# Patient Record
Sex: FEMALE | Race: WHITE | NOT HISPANIC OR LATINO | Employment: OTHER | URBAN - METROPOLITAN AREA
[De-identification: names, ages, dates, MRNs, and addresses within clinical notes are randomized per-mention and may not be internally consistent; named-entity substitution may affect disease eponyms.]

---

## 2017-01-10 ENCOUNTER — GENERIC CONVERSION - ENCOUNTER (OUTPATIENT)
Dept: OTHER | Facility: OTHER | Age: 67
End: 2017-01-10

## 2017-01-23 ENCOUNTER — GENERIC CONVERSION - ENCOUNTER (OUTPATIENT)
Dept: OTHER | Facility: OTHER | Age: 67
End: 2017-01-23

## 2017-02-10 ENCOUNTER — APPOINTMENT (OUTPATIENT)
Dept: LAB | Facility: HOSPITAL | Age: 67
End: 2017-02-10
Payer: MEDICARE

## 2017-02-10 ENCOUNTER — TRANSCRIBE ORDERS (OUTPATIENT)
Dept: LAB | Facility: HOSPITAL | Age: 67
End: 2017-02-10

## 2017-02-10 DIAGNOSIS — E87.5 HYPERPOTASSEMIA: Primary | ICD-10-CM

## 2017-02-10 DIAGNOSIS — E87.5 HYPERPOTASSEMIA: ICD-10-CM

## 2017-02-10 LAB — POTASSIUM SERPL-SCNC: 4.3 MMOL/L (ref 3.5–5.3)

## 2017-02-10 PROCEDURE — 84132 ASSAY OF SERUM POTASSIUM: CPT

## 2017-02-10 PROCEDURE — 36415 COLL VENOUS BLD VENIPUNCTURE: CPT

## 2017-02-28 ENCOUNTER — TRANSCRIBE ORDERS (OUTPATIENT)
Dept: LAB | Facility: HOSPITAL | Age: 67
End: 2017-02-28

## 2017-03-03 ENCOUNTER — APPOINTMENT (OUTPATIENT)
Dept: LAB | Facility: HOSPITAL | Age: 67
End: 2017-03-03
Payer: MEDICARE

## 2017-03-03 DIAGNOSIS — E11.9 TYPE 2 DIABETES MELLITUS WITHOUT COMPLICATIONS (HCC): ICD-10-CM

## 2017-03-03 LAB
ALBUMIN SERPL BCP-MCNC: 3.4 G/DL (ref 3.5–5)
ALP SERPL-CCNC: 86 U/L (ref 46–116)
ALT SERPL W P-5'-P-CCNC: 18 U/L (ref 12–78)
ANION GAP SERPL CALCULATED.3IONS-SCNC: 8 MMOL/L (ref 4–13)
AST SERPL W P-5'-P-CCNC: 10 U/L (ref 5–45)
BILIRUB SERPL-MCNC: 1.26 MG/DL (ref 0.2–1)
BUN SERPL-MCNC: 14 MG/DL (ref 5–25)
CALCIUM SERPL-MCNC: 9.2 MG/DL (ref 8.3–10.1)
CHLORIDE SERPL-SCNC: 108 MMOL/L (ref 100–108)
CHOLEST SERPL-MCNC: 162 MG/DL (ref 50–200)
CO2 SERPL-SCNC: 28 MMOL/L (ref 21–32)
CREAT SERPL-MCNC: 0.78 MG/DL (ref 0.6–1.3)
CREAT UR-MCNC: 327 MG/DL
EST. AVERAGE GLUCOSE BLD GHB EST-MCNC: 151 MG/DL
GFR SERPL CREATININE-BSD FRML MDRD: >60 ML/MIN/1.73SQ M
GLUCOSE SERPL-MCNC: 129 MG/DL (ref 65–140)
HBA1C MFR BLD: 6.9 % (ref 4.2–6.3)
HDLC SERPL-MCNC: 59 MG/DL (ref 40–60)
LDLC SERPL CALC-MCNC: 91 MG/DL (ref 0–100)
MICROALBUMIN UR-MCNC: 139 MG/L (ref 0–20)
MICROALBUMIN/CREAT 24H UR: 43 MG/G CREATININE (ref 0–30)
POTASSIUM SERPL-SCNC: 4.5 MMOL/L (ref 3.5–5.3)
PROT SERPL-MCNC: 7.2 G/DL (ref 6.4–8.2)
SODIUM SERPL-SCNC: 144 MMOL/L (ref 136–145)
TRIGL SERPL-MCNC: 61 MG/DL

## 2017-03-03 PROCEDURE — 80053 COMPREHEN METABOLIC PANEL: CPT

## 2017-03-03 PROCEDURE — 80061 LIPID PANEL: CPT

## 2017-03-03 PROCEDURE — 83036 HEMOGLOBIN GLYCOSYLATED A1C: CPT

## 2017-03-03 PROCEDURE — 82043 UR ALBUMIN QUANTITATIVE: CPT

## 2017-03-03 PROCEDURE — 36415 COLL VENOUS BLD VENIPUNCTURE: CPT

## 2017-03-03 PROCEDURE — 82570 ASSAY OF URINE CREATININE: CPT

## 2017-03-06 ENCOUNTER — ALLSCRIPTS OFFICE VISIT (OUTPATIENT)
Dept: OTHER | Facility: OTHER | Age: 67
End: 2017-03-06

## 2017-03-15 ENCOUNTER — APPOINTMENT (OUTPATIENT)
Dept: LAB | Facility: HOSPITAL | Age: 67
End: 2017-03-15
Payer: MEDICARE

## 2017-03-15 ENCOUNTER — GENERIC CONVERSION - ENCOUNTER (OUTPATIENT)
Dept: OTHER | Facility: OTHER | Age: 67
End: 2017-03-15

## 2017-03-15 DIAGNOSIS — E11.21 TYPE 2 DIABETES MELLITUS WITH DIABETIC NEPHROPATHY (HCC): ICD-10-CM

## 2017-03-15 LAB
ANION GAP SERPL CALCULATED.3IONS-SCNC: 7 MMOL/L (ref 4–13)
BUN SERPL-MCNC: 15 MG/DL (ref 5–25)
CALCIUM SERPL-MCNC: 9 MG/DL (ref 8.3–10.1)
CHLORIDE SERPL-SCNC: 106 MMOL/L (ref 100–108)
CO2 SERPL-SCNC: 31 MMOL/L (ref 21–32)
CREAT SERPL-MCNC: 0.79 MG/DL (ref 0.6–1.3)
GFR SERPL CREATININE-BSD FRML MDRD: >60 ML/MIN/1.73SQ M
GLUCOSE P FAST SERPL-MCNC: 105 MG/DL (ref 65–99)
POTASSIUM SERPL-SCNC: 4.4 MMOL/L (ref 3.5–5.3)
SODIUM SERPL-SCNC: 144 MMOL/L (ref 136–145)

## 2017-03-15 PROCEDURE — 36415 COLL VENOUS BLD VENIPUNCTURE: CPT

## 2017-03-15 PROCEDURE — 80048 BASIC METABOLIC PNL TOTAL CA: CPT

## 2017-04-29 ENCOUNTER — TRANSCRIBE ORDERS (OUTPATIENT)
Dept: LAB | Facility: HOSPITAL | Age: 67
End: 2017-04-29

## 2017-04-29 ENCOUNTER — APPOINTMENT (OUTPATIENT)
Dept: LAB | Facility: HOSPITAL | Age: 67
End: 2017-04-29
Payer: MEDICARE

## 2017-04-29 DIAGNOSIS — L40.50 PSORIATIC ARTHROPATHY (HCC): ICD-10-CM

## 2017-04-29 DIAGNOSIS — L40.50 PSORIATIC ARTHROPATHY (HCC): Primary | ICD-10-CM

## 2017-04-29 LAB
ALBUMIN SERPL BCP-MCNC: 3.4 G/DL (ref 3.5–5)
ALP SERPL-CCNC: 87 U/L (ref 46–116)
ALT SERPL W P-5'-P-CCNC: 20 U/L (ref 12–78)
ANION GAP SERPL CALCULATED.3IONS-SCNC: 6 MMOL/L (ref 4–13)
AST SERPL W P-5'-P-CCNC: 8 U/L (ref 5–45)
BASOPHILS # BLD AUTO: 0.03 THOUSANDS/ΜL (ref 0–0.1)
BASOPHILS NFR BLD AUTO: 1 % (ref 0–1)
BILIRUB SERPL-MCNC: 1.11 MG/DL (ref 0.2–1)
BUN SERPL-MCNC: 15 MG/DL (ref 5–25)
CALCIUM SERPL-MCNC: 9.4 MG/DL (ref 8.3–10.1)
CHLORIDE SERPL-SCNC: 107 MMOL/L (ref 100–108)
CO2 SERPL-SCNC: 31 MMOL/L (ref 21–32)
CREAT SERPL-MCNC: 0.77 MG/DL (ref 0.6–1.3)
CRP SERPL QL: 4.6 MG/L
EOSINOPHIL # BLD AUTO: 0.08 THOUSAND/ΜL (ref 0–0.61)
EOSINOPHIL NFR BLD AUTO: 1 % (ref 0–6)
ERYTHROCYTE [DISTWIDTH] IN BLOOD BY AUTOMATED COUNT: 14.6 % (ref 11.6–15.1)
ERYTHROCYTE [SEDIMENTATION RATE] IN BLOOD: 19 MM/HOUR (ref 0–20)
GFR SERPL CREATININE-BSD FRML MDRD: >60 ML/MIN/1.73SQ M
GLUCOSE P FAST SERPL-MCNC: 130 MG/DL (ref 65–99)
HCT VFR BLD AUTO: 38.9 % (ref 34.8–46.1)
HGB BLD-MCNC: 12.6 G/DL (ref 11.5–15.4)
LYMPHOCYTES # BLD AUTO: 0.95 THOUSANDS/ΜL (ref 0.6–4.47)
LYMPHOCYTES NFR BLD AUTO: 16 % (ref 14–44)
MCH RBC QN AUTO: 30.3 PG (ref 26.8–34.3)
MCHC RBC AUTO-ENTMCNC: 32.4 G/DL (ref 31.4–37.4)
MCV RBC AUTO: 94 FL (ref 82–98)
MONOCYTES # BLD AUTO: 0.36 THOUSAND/ΜL (ref 0.17–1.22)
MONOCYTES NFR BLD AUTO: 6 % (ref 4–12)
NEUTROPHILS # BLD AUTO: 4.49 THOUSANDS/ΜL (ref 1.85–7.62)
NEUTS SEG NFR BLD AUTO: 76 % (ref 43–75)
NRBC BLD AUTO-RTO: 0 /100 WBCS
PLATELET # BLD AUTO: 260 THOUSANDS/UL (ref 149–390)
PMV BLD AUTO: 9.9 FL (ref 8.9–12.7)
POTASSIUM SERPL-SCNC: 4.6 MMOL/L (ref 3.5–5.3)
PROT SERPL-MCNC: 7.1 G/DL (ref 6.4–8.2)
RBC # BLD AUTO: 4.16 MILLION/UL (ref 3.81–5.12)
SODIUM SERPL-SCNC: 144 MMOL/L (ref 136–145)
WBC # BLD AUTO: 5.92 THOUSAND/UL (ref 4.31–10.16)

## 2017-04-29 PROCEDURE — 85652 RBC SED RATE AUTOMATED: CPT

## 2017-04-29 PROCEDURE — 80053 COMPREHEN METABOLIC PANEL: CPT

## 2017-04-29 PROCEDURE — 85025 COMPLETE CBC W/AUTO DIFF WBC: CPT

## 2017-04-29 PROCEDURE — 86140 C-REACTIVE PROTEIN: CPT

## 2017-04-29 PROCEDURE — 36415 COLL VENOUS BLD VENIPUNCTURE: CPT

## 2017-09-12 ENCOUNTER — TRANSCRIBE ORDERS (OUTPATIENT)
Dept: LAB | Facility: HOSPITAL | Age: 67
End: 2017-09-12

## 2017-09-12 ENCOUNTER — APPOINTMENT (OUTPATIENT)
Dept: LAB | Facility: HOSPITAL | Age: 67
End: 2017-09-12
Payer: MEDICARE

## 2017-09-12 DIAGNOSIS — E03.9 UNSPECIFIED HYPOTHYROIDISM: ICD-10-CM

## 2017-09-12 DIAGNOSIS — E03.9 UNSPECIFIED HYPOTHYROIDISM: Primary | ICD-10-CM

## 2017-09-12 DIAGNOSIS — E11.9 TYPE 2 DIABETES MELLITUS WITHOUT COMPLICATIONS (HCC): ICD-10-CM

## 2017-09-12 DIAGNOSIS — E55.9 UNSPECIFIED VITAMIN D DEFICIENCY: ICD-10-CM

## 2017-09-12 LAB
25(OH)D3 SERPL-MCNC: 36.7 NG/ML (ref 30–100)
ALBUMIN SERPL BCP-MCNC: 3.3 G/DL (ref 3.5–5)
ALP SERPL-CCNC: 87 U/L (ref 46–116)
ALT SERPL W P-5'-P-CCNC: 18 U/L (ref 12–78)
ANION GAP SERPL CALCULATED.3IONS-SCNC: 7 MMOL/L (ref 4–13)
AST SERPL W P-5'-P-CCNC: 14 U/L (ref 5–45)
BILIRUB SERPL-MCNC: 0.97 MG/DL (ref 0.2–1)
BUN SERPL-MCNC: 16 MG/DL (ref 5–25)
CALCIUM SERPL-MCNC: 9.2 MG/DL (ref 8.3–10.1)
CHLORIDE SERPL-SCNC: 107 MMOL/L (ref 100–108)
CHOLEST SERPL-MCNC: 141 MG/DL (ref 50–200)
CO2 SERPL-SCNC: 28 MMOL/L (ref 21–32)
CREAT SERPL-MCNC: 0.85 MG/DL (ref 0.6–1.3)
CREAT UR-MCNC: 190 MG/DL
EST. AVERAGE GLUCOSE BLD GHB EST-MCNC: 137 MG/DL
GFR SERPL CREATININE-BSD FRML MDRD: 72 ML/MIN/1.73SQ M
GLUCOSE P FAST SERPL-MCNC: 119 MG/DL (ref 65–99)
HBA1C MFR BLD: 6.4 % (ref 4.2–6.3)
HDLC SERPL-MCNC: 58 MG/DL (ref 40–60)
LDLC SERPL CALC-MCNC: 71 MG/DL (ref 0–100)
MICROALBUMIN UR-MCNC: 104 MG/L (ref 0–20)
MICROALBUMIN/CREAT 24H UR: 55 MG/G CREATININE (ref 0–30)
POTASSIUM SERPL-SCNC: 4.4 MMOL/L (ref 3.5–5.3)
PROT SERPL-MCNC: 7.2 G/DL (ref 6.4–8.2)
SODIUM SERPL-SCNC: 142 MMOL/L (ref 136–145)
TRIGL SERPL-MCNC: 58 MG/DL
TSH SERPL DL<=0.05 MIU/L-ACNC: 1.98 UIU/ML (ref 0.36–3.74)

## 2017-09-12 PROCEDURE — 84443 ASSAY THYROID STIM HORMONE: CPT

## 2017-09-12 PROCEDURE — 36415 COLL VENOUS BLD VENIPUNCTURE: CPT

## 2017-09-12 PROCEDURE — 82306 VITAMIN D 25 HYDROXY: CPT

## 2017-09-12 PROCEDURE — 80053 COMPREHEN METABOLIC PANEL: CPT

## 2017-09-12 PROCEDURE — 82043 UR ALBUMIN QUANTITATIVE: CPT

## 2017-09-12 PROCEDURE — 82570 ASSAY OF URINE CREATININE: CPT

## 2017-09-12 PROCEDURE — 80061 LIPID PANEL: CPT

## 2017-09-12 PROCEDURE — 83036 HEMOGLOBIN GLYCOSYLATED A1C: CPT

## 2017-09-13 DIAGNOSIS — E11.9 TYPE 2 DIABETES MELLITUS WITHOUT COMPLICATIONS (HCC): ICD-10-CM

## 2017-09-13 DIAGNOSIS — Z13.820 ENCOUNTER FOR SCREENING FOR OSTEOPOROSIS: ICD-10-CM

## 2017-09-18 ENCOUNTER — ALLSCRIPTS OFFICE VISIT (OUTPATIENT)
Dept: OTHER | Facility: OTHER | Age: 67
End: 2017-09-18

## 2017-09-19 ENCOUNTER — TRANSCRIBE ORDERS (OUTPATIENT)
Dept: ADMINISTRATIVE | Facility: HOSPITAL | Age: 67
End: 2017-09-19

## 2017-09-19 DIAGNOSIS — Z12.31 ENCOUNTER FOR SCREENING MAMMOGRAM FOR MALIGNANT NEOPLASM OF BREAST: Primary | ICD-10-CM

## 2017-10-17 DIAGNOSIS — Z12.31 ENCOUNTER FOR SCREENING MAMMOGRAM FOR MALIGNANT NEOPLASM OF BREAST: ICD-10-CM

## 2017-10-24 ENCOUNTER — HOSPITAL ENCOUNTER (OUTPATIENT)
Dept: RADIOLOGY | Facility: HOSPITAL | Age: 67
Discharge: HOME | End: 2017-10-24
Attending: OBSTETRICS & GYNECOLOGY
Payer: MEDICARE

## 2017-10-24 DIAGNOSIS — Z12.31 ENCOUNTER FOR SCREENING MAMMOGRAM FOR MALIGNANT NEOPLASM OF BREAST: ICD-10-CM

## 2017-10-24 PROCEDURE — G0202 SCR MAMMO BI INCL CAD: HCPCS

## 2017-10-26 ENCOUNTER — LAB REQUISITION (OUTPATIENT)
Dept: LAB | Facility: HOSPITAL | Age: 67
End: 2017-10-26
Payer: MEDICARE

## 2017-10-26 ENCOUNTER — ALLSCRIPTS OFFICE VISIT (OUTPATIENT)
Dept: OTHER | Facility: OTHER | Age: 67
End: 2017-10-26

## 2017-10-26 DIAGNOSIS — Z01.419 ENCOUNTER FOR GYNECOLOGICAL EXAMINATION WITHOUT ABNORMAL FINDING: ICD-10-CM

## 2017-10-26 PROCEDURE — 87624 HPV HI-RISK TYP POOLED RSLT: CPT | Performed by: OBSTETRICS & GYNECOLOGY

## 2017-10-26 PROCEDURE — G0145 SCR C/V CYTO,THINLAYER,RESCR: HCPCS | Performed by: OBSTETRICS & GYNECOLOGY

## 2017-10-26 NOTE — PROGRESS NOTES
Assessment  1  Diabetic nephropathy (250 40,583 81) (E11 21)  2  Diabetes mellitus, type 2 (250 00) (E11 9)  3  Vitamin D deficiency (268 9) (E55 9)  4  Hypothyroidism (244 9) (E03 9)  5  Caregiver stress (V61 49) (Z63 6)1      Assessment and plan #1 diabetic nephropathy continue with the ACE inhibitor at the current dose unfortunately her blood pressure is already on the low side we cannot increase the ACE inhibitor we will recheck a urine microalbumin in 6 months #2 type 2 diabetes recommended healthy and balanced diet reducing carbohydrates and sweets recommend further weight loss and walking check a comprehensive metabolic panel, hemoglobin A1c, lipid panel number for vitamin D deficiency check vitamin D level #5 hypothyroidism currently euthyroid continue with the current dose of levothyroxine check third generation TSH #6 caregiver stress at this point on the patient does not want medical treatment or counseling no suicidal ideation, I have counseled the patient today she will notify me if her symptoms were to worsen I asked her to make time for herself every day  RTO in 6 months call if any problems  Patient received the Prevnar 13 today, she'll come back in 1 month for her flu shot  1 Amended By: Juan Winter; Sep 18 2017 11:37 AM EST    Plan  Advance directive discussed with patient    · We recommend that you create an advance directive ; Status:Complete - Retrospective  Authorization;   Done: [id number]  Diabetes mellitus, type 2    · (1) COMPREHENSIVE METABOLIC PANEL; Status:Active; Requested for:18Mar2018;    · (1) HEMOGLOBIN A1C; Status:Active; Requested for:18Mar2018;    · (1) LIPID PANEL, FASTING; Status:Active; Requested for:18Mar2018;    · (1) MICROALBUMIN CREATININE RATIO, RANDOM URINE; Status:Active; Requested for:18Mar2018;   Hypothyroidism, Vitamin D deficiency    · (Q) TSH, 3RD GENERATION; Status:Active;  Requested for:18Mar2018;   Vitamin D deficiency    · *(Q) VITAMIN D, 25-HYDROXY, LC/MS/MS; Status:Active; Requested for:18Mar2018;     Chief Complaint  Chief Complaint Chronic Condition [facility]: Patient is here today for follow up of chronic conditions described in HPI  History of Present Illness  HPI: 71-year-old female coming in for a follow-up examination type 2 diabetes, hyperlipidemia, hypothyroidism, vitamin D deficiency, psoriatic arthritis and diabetic neuropathy; the patient reports to me that  with pancreatic ca - some good day and some bad day   The patient does report to me that her  can be very demanding, she reports made that he makes her get things and do things for her all day long  She reports made that at times he will not take his medications, she is becoming frustrated with him because of this  Patient does report to me because his situation she has good days and bad days on her diet  She has been going up and down the steps more frequently taking care of her   She does report caregiver stress symptoms no suicidal ideation      Review of Systems  Complete-Female:   Constitutional: No fever, no chills, feels well, no tiredness, no recent weight gain or weight loss  Cardiovascular: lower extremity edema-- and-- chronic, but-- the heart rate was not slow,-- no chest pain,-- no intermittent leg claudication,-- the heart rate was not fast-- and-- no palpitations  Respiratory: no shortness of breath,-- no cough,-- no wheezing-- and-- no shortness of breath during exertion  Gastrointestinal: No complaints of abdominal pain, no constipation, no nausea or vomiting, no diarrhea, no bloody stools  Psychiatric: anxiety, but-- not suicidal-- and-- no depression  ROS Reviewed:   ROS reviewed  Active Problems  1  Acute deep vein thrombosis of both lower extremities, unspecified vein (453 40) (I82 403)  2  Advance directive discussed with patient (V65 49) (Z67 89)  3  Arthritis (716 90) (M19 90)  4   Bereavement, uncomplicated (S60 51) (Z63 4)  5  Carcinoma in situ of breast, unspecified laterality  6  Chronic venous insufficiency (459 81) (I87 2)  7  Diabetes mellitus, type 2 (250 00) (E11 9)  8  Diabetic nephropathy (250 40,583 81) (E11 21)  9  Encounter for routine gynecological examination (V72 31) (Z01 419)  10  Encounter for screening mammogram for malignant neoplasm of breast (V76 12) (Z12 31)  11  Factor V Leiden mutation (289 81) (D68 51)  12  Hyperlipidemia (272 4) (E78 5)  13  Hypothyroidism (244 9) (E03 9)  14  Influenza vaccine needed (V04 81) (Z23)  15  Malignant neoplasm of breast (174 9) (C50 919)  16  Nontoxic single thyroid nodule (241 0) (E04 1)  17  Pain in right knee (719 46) (M25 561)  18  Pap smear, as part of routine gynecological examination (V76 2) (Z01 419)  19  Prediabetes (790 29) (R73 09)  20  Psoriasis (696 1) (L40 9)  21  Routine Gynecological Exam With Cervical Pap Smear (V72 31)  22  Screening for genitourinary condition (V81 6) (Z13 89)  23  Screening for osteoporosis (V82 81) (Z13 820)  24  Superficial thrombophlebitis of leg, unspecified laterality  25  Urine test positive for microalbuminuria (791 0) (R80 9)  26  Visit for screening mammogram (V76 12) (Z12 31)  27  Vitamin D deficiency (268 9) (E55 9)    Past Medical History  1  History of Encounter for routine gynecological examination (V72 31) (Z01 419)  2  History of Encounter for routine gynecological examination (V72 31) (Z01 419)  3  History of Encounter for routine gynecological examination (V72 31) (Z01 419)  4  History of Pap smear, as part of routine gynecological examination (V76 2) (Z01 419)  5  History of Routine Gynecological Exam With Cervical Pap Smear (V72 31)  Active Problems And Past Medical History Reviewed: The active problems and past medical history were reviewed and updated today  Surgical History  1  History of Endometrial Biopsy By Suction  2  History of Left Breast Lumpectomy  3  History of Oral Surgery Tooth Extraction  4  History of Tubal Ligation  Surgical History Reviewed: The surgical history was reviewed and updated today  Family History  Mother   1  Family history of Breast Cancer (V16 3)  Paternal Grandmother   2  Family history of Breast Cancer (V16 3)  Maternal Aunt   3  Family history of Breast Cancer (V16 3)  Family History   4  Family history of Cancer  5  Family history of Coronary Artery Disease (V17 49)  6  Family history of Diabetes Mellitus (V18 0)  7  Family history of Hypotension  8  Family history of Stroke Syndrome (V17 1)  9  Family history of Thyroid Disorder (V18 19)  Family History Reviewed: The family history was reviewed and updated today  Social History   · Denied: History of Alcohol Use (History)   · Bereavement, uncomplicated (E18 46) (D07 5)   · Denied: History of Drug Use   · Never A Smoker  Social History Reviewed: The social history was reviewed and updated today  The social history was reviewed and is unchanged  Current Meds  1  Aspirin 325 MG Oral Tablet; TAKE 1 TABLET DAILY; Therapy: (Recorded:24Nov2015) to Recorded  2  Calcium + D TABS; Therapy: (Recorded:24Nov2015) to Recorded  3  Folic Acid 1 MG Oral Tablet; TAKE 1 TABLET DAILY; Therapy: (Recorded:24Nov2015) to Recorded  4  Levothyroxine Sodium 75 MCG Oral Tablet; TAKE 1 TABLET DAILY ON AN EMPTY STOMACH; Therapy: 14FVG8491 to (Evaluate:06Apr2017)  Requested for: 98SJM7716; Last Rx:07Mar2017   Ordered  5  Lisinopril 5 MG Oral Tablet; TAKE 1 TABLET DAILY; Therapy: 18UXM8950 to (Evaluate:02Oct2017)  Requested for: 20NJV7571; Last Rx:06Mar2017   Ordered  6  Meloxicam 15 MG Oral Tablet; take 1 tablet daily as needed; Therapy: (Recorded:24Nov2015) to Recorded  7  MetFORMIN HCl - 500 MG Oral Tablet; TAKE 1 TABLET TWICE DAILY WITH MEALS; Therapy: 67YEX8221 to (Evaluate:02Oct2017)  Requested for: 97DTW7372; Last Rx:06Mar2017   Ordered  8  Methotrexate 2 5 MG Oral Tablet; TAKE 8 TABLETS WEEKLY;    Therapy: (Recorded:24Nov2015) to Recorded  9  Simvastatin 20 MG Oral Tablet; Take 1 tablet daily; Therapy: 97PFH5759 to (Evaluate:01Mar2018)  Requested for: 90VNI7635; Last Rx:06Mar2017   Ordered  10  Vitamin D 2000 UNIT Oral Capsule; Therapy: (Recorded:24Nov2015) to Recorded  Medication List Reviewed: The medication list was reviewed and updated today  Allergies  1  No Known Drug Allergies  2  No Known Environmental Allergies  3  No Known Food Allergies    Vitals  Vital Signs    Recorded: 18Sep2017 10:23AM   Heart Rate 72   Respiration 18   Systolic 411, LUE, Sitting   Diastolic 62, LUE, Sitting   BP CUFF SIZE Large   Height 5 ft 5 6 in   Weight 270 lb 0 2 oz   BMI Calculated 44 12   BSA Calculated 2 26     Physical Exam    Constitutional   General appearance: No acute distress, well appearing and well nourished  Eyes   Conjunctiva and lids: No swelling, erythema or discharge  Pupils and irises: Equal, round and reactive to light  Ears, Nose, Mouth, and Throat   External inspection of ears and nose: Normal     Otoscopic examination: Tympanic membranes translucent with normal light reflex  Canals patent without erythema  Nasal mucosa, septum, and turbinates: Normal without edema or erythema  Oropharynx: Normal with no erythema, edema, exudate or lesions  Pulmonary   Respiratory effort: No increased work of breathing or signs of respiratory distress  Auscultation of lungs: Clear to auscultation  Cardiovascular   Auscultation of heart: Normal rate and rhythm, normal S1 and S2, without murmurs  Examination of extremities for edema and/or varicosities: Normal     Abdomen   Abdomen: Non-tender, no masses  Liver and spleen: No hepatomegaly or splenomegaly  Lymphatic   Palpation of lymph nodes in neck: No lymphadenopathy  Psychiatric   Mood and affect: Abnormal   Mood and Affect: anxious-- and-- not depressed          Results/Data  (1) COMPREHENSIVE METABOLIC PANEL [id number] 81:04MW Michelle Arauz Order Number: [id number]     Test Name Result Flag Reference   SODIUM 142 mmol/L  136-145   POTASSIUM 4 4 mmol/L  3 5-5 3   CHLORIDE 107 mmol/L  100-108   CARBON DIOXIDE 28 mmol/L  21-32   ANION GAP (CALC) 7 mmol/L  4-13   BLOOD UREA NITROGEN 16 mg/dL  5-25   CREATININE 0 85 mg/dL  0 60-1 30   Standardized to IDMS reference method   CALCIUM 9 2 mg/dL  8 3-10 1   BILI, TOTAL 0 97 mg/dL  0 20-1 00   ALK PHOSPHATAS 87 U/L     ALT (SGPT) 18 U/L  12-78   Specimen collection should occur prior to Sulfasalazine and/or Sulfapyridine administration due to the potential for falsely depressed results  AST(SGOT) 14 U/L  5-45   Specimen collection should occur prior to Sulfasalazine administration due to the potential for falsely depressed results  ALBUMIN 3 3 g/dL L 3 5-5 0   TOTAL PROTEIN 7 2 g/dL  6 4-8 2   eGFR 72 ml/min/1 73sq [facility] Disease Education Program recommendations are as follows:  GFR calculation is accurate only with a steady state creatinine  Chronic Kidney disease less than 60 ml/min/1 73 sq  meters  Kidney failure less than 15 ml/min/1 73 sq  meters  GLUCOSE FASTING 119 mg/dL H 65-99   Specimen collection should occur prior to Sulfasalazine administration due to the potential for falsely depressed results  Specimen collection should occur prior to Sulfapyridine administration due to the potential for falsely elevated results  (1) HEMOGLOBIN A1C 61Evy1704 08:36AM Michelle Arauz Order Number: [id number]     Test Name Result Flag Reference   HEMOGLOBIN A1C 6 4 % H 4 2-6 3   EST  AVG  GLUCOSE 137 mg/dl       (1) LIPID PANEL, FASTING 29Ern7554 08:36AM [facility] Order Number: [id number]     Test Name Result Flag Reference   CHOLESTEROL 141 mg/dL     HDL,DIRECT 58 mg/dL  40-60   Specimen collection should occur prior to Metamizole administration due to the potential for falsley depressed results  LDL CHOLESTEROL CALCULATED 71 mg/dL  0-100   - Patient Instructions: This is a fasting blood test  Water,black tea or black  coffee only after 9:00pm the night before test   Drink 2 glasses of water the morning of test       Triglyceride:        Normal ??? ??? ??? ??? ??? ??? ??? <150 mg/dl   ??? ??? ???Borderline High ??? ??? 150-199 mg/dl   ??? ??? ? ?? High ??? ??? ??? ??? ??? ??? ??? 200-499 mg/dl   ??? ??? ? ??Very High ??? ??? ??? ??? ??? >499 mg/dl      Cholesterol:       Desirable ??? ??? ??? ??? <200 mg/dl   ??? ??? Borderline High ??? 200-239 mg/dl   ??? ??? High ??? ??? ??? ??? ??? ??? >239 mg/dl      HDL Cholesterol:       High ??? ???>59 mg/dL   ??? ??? Low ??? ??? <41 mg/dL      This screening LDL is a calculated result  It does not have the accuracy of the Direct Measured LDL in the monitoring of patients with hyperlipidemia and/or statin therapy  Direct Measure LDL (QUE833) must be ordered separately in these patients  TRIGLYCERIDES 58 mg/dL  <=150   Specimen collection should occur prior to N-Acetylcysteine or Metamizole administration due to the potential for falsely depressed results  (1) MICROALBUMIN CREATININE RATIO, RANDOM URINE 21Vmb7186 08:36AM [facility]   [facility] Order Number: [id number]     Test Name Result Flag Reference   MICROALBUMIN/ CREAT R 55 mg/g creatinine H 0-30   MICROALBUMIN,URINE 104 0 mg/L H 0 0-20 0   CREATININE URINE 190 0 mg/dL       (1) VITAMIN D 25-HYDROXY 25Eri8238 08:36AM [facility]     Test Name Result Flag Reference   VIT D 25-HYDROX 36 7 ng/mL  30 0-100 0   This assay is a certified procedure of the CDC Vitamin D Standardization Certification Program (VDSCP)     Deficiency <20ng/ml   Insufficiency 20-30ng/ml   Sufficient  ng/ml     *Patients undergoing fluorescein dye angiography may retain small amounts of fluorescein in the body for 48-72 hours post procedure   Samples containing fluorescein can produce falsely elevated Vitamin D values  If the patient had this procedure, a specimen should be resubmitted post fluorescein clearance  (1) TSH [id number] 08:36AM Roxanne Pal     Test Name Result Flag Reference   TSH 1 980 uIU/mL  0 358-3 740   This is a patient instruction: This test is non-fasting  Please drink two glasses of water morning of bloodwork  Patients undergoing fluorescein dye angiography may retain small amounts of fluorescein in the body for 48-72 hours post procedure  Samples containing fluorescein can produce falsely depressed TSH values  If the patient had this procedure,a specimen should be resubmitted post fluorescein clearance  The recommended reference ranges for TSH during pregnancy are as follows:  First trimester 0 1 to 2 5 uIU/mL  Second trimester  0 2 to 3 0 uIU/mL  Third trimester 0 3 to 3 0 uIU/m     (1) CBC/PLT/DIFF 29Apr2017 09:04AM EP[facility], Provider   Test ordered by: Dawn Moya     Test Name Result Flag Reference   WBC COUNT 5 92 Thousand/uL  4 31-10 16   RBC COUNT 4 16 Million/uL  3 81-5 12   HEMOGLOBIN 12 6 g/dL  11 5-15 4   HEMATOCRIT 38 9 %  34 8-46  1   MCV 94 fL  82-98   MCH 30 3 pg  26 8-34 3   MCHC 32 4 g/dL  31 4-37 4   RDW 14 6 %  11 6-15 1   MPV 9 9 fL  8 9-12 7   PLATELET COUNT 008 Thousands/uL  149-390   nRBC AUTOMATED 0 /100 WBCs     NEUTROPHILS RELATIVE PERCENT 76 % H 43-75   LYMPHOCYTES RELATIVE PERCENT 16 %  14-44   MONOCYTES RELATIVE PERCENT 6 %  4-12   EOSINOPHILS RELATIVE PERCENT 1 %  0-6   BASOPHILS RELATIVE PERCENT 1 %  0-1   NEUTROPHILS ABSOLUTE COUNT 4 49 Thousands/? ??L  1 85-7 62   LYMPHOCYTES ABSOLUTE COUNT 0 95 Thousands/? ??L  0 60-4 47   MONOCYTES ABSOLUTE COUNT 0 36 Thousand/? ??L  0 17-1 22   EOSINOPHILS ABSOLUTE COUNT 0 08 Thousand/? ??L  0 00-0 61   BASOPHILS ABSOLUTE COUNT 0 03 Thousands/? ??L  0 00-0 10   This bloodwork is non-fasting  Please drink two glasses of water morning of  bloodwork       Signatures   Electronically signed by : Grant Cisneros ; Sep 18 2017 11:37AM EST                       (Author)    Electronically signed by : Rajani Hess DO; Sep 18 2017 11:37AM EST                       (Author)    Electronically signed by : Rajani Hess DO; Sep 18 2017  8:06PM EST                       (Author)

## 2017-10-27 NOTE — PROGRESS NOTES
Assessment  1  History of Encounter for routine gynecological examination (V72 31) (Z01 419)   2  History of Pap smear, as part of routine gynecological examination (V76 2) (Z01 419)   3  History of screening mammography (V15 89) (Z92 89)   4  Encounter for routine gynecological examination (V72 31) (Z01 419)   5  Pap smear, as part of routine gynecological examination (V76 2) (Z01 419)   6  Visit for screening mammogram (V76 12) (Z12 31)    Plan  Encounter for routine gynecological examination    · Follow-up visit in 1 year Evaluation and Treatment  Follow-up  Status: Hold For -  Scheduling  Requested for: 26Oct2017   Ordered; For: Encounter for routine gynecological examination; Ordered By: Vasyl Okeefe Performed:  Due: 61FRE8304  Pap smear, as part of routine gynecological examination    · (1) THIN PREP PAP WITH IMAGING; Status: In Progress - Specimen/Data  Collected,Retrospective By Protocol Authorization;   Done: [id number]   Perform:[facility] In Office Collection; Order Comments:Cervical/Endocervical; BAB:[id number]; Last Updated Graham Boucher; 10/26/2017 8:25:03 AM;Ordered; For:Pap smear, as part of routine gynecological examination; Ordered By:Rachana Ash; Maturation index required? : No  HPV? : Regardless of Interpretation  Visit for screening mammogram    · * MAMMO SCREENING BILATERAL W CAD; Status:Hold For - Exact  Date,Scheduling,Retrospective By Protocol Authorization; Requested for:Oct 2018; Perform:[facility]; HRE:[id number]; Last Updated Graham Boucher; 10/26/2017 8:25:43 AM;Ordered; For:Visit for screening mammogram; Ordered By:Rachana Ash; Discussion/Summary  healthy adult female Currently, she eats a healthy diet  cervical cancer screening is current Pap test was done today Breast cancer screening: monthly self breast exam was advised, mammogram is current and mammogram has been ordered  Colorectal cancer screening: colorectal cancer screening is current   Advice and education were given regarding nutrition, aerobic exercise, calcium supplements, vitamin D supplements and reproductive health  Normal GYN Exam  Stressed  ordered  SMear DOne  GYN Exam in 1 year  if any problems develop during the interim  The patient has the current Goals: [address]  The patent has the current Barriers: None  Patient is able to Self-Care  PATIENT EDUCATION RECORD She was given the following educational materials:  Ideas for a Healthy Life Style  The treatment plan was reviewed with the patient/guardian  The patient/guardian understands and agrees with the treatment plan     Self Referrals: No      Chief Complaint  Yearly Exam  doing well  No concerns or questions for today  History of Present Illness  HPI: Followed for KIDNEYS & DM    Normal Bowel & Bladder habits  pelvic or abdominal pain   GYN HM, Adult Female [facility] Sero: The patient is being seen for a gynecology evaluation  The last health maintenance visit was 1 year(s) ago  General Health: The patient's health since the last visit is described as good  She has regular dental visits  -- She denies vision problems  -- She denies hearing loss  Lifestyle:  She consumes a diverse and healthy diet  -- She does not have any weight concerns  -- She exercises regularly  -- She does not use tobacco -- She denies drug use  Reproductive health: the patient is postmenopausal    Screening: cancer screening reviewed and current  metabolic screening reviewed and current  risk screening reviewed and current  Review of Systems    Constitutional: No fever, no chills, feels well, no tiredness, no recent weight gain or loss  ENT: no ear ache, no loss of hearing, no nosebleeds or nasal discharge, no sore throat or hoarseness  Cardiovascular: DM, but-- no complaints of slow or fast heart rate, no chest pain, no palpitations, no leg claudication or lower extremity edema     Respiratory: no complaints of shortness of breath, no wheezing, no dyspnea on exertion, no orthopnea or PND  Breasts: no complaints of breast pain, breast lump or nipple discharge  Gastrointestinal: no complaints of abdominal pain, no constipation, no nausea or diarrhea, no vomiting, no bloody stools  Genitourinary: no complaints of dysuria, no incontinence, no pelvic pain, no dysmenorrhea, no vaginal discharge or abnormal vaginal bleeding  Musculoskeletal: no complaints of arthralgia, no myalgia, no joint swelling or stiffness, no limb pain or swelling  Integumentary: no complaints of skin rash or lesion, no itching or dry skin, no skin wounds  Neurological: no complaints of headache, no confusion, no numbness or tingling, no dizziness or fainting  Active Problems  1  Acute deep vein thrombosis of both lower extremities, unspecified vein (453 40)   (I82 403)   2  Advance directive discussed with patient (V65 49) (Z71 89)   3  Arthritis (716 90) (M19 90)   4  Bereavement, uncomplicated (Q47 93) (H20 4)   5  Carcinoma in situ of breast, unspecified laterality   6  Caregiver stress (V61 49) (Z63 6)   7  Chronic venous insufficiency (459 81) (I87 2)   8  Diabetes mellitus, type 2 (250 00) (E11 9)   9  Diabetic nephropathy (250 40,583 81) (E11 21)   10  Encounter for screening mammogram for malignant neoplasm of breast (V76 12)    (Z12 31)   11  Factor V Leiden mutation (289 81) (D68 51)   12  Hyperlipidemia (272 4) (E78 5)   13  Hypothyroidism (244 9) (E03 9)   14  Influenza vaccine needed (V04 81) (Z23)   15  Malignant neoplasm of breast (174 9) (C50 919)   16  Need for pneumococcal vaccination (V03 82) (Z23)   17  Nontoxic single thyroid nodule (241 0) (E04 1)   18  Pain in right knee (719 46) (M25 561)   19  Prediabetes (790 29) (R73 09)   20  Psoriasis (696 1) (L40 9)   21  Routine Gynecological Exam With Cervical Pap Smear (V72 31)   22  Screening for genitourinary condition (V81 6) (Z13 89)   23  Screening for osteoporosis (V82 81) (Z13 820)   24  Superficial thrombophlebitis of leg, unspecified laterality   25  Urine test positive for microalbuminuria (791 0) (R80 9)   26  Vitamin D deficiency (268 9) (E55 9)    Past Medical History   · History of Encounter for routine gynecological examination (V72 31) (Z01 419)   · History of Encounter for routine gynecological examination (V72 31) (Z01 419)   · History of Encounter for routine gynecological examination (V72 31) (Z01 419)   · History of Pap smear, as part of routine gynecological examination (V76 2) (Z01 419)   · History of Routine Gynecological Exam With Cervical Pap Smear (V72 31)    Surgical History   · History of Endometrial Biopsy By Suction   · History of Left Breast Lumpectomy   · History of Oral Surgery Tooth Extraction   · History of Tubal Ligation    Family History  Mother    · Family history of Breast Cancer (V16 3)  Paternal Grandmother    · Family history of Breast Cancer (V16 3)  Maternal Aunt    · Family history of Breast Cancer (V16 3)  Family History    · Family history of Cancer   · Family history of Coronary Artery Disease (V17 49)   · Family history of Diabetes Mellitus (V18 0)   · Family history of Hypotension   · Family history of Stroke Syndrome (V17 1)   · Family history of Thyroid Disorder (V18 19)    Social History   · Denied: History of Alcohol Use (History)   · Bereavement, uncomplicated (D09 19) (P38 8)   · Denied: History of Drug Use   · Never A Smoker    Current Meds   1  Aspirin 325 MG Oral Tablet; TAKE 1 TABLET DAILY; Therapy: (Recorded:24Nov2015) to Recorded   2  Calcium + D TABS; Therapy: (Recorded:24Nov2015) to Recorded   3  Folic Acid 1 MG Oral Tablet; TAKE 1 TABLET DAILY; Therapy: (Recorded:24Nov2015) to Recorded   4  Levothyroxine Sodium 75 MCG Oral Tablet; TAKE 1 TABLET DAILY ON AN EMPTY   STOMACH; Therapy: 22ZHI1700 to (Evaluate:06Apr2017)  Requested for: 58JAK7095; Last   Rx:07Mar2017 Ordered   5  Lisinopril 5 MG Oral Tablet; Take 1 tablet daily;    Therapy: 23OIR5164 to (Evaluate:51Jgn2631)  Requested for: 56KII3632; Last   Rx:17Oct2017 Ordered   6  Meloxicam 15 MG Oral Tablet; take 1 tablet daily as needed; Therapy: (Recorded:24Nov2015) to Recorded   7  MetFORMIN HCl - 500 MG Oral Tablet; TAKE 1 TABLET twice a day with meals; Therapy: 84FRK8715 to (Evaluate:15May2018)  Requested for: 28TJH2979; Last   Rx:17Oct2017 Ordered   8  Methotrexate 2 5 MG Oral Tablet; TAKE 8 TABLETS WEEKLY; Therapy: (Recorded:24Nov2015) to Recorded   9  Simvastatin 20 MG Oral Tablet; Take 1 tablet daily; Therapy: 67NVX1293 to (Evaluate:01Mar2018)  Requested for: 66OYY6078; Last   Rx:06Mar2017 Ordered   10  Vitamin D 2000 UNIT Oral Capsule; Therapy: (Recorded:24Nov2015) to Recorded    Allergies  1  No Known Drug Allergies  2  No Known Environmental Allergies   3  No Known Food Allergies    Vitals   Recorded: 10SWL8692 64:57PT   Systolic 488   Diastolic 70   Height 5 ft 5 6 in   Weight 269 lb 6 oz   BMI Calculated 44 01   BSA Calculated 2 26     Physical Exam    Constitutional   General appearance: No acute distress, well appearing and well nourished  Neck   Neck: Normal, supple, trachea midline, no masses  Thyroid: Normal, no thyromegaly  Pulmonary   Respiratory effort: No increased work of breathing or signs of respiratory distress  Auscultation of lungs: Clear to auscultation  Cardiovascular   Auscultation of heart: Normal rate and rhythm, normal S1 and S2, no murmurs  Peripheral vascular exam: Normal pulses Throughout  Genitourinary   External genitalia: Normal and no lesions appreciated  Vagina: Normal, no lesions or dryness appreciated  Urethra: Normal     Urethral meatus: Normal     Bladder: Normal, soft, non-tender and no prolapse or masses appreciated  Cervix: Normal, no palpable masses  Uterus: Normal, non-tender, not enlarged, and no palpable masses  Adnexa/parametria: Normal, non-tender and no fullness or masses appreciated      Anus, perineum, and rectum: Normal sphincter tone, no masses, and no prolapse  Chest   Breasts: Normal and no dimpling or skin changes noted  Abdomen   Abdomen: Normal, non-tender, and no organomegaly noted  Liver and spleen: No hepatomegaly or splenomegaly  Examination for hernias: No hernias appreciated  Stool sample for occult blood: Negative  Lymphatic   Palpation of lymph nodes in neck, axillae, groin and/or other locations: No lymphadenopathy or masses noted  Skin   Skin and subcutaneous tissue: Normal skin turgor and no rashes      Palpation of skin and subcutaneous tissue: Normal     Psychiatric   Orientation to person, place, and time: Normal     Mood and affect: Normal        Provider Comments  Provider Comments:    treated for Pancreatic CA (Dr Beatriz Brannon)    2 children and 3 grandchildren          Future Appointments    Date/Time Provider Specialty Site   03/20/2018 10:00 AM Yossi Garcia DO [facility]     Signatures   Electronically signed by : Overton Klinefelter, M D ; Oct 26 2017  8:36AM EST                       (Author)

## 2017-10-30 LAB — HPV RRNA GENITAL QL NAA+PROBE: NORMAL

## 2017-11-01 LAB
LAB AP GYN PRIMARY INTERPRETATION: NORMAL
Lab: NORMAL

## 2018-01-12 VITALS
DIASTOLIC BLOOD PRESSURE: 70 MMHG | HEIGHT: 66 IN | SYSTOLIC BLOOD PRESSURE: 116 MMHG | WEIGHT: 269.38 LBS | BODY MASS INDEX: 43.29 KG/M2

## 2018-01-13 VITALS
BODY MASS INDEX: 44.2 KG/M2 | SYSTOLIC BLOOD PRESSURE: 136 MMHG | DIASTOLIC BLOOD PRESSURE: 74 MMHG | HEART RATE: 78 BPM | RESPIRATION RATE: 18 BRPM | WEIGHT: 275.01 LBS | HEIGHT: 66 IN

## 2018-01-13 NOTE — RESULT NOTES
Message   notify the pt normal BMP except mild elevated blood Please have the patient reduce carbohydrates and sweets in the diet and follow up as scheduled        Verified Results  (1) BASIC METABOLIC PROFILE 19BWZ2806 07:25AM Maximino Books   TW Order Number: [id number]  TW Order Number: [id number]     Test Name Result Flag Reference   GLUCOSE,RANDM 114 mg/dL     If the patient is fasting, the ADA then defines impaired fasting glucose as > 100 mg/dL and diabetes as > or equal to 123 mg/dL  SODIUM 142 mmol/L  136-145   POTASSIUM 4 6 mmol/L  3 5-5 3   CHLORIDE 108 mmol/L  100-108   CARBON DIOXIDE 29 mmol/L  21-32   ANION GAP (CALC) 5 mmol/L  4-13   BLOOD UREA NITROGEN 18 mg/dL  5-25   CREATININE 0 77 mg/dL  0 60-1 30   Standardized to IDMS reference method   CALCIUM 9 1 mg/dL  8 3-10 1   eGFR Non-African American      >60 0 ml/min/1 73sq [facility] Disease Education Program recommendations are as follows:  GFR calculation is accurate only with a steady state creatinine  Chronic Kidney disease less than 60 ml/min/1 73 sq  meters  Kidney failure less than 15 ml/min/1 73 sq  meters         Signatures   Electronically signed by : Dianna Borja DO; Jun 8 2016 10:37PM EST                       (Author)

## 2018-01-13 NOTE — RESULT NOTES
Message   Notify the patient the urine microalbumin level was normal follow-up as scheduled        Verified Results  (1) MICROALBUMIN CREATININE RATIO, RANDOM URINE 32Jcl1090 02:53PM Shelly Phalen Order Number: [id number]     Test Name Result Flag Reference   MICROALBUMIN/ CREAT R 22 mg/g creatinine  0-30   MICROALBUMIN,URINE 7 4 mg/L  0 0-20 0   CREATININE URINE 32 9 mg/dL         Signatures   Electronically signed by : Kevin Paulson DO; Sep 13 2016  9:13PM EST                       (Author)

## 2018-01-14 VITALS
WEIGHT: 270.01 LBS | BODY MASS INDEX: 43.39 KG/M2 | HEART RATE: 72 BPM | DIASTOLIC BLOOD PRESSURE: 62 MMHG | RESPIRATION RATE: 18 BRPM | HEIGHT: 66 IN | SYSTOLIC BLOOD PRESSURE: 124 MMHG

## 2018-01-16 NOTE — RESULT NOTES
Message   notify the pt normal basic metabolic profile - reduce sweets and carbohydrates and f/u to discuss as scheduled        Verified Results  (1) BASIC METABOLIC PROFILE 87QDS2946 01:36PM Unique Albert    Order Number: [id number]     Test Name Result Flag Reference   SODIUM 144 mmol/L  136-145   POTASSIUM 4 4 mmol/L  3 5-5 3   CHLORIDE 106 mmol/L  100-108   CARBON DIOXIDE 31 mmol/L  21-32   ANION GAP (CALC) 7 mmol/L  4-13   BLOOD UREA NITROGEN 15 mg/dL  5-25   CREATININE 0 79 mg/dL  0 60-1 30   Standardized to IDMS reference method   CALCIUM 9 0 mg/dL  8 3-10 1   eGFR Non-African American      >60 0 ml/min/1 73sq [facility] Disease Education Program recommendations are as follows:  GFR calculation is accurate only with a steady state creatinine  Chronic Kidney disease less than 60 ml/min/1 73 sq  meters  Kidney failure less than 15 ml/min/1 73 sq  meters     GLUCOSE FASTING 105 mg/dL H 65-99       Signatures   Electronically signed by : Baron Landry DO; Mar 15 2017  8:26PM EST                       (Author)

## 2018-02-26 DIAGNOSIS — E03.9 HYPOTHYROIDISM, UNSPECIFIED TYPE: ICD-10-CM

## 2018-02-26 DIAGNOSIS — E78.5 HYPERLIPIDEMIA, UNSPECIFIED HYPERLIPIDEMIA TYPE: Primary | ICD-10-CM

## 2018-02-26 RX ORDER — SIMVASTATIN 20 MG
TABLET ORAL
Qty: 90 TABLET | Refills: 3 | Status: SHIPPED | OUTPATIENT
Start: 2018-02-26 | End: 2019-02-18 | Stop reason: SDUPTHER

## 2018-02-26 RX ORDER — LEVOTHYROXINE SODIUM 0.07 MG/1
TABLET ORAL
Qty: 90 TABLET | Refills: 3 | Status: SHIPPED | OUTPATIENT
Start: 2018-02-26 | End: 2019-02-18 | Stop reason: SDUPTHER

## 2018-03-12 ENCOUNTER — APPOINTMENT (OUTPATIENT)
Dept: LAB | Facility: HOSPITAL | Age: 68
End: 2018-03-12
Payer: MEDICARE

## 2018-03-12 DIAGNOSIS — E11.9 TYPE 2 DIABETES MELLITUS WITHOUT COMPLICATIONS (HCC): ICD-10-CM

## 2018-03-12 LAB
25(OH)D3 SERPL-MCNC: 35.9 NG/ML (ref 30–100)
ALBUMIN SERPL BCP-MCNC: 3.4 G/DL (ref 3.5–5)
ALP SERPL-CCNC: 84 U/L (ref 46–116)
ALT SERPL W P-5'-P-CCNC: 15 U/L (ref 12–78)
ANION GAP SERPL CALCULATED.3IONS-SCNC: 7 MMOL/L (ref 4–13)
AST SERPL W P-5'-P-CCNC: 9 U/L (ref 5–45)
BILIRUB SERPL-MCNC: 0.81 MG/DL (ref 0.2–1)
BUN SERPL-MCNC: 20 MG/DL (ref 5–25)
CALCIUM SERPL-MCNC: 9.2 MG/DL (ref 8.3–10.1)
CHLORIDE SERPL-SCNC: 107 MMOL/L (ref 100–108)
CHOLEST SERPL-MCNC: 167 MG/DL (ref 50–200)
CO2 SERPL-SCNC: 29 MMOL/L (ref 21–32)
CREAT SERPL-MCNC: 0.79 MG/DL (ref 0.6–1.3)
CREAT UR-MCNC: 138 MG/DL
EST. AVERAGE GLUCOSE BLD GHB EST-MCNC: 143 MG/DL
GFR SERPL CREATININE-BSD FRML MDRD: 78 ML/MIN/1.73SQ M
GLUCOSE P FAST SERPL-MCNC: 114 MG/DL (ref 65–99)
HBA1C MFR BLD: 6.6 % (ref 4.2–6.3)
HDLC SERPL-MCNC: 56 MG/DL (ref 40–60)
LDLC SERPL CALC-MCNC: 100 MG/DL (ref 0–100)
MICROALBUMIN UR-MCNC: 150 MG/L (ref 0–20)
MICROALBUMIN/CREAT 24H UR: 109 MG/G CREATININE (ref 0–30)
POTASSIUM SERPL-SCNC: 5 MMOL/L (ref 3.5–5.3)
PROT SERPL-MCNC: 7.2 G/DL (ref 6.4–8.2)
SODIUM SERPL-SCNC: 143 MMOL/L (ref 136–145)
TRIGL SERPL-MCNC: 56 MG/DL
TSH SERPL DL<=0.05 MIU/L-ACNC: 5.62 UIU/ML (ref 0.36–3.74)

## 2018-03-12 PROCEDURE — 83036 HEMOGLOBIN GLYCOSYLATED A1C: CPT

## 2018-03-12 PROCEDURE — 84443 ASSAY THYROID STIM HORMONE: CPT

## 2018-03-12 PROCEDURE — 36415 COLL VENOUS BLD VENIPUNCTURE: CPT

## 2018-03-12 PROCEDURE — 80053 COMPREHEN METABOLIC PANEL: CPT

## 2018-03-12 PROCEDURE — 80061 LIPID PANEL: CPT

## 2018-03-12 PROCEDURE — 82570 ASSAY OF URINE CREATININE: CPT

## 2018-03-12 PROCEDURE — 82306 VITAMIN D 25 HYDROXY: CPT

## 2018-03-12 PROCEDURE — 82043 UR ALBUMIN QUANTITATIVE: CPT

## 2018-03-12 RX ORDER — MULTIVIT-MIN/IRON/FOLIC ACID/K 18-600-40
1 CAPSULE ORAL DAILY
COMMUNITY

## 2018-03-12 RX ORDER — MELOXICAM 15 MG/1
15 TABLET ORAL DAILY
Refills: 2 | COMMUNITY
Start: 2018-01-24 | End: 2022-02-07 | Stop reason: ALTCHOICE

## 2018-03-12 RX ORDER — LISINOPRIL 5 MG/1
5 TABLET ORAL DAILY
Refills: 6 | COMMUNITY
Start: 2018-02-16 | End: 2018-05-20 | Stop reason: SDUPTHER

## 2018-03-12 RX ORDER — LANOLIN ALCOHOL/MO/W.PET/CERES
CREAM (GRAM) TOPICAL
COMMUNITY

## 2018-03-12 RX ORDER — ASPIRIN 325 MG
1 TABLET ORAL DAILY
COMMUNITY
End: 2020-12-04

## 2018-03-12 RX ORDER — FOLIC ACID 1 MG/1
1 TABLET ORAL DAILY
COMMUNITY

## 2018-03-18 DIAGNOSIS — E11.9 TYPE 2 DIABETES MELLITUS WITHOUT COMPLICATIONS (HCC): ICD-10-CM

## 2018-03-20 ENCOUNTER — OFFICE VISIT (OUTPATIENT)
Dept: INTERNAL MEDICINE CLINIC | Facility: CLINIC | Age: 68
End: 2018-03-20
Payer: MEDICARE

## 2018-03-20 VITALS
WEIGHT: 262.2 LBS | HEIGHT: 67 IN | RESPIRATION RATE: 16 BRPM | OXYGEN SATURATION: 99 % | SYSTOLIC BLOOD PRESSURE: 114 MMHG | BODY MASS INDEX: 41.15 KG/M2 | DIASTOLIC BLOOD PRESSURE: 70 MMHG | HEART RATE: 70 BPM

## 2018-03-20 DIAGNOSIS — E03.9 HYPOTHYROIDISM, UNSPECIFIED TYPE: ICD-10-CM

## 2018-03-20 DIAGNOSIS — E78.5 HYPERLIPIDEMIA, UNSPECIFIED HYPERLIPIDEMIA TYPE: ICD-10-CM

## 2018-03-20 DIAGNOSIS — Z23 NEED FOR VACCINATION FOR ZOSTER: ICD-10-CM

## 2018-03-20 DIAGNOSIS — E13.9 DIABETES 1.5, MANAGED AS TYPE 2 (HCC): ICD-10-CM

## 2018-03-20 DIAGNOSIS — B35.3 TINEA PEDIS OF BOTH FEET: ICD-10-CM

## 2018-03-20 DIAGNOSIS — IMO0001 CLASS 3 OBESITY DUE TO EXCESS CALORIES WITHOUT SERIOUS COMORBIDITY WITH BODY MASS INDEX (BMI) OF 40.0 TO 44.9 IN ADULT: Primary | ICD-10-CM

## 2018-03-20 PROBLEM — E11.21 DIABETIC NEPHROPATHY (HCC): Status: ACTIVE | Noted: 2017-03-06

## 2018-03-20 PROCEDURE — 90471 IMMUNIZATION ADMIN: CPT

## 2018-03-20 PROCEDURE — 99214 OFFICE O/P EST MOD 30 MIN: CPT | Performed by: INTERNAL MEDICINE

## 2018-03-20 PROCEDURE — 90736 HZV VACCINE LIVE SUBQ: CPT

## 2018-03-20 NOTE — PROGRESS NOTES
Assessment/Plan:         Diagnoses and all orders for this visit:    Class 3 obesity due to excess calories without serious comorbidity with body mass index (BMI) of 40 0 to 44 9 in adult [facility])  Comments:  Obesity -I have counseled patient following healthy and balanced diet, I would like the patient to lose weight, I would like the patient exercise routinely; Hyperlipidemia, unspecified hyperlipidemia type  Comments:  Hyperlipidemia controlled continue with current medical regiment recommend a low-cholesterol diet and recommend routine exercise we will continue to monitor     Hypothyroidism, unspecified type  Comments:  Hypothyroidism controlled the patient is currently euthyroid I will be ordering a TSH prior to the next office visit and the patient will continue levothyroxine    Tinea pedis of both feet  Comments:  Diabetic foot examination completed for the patient today, will treat with econazole nitrate cream he would try, diabetic foot care  Orders:  -     econazole nitrate 1 % cream; Apply topically daily    Diabetes 1 5, managed as type 2 ([facility] [address] )  Comments:  I have counselled the pt to follow a healthy and balanced diet ,and recommend routine exercise  Will check diabetic labs  Orders:  -     Comprehensive metabolic panel; Future  -     HEMOGLOBIN A1C W/ EAG ESTIMATION; Future  -     Lipid Panel with Direct LDL reflex; Future  -     Microalbumin / creatinine urine ratio; Future    Need for vaccination for zoster  Comments:  Patient did request the shingles vaccine which she did receive today  Orders: -     Varicella-Zoster Vaccine SQ        Return to office 6 months  call if any problems  Subjective:      Patient ID: Tanya Farley is a 78 y o  female  HPI 71-year old female coming in for a follow up office visit regarding obesity, hyperlipidemia, hypothyroidism, type 2 diabetes, need for shingles vaccine;  The patient reports me compliant taking medications without untoward side effects the  The patient is here to review his medical condition, update me on the medical condition and the patient reports me no hospitalizations and no ER visits  She does report me that her  has recently passed pancreatic cancer she is doing well emotionally she reports me that she is already treated for him  She is starting to move on with her life she does reports me she was having a hard time taking the metformin 2/day (only 50%) she is active ,     The following portions of the patient's history were reviewed and updated as appropriate: current medications, past family history, past medical history, past social history, past surgical history and problem list     Review of Systems   Constitutional: Negative for activity change, appetite change, chills, fever and unexpected weight change  HENT: Negative for hearing loss and postnasal drip  Eyes: Negative for pain  Respiratory: Negative for cough, shortness of breath and wheezing  Cardiovascular: Negative for chest pain  Gastrointestinal: Negative for abdominal distention, abdominal pain, diarrhea, nausea and vomiting  Neurological: Negative for dizziness, light-headedness and headaches  Psychiatric/Behavioral: Negative for suicidal ideas  The patient is not nervous/anxious  Objective:      /70 (BP Location: Right arm, Patient Position: Sitting, Cuff Size: Standard)   Pulse 70   Resp 16   Ht 5' 6 5" (1 689 m)   Wt 119 kg (262 lb 3 2 oz)   SpO2 99%   BMI 41 69 kg/m²          Physical Exam   Constitutional: She appears well-developed and well-nourished  HENT:   Head: Normocephalic  Mouth/Throat: Oropharynx is clear and moist    Eyes: Conjunctivae are normal  Pupils are equal, round, and reactive to light  Right eye exhibits no discharge  Left eye exhibits no discharge  No scleral icterus  Neck: Neck supple  Cardiovascular: Normal rate, regular rhythm, normal heart sounds and intact distal pulses    Exam reveals no gallop and no friction rub  No murmur heard  Pulses:       Dorsalis pedis pulses are 2+ on the right side, and 2+ on the left side  Posterior tibial pulses are 2+ on the right side, and 2+ on the left side  Pulmonary/Chest: Breath sounds normal  No respiratory distress  She has no wheezes  She has no rales  Abdominal: Soft  Bowel sounds are normal  She exhibits no distension and no mass  There is no tenderness  There is no rebound and no guarding  Musculoskeletal: She exhibits no edema or deformity  Feet:   Right Foot:   Skin Integrity: Positive for skin breakdown and dry skin  Negative for ulcer, warmth or callus  Left Foot:   Skin Integrity: Positive for skin breakdown and dry skin  Negative for ulcer, warmth or callus  Lymphadenopathy:     She has no cervical adenopathy  Neurological: She is alert  Coordination normal      Right Foot/Ankle   Right Foot Inspection  Skin Exam: dry skin and maceration skin not intact, no warmth, no callus, no ulcer and no callus                          Toe Exam: no swelling and erythema  Sensory       Monofilament testing: intact  Vascular    The right DP pulse is 2+  The right PT pulse is 2+  Left Foot/Ankle  Left Foot Inspection  Skin Exam: dry skin and macerationskin not intact, no warmth, no ulcer and no callus                         Toe Exam: no swelling and no erythema                   Sensory       Monofilament: intact  Vascular    The left DP pulse is 2+  The left PT pulse is 2+  Assign Risk Category:  No deformity present;  No loss of protective sensation;        Risk: 0

## 2018-04-30 ENCOUNTER — APPOINTMENT (OUTPATIENT)
Dept: LAB | Facility: HOSPITAL | Age: 68
End: 2018-04-30
Payer: MEDICARE

## 2018-04-30 ENCOUNTER — TRANSCRIBE ORDERS (OUTPATIENT)
Dept: LAB | Facility: HOSPITAL | Age: 68
End: 2018-04-30

## 2018-04-30 DIAGNOSIS — L40.9 PSORIASIS: Primary | ICD-10-CM

## 2018-04-30 DIAGNOSIS — L40.9 PSORIASIS: ICD-10-CM

## 2018-04-30 LAB
ALBUMIN SERPL BCP-MCNC: 3.3 G/DL (ref 3.5–5)
ALP SERPL-CCNC: 83 U/L (ref 46–116)
ALT SERPL W P-5'-P-CCNC: 17 U/L (ref 12–78)
ANION GAP SERPL CALCULATED.3IONS-SCNC: 7 MMOL/L (ref 4–13)
AST SERPL W P-5'-P-CCNC: 12 U/L (ref 5–45)
BASOPHILS # BLD AUTO: 0.03 THOUSANDS/ΜL (ref 0–0.1)
BASOPHILS NFR BLD AUTO: 1 % (ref 0–1)
BILIRUB SERPL-MCNC: 0.89 MG/DL (ref 0.2–1)
BUN SERPL-MCNC: 12 MG/DL (ref 5–25)
CALCIUM SERPL-MCNC: 9 MG/DL (ref 8.3–10.1)
CHLORIDE SERPL-SCNC: 108 MMOL/L (ref 100–108)
CO2 SERPL-SCNC: 28 MMOL/L (ref 21–32)
CREAT SERPL-MCNC: 0.77 MG/DL (ref 0.6–1.3)
CRP SERPL QL: 4.5 MG/L
EOSINOPHIL # BLD AUTO: 0.11 THOUSAND/ΜL (ref 0–0.61)
EOSINOPHIL NFR BLD AUTO: 2 % (ref 0–6)
ERYTHROCYTE [DISTWIDTH] IN BLOOD BY AUTOMATED COUNT: 14.7 % (ref 11.6–15.1)
ERYTHROCYTE [SEDIMENTATION RATE] IN BLOOD: 25 MM/HOUR (ref 0–20)
GFR SERPL CREATININE-BSD FRML MDRD: 81 ML/MIN/1.73SQ M
GLUCOSE P FAST SERPL-MCNC: 114 MG/DL (ref 65–99)
HCT VFR BLD AUTO: 37.8 % (ref 34.8–46.1)
HGB BLD-MCNC: 11.8 G/DL (ref 11.5–15.4)
LYMPHOCYTES # BLD AUTO: 0.89 THOUSANDS/ΜL (ref 0.6–4.47)
LYMPHOCYTES NFR BLD AUTO: 15 % (ref 14–44)
MCH RBC QN AUTO: 29.6 PG (ref 26.8–34.3)
MCHC RBC AUTO-ENTMCNC: 31.2 G/DL (ref 31.4–37.4)
MCV RBC AUTO: 95 FL (ref 82–98)
MONOCYTES # BLD AUTO: 0.48 THOUSAND/ΜL (ref 0.17–1.22)
MONOCYTES NFR BLD AUTO: 8 % (ref 4–12)
NEUTROPHILS # BLD AUTO: 4.41 THOUSANDS/ΜL (ref 1.85–7.62)
NEUTS SEG NFR BLD AUTO: 74 % (ref 43–75)
NRBC BLD AUTO-RTO: 0 /100 WBCS
PLATELET # BLD AUTO: 296 THOUSANDS/UL (ref 149–390)
PMV BLD AUTO: 9.9 FL (ref 8.9–12.7)
POTASSIUM SERPL-SCNC: 4.3 MMOL/L (ref 3.5–5.3)
PROT SERPL-MCNC: 7.1 G/DL (ref 6.4–8.2)
RBC # BLD AUTO: 3.99 MILLION/UL (ref 3.81–5.12)
SODIUM SERPL-SCNC: 143 MMOL/L (ref 136–145)
WBC # BLD AUTO: 5.93 THOUSAND/UL (ref 4.31–10.16)

## 2018-04-30 PROCEDURE — 86140 C-REACTIVE PROTEIN: CPT

## 2018-04-30 PROCEDURE — 36415 COLL VENOUS BLD VENIPUNCTURE: CPT

## 2018-04-30 PROCEDURE — 80053 COMPREHEN METABOLIC PANEL: CPT

## 2018-04-30 PROCEDURE — 85025 COMPLETE CBC W/AUTO DIFF WBC: CPT

## 2018-04-30 PROCEDURE — 85652 RBC SED RATE AUTOMATED: CPT

## 2018-05-20 DIAGNOSIS — I10 ESSENTIAL HYPERTENSION: Primary | ICD-10-CM

## 2018-05-20 RX ORDER — LISINOPRIL 5 MG/1
TABLET ORAL
Qty: 30 TABLET | Refills: 6 | Status: SHIPPED | OUTPATIENT
Start: 2018-05-20 | End: 2018-12-20 | Stop reason: SDUPTHER

## 2018-05-31 ENCOUNTER — APPOINTMENT (OUTPATIENT)
Dept: LAB | Facility: HOSPITAL | Age: 68
End: 2018-05-31

## 2018-05-31 ENCOUNTER — TRANSCRIBE ORDERS (OUTPATIENT)
Dept: LAB | Facility: HOSPITAL | Age: 68
End: 2018-05-31

## 2018-05-31 DIAGNOSIS — Z01.84 IMMUNITY STATUS TESTING: Primary | ICD-10-CM

## 2018-05-31 DIAGNOSIS — Z01.84 IMMUNITY STATUS TESTING: ICD-10-CM

## 2018-05-31 LAB — RUBV IGG SERPL IA-ACNC: >175 IU/ML

## 2018-05-31 PROCEDURE — 86735 MUMPS ANTIBODY: CPT

## 2018-05-31 PROCEDURE — 86762 RUBELLA ANTIBODY: CPT

## 2018-05-31 PROCEDURE — 86765 RUBEOLA ANTIBODY: CPT

## 2018-05-31 PROCEDURE — 36415 COLL VENOUS BLD VENIPUNCTURE: CPT

## 2018-05-31 PROCEDURE — 86480 TB TEST CELL IMMUN MEASURE: CPT

## 2018-05-31 PROCEDURE — 86787 VARICELLA-ZOSTER ANTIBODY: CPT

## 2018-06-02 LAB
ANNOTATION COMMENT IMP: NORMAL
GAMMA INTERFERON BACKGROUND BLD IA-ACNC: 0.03 IU/ML
M TB IFN-G BLD-IMP: NEGATIVE
M TB IFN-G CD4+ BCKGRND COR BLD-ACNC: 0.01 IU/ML
M TB IFN-G CD4+ T-CELLS BLD-ACNC: 0.04 IU/ML
MITOGEN IGNF BLD-ACNC: >10 IU/ML
QUANTIFERON-TB GOLD IN TUBE: NORMAL
SERVICE CMNT-IMP: NORMAL

## 2018-06-05 LAB
MEV IGG SER QL: NORMAL
MUV IGG SER QL: NORMAL
VZV IGG SER IA-ACNC: NORMAL

## 2018-07-02 ENCOUNTER — TELEPHONE (OUTPATIENT)
Dept: INTERNAL MEDICINE CLINIC | Facility: CLINIC | Age: 68
End: 2018-07-02

## 2018-07-05 ENCOUNTER — CLINICAL SUPPORT (OUTPATIENT)
Dept: INTERNAL MEDICINE CLINIC | Facility: CLINIC | Age: 68
End: 2018-07-05
Payer: MEDICARE

## 2018-07-05 DIAGNOSIS — Z23 NEED FOR TD VACCINE: Primary | ICD-10-CM

## 2018-07-05 PROCEDURE — 90714 TD VACC NO PRESV 7 YRS+ IM: CPT | Performed by: INTERNAL MEDICINE

## 2018-07-05 PROCEDURE — 90471 IMMUNIZATION ADMIN: CPT | Performed by: INTERNAL MEDICINE

## 2018-08-03 DIAGNOSIS — E13.9 DIABETES 1.5, MANAGED AS TYPE 2 (HCC): Primary | ICD-10-CM

## 2018-08-29 ENCOUNTER — TRANSCRIBE ORDERS (OUTPATIENT)
Dept: OBGYN CLINIC | Facility: CLINIC | Age: 68
End: 2018-08-29

## 2018-08-29 DIAGNOSIS — Z12.31 ENCOUNTER FOR SCREENING MAMMOGRAM FOR MALIGNANT NEOPLASM OF BREAST: Primary | ICD-10-CM

## 2018-08-30 ENCOUNTER — APPOINTMENT (OUTPATIENT)
Dept: LAB | Facility: HOSPITAL | Age: 68
End: 2018-08-30
Payer: MEDICARE

## 2018-08-30 ENCOUNTER — TRANSCRIBE ORDERS (OUTPATIENT)
Dept: LAB | Facility: HOSPITAL | Age: 68
End: 2018-08-30

## 2018-08-30 DIAGNOSIS — L40.59 POLYARTICULAR PSORIATIC ARTHRITIS (HCC): Primary | ICD-10-CM

## 2018-08-30 DIAGNOSIS — E13.9 DIABETES 1.5, MANAGED AS TYPE 2 (HCC): ICD-10-CM

## 2018-08-30 LAB
ALBUMIN SERPL BCP-MCNC: 3.1 G/DL (ref 3.5–5)
ALP SERPL-CCNC: 78 U/L (ref 46–116)
ALT SERPL W P-5'-P-CCNC: 17 U/L (ref 12–78)
ANION GAP SERPL CALCULATED.3IONS-SCNC: 6 MMOL/L (ref 4–13)
AST SERPL W P-5'-P-CCNC: 13 U/L (ref 5–45)
BASOPHILS # BLD AUTO: 0.04 THOUSANDS/ΜL (ref 0–0.1)
BASOPHILS NFR BLD AUTO: 1 % (ref 0–1)
BILIRUB DIRECT SERPL-MCNC: 0.18 MG/DL (ref 0–0.2)
BILIRUB SERPL-MCNC: 0.89 MG/DL (ref 0.2–1)
BUN SERPL-MCNC: 15 MG/DL (ref 5–25)
CALCIUM SERPL-MCNC: 8.8 MG/DL (ref 8.3–10.1)
CHLORIDE SERPL-SCNC: 107 MMOL/L (ref 100–108)
CHOLEST SERPL-MCNC: 143 MG/DL (ref 50–200)
CO2 SERPL-SCNC: 26 MMOL/L (ref 21–32)
CREAT SERPL-MCNC: 0.77 MG/DL (ref 0.6–1.3)
CREAT UR-MCNC: 168 MG/DL
CRP SERPL QL: 6.3 MG/L
EOSINOPHIL # BLD AUTO: 0.09 THOUSAND/ΜL (ref 0–0.61)
EOSINOPHIL NFR BLD AUTO: 2 % (ref 0–6)
ERYTHROCYTE [DISTWIDTH] IN BLOOD BY AUTOMATED COUNT: 14.3 % (ref 11.6–15.1)
ERYTHROCYTE [SEDIMENTATION RATE] IN BLOOD: 32 MM/HOUR (ref 0–20)
EST. AVERAGE GLUCOSE BLD GHB EST-MCNC: 143 MG/DL
GFR SERPL CREATININE-BSD FRML MDRD: 81 ML/MIN/1.73SQ M
GLUCOSE P FAST SERPL-MCNC: 122 MG/DL (ref 65–99)
HBA1C MFR BLD: 6.6 % (ref 4.2–6.3)
HCT VFR BLD AUTO: 37.2 % (ref 34.8–46.1)
HDLC SERPL-MCNC: 53 MG/DL (ref 40–60)
HGB BLD-MCNC: 12.3 G/DL (ref 11.5–15.4)
IMM GRANULOCYTES # BLD AUTO: 0.02 THOUSAND/UL (ref 0–0.2)
IMM GRANULOCYTES NFR BLD AUTO: 0 % (ref 0–2)
LDLC SERPL CALC-MCNC: 78 MG/DL (ref 0–100)
LYMPHOCYTES # BLD AUTO: 0.81 THOUSANDS/ΜL (ref 0.6–4.47)
LYMPHOCYTES NFR BLD AUTO: 14 % (ref 14–44)
MCH RBC QN AUTO: 30.8 PG (ref 26.8–34.3)
MCHC RBC AUTO-ENTMCNC: 33.1 G/DL (ref 31.4–37.4)
MCV RBC AUTO: 93 FL (ref 82–98)
MICROALBUMIN UR-MCNC: 175 MG/L (ref 0–20)
MICROALBUMIN/CREAT 24H UR: 104 MG/G CREATININE (ref 0–30)
MONOCYTES # BLD AUTO: 0.38 THOUSAND/ΜL (ref 0.17–1.22)
MONOCYTES NFR BLD AUTO: 7 % (ref 4–12)
NEUTROPHILS # BLD AUTO: 4.35 THOUSANDS/ΜL (ref 1.85–7.62)
NEUTS SEG NFR BLD AUTO: 76 % (ref 43–75)
NRBC BLD AUTO-RTO: 0 /100 WBCS
PLATELET # BLD AUTO: 299 THOUSANDS/UL (ref 149–390)
PMV BLD AUTO: 10.2 FL (ref 8.9–12.7)
POTASSIUM SERPL-SCNC: 4.1 MMOL/L (ref 3.5–5.3)
PROT SERPL-MCNC: 6.9 G/DL (ref 6.4–8.2)
RBC # BLD AUTO: 3.99 MILLION/UL (ref 3.81–5.12)
SODIUM SERPL-SCNC: 139 MMOL/L (ref 136–145)
TRIGL SERPL-MCNC: 61 MG/DL
WBC # BLD AUTO: 5.69 THOUSAND/UL (ref 4.31–10.16)

## 2018-08-30 PROCEDURE — 83036 HEMOGLOBIN GLYCOSYLATED A1C: CPT

## 2018-08-30 PROCEDURE — 36415 COLL VENOUS BLD VENIPUNCTURE: CPT

## 2018-08-30 PROCEDURE — 86140 C-REACTIVE PROTEIN: CPT

## 2018-08-30 PROCEDURE — 85025 COMPLETE CBC W/AUTO DIFF WBC: CPT

## 2018-08-30 PROCEDURE — 85652 RBC SED RATE AUTOMATED: CPT

## 2018-08-30 PROCEDURE — 80061 LIPID PANEL: CPT

## 2018-08-30 PROCEDURE — 82570 ASSAY OF URINE CREATININE: CPT

## 2018-08-30 PROCEDURE — 82248 BILIRUBIN DIRECT: CPT

## 2018-08-30 PROCEDURE — 80053 COMPREHEN METABOLIC PANEL: CPT

## 2018-08-30 PROCEDURE — 82043 UR ALBUMIN QUANTITATIVE: CPT

## 2018-09-18 ENCOUNTER — TELEPHONE (OUTPATIENT)
Dept: INTERNAL MEDICINE CLINIC | Facility: CLINIC | Age: 68
End: 2018-09-18

## 2018-09-18 ENCOUNTER — OFFICE VISIT (OUTPATIENT)
Dept: INTERNAL MEDICINE CLINIC | Facility: CLINIC | Age: 68
End: 2018-09-18
Payer: MEDICARE

## 2018-09-18 VITALS
DIASTOLIC BLOOD PRESSURE: 70 MMHG | OXYGEN SATURATION: 95 % | HEIGHT: 67 IN | HEART RATE: 72 BPM | WEIGHT: 262 LBS | SYSTOLIC BLOOD PRESSURE: 120 MMHG | BODY MASS INDEX: 41.12 KG/M2

## 2018-09-18 DIAGNOSIS — Z00.00 HEALTHCARE MAINTENANCE: Primary | ICD-10-CM

## 2018-09-18 DIAGNOSIS — Z23 NEED FOR PNEUMOCOCCAL VACCINATION: ICD-10-CM

## 2018-09-18 DIAGNOSIS — E78.5 HYPERLIPIDEMIA, UNSPECIFIED HYPERLIPIDEMIA TYPE: ICD-10-CM

## 2018-09-18 DIAGNOSIS — E03.9 HYPOTHYROIDISM, UNSPECIFIED TYPE: ICD-10-CM

## 2018-09-18 DIAGNOSIS — E11.21 TYPE 2 DIABETES MELLITUS WITH DIABETIC NEPHROPATHY, WITHOUT LONG-TERM CURRENT USE OF INSULIN (HCC): ICD-10-CM

## 2018-09-18 DIAGNOSIS — J02.9 SORE THROAT: ICD-10-CM

## 2018-09-18 DIAGNOSIS — E11.9 TYPE 2 DIABETES MELLITUS WITHOUT COMPLICATION, WITHOUT LONG-TERM CURRENT USE OF INSULIN (HCC): Primary | ICD-10-CM

## 2018-09-18 PROCEDURE — G0009 ADMIN PNEUMOCOCCAL VACCINE: HCPCS

## 2018-09-18 PROCEDURE — 99213 OFFICE O/P EST LOW 20 MIN: CPT | Performed by: NURSE PRACTITIONER

## 2018-09-18 PROCEDURE — G0439 PPPS, SUBSEQ VISIT: HCPCS | Performed by: NURSE PRACTITIONER

## 2018-09-18 PROCEDURE — 90732 PPSV23 VACC 2 YRS+ SUBQ/IM: CPT

## 2018-09-18 NOTE — PROGRESS NOTES
Assessment and Plan:    Problem List Items Addressed This Visit     Diabetes mellitus, type 2 ([facility] )    Hypothyroidism      Other Visit Diagnoses     Healthcare maintenance    -  Primary    Need for pneumococcal vaccination        Relevant Orders    PNEUMOCOCCAL POLYSACCHARIDE VACCINE 23-VALENT =>1YO SQ IM (Completed)    Sore throat            Health Maintenance Due   Topic Date Due    DM Eye Exam  01/10/2018     · Patient Counseling:   ? Nutrition: Stressed importance of a well balanced diet, moderation of sodium/saturated fat, caloric balance and sufficient intake of fiber  ? Exercise: Stressed the importance of regular exercise with a goal of 150 minutes per week  ? Dental Health: Discussed daily flossing and brushing and regular dental visits      · Immunizations reviewed yes, up to date        HPI:  Almaz Peña is a 78 y o  female here for her Subsequent Wellness Visit  Patient Active Problem List   Diagnosis    Diabetes mellitus, type 2 ([place] [address] )    Diabetic nephropathy ([place] [address] )    Hyperlipidemia    Hypothyroidism    Malignant neoplasm of breast ([place] [address] )    Nontoxic single thyroid nodule    Prediabetes    Vitamin D deficiency     History reviewed  No pertinent past medical history  History reviewed  No pertinent surgical history  History reviewed  No pertinent family history    History   Smoking Status    Never Smoker   Smokeless Tobacco    Never Used     History   Alcohol use Not on file      History   Drug use: Unknown       Current Outpatient Prescriptions   Medication Sig Dispense Refill    aspirin 325 mg tablet Take 1 tablet by mouth daily      calcium citrate-vitamin D (CITRACAL+D) 315-200 MG-UNIT per tablet Take by mouth      Cholecalciferol (VITAMIN D) 2000 units CAPS Take 1 capsule by mouth daily      econazole nitrate 1 % cream Apply topically daily 15 g 0    folic acid (FOLVITE) 1 mg tablet Take 1 tablet by mouth daily      levothyroxine 75 mcg tablet TAKE 1 TABLET BY MOUTH EVERY DAY ON EMPTY STOMACH 90 tablet 3    lisinopril (ZESTRIL) 5 mg tablet TAKE 1 TABLET DAILY 30 tablet 6    meloxicam (MOBIC) 15 mg tablet Take 15 mg by mouth daily  2    metFORMIN (GLUCOPHAGE) 500 mg tablet TAKE 1 TABLET TWICE A DAY WITH MEALS 60 tablet 6    methotrexate 2 5 mg tablet TAKE 8 TABS BY MOUTH ONCE WEEKLY  1    simvastatin (ZOCOR) 20 mg tablet TAKE 1 TABLET DAILY 90 tablet 3     No current facility-administered medications for this visit  No Known Allergies  Immunization History   Administered Date(s) Administered    H1N1, All Formulations 10/27/2009    Influenza Quadrivalent Preservative Free 3 years and older IM 10/09/2014, 09/12/2016    Influenza TIV (IM) 11/16/2012, 10/13/2013, 11/20/2015, 10/28/2017    Pneumococcal Conjugate 13-Valent 09/18/2017    Pneumococcal Polysaccharide PPV23 09/18/2018    TD (adult) Preservative Free 07/05/2018    Zoster 03/20/2018     Vitals:    09/18/18 1027   BP: 120/70   Pulse: 72   SpO2: 95%     Patient Care Team:  Kevin Paulson DO as PCP - General    Medicare Screening Tests and Risk Assessments:  Last Medicare Wellness visit information reviewed, patient interviewed, no change since last AWV  Health Risk Assessment:  Patient rates overall health as very good  Patient feels that their physical health rating is Same  Eyesight was rated as Same  Hearing was rated as Same  Patient feels that their emotional and mental health rating is Same  Pain experienced by patient in the last 7 days has been Some  Patient states that she has experienced no weight loss or gain in last 6 months  Emotional/Mental Health:  Patient has been feeling nervous/anxious  PHQ-9 Depression Screening:    Frequency of the following problems over the past two weeks:      1  Little interest or pleasure in doing things: 0 - not at all      2  Feeling down, depressed, or hopeless: 0 - not at all  PHQ-2 Score: 0          Broken Bones/Falls:     Fall Risk Assessment:    In the past year, patient has experienced: No history of falling in past year          Bladder/Bowel:  Patient has not leaked urine accidently in the last six months  Patient reports no loss of bowel control  Immunizations:  Patient has not had a flu vaccination within the last year  Patient has received a pneumonia shot  Patient has received a shingles shot  Patient has received tetanus/diphtheria shot  Home Safety:  Patient does not have trouble with stairs inside or outside of their home  Patient currently reports that there are no safety hazards present in home, working smoke alarms, working carbon monoxide detectors  Preventative Screenings:   Breast cancer screening performed, colon cancer screen completed, cholesterol screen completed, glaucoma eye exam completed,     Nutrition:  Current diet: Diabetic with servings of the following:    Medications:  Patient is currently taking over-the-counter supplements  Patient is able to manage medications  Lifestyle Choices:  Patient reports no tobacco use  Patient has not smoked or used tobacco in the past   Patient reports no alcohol use  Patient drives a vehicle  Patient wears seat belt  Activities of Daily Living:  Can get out of bed by his or her self, able to dress self, able to make own meals, able to do own shopping, able to bathe self, can do own laundry/housekeeping, can manage own money, pay bills and track expenses    Previous Hospitalizations:  No hospitalization or ED visit in past 12 months        Advanced Directives:  Patient has decided on a power of   Patient has completed advanced directive          Preventative Screening/Counseling:      Cardiovascular:      General: Screening Current          Diabetes:      General: Screening Current      Counseling: Healthy Weight, Healthy Diet and Improve Physical Activity          Colorectal Cancer:      General: Screening Current          Breast Cancer:      General: Screening Current          Cervical Cancer:      General: Screening Current          Osteoporosis:      General: Screening Not Indicated          AAA:      General: Screening Not Indicated          Glaucoma:      General: Screening Current          HIV:      General: Screening Not Indicated          Hepatitis C:      General: Screening Not Indicated        Advanced Directives:   Patient has living will for healthcare, patient has an advanced directive       Immunizations:      Pneumococcal: Pneumococcal Due Today

## 2018-09-18 NOTE — PROGRESS NOTES
Assessment/Plan:        Sore Throat   Drink fluids and rest   Tylenol prn pain    Subjective:      Patient ID: Kia Huang is a 78 y o  female  Sore Throat    This is a new problem  The current episode started today  The problem has been unchanged  There has been no fever  The pain is mild  The following portions of the patient's history were reviewed and updated as appropriate: allergies, current medications, past family history, past medical history, past social history, past surgical history and problem list     Review of Systems   Constitutional: Negative  HENT: Positive for sore throat  Eyes: Negative  Respiratory: Negative  Cardiovascular: Negative  Gastrointestinal: Negative  Musculoskeletal: Negative  Neurological: Negative  History reviewed  No pertinent family history  History reviewed  No pertinent past medical history  Social History     Social History    Marital status: /Civil Union     Spouse name: N/A    Number of children: N/A    Years of education: N/A     Occupational History    Not on file       Social History Main Topics    Smoking status: Never Smoker    Smokeless tobacco: Never Used    Alcohol use Not on file    Drug use: Unknown    Sexual activity: Not on file     Other Topics Concern    Not on file     Social History Narrative    No narrative on file       Current Outpatient Prescriptions:     aspirin 325 mg tablet, Take 1 tablet by mouth daily, Disp: , Rfl:     calcium citrate-vitamin D (CITRACAL+D) 315-200 MG-UNIT per tablet, Take by mouth, Disp: , Rfl:     Cholecalciferol (VITAMIN D) 2000 units CAPS, Take 1 capsule by mouth daily, Disp: , Rfl:     econazole nitrate 1 % cream, Apply topically daily, Disp: 15 g, Rfl: 0    folic acid (FOLVITE) 1 mg tablet, Take 1 tablet by mouth daily, Disp: , Rfl:     levothyroxine 75 mcg tablet, TAKE 1 TABLET BY MOUTH EVERY DAY ON EMPTY STOMACH, Disp: 90 tablet, Rfl: 3    lisinopril (ZESTRIL) 5 mg tablet, TAKE 1 TABLET DAILY, Disp: 30 tablet, Rfl: 6    meloxicam (MOBIC) 15 mg tablet, Take 15 mg by mouth daily, Disp: , Rfl: 2    metFORMIN (GLUCOPHAGE) 500 mg tablet, TAKE 1 TABLET TWICE A DAY WITH MEALS, Disp: 60 tablet, Rfl: 6    methotrexate 2 5 mg tablet, TAKE 8 TABS BY MOUTH ONCE WEEKLY, Disp: , Rfl: 1    simvastatin (ZOCOR) 20 mg tablet, TAKE 1 TABLET DAILY, Disp: 90 tablet, Rfl: 3  No Known Allergies      Objective:      /70 (BP Location: Left arm, Patient Position: Sitting, Cuff Size: Large)   Pulse 72   Ht 5' 6 5" (1 689 m)   Wt 119 kg (262 lb)   SpO2 95%   BMI 41 65 kg/m²          Physical Exam   Constitutional: She is oriented to person, place, and time  She appears well-developed and well-nourished  HENT:   Head: Normocephalic and atraumatic  Eyes: Conjunctivae are normal  Pupils are equal, round, and reactive to light  Neck: Normal range of motion  Neck supple  Cardiovascular: Normal rate and regular rhythm  Pulmonary/Chest: Effort normal and breath sounds normal    Abdominal: Soft  Bowel sounds are normal    Musculoskeletal: Normal range of motion  Neurological: She is alert and oriented to person, place, and time  Skin: Skin is warm and dry

## 2018-10-01 DIAGNOSIS — Z12.31 ENCOUNTER FOR SCREENING MAMMOGRAM FOR MALIGNANT NEOPLASM OF BREAST: ICD-10-CM

## 2018-10-25 ENCOUNTER — HOSPITAL ENCOUNTER (OUTPATIENT)
Dept: RADIOLOGY | Facility: HOSPITAL | Age: 68
Discharge: HOME | End: 2018-10-25
Attending: OBSTETRICS & GYNECOLOGY
Payer: MEDICARE

## 2018-10-25 DIAGNOSIS — Z12.31 ENCOUNTER FOR SCREENING MAMMOGRAM FOR MALIGNANT NEOPLASM OF BREAST: ICD-10-CM

## 2018-10-25 PROCEDURE — 77067 SCR MAMMO BI INCL CAD: CPT

## 2018-10-31 ENCOUNTER — ANNUAL EXAM (OUTPATIENT)
Dept: OBGYN CLINIC | Facility: CLINIC | Age: 68
End: 2018-10-31
Payer: MEDICARE

## 2018-10-31 VITALS
WEIGHT: 266 LBS | HEIGHT: 67 IN | DIASTOLIC BLOOD PRESSURE: 76 MMHG | SYSTOLIC BLOOD PRESSURE: 124 MMHG | BODY MASS INDEX: 41.75 KG/M2

## 2018-10-31 DIAGNOSIS — Z12.39 BREAST CANCER SCREENING: Primary | ICD-10-CM

## 2018-10-31 DIAGNOSIS — Z01.419 ENCOUNTER FOR ANNUAL ROUTINE GYNECOLOGICAL EXAMINATION: ICD-10-CM

## 2018-10-31 PROCEDURE — G0101 CA SCREEN;PELVIC/BREAST EXAM: HCPCS | Performed by: OBSTETRICS & GYNECOLOGY

## 2018-10-31 NOTE — PROGRESS NOTES
Assessment/Plan:    No problem-specific Assessment & Plan notes found for this encounter  Normal gynecological physical examination  Self-breast examination stressed  Mammogram ordered  Discussed regular exercise, healthy diet, importance of vitamin D and calcium supplements  Discussed importance of sun block use during periods of prolonged sun exposure  Patient will be seen in 1 year for routine gynecologic and medical examination  Patient will call office for any problems, concerns, or issues which may arise during the interim  Diagnoses and all orders for this visit:    Breast cancer screening  -     Mammo screening bilateral w cad; Future    Encounter for annual routine gynecological examination        Subjective:      Patient ID: Radha Rico is a 78 y o  female  Patient is a 79-year-old female who presents to the office for her annual gynecologic and medical examination  She lost her  to pancreatic cancer this past February  She states that she doing well and in good spirits  She has been busy helping to babysit her grandchildren and is volunteering at the hospital weekly  She has no complaints today  She denies any vaginal bleeding or discharge, pelvic pain, abdominal pain/symptoms, vasomotor symptoms, insomnia, or weight change  She reports normal bowel and bladder habits  Patient has a history of breast cancer that was resolved over a decade ago  She states that she no longer has to go for additional screenings or follow-ups  She denies any breast changes, masses, pain or discharge and does regular self-breast examinations  Her last mammogram was last week and results were benign  She is up-to-date with colonoscopy screening  She has not yet undergone screening for osteoporosis  She is currently being followed and treated for a diabetes mellitus type 2, hyperlipidemia, hypothyroidism, and psoriatic arthritis          The following portions of the patient's history were reviewed and updated as appropriate: allergies, current medications, past family history, past medical history, past social history, past surgical history and problem list     Review of Systems   Constitutional: Negative  Negative for unexpected weight change  HENT: Negative  Eyes: Negative  Respiratory: Negative  Cardiovascular: Negative  Followed for cholesterol   Gastrointestinal: Negative  Negative for abdominal pain, blood in stool, nausea and vomiting  Endocrine: Negative  Followed for thyroid and diabetes   Genitourinary: Negative  Negative for dysuria, frequency, hematuria, urgency, vaginal bleeding and vaginal discharge  Musculoskeletal: Negative  Followed for psoriatic arthritis   Skin: Negative  Neurological: Negative  Psychiatric/Behavioral: Negative  Objective:      /76 (BP Location: Right arm, Patient Position: Sitting, Cuff Size: Standard)   Ht 5' 7" (1 702 m)   Wt 121 kg (266 lb)   BMI 41 66 kg/m²          Physical Exam   Constitutional: She appears well-developed and well-nourished  HENT:   Head: Normocephalic  Eyes: Pupils are equal, round, and reactive to light  Neck: Normal range of motion  Neck supple  Cardiovascular: Normal rate and regular rhythm  Pulmonary/Chest: Effort normal and breath sounds normal  Right breast exhibits no inverted nipple, no mass, no nipple discharge, no skin change and no tenderness  Left breast exhibits no inverted nipple, no mass, no nipple discharge, no skin change and no tenderness  Breasts are symmetrical        Abdominal: Soft  Normal appearance and bowel sounds are normal    Genitourinary: Rectum normal, vagina normal and uterus normal  Rectal exam shows guaiac negative stool  Pelvic exam was performed with patient supine  Right adnexum displays no mass, no tenderness and no fullness  Left adnexum displays no mass, no tenderness and no fullness   No vaginal discharge found  Lymphadenopathy:     She has no cervical adenopathy  She has no axillary adenopathy  Right: No inguinal and no supraclavicular adenopathy present  Left: No inguinal and no supraclavicular adenopathy present  Neurological: She is alert  Skin: Skin is intact  No rash noted  Psychiatric: She has a normal mood and affect   Her speech is normal and behavior is normal  Judgment and thought content normal  Cognition and memory are normal

## 2018-11-29 ENCOUNTER — APPOINTMENT (OUTPATIENT)
Dept: LAB | Facility: HOSPITAL | Age: 68
End: 2018-11-29
Attending: INTERNAL MEDICINE
Payer: MEDICARE

## 2018-11-29 ENCOUNTER — TRANSCRIBE ORDERS (OUTPATIENT)
Dept: LAB | Facility: HOSPITAL | Age: 68
End: 2018-11-29

## 2018-11-29 DIAGNOSIS — L40.59 POLYARTICULAR PSORIATIC ARTHRITIS (HCC): Primary | ICD-10-CM

## 2018-11-29 DIAGNOSIS — L40.59 POLYARTICULAR PSORIATIC ARTHRITIS (HCC): ICD-10-CM

## 2018-11-29 LAB
ALBUMIN SERPL BCP-MCNC: 3.4 G/DL (ref 3.5–5)
ALP SERPL-CCNC: 80 U/L (ref 46–116)
ALT SERPL W P-5'-P-CCNC: 20 U/L (ref 12–78)
AST SERPL W P-5'-P-CCNC: 14 U/L (ref 5–45)
BASOPHILS # BLD AUTO: 0.04 THOUSANDS/ΜL (ref 0–0.1)
BASOPHILS NFR BLD AUTO: 1 % (ref 0–1)
BILIRUB DIRECT SERPL-MCNC: 0.3 MG/DL (ref 0–0.2)
BILIRUB SERPL-MCNC: 1.15 MG/DL (ref 0.2–1)
CREAT SERPL-MCNC: 0.79 MG/DL (ref 0.6–1.3)
CRP SERPL QL: <3 MG/L
EOSINOPHIL # BLD AUTO: 0.06 THOUSAND/ΜL (ref 0–0.61)
EOSINOPHIL NFR BLD AUTO: 1 % (ref 0–6)
ERYTHROCYTE [DISTWIDTH] IN BLOOD BY AUTOMATED COUNT: 14.1 % (ref 11.6–15.1)
ERYTHROCYTE [SEDIMENTATION RATE] IN BLOOD: 23 MM/HOUR (ref 0–20)
GFR SERPL CREATININE-BSD FRML MDRD: 78 ML/MIN/1.73SQ M
HBV CORE AB SER QL: NORMAL
HBV CORE IGM SER QL: NORMAL
HBV SURFACE AG SER QL: NORMAL
HCT VFR BLD AUTO: 37.4 % (ref 34.8–46.1)
HCV AB SER QL: NORMAL
HGB BLD-MCNC: 12 G/DL (ref 11.5–15.4)
IMM GRANULOCYTES # BLD AUTO: 0.01 THOUSAND/UL (ref 0–0.2)
IMM GRANULOCYTES NFR BLD AUTO: 0 % (ref 0–2)
LYMPHOCYTES # BLD AUTO: 0.71 THOUSANDS/ΜL (ref 0.6–4.47)
LYMPHOCYTES NFR BLD AUTO: 14 % (ref 14–44)
MCH RBC QN AUTO: 30.1 PG (ref 26.8–34.3)
MCHC RBC AUTO-ENTMCNC: 32.1 G/DL (ref 31.4–37.4)
MCV RBC AUTO: 94 FL (ref 82–98)
MONOCYTES # BLD AUTO: 0.36 THOUSAND/ΜL (ref 0.17–1.22)
MONOCYTES NFR BLD AUTO: 7 % (ref 4–12)
NEUTROPHILS # BLD AUTO: 4.02 THOUSANDS/ΜL (ref 1.85–7.62)
NEUTS SEG NFR BLD AUTO: 77 % (ref 43–75)
NRBC BLD AUTO-RTO: 0 /100 WBCS
PLATELET # BLD AUTO: 286 THOUSANDS/UL (ref 149–390)
PMV BLD AUTO: 10.2 FL (ref 8.9–12.7)
PROT SERPL-MCNC: 6.9 G/DL (ref 6.4–8.2)
RBC # BLD AUTO: 3.99 MILLION/UL (ref 3.81–5.12)
WBC # BLD AUTO: 5.2 THOUSAND/UL (ref 4.31–10.16)

## 2018-11-29 PROCEDURE — 80076 HEPATIC FUNCTION PANEL: CPT

## 2018-11-29 PROCEDURE — 87340 HEPATITIS B SURFACE AG IA: CPT

## 2018-11-29 PROCEDURE — 86803 HEPATITIS C AB TEST: CPT

## 2018-11-29 PROCEDURE — 36415 COLL VENOUS BLD VENIPUNCTURE: CPT

## 2018-11-29 PROCEDURE — 86704 HEP B CORE ANTIBODY TOTAL: CPT

## 2018-11-29 PROCEDURE — 85652 RBC SED RATE AUTOMATED: CPT

## 2018-11-29 PROCEDURE — 85025 COMPLETE CBC W/AUTO DIFF WBC: CPT

## 2018-11-29 PROCEDURE — 86140 C-REACTIVE PROTEIN: CPT

## 2018-11-29 PROCEDURE — 82565 ASSAY OF CREATININE: CPT

## 2018-11-29 PROCEDURE — 86705 HEP B CORE ANTIBODY IGM: CPT

## 2018-12-20 DIAGNOSIS — I10 ESSENTIAL HYPERTENSION: ICD-10-CM

## 2018-12-20 RX ORDER — LISINOPRIL 5 MG/1
TABLET ORAL
Qty: 30 TABLET | Refills: 6 | Status: SHIPPED | OUTPATIENT
Start: 2018-12-20 | End: 2019-08-13 | Stop reason: SDUPTHER

## 2019-02-18 DIAGNOSIS — E03.9 HYPOTHYROIDISM, UNSPECIFIED TYPE: ICD-10-CM

## 2019-02-18 DIAGNOSIS — E78.5 HYPERLIPIDEMIA, UNSPECIFIED HYPERLIPIDEMIA TYPE: ICD-10-CM

## 2019-02-18 RX ORDER — LEVOTHYROXINE SODIUM 0.07 MG/1
TABLET ORAL
Qty: 90 TABLET | Refills: 3 | Status: SHIPPED | OUTPATIENT
Start: 2019-02-18 | End: 2020-02-15

## 2019-02-18 RX ORDER — SIMVASTATIN 20 MG
TABLET ORAL
Qty: 90 TABLET | Refills: 3 | Status: SHIPPED | OUTPATIENT
Start: 2019-02-18 | End: 2020-02-15

## 2019-03-05 ENCOUNTER — APPOINTMENT (OUTPATIENT)
Dept: LAB | Facility: HOSPITAL | Age: 69
End: 2019-03-05
Payer: MEDICARE

## 2019-03-05 DIAGNOSIS — L40.59 POLYARTICULAR PSORIATIC ARTHRITIS (HCC): Primary | ICD-10-CM

## 2019-03-05 LAB
ALBUMIN SERPL BCP-MCNC: 3.3 G/DL (ref 3.5–5)
ALP SERPL-CCNC: 87 U/L (ref 46–116)
ALT SERPL W P-5'-P-CCNC: 18 U/L (ref 12–78)
ANION GAP SERPL CALCULATED.3IONS-SCNC: 3 MMOL/L (ref 4–13)
AST SERPL W P-5'-P-CCNC: 11 U/L (ref 5–45)
BASOPHILS # BLD AUTO: 0.05 THOUSANDS/ΜL (ref 0–0.1)
BASOPHILS NFR BLD AUTO: 1 % (ref 0–1)
BILIRUB DIRECT SERPL-MCNC: 0.25 MG/DL (ref 0–0.2)
BILIRUB SERPL-MCNC: 1.13 MG/DL (ref 0.2–1)
BUN SERPL-MCNC: 17 MG/DL (ref 5–25)
CALCIUM SERPL-MCNC: 8.9 MG/DL (ref 8.3–10.1)
CHLORIDE SERPL-SCNC: 108 MMOL/L (ref 100–108)
CHOLEST SERPL-MCNC: 156 MG/DL (ref 50–200)
CO2 SERPL-SCNC: 26 MMOL/L (ref 21–32)
CREAT SERPL-MCNC: 0.77 MG/DL (ref 0.6–1.3)
CREAT UR-MCNC: 94.6 MG/DL
CRP SERPL QL: 4.9 MG/L
EOSINOPHIL # BLD AUTO: 0.08 THOUSAND/ΜL (ref 0–0.61)
EOSINOPHIL NFR BLD AUTO: 2 % (ref 0–6)
ERYTHROCYTE [DISTWIDTH] IN BLOOD BY AUTOMATED COUNT: 15.1 % (ref 11.6–15.1)
ERYTHROCYTE [SEDIMENTATION RATE] IN BLOOD: 30 MM/HOUR (ref 0–20)
EST. AVERAGE GLUCOSE BLD GHB EST-MCNC: 154 MG/DL
GFR SERPL CREATININE-BSD FRML MDRD: 80 ML/MIN/1.73SQ M
GLUCOSE P FAST SERPL-MCNC: 119 MG/DL (ref 65–99)
HBA1C MFR BLD: 7 % (ref 4.2–6.3)
HCT VFR BLD AUTO: 35.9 % (ref 34.8–46.1)
HDLC SERPL-MCNC: 58 MG/DL (ref 40–60)
HGB BLD-MCNC: 11.4 G/DL (ref 11.5–15.4)
IMM GRANULOCYTES # BLD AUTO: 0.01 THOUSAND/UL (ref 0–0.2)
IMM GRANULOCYTES NFR BLD AUTO: 0 % (ref 0–2)
LDLC SERPL CALC-MCNC: 84 MG/DL (ref 0–100)
LEFT EYE DIABETIC RETINOPATHY: NORMAL
LYMPHOCYTES # BLD AUTO: 0.75 THOUSANDS/ΜL (ref 0.6–4.47)
LYMPHOCYTES NFR BLD AUTO: 16 % (ref 14–44)
MCH RBC QN AUTO: 29.5 PG (ref 26.8–34.3)
MCHC RBC AUTO-ENTMCNC: 31.8 G/DL (ref 31.4–37.4)
MCV RBC AUTO: 93 FL (ref 82–98)
MICROALBUMIN UR-MCNC: 68.3 MG/L (ref 0–20)
MICROALBUMIN/CREAT 24H UR: 72 MG/G CREATININE (ref 0–30)
MONOCYTES # BLD AUTO: 0.45 THOUSAND/ΜL (ref 0.17–1.22)
MONOCYTES NFR BLD AUTO: 10 % (ref 4–12)
NEUTROPHILS # BLD AUTO: 3.33 THOUSANDS/ΜL (ref 1.85–7.62)
NEUTS SEG NFR BLD AUTO: 71 % (ref 43–75)
NONHDLC SERPL-MCNC: 98 MG/DL
NRBC BLD AUTO-RTO: 0 /100 WBCS
PLATELET # BLD AUTO: 251 THOUSANDS/UL (ref 149–390)
PMV BLD AUTO: 10.4 FL (ref 8.9–12.7)
POTASSIUM SERPL-SCNC: 4.3 MMOL/L (ref 3.5–5.3)
PROT SERPL-MCNC: 7 G/DL (ref 6.4–8.2)
RBC # BLD AUTO: 3.86 MILLION/UL (ref 3.81–5.12)
RIGHT EYE DIABETIC RETINOPATHY: NORMAL
SODIUM SERPL-SCNC: 137 MMOL/L (ref 136–145)
TRIGL SERPL-MCNC: 70 MG/DL
WBC # BLD AUTO: 4.67 THOUSAND/UL (ref 4.31–10.16)

## 2019-03-05 PROCEDURE — 82043 UR ALBUMIN QUANTITATIVE: CPT

## 2019-03-05 PROCEDURE — 85025 COMPLETE CBC W/AUTO DIFF WBC: CPT

## 2019-03-05 PROCEDURE — 82570 ASSAY OF URINE CREATININE: CPT

## 2019-03-05 PROCEDURE — 82248 BILIRUBIN DIRECT: CPT

## 2019-03-05 PROCEDURE — 86140 C-REACTIVE PROTEIN: CPT

## 2019-03-05 PROCEDURE — 80053 COMPREHEN METABOLIC PANEL: CPT

## 2019-03-05 PROCEDURE — 36415 COLL VENOUS BLD VENIPUNCTURE: CPT

## 2019-03-05 PROCEDURE — 83036 HEMOGLOBIN GLYCOSYLATED A1C: CPT

## 2019-03-05 PROCEDURE — 80061 LIPID PANEL: CPT

## 2019-03-05 PROCEDURE — 85652 RBC SED RATE AUTOMATED: CPT

## 2019-03-11 ENCOUNTER — OFFICE VISIT (OUTPATIENT)
Dept: INTERNAL MEDICINE CLINIC | Facility: CLINIC | Age: 69
End: 2019-03-11
Payer: MEDICARE

## 2019-03-11 VITALS
RESPIRATION RATE: 16 BRPM | SYSTOLIC BLOOD PRESSURE: 122 MMHG | WEIGHT: 270.6 LBS | DIASTOLIC BLOOD PRESSURE: 68 MMHG | OXYGEN SATURATION: 99 % | HEART RATE: 72 BPM | HEIGHT: 67 IN | BODY MASS INDEX: 42.47 KG/M2

## 2019-03-11 DIAGNOSIS — E11.9 TYPE 2 DIABETES MELLITUS WITHOUT COMPLICATION, WITHOUT LONG-TERM CURRENT USE OF INSULIN (HCC): Primary | ICD-10-CM

## 2019-03-11 DIAGNOSIS — I10 ESSENTIAL HYPERTENSION: ICD-10-CM

## 2019-03-11 DIAGNOSIS — E78.5 HYPERLIPIDEMIA, UNSPECIFIED HYPERLIPIDEMIA TYPE: ICD-10-CM

## 2019-03-11 DIAGNOSIS — Z13.0 SCREENING, ANEMIA, DEFICIENCY, IRON: ICD-10-CM

## 2019-03-11 DIAGNOSIS — E03.9 HYPOTHYROIDISM, UNSPECIFIED TYPE: ICD-10-CM

## 2019-03-11 PROBLEM — E66.01 CLASS 3 SEVERE OBESITY DUE TO EXCESS CALORIES WITH SERIOUS COMORBIDITY IN ADULT (HCC): Status: ACTIVE | Noted: 2019-03-11

## 2019-03-11 PROBLEM — E66.813 CLASS 3 SEVERE OBESITY DUE TO EXCESS CALORIES WITH SERIOUS COMORBIDITY IN ADULT (HCC): Status: ACTIVE | Noted: 2019-03-11

## 2019-03-11 PROCEDURE — 99214 OFFICE O/P EST MOD 30 MIN: CPT | Performed by: INTERNAL MEDICINE

## 2019-03-11 NOTE — PROGRESS NOTES
Assessment/Plan:    Hypothyroidism  Hypothyroidism controlled the patient is currently euthyroid I will be ordering a TSH prior to the next office visit and the patient will continue with current medical regiment; we will continue to monitor the patient's progress  Type 2 diabetes mellitus without complication, without long-term current use of insulin (HCC)  Lab Results   Component Value Date    HGBA1C 7 0 (H) 03/05/2019       No results for input(s): POCGLU in the last 72 hours  Blood Sugar Average: Last 72 hrs:   I will be ordering diabetic laboratories including comprehensive metabolic panel, hemoglobin A1c, urine microalbumin, lipid panel  Will increase metformin 500 mg in a m  1000 mg with dinner I have asked her to use a reminder on her smart phone to let her know when it is time to take her medication I have counselled the pt to follow a healthy and balanced diet ,and recommend routine exercise  Essential hypertension  Hypertension - controlled, I have counseled patient following healthy balance diet, I would like the patient reduce sodium, exercise routinely, I would like the patient continued the med current medical regiment and we will continue to monitor  Hyperlipidemia  Hyperlipidemia controlled continue with current medical regiment recommend a low-cholesterol diet and recommend routine exercise we will continue to monitor the progress  Screening, anemia, deficiency, iron  Patient has developed a mild drop in hemoglobin will check iron, ferritin I will have the patient go for a colonoscopy she has not had a colonoscopy in 5 years , I would like her to undergo screening for colon cancer    Class 3 severe obesity due to excess calories with serious comorbidity in adult [facility])  Obesity -I have counseled patient following healthy and balanced diet, I would like the patient to lose weight, I would like the patient exercise routinely; we will continue monitor the patient's progress  Problem List Items Addressed This Visit        Endocrine    Type 2 diabetes mellitus without complication, without long-term current use of insulin ([facility] ) - Primary     Lab Results   Component Value Date    HGBA1C 7 0 (H) 03/05/2019       No results for input(s): POCGLU in the last 72 hours  Blood Sugar Average: Last 72 hrs:   I will be ordering diabetic laboratories including comprehensive metabolic panel, hemoglobin A1c, urine microalbumin, lipid panel  Will increase metformin 500 mg in a m  1000 mg with dinner I have asked her to use a reminder on her smart phone to let her know when it is time to take her medication I have counselled the pt to follow a healthy and balanced diet ,and recommend routine exercise  Relevant Orders    Comprehensive metabolic panel    Hemoglobin A1C    Microalbumin / creatinine urine ratio    Lipid Panel with Direct LDL reflex    Hypothyroidism     Hypothyroidism controlled the patient is currently euthyroid I will be ordering a TSH prior to the next office visit and the patient will continue with current medical regiment; we will continue to monitor the patient's progress  Cardiovascular and Mediastinum    Essential hypertension     Hypertension - controlled, I have counseled patient following healthy balance diet, I would like the patient reduce sodium, exercise routinely, I would like the patient continued the med current medical regiment and we will continue to monitor  Other    Hyperlipidemia     Hyperlipidemia controlled continue with current medical regiment recommend a low-cholesterol diet and recommend routine exercise we will continue to monitor the progress           Screening, anemia, deficiency, iron     Patient has developed a mild drop in hemoglobin will check iron, ferritin I will have the patient go for a colonoscopy she has not had a colonoscopy in 5 years , I would like her to undergo screening for colon cancer         Relevant Orders    Ambulatory referral to General Surgery    Iron    Ferritin    CBC (Includes Diff/Plt) (Refl)            Subjective:      Patient ID: Aurelia Sevilla is a 80 y o  female  HPI 71-year old female coming in for a follow up office visit regarding type 2 diabetes, hyperlipidemia, obesity, hypothyroidism, essential hypertension; The patient reports me compliant taking medications without untoward side effects the  The patient is here to review his medical condition, update me on the medical condition and the patient reports me no hospitalizations and no ER visits  She is here to review her laboratories  She reports me following up with Rheumatology regarding the psoriatic arthritis  She does not want to be on a biological agent but will continue with methotrexate  She does report me at times missing her 2nd dose of metformin    The following portions of the patient's history were reviewed and updated as appropriate: allergies, current medications, past family history, past medical history, past social history, past surgical history and problem list     Review of Systems   Constitutional: Negative for activity change, appetite change and unexpected weight change  HENT: Negative for congestion and postnasal drip  Eyes: Negative for visual disturbance  Respiratory: Negative for cough and shortness of breath  Cardiovascular: Negative for chest pain  Gastrointestinal: Negative for abdominal pain, diarrhea, nausea and vomiting  Musculoskeletal: Positive for arthralgias  Neurological: Negative for dizziness, light-headedness and headaches  Hematological: Negative for adenopathy  Objective:    Return in about 6 months (around 9/18/2019)  No results found  No Known Allergies    History reviewed  No pertinent past medical history  History reviewed  No pertinent surgical history    Current Outpatient Medications on File Prior to Visit   Medication Sig Dispense Refill    aspirin 325 mg tablet Take 1 tablet by mouth daily      calcium citrate-vitamin D (CITRACAL+D) 315-200 MG-UNIT per tablet Take by mouth      Cholecalciferol (VITAMIN D) 2000 units CAPS Take 1 capsule by mouth daily      econazole nitrate 1 % cream Apply topically daily 15 g 0    folic acid (FOLVITE) 1 mg tablet Take 1 tablet by mouth daily      levothyroxine 75 mcg tablet TAKE 1 TABLET BY MOUTH EVERY DAY ON EMPTY STOMACH 90 tablet 3    lisinopril (ZESTRIL) 5 mg tablet TAKE 1 TABLET DAILY 30 tablet 6    meloxicam (MOBIC) 15 mg tablet Take 15 mg by mouth daily  2    metFORMIN (GLUCOPHAGE) 500 mg tablet TAKE 1 TABLET TWICE A DAY WITH MEALS 60 tablet 6    methotrexate 2 5 mg tablet TAKE 8 TABS BY MOUTH ONCE WEEKLY  1    simvastatin (ZOCOR) 20 mg tablet TAKE 1 TABLET DAILY 90 tablet 3     No current facility-administered medications on file prior to visit        Family History   Problem Relation Age of Onset    Breast cancer Mother     Ovarian cancer Sister     Lung cancer Sister     Breast cancer Maternal Aunt     Stomach cancer Maternal Uncle     Skin cancer Maternal Uncle     Stomach cancer Paternal Aunt     Breast cancer Paternal Aunt      Social History     Socioeconomic History    Marital status: /Civil Union     Spouse name: Not on file    Number of children: Not on file    Years of education: Not on file    Highest education level: Not on file   Occupational History    Not on file   Social Needs    Financial resource strain: Not on file    Food insecurity:     Worry: Not on file     Inability: Not on file    Transportation needs:     Medical: Not on file     Non-medical: Not on file   Tobacco Use    Smoking status: Never Smoker    Smokeless tobacco: Never Used   Substance and Sexual Activity    Alcohol use: Yes     Comment: rarely    Drug use: No    Sexual activity: Never   Lifestyle    Physical activity:     Days per week: Not on file     Minutes per session: Not on file    Stress: Not on file   Relationships    Social connections:     Talks on phone: Not on file     Gets together: Not on file     Attends Oriental orthodox service: Not on file     Active member of club or organization: Not on file     Attends meetings of clubs or organizations: Not on file     Relationship status: Not on file    Intimate partner violence:     Fear of current or ex partner: Not on file     Emotionally abused: Not on file     Physically abused: Not on file     Forced sexual activity: Not on file   Other Topics Concern    Not on file   Social History Narrative    Not on file     Vitals:    03/11/19 0924   BP: 122/68   Pulse: 72   Resp: 16   SpO2: 99%   Weight: 123 kg (270 lb 9 6 oz)   Height: 5' 7" (1 702 m)     Results for orders placed or performed in visit on 03/05/19    Diabetes Eye Exam   Result Value Ref Range    Right Eye Diabetic Retinopathy None     Left Eye Diabetic Retinopathy None      Weight (last 2 days)     Date/Time   Weight    03/11/19 0924   123 (270 6)            Body mass index is 42 38 kg/m²  BP      Temp      Pulse     Resp      SpO2        Vitals:    03/11/19 0924   Weight: 123 kg (270 lb 9 6 oz)     Vitals:    03/11/19 0924   Weight: 123 kg (270 lb 9 6 oz)       /68   Pulse 72   Resp 16   Ht 5' 7" (1 702 m)   Wt 123 kg (270 lb 9 6 oz)   SpO2 99%   BMI 42 38 kg/m²          Physical Exam   Constitutional: She appears well-developed and well-nourished  HENT:   Head: Normocephalic  Mouth/Throat: Oropharynx is clear and moist    Eyes: Pupils are equal, round, and reactive to light  Conjunctivae are normal  Right eye exhibits no discharge  Left eye exhibits no discharge  No scleral icterus  Neck: Neck supple  Cardiovascular: Normal rate, regular rhythm, normal heart sounds and intact distal pulses  Exam reveals no gallop and no friction rub  No murmur heard  Pulmonary/Chest: Breath sounds normal  No respiratory distress  She has no wheezes  She has no rales  Abdominal: Soft  Bowel sounds are normal  She exhibits no distension and no mass  There is no tenderness  There is no rebound and no guarding  Musculoskeletal: She exhibits no edema or deformity  Lymphadenopathy:     She has no cervical adenopathy  Neurological: She is alert   Coordination normal

## 2019-03-12 NOTE — ASSESSMENT & PLAN NOTE
Patient has developed a mild drop in hemoglobin will check iron, ferritin I will have the patient go for a colonoscopy she has not had a colonoscopy in 5 years , I would like her to undergo screening for colon cancer

## 2019-03-12 NOTE — ASSESSMENT & PLAN NOTE
Lab Results   Component Value Date    HGBA1C 7 0 (H) 03/05/2019       No results for input(s): POCGLU in the last 72 hours  Blood Sugar Average: Last 72 hrs:   I will be ordering diabetic laboratories including comprehensive metabolic panel, hemoglobin A1c, urine microalbumin, lipid panel  Will increase metformin 500 mg in a m  1000 mg with dinner I have asked her to use a reminder on her smart phone to let her know when it is time to take her medication I have counselled the pt to follow a healthy and balanced diet ,and recommend routine exercise

## 2019-04-06 DIAGNOSIS — E13.9 DIABETES 1.5, MANAGED AS TYPE 2 (HCC): ICD-10-CM

## 2019-04-13 ENCOUNTER — APPOINTMENT (OUTPATIENT)
Dept: LAB | Facility: HOSPITAL | Age: 69
End: 2019-04-13
Payer: MEDICARE

## 2019-04-13 ENCOUNTER — TRANSCRIBE ORDERS (OUTPATIENT)
Dept: LAB | Facility: HOSPITAL | Age: 69
End: 2019-04-13

## 2019-04-13 DIAGNOSIS — Z13.0 SCREENING FOR IRON DEFICIENCY ANEMIA: Primary | ICD-10-CM

## 2019-04-13 DIAGNOSIS — Z13.0 SCREENING, ANEMIA, DEFICIENCY, IRON: ICD-10-CM

## 2019-04-13 LAB
BASOPHILS # BLD AUTO: 0.06 THOUSANDS/ΜL (ref 0–0.1)
BASOPHILS NFR BLD AUTO: 1 % (ref 0–1)
EOSINOPHIL # BLD AUTO: 0.1 THOUSAND/ΜL (ref 0–0.61)
EOSINOPHIL NFR BLD AUTO: 2 % (ref 0–6)
ERYTHROCYTE [DISTWIDTH] IN BLOOD BY AUTOMATED COUNT: 14.9 % (ref 11.6–15.1)
FERRITIN SERPL-MCNC: 58 NG/ML (ref 8–388)
HCT VFR BLD AUTO: 39.4 % (ref 34.8–46.1)
HGB BLD-MCNC: 12.2 G/DL (ref 11.5–15.4)
IMM GRANULOCYTES # BLD AUTO: 0.01 THOUSAND/UL (ref 0–0.2)
IMM GRANULOCYTES NFR BLD AUTO: 0 % (ref 0–2)
IRON SERPL-MCNC: 80 UG/DL (ref 50–170)
LYMPHOCYTES # BLD AUTO: 0.98 THOUSANDS/ΜL (ref 0.6–4.47)
LYMPHOCYTES NFR BLD AUTO: 18 % (ref 14–44)
MCH RBC QN AUTO: 29.6 PG (ref 26.8–34.3)
MCHC RBC AUTO-ENTMCNC: 31 G/DL (ref 31.4–37.4)
MCV RBC AUTO: 96 FL (ref 82–98)
MONOCYTES # BLD AUTO: 0.33 THOUSAND/ΜL (ref 0.17–1.22)
MONOCYTES NFR BLD AUTO: 6 % (ref 4–12)
NEUTROPHILS # BLD AUTO: 3.86 THOUSANDS/ΜL (ref 1.85–7.62)
NEUTS SEG NFR BLD AUTO: 73 % (ref 43–75)
NRBC BLD AUTO-RTO: 0 /100 WBCS
PLATELET # BLD AUTO: 301 THOUSANDS/UL (ref 149–390)
PMV BLD AUTO: 10.2 FL (ref 8.9–12.7)
RBC # BLD AUTO: 4.12 MILLION/UL (ref 3.81–5.12)
WBC # BLD AUTO: 5.34 THOUSAND/UL (ref 4.31–10.16)

## 2019-04-13 PROCEDURE — 36415 COLL VENOUS BLD VENIPUNCTURE: CPT

## 2019-04-13 PROCEDURE — 83540 ASSAY OF IRON: CPT

## 2019-04-13 PROCEDURE — 82728 ASSAY OF FERRITIN: CPT

## 2019-04-13 PROCEDURE — 85025 COMPLETE CBC W/AUTO DIFF WBC: CPT

## 2019-06-10 ENCOUNTER — TELEPHONE (OUTPATIENT)
Dept: INTERNAL MEDICINE CLINIC | Facility: CLINIC | Age: 69
End: 2019-06-10

## 2019-06-12 DIAGNOSIS — E13.9 DIABETES 1.5, MANAGED AS TYPE 2 (HCC): Primary | ICD-10-CM

## 2019-06-12 DIAGNOSIS — E11.9 TYPE 2 DIABETES MELLITUS WITHOUT COMPLICATION, WITHOUT LONG-TERM CURRENT USE OF INSULIN (HCC): ICD-10-CM

## 2019-06-12 RX ORDER — METFORMIN HYDROCHLORIDE 500 MG/1
500 TABLET, EXTENDED RELEASE ORAL
Qty: 90 TABLET | Refills: 1 | Status: SHIPPED | OUTPATIENT
Start: 2019-06-12 | End: 2019-10-02 | Stop reason: SDUPTHER

## 2019-08-13 DIAGNOSIS — I10 ESSENTIAL HYPERTENSION: ICD-10-CM

## 2019-08-13 RX ORDER — LISINOPRIL 5 MG/1
TABLET ORAL
Qty: 30 TABLET | Refills: 6 | Status: SHIPPED | OUTPATIENT
Start: 2019-08-13 | End: 2020-03-11

## 2019-09-26 ENCOUNTER — APPOINTMENT (OUTPATIENT)
Dept: LAB | Facility: HOSPITAL | Age: 69
End: 2019-09-26
Payer: MEDICARE

## 2019-09-26 DIAGNOSIS — E11.9 TYPE 2 DIABETES MELLITUS WITHOUT COMPLICATION, WITHOUT LONG-TERM CURRENT USE OF INSULIN (HCC): ICD-10-CM

## 2019-09-26 LAB
ALBUMIN SERPL BCP-MCNC: 3.1 G/DL (ref 3.5–5)
ALP SERPL-CCNC: 92 U/L (ref 46–116)
ALT SERPL W P-5'-P-CCNC: 15 U/L (ref 12–78)
ANION GAP SERPL CALCULATED.3IONS-SCNC: 6 MMOL/L (ref 4–13)
AST SERPL W P-5'-P-CCNC: 8 U/L (ref 5–45)
BILIRUB SERPL-MCNC: 0.92 MG/DL (ref 0.2–1)
BUN SERPL-MCNC: 18 MG/DL (ref 5–25)
CALCIUM SERPL-MCNC: 9 MG/DL (ref 8.3–10.1)
CHLORIDE SERPL-SCNC: 111 MMOL/L (ref 100–108)
CHOLEST SERPL-MCNC: 146 MG/DL (ref 50–200)
CO2 SERPL-SCNC: 23 MMOL/L (ref 21–32)
CREAT SERPL-MCNC: 0.81 MG/DL (ref 0.6–1.3)
CREAT UR-MCNC: 198 MG/DL
EST. AVERAGE GLUCOSE BLD GHB EST-MCNC: 166 MG/DL
GFR SERPL CREATININE-BSD FRML MDRD: 75 ML/MIN/1.73SQ M
GLUCOSE P FAST SERPL-MCNC: 133 MG/DL (ref 65–99)
HBA1C MFR BLD: 7.4 % (ref 4.2–6.3)
HDLC SERPL-MCNC: 52 MG/DL (ref 40–60)
LDLC SERPL CALC-MCNC: 82 MG/DL (ref 0–100)
MICROALBUMIN UR-MCNC: 64.8 MG/L (ref 0–20)
MICROALBUMIN/CREAT 24H UR: 33 MG/G CREATININE (ref 0–30)
POTASSIUM SERPL-SCNC: 4.1 MMOL/L (ref 3.5–5.3)
PROT SERPL-MCNC: 6.8 G/DL (ref 6.4–8.2)
SODIUM SERPL-SCNC: 140 MMOL/L (ref 136–145)
TRIGL SERPL-MCNC: 61 MG/DL

## 2019-09-26 PROCEDURE — 82043 UR ALBUMIN QUANTITATIVE: CPT

## 2019-09-26 PROCEDURE — 80061 LIPID PANEL: CPT

## 2019-09-26 PROCEDURE — 36415 COLL VENOUS BLD VENIPUNCTURE: CPT

## 2019-09-26 PROCEDURE — 83036 HEMOGLOBIN GLYCOSYLATED A1C: CPT

## 2019-09-26 PROCEDURE — 80053 COMPREHEN METABOLIC PANEL: CPT

## 2019-09-26 PROCEDURE — 82570 ASSAY OF URINE CREATININE: CPT

## 2019-10-01 ENCOUNTER — OFFICE VISIT (OUTPATIENT)
Dept: INTERNAL MEDICINE CLINIC | Facility: CLINIC | Age: 69
End: 2019-10-01
Payer: MEDICARE

## 2019-10-01 VITALS
WEIGHT: 274.4 LBS | OXYGEN SATURATION: 98 % | SYSTOLIC BLOOD PRESSURE: 124 MMHG | RESPIRATION RATE: 16 BRPM | DIASTOLIC BLOOD PRESSURE: 72 MMHG | HEIGHT: 67 IN | HEART RATE: 75 BPM | BODY MASS INDEX: 43.07 KG/M2

## 2019-10-01 DIAGNOSIS — E11.9 TYPE 2 DIABETES MELLITUS WITHOUT COMPLICATION, WITHOUT LONG-TERM CURRENT USE OF INSULIN (HCC): ICD-10-CM

## 2019-10-01 DIAGNOSIS — Z00.00 MEDICARE ANNUAL WELLNESS VISIT, SUBSEQUENT: ICD-10-CM

## 2019-10-01 DIAGNOSIS — I10 ESSENTIAL HYPERTENSION: ICD-10-CM

## 2019-10-01 DIAGNOSIS — E66.01 CLASS 3 SEVERE OBESITY DUE TO EXCESS CALORIES WITH SERIOUS COMORBIDITY AND BODY MASS INDEX (BMI) OF 40.0 TO 44.9 IN ADULT (HCC): ICD-10-CM

## 2019-10-01 DIAGNOSIS — E78.5 HYPERLIPIDEMIA, UNSPECIFIED HYPERLIPIDEMIA TYPE: ICD-10-CM

## 2019-10-01 DIAGNOSIS — E04.9 ENLARGED THYROID: ICD-10-CM

## 2019-10-01 DIAGNOSIS — Z13.820 SCREENING FOR OSTEOPOROSIS: Primary | ICD-10-CM

## 2019-10-01 PROCEDURE — 99214 OFFICE O/P EST MOD 30 MIN: CPT | Performed by: INTERNAL MEDICINE

## 2019-10-01 PROCEDURE — G0439 PPPS, SUBSEQ VISIT: HCPCS | Performed by: INTERNAL MEDICINE

## 2019-10-01 RX ORDER — METFORMIN HYDROCHLORIDE 1000 MG/1
1000 TABLET, FILM COATED, EXTENDED RELEASE ORAL
Qty: 90 TABLET | Refills: 3 | Status: SHIPPED | OUTPATIENT
Start: 2019-10-01 | End: 2020-07-27 | Stop reason: SDUPTHER

## 2019-10-01 NOTE — PROGRESS NOTES
Assessment and Plan:     Problem List Items Addressed This Visit        Endocrine    Type 2 diabetes mellitus without complication, without long-term current use of insulin (HCC)    Relevant Medications    metFORMIN (GLUMETZA) 1000 MG (MOD) 24 hr tablet    Other Relevant Orders    Diabetic foot exam    Comprehensive metabolic panel    Hemoglobin A1C       Other    Medicare annual wellness visit, subsequent     Assessment and plan 1  Medicare subsequent annual wellness examination overall the patient is clinically stable and doing well, we encouraged the patient to follow a healthy and balanced diet  We recommend that the patient exercise routinely approximately 30 minutes 5 times per week   We have reviewed the patient's vaccines and have made recommendations for updates if necessary consider the new shingles vaccine recommend flu      We will be ordering screening laboratories which are age appropriate  Return to the office in      call if any problems  Other Visit Diagnoses     Screening for osteoporosis    -  Primary    Relevant Orders    DXA bone density spine hip and pelvis    Enlarged thyroid        Relevant Orders    US thyroid    TSH, 3rd generation           Preventive health issues were discussed with patient, and age appropriate screening tests were ordered as noted in patient's After Visit Summary  Personalized health advice and appropriate referrals for health education or preventive services given if needed, as noted in patient's After Visit Summary       History of Present Illness:     Patient presents for Medicare Annual Wellness visit    Patient Care Team:  [facility] as PCP - General     Problem List:     Patient Active Problem List   Diagnosis    Type 2 diabetes mellitus without complication, without long-term current use of insulin ([facility] [address] )    Diabetic nephropathy ([facility] [address] )    Hyperlipidemia    Hypothyroidism    Malignant neoplasm of breast ([facility] [address] )    Nontoxic single thyroid nodule    Prediabetes    Vitamin D deficiency    Essential hypertension    Screening, anemia, deficiency, iron    Class 3 severe obesity due to excess calories with serious comorbidity in adult [facility])    Medicare annual wellness visit, subsequent      Past Medical and Surgical History:     History reviewed  No pertinent past medical history  History reviewed  No pertinent surgical history     Family History:     Family History   Problem Relation Age of Onset    Breast cancer Mother     Ovarian cancer Sister     Lung cancer Sister     Breast cancer Maternal Aunt     Stomach cancer Maternal Uncle     Skin cancer Maternal Uncle     Stomach cancer Paternal [de-identified]     Breast cancer Paternal Aunt       Social History:     Social History     Socioeconomic History    Marital status: /Civil Union     Spouse name: None    Number of children: None    Years of education: None    Highest education level: None   Occupational History    None   Social Needs    Financial resource strain: None    Food insecurity:     Worry: None     Inability: None    Transportation needs:     Medical: None     Non-medical: None   Tobacco Use    Smoking status: Never Smoker    Smokeless tobacco: Never Used   Substance and Sexual Activity    Alcohol use: Yes     Comment: rarely    Drug use: No    Sexual activity: Never   Lifestyle    Physical activity:     Days per week: None     Minutes per session: None    Stress: None   Relationships    Social connections:     Talks on phone: None     Gets together: None     Attends [employer] service: None     Active member of club or organization: None     Attends meetings of clubs or organizations: None     Relationship status: None    Intimate partner violence:     Fear of current or ex partner: None     Emotionally abused: None     Physically abused: None     Forced sexual activity: None   Other Topics Concern    None   Social History Narrative    None Medications and Allergies:     Current Outpatient Medications   Medication Sig Dispense Refill    aspirin 325 mg tablet Take 1 tablet by mouth daily      calcium citrate-vitamin D (CITRACAL+D) 315-200 MG-UNIT per tablet Take by mouth      Cholecalciferol (VITAMIN D) 2000 units CAPS Take 1 capsule by mouth daily      econazole nitrate 1 % cream Apply topically daily 15 g 0    folic acid (FOLVITE) 1 mg tablet Take 1 tablet by mouth daily      levothyroxine 75 mcg tablet TAKE 1 TABLET BY MOUTH EVERY DAY ON EMPTY STOMACH 90 tablet 3    lisinopril (ZESTRIL) 5 mg tablet TAKE 1 TABLET DAILY 30 tablet 6    meloxicam (MOBIC) 15 mg tablet Take 15 mg by mouth daily  2    metFORMIN (GLUCOPHAGE-XR) 500 mg 24 hr tablet Take 1 tablet (500 mg total) by mouth daily with dinner 90 tablet 1    methotrexate 2 5 mg tablet TAKE 8 TABS BY MOUTH ONCE WEEKLY  1    simvastatin (ZOCOR) 20 mg tablet TAKE 1 TABLET DAILY 90 tablet 3    metFORMIN (GLUMETZA) 1000 MG (MOD) 24 hr tablet Take 1 tablet (1,000 mg total) by mouth daily with dinner 90 tablet 3     No current facility-administered medications for this visit        No Known Allergies   Immunizations:     Immunization History   Administered Date(s) Administered    H1N1, All Formulations 10/27/2009    Influenza Quadrivalent Preservative Free 3 years and older IM 10/09/2014, 09/12/2016    Influenza TIV (IM) 11/16/2012, 10/13/2013, 11/20/2015, 10/28/2017    Influenza, high dose seasonal 0 5 mL 10/19/2018    Pneumococcal Conjugate 13-Valent 09/18/2017    Pneumococcal Polysaccharide PPV23 09/18/2018    TD (adult) Preservative Free 07/05/2018    Zoster 03/20/2018      Health Maintenance:         Topic Date Due    MAMMOGRAM  10/25/2020    CRC Screening: Colonoscopy  10/06/2024    Hepatitis C Screening  Completed         Topic Date Due    HEPATITIS B VACCINES (1 of 3 - Risk 3-dose series) 11/15/1970      Medicare Health Risk Assessment:     /72   Pulse 75   Resp 16 Ht 5' 7" (1 702 m)   Wt 124 kg (274 lb 6 4 oz)   SpO2 98%   BMI 42 98 kg/m²      Esha Posadas is here for her Subsequent Wellness visit  Health Risk Assessment:   Patient rates overall health as good  Patient feels that their physical health rating is same  Eyesight was rated as same  Hearing was rated as same  Patient feels that their emotional and mental health rating is same  Pain experienced in the last 7 days has been none  Patient states that she has experienced no weight loss or gain in last 6 months  Depression Screening:   PHQ-2 Score: 0      Fall Risk Screening: In the past year, patient has experienced: no history of falling in past year      Urinary Incontinence Screening:   Patient has not leaked urine accidently in the last six months  Home Safety:  Patient does not have trouble with stairs inside or outside of their home  Patient has working smoke alarms and has no working carbon monoxide detector  Home safety hazards include: none  Nutrition:   Current diet is Regular  Medications:   Patient is currently taking over-the-counter supplements  OTC medications include: see medication list  Patient is able to manage medications  Activities of Daily Living (ADLs)/Instrumental Activities of Daily Living (IADLs):   Walk and transfer into and out of bed and chair?: Yes  Dress and groom yourself?: Yes    Bathe or shower yourself?: Yes    Feed yourself? Yes  Do your laundry/housekeeping?: Yes  Manage your money, pay your bills and track your expenses?: Yes  Make your own meals?: Yes    Do your own shopping?: Yes    Previous Hospitalizations:   Any hospitalizations or ED visits within the last 12 months?: No      Advance Care Planning:   Living will: Yes    Durable POA for healthcare:  Yes    Advanced directive: Yes      Cognitive Screening:   Provider or family/friend/caregiver concerned regarding cognition?: No    PREVENTIVE SCREENINGS      Cardiovascular Screening:    General: Screening Not Indicated and History Lipid Disorder      Diabetes Screening:     General: Screening Not Indicated and History Diabetes      Colorectal Cancer Screening:     General: Screening Current      Breast Cancer Screening:     General: History Breast Cancer      Cervical Cancer Screening:    General: Screening Not Indicated      Lung Cancer Screening:     General: Screening Not Indicated      Hepatitis C Screening:    General: Screening Current    Other Counseling Topics:   Car/seat belt/driving safety, sunscreen and calcium and vitamin D intake         Alexander Cons, DO

## 2019-10-01 NOTE — PATIENT INSTRUCTIONS
Medicare Preventive Visit Patient Instructions  Thank you for completing your Welcome to Medicare Visit or Medicare Annual Wellness Visit today  Your next wellness visit will be due in one year (10/1/2020)  The screening/preventive services that you may require over the next 5-10 years are detailed below  Some tests may not apply to you based off risk factors and/or age  Screening tests ordered at today's visit but not completed yet may show as past due  Also, please note that scanned in results may not display below  Preventive Screenings:  Service Recommendations Previous Testing/Comments   Colorectal Cancer Screening  * Colonoscopy    * Fecal Occult Blood Test (FOBT)/Fecal Immunochemical Test (FIT)  * Fecal DNA/Cologuard Test  * Flexible Sigmoidoscopy Age: 54-65 years old   Colonoscopy: every 10 years (may be performed more frequently if at higher risk)  OR  FOBT/FIT: every 1 year  OR  Cologuard: every 3 years  OR  Sigmoidoscopy: every 5 years  Screening may be recommended earlier than age 48 if at higher risk for colorectal cancer  Also, an individualized decision between you and your healthcare provider will decide whether screening between the ages of 74-80 would be appropriate  Colonoscopy: 10/06/2014  FOBT/FIT: Not on file  Cologuard: Not on file  Sigmoidoscopy: Not on file    Screening Current     Breast Cancer Screening Age: 36 years old  Frequency: every 1-2 years  Not required if history of left and right mastectomy Mammogram: 10/25/2018    History Breast Cancer   Cervical Cancer Screening Between the ages of 21-29, pap smear recommended once every 3 years  Between the ages of 33-67, can perform pap smear with HPV co-testing every 5 years     Recommendations may differ for women with a history of total hysterectomy, cervical cancer, or abnormal pap smears in past  Pap Smear: 10/31/2018    Screening Not Indicated   Hepatitis C Screening Once for adults born between 1945 and 1965  More frequently in patients at high risk for Hepatitis C Hep C Antibody: 11/29/2018    Screening Current   Diabetes Screening 1-2 times per year if you're at risk for diabetes or have pre-diabetes Fasting glucose: 133 mg/dL   A1C: 7 4 %    Screening Not Indicated  History Diabetes   Cholesterol Screening Once every 5 years if you don't have a lipid disorder  May order more often based on risk factors  Lipid panel: 09/26/2019    Screening Not Indicated  History Lipid Disorder     Other Preventive Screenings Covered by Medicare:  1  Abdominal Aortic Aneurysm (AAA) Screening: covered once if your at risk  You're considered to be at risk if you have a family history of AAA  2  Lung Cancer Screening: covers low dose CT scan once per year if you meet all of the following conditions: (1) Age 50-69; (2) No signs or symptoms of lung cancer; (3) Current smoker or have quit smoking within the last 15 years; (4) You have a tobacco smoking history of at least 30 pack years (packs per day multiplied by number of years you smoked); (5) You get a written order from a healthcare provider  3  Glaucoma Screening: covered annually if you're considered high risk: (1) You have diabetes OR (2) Family history of glaucoma OR (3)  aged 48 and older OR (3)  American aged 72 and older  3  Osteoporosis Screening: covered every 2 years if you meet one of the following conditions: (1) You're estrogen deficient and at risk for osteoporosis based off medical history and other findings; (2) Have a vertebral abnormality; (3) On glucocorticoid therapy for more than 3 months; (4) Have primary hyperparathyroidism; (5) On osteoporosis medications and need to assess response to drug therapy  · Last bone density test (DXA Scan): Not on file  5  HIV Screening: covered annually if you're between the age of 12-76  Also covered annually if you are younger than 13 and older than 72 with risk factors for HIV infection   For pregnant patients, it is covered up to 3 times per pregnancy  Immunizations:  Immunization Recommendations   Influenza Vaccine Annual influenza vaccination during flu season is recommended for all persons aged >= 6 months who do not have contraindications   Pneumococcal Vaccine (Prevnar and Pneumovax)  * Prevnar = PCV13  * Pneumovax = PPSV23   Adults 25-60 years old: 1-3 doses may be recommended based on certain risk factors  Adults 72 years old: Prevnar (PCV13) vaccine recommended followed by Pneumovax (PPSV23) vaccine  If already received PPSV23 since turning 65, then PCV13 recommended at least one year after PPSV23 dose  Hepatitis B Vaccine 3 dose series if at intermediate or high risk (ex: diabetes, end stage renal disease, liver disease)   Tetanus (Td) Vaccine - COST NOT COVERED BY MEDICARE PART B Following completion of primary series, a booster dose should be given every 10 years to maintain immunity against tetanus  Td may also be given as tetanus wound prophylaxis  Tdap Vaccine - COST NOT COVERED BY MEDICARE PART B Recommended at least once for all adults  For pregnant patients, recommended with each pregnancy  Shingles Vaccine (Shingrix) - COST NOT COVERED BY MEDICARE PART B  2 shot series recommended in those aged 48 and above     Health Maintenance Due:      Topic Date Due    MAMMOGRAM  10/25/2020    CRC Screening: Colonoscopy  10/06/2024    Hepatitis C Screening  Completed     Immunizations Due:      Topic Date Due    HEPATITIS B VACCINES (1 of 3 - Risk 3-dose series) 11/15/1970     Advance Directives   What are advance directives? Advance directives are legal documents that state your wishes and plans for medical care  These plans are made ahead of time in case you lose your ability to make decisions for yourself  Advance directives can apply to any medical decision, such as the treatments you want, and if you want to donate organs  What are the types of advance directives?   There are many types of advance directives, and each state has rules about how to use them  You may choose a combination of any of the following:  · Living will: This is a written record of the treatment you want  You can also choose which treatments you do not want, which to limit, and which to stop at a certain time  This includes surgery, medicine, IV fluid, and tube feedings  · Durable power of  for healthcare [facility]): This is a written record that states who you want to make healthcare choices for you when you are unable to make them for yourself  This person, called a proxy, is usually a family member or a friend  You may choose more than 1 proxy  · Do not resuscitate (DNR) order:  A DNR order is used in case your heart stops beating or you stop breathing  It is a request not to have certain forms of treatment, such as CPR  A DNR order may be included in other types of advance directives  · Medical directive: This covers the care that you want if you are in a coma, near death, or unable to make decisions for yourself  You can list the treatments you want for each condition  Treatment may include pain medicine, surgery, blood transfusions, dialysis, IV or tube feedings, and a ventilator (breathing machine)  · Values history: This document has questions about your views, beliefs, and how you feel and think about life  This information can help others choose the care that you would choose  Why are advance directives important? An advance directive helps you control your care  Although spoken wishes may be used, it is better to have your wishes written down  Spoken wishes can be misunderstood, or not followed  Treatments may be given even if you do not want them  An advance directive may make it easier for your family to make difficult choices about your care     Weight Management   Why it is important to manage your weight:  Being overweight increases your risk of health conditions such as heart disease, high blood pressure, type 2 diabetes, and certain types of cancer  It can also increase your risk for osteoarthritis, sleep apnea, and other respiratory problems  Aim for a slow, steady weight loss  Even a small amount of weight loss can lower your risk of health problems  How to lose weight safely:  A safe and healthy way to lose weight is to eat fewer calories and get regular exercise  You can lose up about 1 pound a week by decreasing the number of calories you eat by 500 calories each day  Healthy meal plan for weight management:  A healthy meal plan includes a variety of foods, contains fewer calories, and helps you stay healthy  A healthy meal plan includes the following:  · Eat whole-grain foods more often  A healthy meal plan should contain fiber  Fiber is the part of grains, fruits, and vegetables that is not broken down by your body  Whole-grain foods are healthy and provide extra fiber in your diet  Some examples of whole-grain foods are whole-wheat breads and pastas, oatmeal, brown rice, and bulgur  · Eat a variety of vegetables every day  Include dark, leafy greens such as spinach, kale, joshua greens, and mustard greens  Eat yellow and orange vegetables such as carrots, sweet potatoes, and winter squash  · Eat a variety of fruits every day  Choose fresh or canned fruit (canned in its own juice or light syrup) instead of juice  Fruit juice has very little or no fiber  · Eat low-fat dairy foods  Drink fat-free (skim) milk or 1% milk  Eat fat-free yogurt and low-fat cottage cheese  Try low-fat cheeses such as mozzarella and other reduced-fat cheeses  · Choose meat and other protein foods that are low in fat  Choose beans or other legumes such as split peas or lentils  Choose fish, skinless poultry (chicken or turkey), or lean cuts of red meat (beef or pork)  Before you cook meat or poultry, cut off any visible fat  · Use less fat and oil  Try baking foods instead of frying them   Add less fat, such as margarine, sour cream, regular salad dressing and mayonnaise to foods  Eat fewer high-fat foods  Some examples of high-fat foods include french fries, doughnuts, ice cream, and cakes  · Eat fewer sweets  Limit foods and drinks that are high in sugar  This includes candy, cookies, regular soda, and sweetened drinks  Exercise:  Exercise at least 30 minutes per day on most days of the week  Some examples of exercise include walking, biking, dancing, and swimming  You can also fit in more physical activity by taking the stairs instead of the elevator or parking farther away from stores  Ask your healthcare provider about the best exercise plan for you  © Copyright [employer] 2018 Information is for End User's use only and may not be sold, redistributed or otherwise used for commercial purposes   All illustrations and images included in CareNotes® are the copyrighted property of [employer]  or [address] [place] [address]

## 2019-10-02 DIAGNOSIS — E11.9 TYPE 2 DIABETES MELLITUS WITHOUT COMPLICATION, WITHOUT LONG-TERM CURRENT USE OF INSULIN (HCC): ICD-10-CM

## 2019-10-02 RX ORDER — METFORMIN HYDROCHLORIDE 500 MG/1
TABLET, EXTENDED RELEASE ORAL
Qty: 180 TABLET | Refills: 1 | Status: SHIPPED | OUTPATIENT
Start: 2019-10-02 | End: 2020-04-10

## 2019-10-02 NOTE — PROGRESS NOTES
BMI Counseling: Body mass index is 42 98 kg/m²  Discussed the patient's BMI with her  The BMI is above normal  Nutrition recommendations include reducing portion sizes  Assessment/Plan:    Medicare annual wellness visit, subsequent  Assessment and plan 1  Medicare subsequent annual wellness examination overall the patient is clinically stable and doing well, we encouraged the patient to follow a healthy and balanced diet  We recommend that the patient exercise routinely approximately 30 minutes 5 times per week   We have reviewed the patient's vaccines and have made recommendations for updates if necessary consider the new shingles vaccine recommend flu      We will be ordering screening laboratories which are age appropriate  Return to the office in      call if any problems  Enlarged thyroid  Will check ultrasound of the thyroid and 3rd generation TSH    Type 2 diabetes mellitus without complication, without long-term current use of insulin (HCC)    Lab Results   Component Value Date    HGBA1C 7 4 (H) 09/26/2019   Suboptimal control will increase the metformin XR to 1000 mg once daily I have counselled the pt to follow a healthy and balanced diet ,and recommend routine exercise  Diabetic labs will be checked in 3 months annual eye examination recommended and annual foot examination completed  Essential hypertension  Hypertension - controlled, I have counseled patient following healthy balance diet, I would like the patient reduce sodium, exercise routinely, I would like the patient continued the med current medical regiment and we will continue to monitor  Class 3 severe obesity due to excess calories with serious comorbidity in [facility])  Obesity -I have counseled patient following healthy and balanced diet, I would like the patient to lose weight, I would like the patient exercise routinely; we will continue monitor the patient's progress      Hyperlipidemia  Hyperlipidemia controlled continue with current medical regiment recommend a low-cholesterol diet and recommend routine exercise we will continue to monitor the progress  Screening for osteoporosis  Counseled, will check a DEXA scan screening         Problem List Items Addressed This Visit        Endocrine    Type 2 diabetes mellitus without complication, without long-term current use of insulin ([facility] [address] )       Lab Results   Component Value Date    HGBA1C 7 4 (H) 09/26/2019   Suboptimal control will increase the metformin XR to 1000 mg once daily I have counselled the pt to follow a healthy and balanced diet ,and recommend routine exercise  Diabetic labs will be checked in 3 months annual eye examination recommended and annual foot examination completed  Relevant Medications    metFORMIN (GLUMETZA) 1000 MG (MOD) 24 hr tablet    Other Relevant Orders    Diabetic foot exam    Comprehensive metabolic panel    Hemoglobin A1C    Enlarged thyroid     Will check ultrasound of the thyroid and 3rd generation TSH         Relevant Orders    US thyroid    TSH, 3rd generation       Cardiovascular and Mediastinum    Essential hypertension     Hypertension - controlled, I have counseled patient following healthy balance diet, I would like the patient reduce sodium, exercise routinely, I would like the patient continued the med current medical regiment and we will continue to monitor  Other    Hyperlipidemia     Hyperlipidemia controlled continue with current medical regiment recommend a low-cholesterol diet and recommend routine exercise we will continue to monitor the progress  Class 3 severe obesity due to excess calories with serious comorbidity in adult [facility])     Obesity -I have counseled patient following healthy and balanced diet, I would like the patient to lose weight, I would like the patient exercise routinely; we will continue monitor the patient's progress           Medicare annual wellness visit, subsequent     Assessment and plan 1  Medicare subsequent annual wellness examination overall the patient is clinically stable and doing well, we encouraged the patient to follow a healthy and balanced diet  We recommend that the patient exercise routinely approximately 30 minutes 5 times per week   We have reviewed the patient's vaccines and have made recommendations for updates if necessary consider the new shingles vaccine recommend flu      We will be ordering screening laboratories which are age appropriate  Return to the office in      call if any problems  Screening for osteoporosis - Primary     Counseled, will check a DEXA scan screening         Relevant Orders    DXA bone density spine hip and pelvis            Subjective:      Patient ID: Thom Posey is a 80 y o  female  HPI 71-year old female coming in for a follow up office visit regarding type 2 diabetes, enlarged thyroid, hypertension, hyperlipidemia and obesity; The patient reports me compliant taking medications without untoward side effects the  The patient is here to review his medical condition, update me on the medical condition and the patient reports me no hospitalizations and no ER visits  She reports me her diet could be much better and that she knows better with her diet but she is not following the dietary restrictions  The following portions of the patient's history were reviewed and updated as appropriate: allergies, current medications, past family history, past medical history, past social history, past surgical history and problem list     Review of Systems   Constitutional: Negative for activity change, appetite change and unexpected weight change  HENT: Negative for congestion and postnasal drip  Eyes: Negative for visual disturbance  Respiratory: Negative for cough and shortness of breath  Cardiovascular: Negative for chest pain  Gastrointestinal: Negative for abdominal pain, diarrhea, nausea and vomiting  Neurological: Negative for dizziness, light-headedness and headaches  Hematological: Negative for adenopathy  Objective:    No follow-ups on file  No results found  No Known Allergies    History reviewed  No pertinent past medical history  History reviewed  No pertinent surgical history  Current Outpatient Medications on File Prior to Visit   Medication Sig Dispense Refill    aspirin 325 mg tablet Take 1 tablet by mouth daily      calcium citrate-vitamin D (CITRACAL+D) 315-200 MG-UNIT per tablet Take by mouth      Cholecalciferol (VITAMIN D) 2000 units CAPS Take 1 capsule by mouth daily      econazole nitrate 1 % cream Apply topically daily 15 g 0    folic acid (FOLVITE) 1 mg tablet Take 1 tablet by mouth daily      levothyroxine 75 mcg tablet TAKE 1 TABLET BY MOUTH EVERY DAY ON EMPTY STOMACH 90 tablet 3    lisinopril (ZESTRIL) 5 mg tablet TAKE 1 TABLET DAILY 30 tablet 6    meloxicam (MOBIC) 15 mg tablet Take 15 mg by mouth daily  2    metFORMIN (GLUCOPHAGE-XR) 500 mg 24 hr tablet Take 1 tablet (500 mg total) by mouth daily with dinner 90 tablet 1    methotrexate 2 5 mg tablet TAKE 8 TABS BY MOUTH ONCE WEEKLY  1    simvastatin (ZOCOR) 20 mg tablet TAKE 1 TABLET DAILY 90 tablet 3    [DISCONTINUED] metFORMIN (GLUCOPHAGE) 500 mg tablet TAKE 1 TABLET TWICE A DAY WITH MEALS 60 tablet 6     No current facility-administered medications on file prior to visit        Family History   Problem Relation Age of Onset    Breast cancer Mother     Ovarian cancer Sister     Lung cancer Sister     Breast cancer Maternal Aunt     Stomach cancer Maternal Uncle     Skin cancer Maternal Uncle     Stomach cancer Paternal [de-identified]     Breast cancer Paternal Aunt      Social History     Socioeconomic History    Marital status: /Civil Union     Spouse name: Not on file    Number of children: Not on file    Years of education: Not on file    Highest education level: Not on file   Occupational History    Not on file   Social Needs    Financial resource strain: Not on file    Food insecurity:     Worry: Not on file     Inability: Not on file    Transportation needs:     Medical: Not on file     Non-medical: Not on file   Tobacco Use    Smoking status: Never Smoker    Smokeless tobacco: Never Used   Substance and Sexual Activity    Alcohol use: Yes     Comment: rarely    Drug use: No    Sexual activity: Never   Lifestyle    Physical activity:     Days per week: Not on file     Minutes per session: Not on file    Stress: Not on file   Relationships    Social connections:     Talks on phone: Not on file     Gets together: Not on file     Attends [employer] service: Not on file     Active member of club or organization: Not on file     Attends meetings of clubs or organizations: Not on file     Relationship status: Not on file    Intimate partner violence:     Fear of current or ex partner: Not on file     Emotionally abused: Not on file     Physically abused: Not on file     Forced sexual activity: Not on file   Other Topics Concern    Not on file   Social History Narrative    Not on file     Vitals:    10/01/19 1133   BP: 124/72   Pulse: 75   Resp: 16   SpO2: 98%   Weight: 124 kg (274 lb 6 4 oz)   Height: 5' 7" (1 702 m)     Results for orders placed or performed in visit on 09/26/19   Comprehensive metabolic panel   Result Value Ref Range    Sodium 140 136 - 145 mmol/L    Potassium 4 1 3 5 - 5 3 mmol/L    Chloride 111 (H) 100 - 108 mmol/L    CO2 23 21 - 32 mmol/L    ANION GAP 6 4 - 13 mmol/L    BUN 18 5 - 25 mg/dL    Creatinine 0 81 0 60 - 1 30 mg/dL    Glucose, Fasting 133 (H) 65 - 99 mg/dL    Calcium 9 0 8 3 - 10 1 mg/dL    AST 8 5 - 45 U/L    ALT 15 12 - 78 U/L    Alkaline Phosphatase 92 46 - 116 U/L    Total Protein 6 8 6 4 - 8 2 g/dL    Albumin 3 1 (L) 3 5 - 5 0 g/dL    Total Bilirubin 0 92 0 20 - 1 00 mg/dL    eGFR 75 ml/min/1 73sq m   Hemoglobin A1C   Result Value Ref Range Hemoglobin A1C 7 4 (H) 4 2 - 6 3 %     mg/dl   Microalbumin / creatinine urine ratio   Result Value Ref Range    Creatinine, Ur 198 0 mg/dL    Microalbum  ,U,Random 64 8 (H) 0 0 - 20 0 mg/L    Microalb Creat Ratio 33 (H) 0 - 30 mg/g creatinine   Lipid Panel with Direct LDL reflex   Result Value Ref Range    Cholesterol 146 50 - 200 mg/dL    Triglycerides 61 <=150 mg/dL    HDL, Direct 52 40 - 60 mg/dL    LDL Calculated 82 0 - 100 mg/dL     Weight (last 2 days)     Date/Time   Weight    10/01/19 1133   124 (274 4)            Body mass index is 42 98 kg/m²  BP      Temp      Pulse     Resp      SpO2        Vitals:    10/01/19 1133   Weight: 124 kg (274 lb 6 4 oz)     Vitals:    10/01/19 1133   Weight: 124 kg (274 lb 6 4 oz)       /72   Pulse 75   Resp 16   Ht 5' 7" (1 702 m)   Wt 124 kg (274 lb 6 4 oz)   SpO2 98%   BMI 42 98 kg/m²          Physical Exam   Constitutional: She appears well-developed and well-nourished  HENT:   Head: Normocephalic  Mouth/Throat: Oropharynx is clear and moist    Eyes: Pupils are equal, round, and reactive to light  Conjunctivae are normal  Right eye exhibits no discharge  Left eye exhibits no discharge  No scleral icterus  Neck: Neck supple  Cardiovascular: Normal rate, regular rhythm, normal heart sounds and intact distal pulses  Exam reveals no gallop and no friction rub  Pulses are no weak pulses  No murmur heard  Pulses:       Dorsalis pedis pulses are 2+ on the right side, and 2+ on the left side  Posterior tibial pulses are 2+ on the right side, and 2+ on the left side  Pulmonary/Chest: Breath sounds normal  No respiratory distress  She has no wheezes  She has no rales  Abdominal: Soft  Bowel sounds are normal  She exhibits no distension and no mass  There is no tenderness  There is no rebound and no guarding  Musculoskeletal: She exhibits no edema or deformity     Feet:   Right Foot:   Skin Integrity: Negative for ulcer, skin breakdown, erythema, warmth, callus or dry skin  Left Foot:   Skin Integrity: Negative for ulcer, skin breakdown, erythema, warmth, callus or dry skin  Lymphadenopathy:     She has no cervical adenopathy  Neurological: She is alert  Coordination normal      Patient's shoes and socks removed  Right Foot/Ankle   Right Foot Inspection  Skin Exam: skin normal and skin intact no dry skin, no warmth, no callus, no erythema, no maceration, no abnormal color, no pre-ulcer, no ulcer and no callus                          Toe Exam: ROM and strength within normal limits  Sensory       Monofilament testing: intact  Vascular    The right DP pulse is 2+  The right PT pulse is 2+  Left Foot/Ankle  Left Foot Inspection  Skin Exam: skin normal and skin intactno dry skin, no warmth, no erythema, no maceration, normal color, no pre-ulcer, no ulcer and no callus                         Toe Exam: ROM and strength within normal limits                   Sensory       Monofilament: intact  Vascular    The left DP pulse is 2+  The left PT pulse is 2+  Assign Risk Category:  No deformity present; No loss of protective sensation;  No weak pulses       Risk: 0  Enlarged thyroid on exam

## 2019-10-02 NOTE — ASSESSMENT & PLAN NOTE
Assessment and plan 1  Medicare subsequent annual wellness examination overall the patient is clinically stable and doing well, we encouraged the patient to follow a healthy and balanced diet  We recommend that the patient exercise routinely approximately 30 minutes 5 times per week   We have reviewed the patient's vaccines and have made recommendations for updates if necessary consider the new shingles vaccine recommend flu      We will be ordering screening laboratories which are age appropriate  Return to the office in      call if any problems

## 2019-10-02 NOTE — ASSESSMENT & PLAN NOTE
Lab Results   Component Value Date    HGBA1C 7 4 (H) 09/26/2019   Suboptimal control will increase the metformin XR to 1000 mg once daily I have counselled the pt to follow a healthy and balanced diet ,and recommend routine exercise  Diabetic labs will be checked in 3 months annual eye examination recommended and annual foot examination completed

## 2019-10-28 ENCOUNTER — HOSPITAL ENCOUNTER (OUTPATIENT)
Dept: RADIOLOGY | Facility: HOSPITAL | Age: 69
Discharge: HOME | End: 2019-10-28
Attending: OBSTETRICS & GYNECOLOGY
Payer: MEDICARE

## 2019-10-28 ENCOUNTER — APPOINTMENT (OUTPATIENT)
Dept: LAB | Facility: HOSPITAL | Age: 69
End: 2019-10-28
Attending: INTERNAL MEDICINE
Payer: MEDICARE

## 2019-10-28 ENCOUNTER — TRANSCRIBE ORDERS (OUTPATIENT)
Dept: LAB | Facility: HOSPITAL | Age: 69
End: 2019-10-28

## 2019-10-28 VITALS — HEIGHT: 67 IN | BODY MASS INDEX: 43 KG/M2 | WEIGHT: 274 LBS

## 2019-10-28 DIAGNOSIS — L40.59 POLYARTICULAR PSORIATIC ARTHRITIS (HCC): Primary | ICD-10-CM

## 2019-10-28 DIAGNOSIS — L40.9 PSORIASIS: ICD-10-CM

## 2019-10-28 DIAGNOSIS — L40.59 POLYARTICULAR PSORIATIC ARTHRITIS (HCC): ICD-10-CM

## 2019-10-28 DIAGNOSIS — Z12.39 BREAST CANCER SCREENING: ICD-10-CM

## 2019-10-28 LAB
ALBUMIN SERPL BCP-MCNC: 3.3 G/DL (ref 3.5–5)
ALP SERPL-CCNC: 93 U/L (ref 46–116)
ALT SERPL W P-5'-P-CCNC: 14 U/L (ref 12–78)
AST SERPL W P-5'-P-CCNC: 10 U/L (ref 5–45)
BASOPHILS # BLD AUTO: 0.06 THOUSANDS/ΜL (ref 0–0.1)
BASOPHILS NFR BLD AUTO: 1 % (ref 0–1)
BILIRUB DIRECT SERPL-MCNC: 0.19 MG/DL (ref 0–0.2)
BILIRUB SERPL-MCNC: 1.13 MG/DL (ref 0.2–1)
CREAT SERPL-MCNC: 0.93 MG/DL (ref 0.6–1.3)
CRP SERPL QL: 6.8 MG/L
EOSINOPHIL # BLD AUTO: 0.12 THOUSAND/ΜL (ref 0–0.61)
EOSINOPHIL NFR BLD AUTO: 2 % (ref 0–6)
ERYTHROCYTE [DISTWIDTH] IN BLOOD BY AUTOMATED COUNT: 14.1 % (ref 11.6–15.1)
ERYTHROCYTE [SEDIMENTATION RATE] IN BLOOD: 26 MM/HOUR (ref 0–20)
GFR SERPL CREATININE-BSD FRML MDRD: 64 ML/MIN/1.73SQ M
HCT VFR BLD AUTO: 39.3 % (ref 34.8–46.1)
HGB BLD-MCNC: 12.4 G/DL (ref 11.5–15.4)
IMM GRANULOCYTES # BLD AUTO: 0.02 THOUSAND/UL (ref 0–0.2)
IMM GRANULOCYTES NFR BLD AUTO: 0 % (ref 0–2)
LYMPHOCYTES # BLD AUTO: 1.13 THOUSANDS/ΜL (ref 0.6–4.47)
LYMPHOCYTES NFR BLD AUTO: 19 % (ref 14–44)
MCH RBC QN AUTO: 30.7 PG (ref 26.8–34.3)
MCHC RBC AUTO-ENTMCNC: 31.6 G/DL (ref 31.4–37.4)
MCV RBC AUTO: 97 FL (ref 82–98)
MONOCYTES # BLD AUTO: 0.49 THOUSAND/ΜL (ref 0.17–1.22)
MONOCYTES NFR BLD AUTO: 8 % (ref 4–12)
NEUTROPHILS # BLD AUTO: 4.13 THOUSANDS/ΜL (ref 1.85–7.62)
NEUTS SEG NFR BLD AUTO: 70 % (ref 43–75)
NRBC BLD AUTO-RTO: 0 /100 WBCS
PLATELET # BLD AUTO: 265 THOUSANDS/UL (ref 149–390)
PMV BLD AUTO: 10.5 FL (ref 8.9–12.7)
PROT SERPL-MCNC: 7.1 G/DL (ref 6.4–8.2)
RBC # BLD AUTO: 4.04 MILLION/UL (ref 3.81–5.12)
WBC # BLD AUTO: 5.95 THOUSAND/UL (ref 4.31–10.16)

## 2019-10-28 PROCEDURE — 36415 COLL VENOUS BLD VENIPUNCTURE: CPT

## 2019-10-28 PROCEDURE — 80076 HEPATIC FUNCTION PANEL: CPT

## 2019-10-28 PROCEDURE — 77067 SCR MAMMO BI INCL CAD: CPT

## 2019-10-28 PROCEDURE — 82565 ASSAY OF CREATININE: CPT

## 2019-10-28 PROCEDURE — 85025 COMPLETE CBC W/AUTO DIFF WBC: CPT

## 2019-10-28 PROCEDURE — 86140 C-REACTIVE PROTEIN: CPT

## 2019-10-28 PROCEDURE — 85652 RBC SED RATE AUTOMATED: CPT

## 2019-11-01 ENCOUNTER — ANNUAL EXAM (OUTPATIENT)
Dept: OBGYN CLINIC | Facility: CLINIC | Age: 69
End: 2019-11-01
Payer: MEDICARE

## 2019-11-01 VITALS — WEIGHT: 272 LBS | BODY MASS INDEX: 42.6 KG/M2 | DIASTOLIC BLOOD PRESSURE: 62 MMHG | SYSTOLIC BLOOD PRESSURE: 126 MMHG

## 2019-11-01 DIAGNOSIS — Z12.39 BREAST CANCER SCREENING: Primary | ICD-10-CM

## 2019-11-01 DIAGNOSIS — Z01.419 ENCOUNTER FOR GYNECOLOGICAL EXAMINATION WITHOUT ABNORMAL FINDING: ICD-10-CM

## 2019-11-01 PROCEDURE — G0101 CA SCREEN;PELVIC/BREAST EXAM: HCPCS | Performed by: OBSTETRICS & GYNECOLOGY

## 2019-11-01 PROCEDURE — G0145 SCR C/V CYTO,THINLAYER,RESCR: HCPCS | Performed by: OBSTETRICS & GYNECOLOGY

## 2019-11-01 NOTE — PROGRESS NOTES
Assessment/Plan:    No problem-specific Assessment & Plan notes found for this encounter  Normal gynecological physical examination  Self-breast examination stressed  Mammogram ordered  Discussed regular exercise, healthy diet, importance of vitamin D and calcium supplements  Discussed importance of sun block use during periods of prolonged sun exposure  Patient will be seen in 1 year for routine gynecologic and medical examination  Patient will call office for any problems, concerns, or issues which may arise during the interim  Diagnoses and all orders for this visit:    Breast cancer screening  -     Mammo screening bilateral w cad; Future    Encounter for gynecological examination without abnormal finding        Subjective:      Patient ID: Dalton Pineda is a 80 y o  female  Patient is a 81-year-old female who presents today for her annual gynecologic and medical examination    Patient denies any vaginal bleeding at this time    She also denies any significant vasomotor symptoms    Patient reports normal bowel and bladder habits    She denies any significant pelvic or abdominal pain    Patient is followed medically for blood pressure, diabetes, cholesterol and thyroid    Patient denies any significant headaches, chest pain, shortness of breath, fever, shakes or chills    She is up-to-date with screening colonoscopy    Patient does regular self-breast examinations and is up-to-date with screening mammography  Arrangements for her annual mammogram replaced into the EMR system at today's visit  The following portions of the patient's history were reviewed and updated as appropriate: allergies, current medications, past family history, past medical history, past social history, past surgical history and problem list     Review of Systems   Constitutional: Negative  Negative for appetite change, diaphoresis, fatigue, fever and unexpected weight change  HENT: Negative      Eyes: Negative  Respiratory: Negative  Cardiovascular: Negative  Patient  followed for blood pressure and cholesterol   Gastrointestinal: Negative  Negative for abdominal pain, blood in stool, constipation, diarrhea, nausea and vomiting  Endocrine: Negative  Negative for cold intolerance and heat intolerance  Patient followed for thyroid and diabetes   Genitourinary: Negative  Negative for dysuria, frequency, hematuria, urgency, vaginal bleeding, vaginal discharge and vaginal pain  Musculoskeletal: Negative  Skin: Negative  Allergic/Immunologic: Negative  Neurological: Negative  Hematological: Negative  Negative for adenopathy  Psychiatric/Behavioral: Negative  Objective:      /62   Wt 123 kg (272 lb)   BMI 42 60 kg/m²          Physical Exam   Constitutional: She is oriented to person, place, and time  She appears well-developed and well-nourished  HENT:   Head: Normocephalic  Eyes: Pupils are equal, round, and reactive to light  Neck: Normal range of motion  Neck supple  Cardiovascular: Normal rate and regular rhythm  Pulmonary/Chest: Effort normal and breath sounds normal  Right breast exhibits no inverted nipple, no mass, no nipple discharge, no skin change and no tenderness  Left breast exhibits no inverted nipple, no mass, no nipple discharge, no skin change and no tenderness  Breasts are symmetrical    Abdominal: Soft  Normal appearance and bowel sounds are normal    Genitourinary: Rectum normal, vagina normal and uterus normal  Rectal exam shows guaiac negative stool  Pelvic exam was performed with patient supine  There is no rash or lesion on the right labia  There is no rash or lesion on the left labia  Uterus is not enlarged and not tender  Cervix exhibits no discharge and no friability  Right adnexum displays no mass, no tenderness and no fullness  Left adnexum displays no mass, no tenderness and no fullness   No erythema, tenderness or bleeding in the vagina  No vaginal discharge found  Musculoskeletal: Normal range of motion  Lymphadenopathy:     She has no cervical adenopathy  She has no axillary adenopathy  Right: No inguinal and no supraclavicular adenopathy present  Left: No inguinal and no supraclavicular adenopathy present  Neurological: She is alert and oriented to person, place, and time  Skin: Skin is warm, dry and intact  No rash noted  Psychiatric: She has a normal mood and affect   Her speech is normal and behavior is normal  Judgment and thought content normal  Cognition and memory are normal

## 2019-11-07 LAB
LAB AP GYN PRIMARY INTERPRETATION: NORMAL
Lab: NORMAL

## 2019-12-04 ENCOUNTER — TELEPHONE (OUTPATIENT)
Dept: INTERNAL MEDICINE CLINIC | Facility: CLINIC | Age: 69
End: 2019-12-04

## 2019-12-06 ENCOUNTER — DOCUMENTATION (OUTPATIENT)
Dept: INTERNAL MEDICINE CLINIC | Facility: CLINIC | Age: 69
End: 2019-12-06

## 2019-12-06 NOTE — PROGRESS NOTES
[place], PharmD, BCACP      The following is per review of patient's pertinent medical/medication history and phone call with patient and insurance company:    Reason for documentation: Per PCP request, patient's insurance formulary reviewed for metformin alternative d/t patient reporting high copay cost for metformin ER in upcoming year  Patient's insurance coverage: [employer]    Findings:   Per phone call with Suresh Folr, patient's medication insurance coverage will be switching over to [employer] as [facility] will no longer be covering Part D (medication) services directly beginning 1/1/2020  The reported $100 copay was if patient were to get medications through [place] medication coverage in 2020, not [employer]  Per discussion with [facility], metformin ER (generic Glucophage XR) will be covered at $0 copay for patient beginning 1/1/2020  Call placed to patient to discuss the above and encouraged patient to contact clinical pharmacist if she is expected to pay high copay for metformin ER in the future  Patient voiced understanding  PCP: no further action required as above    Demographics  Interaction Method: Phone  Type of Intervention: New    Topic(s) Addressed  Diabetes    Intervention(s) Made      Non-Pharmacologic: Other    Tool(s) Used  Not Applicable    Time Spent in Direct Patient Care Activities and Care Coordination: 31-45 Minutes    Recommendation(s) Accepted by the Patient/Caregiver:  All Accepted

## 2020-01-03 ENCOUNTER — HOSPITAL ENCOUNTER (OUTPATIENT)
Dept: RADIOLOGY | Age: 70
Discharge: HOME | End: 2020-01-03
Payer: MEDICARE

## 2020-01-03 DIAGNOSIS — Z13.820 SCREENING FOR OSTEOPOROSIS: ICD-10-CM

## 2020-01-03 DIAGNOSIS — E04.9 ENLARGED THYROID: ICD-10-CM

## 2020-01-03 PROCEDURE — 77080 DXA BONE DENSITY AXIAL: CPT

## 2020-01-03 PROCEDURE — 76536 US EXAM OF HEAD AND NECK: CPT

## 2020-01-07 ENCOUNTER — APPOINTMENT (OUTPATIENT)
Dept: LAB | Facility: HOSPITAL | Age: 70
End: 2020-01-07
Payer: MEDICARE

## 2020-01-07 DIAGNOSIS — E11.9 TYPE 2 DIABETES MELLITUS WITHOUT COMPLICATION, WITHOUT LONG-TERM CURRENT USE OF INSULIN (HCC): ICD-10-CM

## 2020-01-07 DIAGNOSIS — E04.9 ENLARGED THYROID: ICD-10-CM

## 2020-01-07 LAB
ALBUMIN SERPL BCP-MCNC: 3.2 G/DL (ref 3.5–5)
ALP SERPL-CCNC: 91 U/L (ref 46–116)
ALT SERPL W P-5'-P-CCNC: 16 U/L (ref 12–78)
ANION GAP SERPL CALCULATED.3IONS-SCNC: 4 MMOL/L (ref 4–13)
AST SERPL W P-5'-P-CCNC: 10 U/L (ref 5–45)
BILIRUB SERPL-MCNC: 1.15 MG/DL (ref 0.2–1)
BUN SERPL-MCNC: 20 MG/DL (ref 5–25)
CALCIUM SERPL-MCNC: 9.3 MG/DL (ref 8.3–10.1)
CHLORIDE SERPL-SCNC: 111 MMOL/L (ref 100–108)
CO2 SERPL-SCNC: 28 MMOL/L (ref 21–32)
CREAT SERPL-MCNC: 1.07 MG/DL (ref 0.6–1.3)
EST. AVERAGE GLUCOSE BLD GHB EST-MCNC: 140 MG/DL
GFR SERPL CREATININE-BSD FRML MDRD: 53 ML/MIN/1.73SQ M
GLUCOSE P FAST SERPL-MCNC: 115 MG/DL (ref 65–99)
HBA1C MFR BLD: 6.5 % (ref 4.2–6.3)
POTASSIUM SERPL-SCNC: 5 MMOL/L (ref 3.5–5.3)
PROT SERPL-MCNC: 7.1 G/DL (ref 6.4–8.2)
SODIUM SERPL-SCNC: 143 MMOL/L (ref 136–145)
TSH SERPL DL<=0.05 MIU/L-ACNC: 4.31 UIU/ML (ref 0.36–3.74)

## 2020-01-07 PROCEDURE — 80053 COMPREHEN METABOLIC PANEL: CPT

## 2020-01-07 PROCEDURE — 83036 HEMOGLOBIN GLYCOSYLATED A1C: CPT

## 2020-01-07 PROCEDURE — 84443 ASSAY THYROID STIM HORMONE: CPT

## 2020-01-07 PROCEDURE — 36415 COLL VENOUS BLD VENIPUNCTURE: CPT

## 2020-01-10 ENCOUNTER — TELEPHONE (OUTPATIENT)
Dept: INTERNAL MEDICINE CLINIC | Facility: CLINIC | Age: 70
End: 2020-01-10

## 2020-01-10 NOTE — TELEPHONE ENCOUNTER
Brittanie Alonzo from [facility] called and said pt was there on 1/3 for a DEXA scan and she was potentially exposed to Pertussis  She would like you to contact patient with instructions

## 2020-01-13 ENCOUNTER — OFFICE VISIT (OUTPATIENT)
Dept: INTERNAL MEDICINE CLINIC | Facility: CLINIC | Age: 70
End: 2020-01-13
Payer: MEDICARE

## 2020-01-13 VITALS
HEART RATE: 85 BPM | WEIGHT: 272 LBS | SYSTOLIC BLOOD PRESSURE: 101 MMHG | HEIGHT: 67 IN | OXYGEN SATURATION: 99 % | DIASTOLIC BLOOD PRESSURE: 62 MMHG | TEMPERATURE: 98.3 F | BODY MASS INDEX: 42.69 KG/M2

## 2020-01-13 DIAGNOSIS — E11.9 TYPE 2 DIABETES MELLITUS WITHOUT COMPLICATION, WITHOUT LONG-TERM CURRENT USE OF INSULIN (HCC): ICD-10-CM

## 2020-01-13 DIAGNOSIS — Z12.11 SCREENING FOR MALIGNANT NEOPLASM OF COLON: ICD-10-CM

## 2020-01-13 DIAGNOSIS — E03.9 HYPOTHYROIDISM, UNSPECIFIED TYPE: Primary | ICD-10-CM

## 2020-01-13 DIAGNOSIS — E78.5 HYPERLIPIDEMIA, UNSPECIFIED HYPERLIPIDEMIA TYPE: ICD-10-CM

## 2020-01-13 DIAGNOSIS — I10 ESSENTIAL HYPERTENSION: ICD-10-CM

## 2020-01-13 DIAGNOSIS — E66.01 CLASS 3 SEVERE OBESITY DUE TO EXCESS CALORIES WITH SERIOUS COMORBIDITY AND BODY MASS INDEX (BMI) OF 40.0 TO 44.9 IN ADULT (HCC): ICD-10-CM

## 2020-01-13 PROBLEM — E66.813 CLASS 3 SEVERE OBESITY DUE TO EXCESS CALORIES WITH SERIOUS COMORBIDITY AND BODY MASS INDEX (BMI) OF 40.0 TO 44.9 IN ADULT (HCC): Status: ACTIVE | Noted: 2020-01-13

## 2020-01-13 PROCEDURE — 99214 OFFICE O/P EST MOD 30 MIN: CPT | Performed by: INTERNAL MEDICINE

## 2020-01-13 RX ORDER — LEVOTHYROXINE SODIUM 88 UG/1
88 TABLET ORAL DAILY
Qty: 30 TABLET | Refills: 6 | Status: SHIPPED | OUTPATIENT
Start: 2020-01-13 | End: 2020-08-10

## 2020-01-14 NOTE — ASSESSMENT & PLAN NOTE
Lab Results   Component Value Date    HGBA1C 6 5 (H) 01/07/2020   I have counselled the pt to follow a healthy and balanced diet ,and recommend routine exercise  I will be ordering diabetic laboratories including comprehensive metabolic panel, hemoglobin A1c, urine microalbumin, lipid panel    Annual eye examination recommended

## 2020-01-14 NOTE — PROGRESS NOTES
BMI Counseling: Body mass index is 42 6 kg/m²  The BMI is above normal  Nutrition recommendations include decreasing portion sizes  Exercise recommendations include exercising 3-5 times per week  Assessment/Plan:    Hypothyroidism  Hypothyroidism controlled the patient is currently euthyroid I will be ordering a TSH prior to the next office visit and the patient will continue with current medical regiment; we will continue to monitor the patient's progress  Type 2 diabetes mellitus without complication, without long-term current use of insulin (HCC)    Lab Results   Component Value Date    HGBA1C 6 5 (H) 01/07/2020   I have counselled the pt to follow a healthy and balanced diet ,and recommend routine exercise  I will be ordering diabetic laboratories including comprehensive metabolic panel, hemoglobin A1c, urine microalbumin, lipid panel  Annual eye examination recommended    Essential hypertension  Hypertension - controlled, I have counseled patient following healthy balance diet, I would like the patient reduce sodium, exercise routinely, I would like the patient continued the med current medical regiment and we will continue to monitor  Class 3 severe obesity due to excess calories with serious comorbidity and body mass index (BMI) of 40 0 to 44 9 in adult [facility])  Obesity -I have counseled patient following healthy and balanced diet, I would like the patient to lose weight, I would like the patient exercise routinely; we will continue monitor the patient's progress  Hyperlipidemia  Hyperlipidemia controlled continue with current medical regiment recommend a low-cholesterol diet and recommend routine exercise we will continue to monitor the progress      Screening for malignant neoplasm of colon  Declines a colonoscopy but is willing to go for cologuard         Problem List Items Addressed This Visit        Endocrine    Type 2 diabetes mellitus without complication, without long-term current use of insulin ([facility] [address] )       Lab Results   Component Value Date    HGBA1C 6 5 (H) 01/07/2020   I have counselled the pt to follow a healthy and balanced diet ,and recommend routine exercise  I will be ordering diabetic laboratories including comprehensive metabolic panel, hemoglobin A1c, urine microalbumin, lipid panel  Annual eye examination recommended         Relevant Orders    Comprehensive metabolic panel    Hemoglobin A1C    Lipid Panel with Direct LDL reflex    Microalbumin / creatinine urine ratio    Hypothyroidism - Primary     Hypothyroidism controlled the patient is currently euthyroid I will be ordering a TSH prior to the next office visit and the patient will continue with current medical regiment; we will continue to monitor the patient's progress  Relevant Medications    levothyroxine 88 mcg tablet    Other Relevant Orders    US thyroid    TSH, 3rd generation       Cardiovascular and Mediastinum    Essential hypertension     Hypertension - controlled, I have counseled patient following healthy balance diet, I would like the patient reduce sodium, exercise routinely, I would like the patient continued the med current medical regiment and we will continue to monitor  Other    Hyperlipidemia     Hyperlipidemia controlled continue with current medical regiment recommend a low-cholesterol diet and recommend routine exercise we will continue to monitor the progress  Screening for malignant neoplasm of colon     Declines a colonoscopy but is willing to go for cologuard         Relevant Orders    Cologuard    Class 3 severe obesity due to excess calories with serious comorbidity and body mass index (BMI) of 40 0 to 44 9 in adult [facility])     Obesity -I have counseled patient following healthy and balanced diet, I would like the patient to lose weight, I would like the patient exercise routinely; we will continue monitor the patient's progress                 Return to office 6 months  call if any problems  Subjective:      Patient ID: Amy Napier is a 77 y o  female  HPI 73-year old female coming in for a follow up office visit regarding hypothyroidism, type 2 diabetes, obesity, hyperlipidemia and hypertension; The patient reports me compliant taking medications without untoward side effects the  The patient is here to review his medical condition, update me on the medical condition and the patient reports me no hospitalizations and no ER visits  She reports me overall doing well she could be doing better on her diet although she less [employer] cookies she continues to volunteer  She is here to review her laboratories  The following portions of the patient's history were reviewed and updated as appropriate: allergies, current medications, past family history, past medical history, past social history, past surgical history and problem list     Review of Systems   Constitutional: Negative for activity change, appetite change and unexpected weight change  HENT: Negative for congestion and postnasal drip  Eyes: Negative for visual disturbance  Respiratory: Negative for cough and shortness of breath  Cardiovascular: Negative for chest pain  Gastrointestinal: Negative for abdominal pain, diarrhea, nausea and vomiting  Neurological: Negative for dizziness, light-headedness and headaches  Hematological: Negative for adenopathy  Objective:    No follow-ups on file  Procedure: Us Thyroid    Result Date: 1/8/2020  Narrative: THYROID ULTRASOUND INDICATION:    E04 9: Nontoxic goiter, unspecified  COMPARISON:  Thyroid ultrasound 5/23/2014 TECHNIQUE:   Ultrasound of the thyroid was performed with a high frequency linear transducer in transverse and sagittal planes including volumetric imaging sweeps as well as traditional still imaging technique  FINDINGS:  Thyroid parenchyma is diffusely heterogeneous in echotexture  Right lobe:  6 5 x 2 4 x 2 4 cm    Volume approximately 18 cc previously 13 cc  Left lobe:  5 9 x 1 9 x 2 2 cm  Volume approximately 12 cc previously 9 cc  Isthmus:  0 7 cm  Previously 0 5 cm  Nodule #1  Image 13  Right posterior mid gland nodule or nodular parenchyma measuring 0 9 x 0 7 x 0 6 cm  This appears slightly more discrete compared to the prior study  COMPOSITION:  2 points, solid or almost completely solid   ECHOGENICITY:  1 point, hyperechoic or isoechoic  SHAPE:  3 points, taller-than-wide  MARGIN: 0 points, ill-defined  ECHOGENIC FOCI:  0 points, none or large comet-tail artifacts  TI-RADS Classification: TR 4 (4-6 points), Moderately suspicious  FNA if > 1 5 cm  Follow if > 1cm  Impression: Mild diffuse interval enlargement of markedly heterogeneous thyroid gland  No nodule meets current ACR criteria for requiring biopsy or followup ultrasounds  Reference: ACR Thyroid Imaging, Reporting and Data System (TI-RADS): White Paper of the [employer]  J AM Augustina Radiol [id number]  (additional recommendations based on American Thyroid Association 2015 guidelines ) Workstation performed: [id number]     Procedure: Dxa Bone Density Spine Hip And Pelvis    Result Date: 1/3/2020  Narrative: CENTRAL  DXA SCAN CLINICAL HISTORY:   77year old post-menopausal  female risk factors include hyperthyroidism and breast carcinoma  Psoriatic arthritis with methotrexate use  Osteoporosis screening  TECHNIQUE: Bone densitometry was performed using a Hologic Horizon A bone densitometer  Regions of interest appear properly placed  There are no obvious fractures or other confounding variables which could limit the study  Degenerative changes of the lumbar spine and hip  This will falsely elevate the bone mineral densities in these regions  COMPARISON:  None  RESULTS: LUMBAR SPINE:  L1-L4: BMD 1 230 gm/cm2 T-score 1 7 Z-score 3 6 LUMBAR SPINE L1, L3 and L4 (average) :  BMD 1 231 gm/sq-cm T-score is 1 6 Z-score is 3 6 LEFT TOTAL HIP: BMD 1 110 gm/cm2 T-score 1 4 Z-score 2 8 LEFT FEMORAL NECK: BMD 0 877 gm/cm2 T-score 0 3 Z-score 1 9     Impression: 1  Based on the [facility] classification, this study is NORMAL  The patient is considered at low risk for fracture  2  A daily intake of calcium of at least 1200 mg and vitamin D, 800-1000 IU, as well as weight bearing and muscle strengthening exercise, fall prevention and avoidance of tobacco and excessive alcohol intake as basic preventive measures are recommended  3  Repeat DXA scan on the same equipment in 18-24 months as clinically indicated  The 10 year risk of hip fracture is 0 2%, with the 10 year risk of major osteoporotic fracture being 6%, as calculated by the [facility] fracture risk assessment tool (FRAX)  The current NOF guidelines recommend treating patients with FRAX 10 year risk score of >3% for hip fracture and >20% for major osteoporotic fracture   WHO CLASSIFICATION: Normal (a T-score of -1 0 or higher) Low bone mineral density (a T-score of less than -1 0 but higher than -2 5) Osteoporosis (a T-score of -2 5 or less) Severe osteoporosis (a T-score of -2 5 or less with a fragility fracture)   Workstation performed: [id number]         No Known Allergies    Past Medical History:   Diagnosis Date    Breast cancer ([facility] [address] )     History of radiation therapy 09/01/2006     Past Surgical History:   Procedure Laterality Date    BIOPSY CORE NEEDLE Right 03/03/2010    benign    BREAST CYST ASPIRATION Right 08/15/2003    BREAST CYST ASPIRATION Right     x3  (Years ago)    BREAST LUMPECTOMY Left 08/22/2006    MAMMO STEREOTACTIC BREAST BIOPSY LEFT (ALL INC) Left 08/02/2006    malignant    US GUIDED BREAST BIOPSY LEFT COMPLETE Left 02/06/2009    benign     Current Outpatient Medications on File Prior to Visit   Medication Sig Dispense Refill    aspirin 325 mg tablet Take 1 tablet by mouth daily      calcium citrate-vitamin D (CITRACAL+D) 315-200 MG-UNIT per tablet Take by mouth      Cholecalciferol (VITAMIN D) 2000 units CAPS Take 1 capsule by mouth daily      folic acid (FOLVITE) 1 mg tablet Take 1 tablet by mouth daily      levothyroxine 75 mcg tablet TAKE 1 TABLET BY MOUTH EVERY DAY ON EMPTY STOMACH 90 tablet 3    lisinopril (ZESTRIL) 5 mg tablet TAKE 1 TABLET DAILY 30 tablet 6    meloxicam (MOBIC) 15 mg tablet Take 15 mg by mouth daily  2    metFORMIN (GLUMETZA) 1000 MG (MOD) 24 hr tablet Take 1 tablet (1,000 mg total) by mouth daily with dinner 90 tablet 3    methotrexate 2 5 mg tablet 6 once a week  1    simvastatin (ZOCOR) 20 mg tablet TAKE 1 TABLET DAILY 90 tablet 3    econazole nitrate 1 % cream Apply topically daily (Patient not taking: Reported on 11/1/2019) 15 g 0    metFORMIN (GLUCOPHAGE-XR) 500 mg 24 hr tablet Take 2 tablets once daily  (Patient not taking: Reported on 1/13/2020) 180 tablet 1     No current facility-administered medications on file prior to visit        Family History   Problem Relation Age of Onset   [place] Breast cancer Mother 48    Ovarian cancer Sister 61    Lung cancer Sister     Breast cancer Maternal Aunt 37    Stomach cancer Maternal Uncle     Skin cancer Maternal Uncle     Breast cancer Paternal Aunt     Breast cancer Paternal Grandmother 80    Skin cancer Paternal Aunt      Social History     Socioeconomic History    Marital status: /Civil Union     Spouse name: Not on file    Number of children: Not on file    Years of education: Not on file    Highest education level: Not on file   Occupational History    Not on file   Social Needs    Financial resource strain: Not on file    Food insecurity:     Worry: Not on file     Inability: Not on file    Transportation needs:     Medical: Not on file     Non-medical: Not on file   Tobacco Use    Smoking status: Never Smoker    Smokeless tobacco: Never Used   Substance and Sexual Activity    Alcohol use: Yes     Comment: rarely    Drug use: No    Sexual activity: Never   Lifestyle    Physical activity: Days per week: Not on file     Minutes per session: Not on file    Stress: Not on file   Relationships    Social connections:     Talks on phone: Not on file     Gets together: Not on file     Attends Gnosticism service: Not on file     Active member of club or organization: Not on file     Attends meetings of clubs or organizations: Not on file     Relationship status: Not on file    Intimate partner violence:     Fear of current or ex partner: Not on file     Emotionally abused: Not on file     Physically abused: Not on file     Forced sexual activity: Not on file   Other Topics Concern    Not on file   Social History Narrative    Not on file     Vitals:    01/13/20 0924   BP: 101/62   Pulse: 85   Temp: 98 3 °F (36 8 °C)   SpO2: 99%   Weight: 123 kg (272 lb)   Height: 5' 7" (1 702 m)     Results for orders placed or performed in visit on 01/07/20   Comprehensive metabolic panel   Result Value Ref Range    Sodium 143 136 - 145 mmol/L    Potassium 5 0 3 5 - 5 3 mmol/L    Chloride 111 (H) 100 - 108 mmol/L    CO2 28 21 - 32 mmol/L    ANION GAP 4 4 - 13 mmol/L    BUN 20 5 - 25 mg/dL    Creatinine 1 07 0 60 - 1 30 mg/dL    Glucose, Fasting 115 (H) 65 - 99 mg/dL    Calcium 9 3 8 3 - 10 1 mg/dL    AST 10 5 - 45 U/L    ALT 16 12 - 78 U/L    Alkaline Phosphatase 91 46 - 116 U/L    Total Protein 7 1 6 4 - 8 2 g/dL    Albumin 3 2 (L) 3 5 - 5 0 g/dL    Total Bilirubin 1 15 (H) 0 20 - 1 00 mg/dL    eGFR 53 ml/min/1 73sq m   Hemoglobin A1C   Result Value Ref Range    Hemoglobin A1C 6 5 (H) 4 2 - 6 3 %     mg/dl   TSH, 3rd generation   Result Value Ref Range    TSH 3RD GENERATON 4 310 (H) 0 358 - 3 740 uIU/mL     Weight (last 2 days)     Date/Time   Weight    01/13/20 0924   123 (272)            Body mass index is 42 6 kg/m²    BP      Temp      Pulse     Resp      SpO2        Vitals:    01/13/20 0924   Weight: 123 kg (272 lb)     Vitals:    01/13/20 0924   Weight: 123 kg (272 lb)       /62   Pulse 85   Temp 98 3 °F (36 8 °C)   Ht 5' 7" (1 702 m)   Wt 123 kg (272 lb)   SpO2 99%   BMI 42 60 kg/m²          Physical Exam   Constitutional: She appears well-developed and well-nourished  HENT:   Head: Normocephalic  Mouth/Throat: Oropharynx is clear and moist    Eyes: Pupils are equal, round, and reactive to light  Conjunctivae are normal  Right eye exhibits no discharge  Left eye exhibits no discharge  No scleral icterus  Neck: Neck supple  Cardiovascular: Normal rate, regular rhythm, normal heart sounds and intact distal pulses  Exam reveals no gallop and no friction rub  No murmur heard  Pulmonary/Chest: Breath sounds normal  No respiratory distress  She has no wheezes  She has no rales  Abdominal: Soft  Bowel sounds are normal  She exhibits no distension and no mass  There is no tenderness  There is no rebound and no guarding  Musculoskeletal: She exhibits no edema or deformity  Lymphadenopathy:     She has no cervical adenopathy  Neurological: She is alert   Coordination normal

## 2020-02-14 ENCOUNTER — APPOINTMENT (OUTPATIENT)
Dept: LAB | Facility: HOSPITAL | Age: 70
End: 2020-02-14
Payer: MEDICARE

## 2020-02-14 ENCOUNTER — TRANSCRIBE ORDERS (OUTPATIENT)
Dept: LAB | Facility: HOSPITAL | Age: 70
End: 2020-02-14

## 2020-02-14 DIAGNOSIS — E03.9 HYPOTHYROIDISM, UNSPECIFIED TYPE: ICD-10-CM

## 2020-02-14 DIAGNOSIS — L40.59 POLYARTICULAR PSORIATIC ARTHRITIS (HCC): ICD-10-CM

## 2020-02-14 DIAGNOSIS — L40.59 POLYARTICULAR PSORIATIC ARTHRITIS (HCC): Primary | ICD-10-CM

## 2020-02-14 LAB
ALBUMIN SERPL BCP-MCNC: 3.2 G/DL (ref 3.5–5)
ALP SERPL-CCNC: 91 U/L (ref 46–116)
ALT SERPL W P-5'-P-CCNC: 16 U/L (ref 12–78)
AST SERPL W P-5'-P-CCNC: 7 U/L (ref 5–45)
BASOPHILS # BLD AUTO: 0.05 THOUSANDS/ΜL (ref 0–0.1)
BASOPHILS NFR BLD AUTO: 1 % (ref 0–1)
BILIRUB DIRECT SERPL-MCNC: 0.19 MG/DL (ref 0–0.2)
BILIRUB SERPL-MCNC: 0.76 MG/DL (ref 0.2–1)
CREAT SERPL-MCNC: 0.86 MG/DL (ref 0.6–1.3)
CRP SERPL QL: 5.5 MG/L
EOSINOPHIL # BLD AUTO: 0.1 THOUSAND/ΜL (ref 0–0.61)
EOSINOPHIL NFR BLD AUTO: 2 % (ref 0–6)
ERYTHROCYTE [DISTWIDTH] IN BLOOD BY AUTOMATED COUNT: 14.3 % (ref 11.6–15.1)
ERYTHROCYTE [SEDIMENTATION RATE] IN BLOOD: 28 MM/HOUR (ref 0–20)
GFR SERPL CREATININE-BSD FRML MDRD: 70 ML/MIN/1.73SQ M
HCT VFR BLD AUTO: 38.8 % (ref 34.8–46.1)
HGB BLD-MCNC: 12 G/DL (ref 11.5–15.4)
IMM GRANULOCYTES # BLD AUTO: 0.02 THOUSAND/UL (ref 0–0.2)
IMM GRANULOCYTES NFR BLD AUTO: 0 % (ref 0–2)
LYMPHOCYTES # BLD AUTO: 1.08 THOUSANDS/ΜL (ref 0.6–4.47)
LYMPHOCYTES NFR BLD AUTO: 21 % (ref 14–44)
MCH RBC QN AUTO: 29.9 PG (ref 26.8–34.3)
MCHC RBC AUTO-ENTMCNC: 30.9 G/DL (ref 31.4–37.4)
MCV RBC AUTO: 97 FL (ref 82–98)
MONOCYTES # BLD AUTO: 0.34 THOUSAND/ΜL (ref 0.17–1.22)
MONOCYTES NFR BLD AUTO: 7 % (ref 4–12)
NEUTROPHILS # BLD AUTO: 3.57 THOUSANDS/ΜL (ref 1.85–7.62)
NEUTS SEG NFR BLD AUTO: 69 % (ref 43–75)
NRBC BLD AUTO-RTO: 0 /100 WBCS
PLATELET # BLD AUTO: 301 THOUSANDS/UL (ref 149–390)
PMV BLD AUTO: 10.4 FL (ref 8.9–12.7)
PROT SERPL-MCNC: 6.9 G/DL (ref 6.4–8.2)
RBC # BLD AUTO: 4.02 MILLION/UL (ref 3.81–5.12)
TSH SERPL DL<=0.05 MIU/L-ACNC: 2.13 UIU/ML (ref 0.36–3.74)
WBC # BLD AUTO: 5.16 THOUSAND/UL (ref 4.31–10.16)

## 2020-02-14 PROCEDURE — 85652 RBC SED RATE AUTOMATED: CPT

## 2020-02-14 PROCEDURE — 80076 HEPATIC FUNCTION PANEL: CPT

## 2020-02-14 PROCEDURE — 82565 ASSAY OF CREATININE: CPT

## 2020-02-14 PROCEDURE — 84443 ASSAY THYROID STIM HORMONE: CPT

## 2020-02-14 PROCEDURE — 85025 COMPLETE CBC W/AUTO DIFF WBC: CPT

## 2020-02-14 PROCEDURE — 86140 C-REACTIVE PROTEIN: CPT

## 2020-02-14 PROCEDURE — 36415 COLL VENOUS BLD VENIPUNCTURE: CPT

## 2020-02-15 DIAGNOSIS — E03.9 HYPOTHYROIDISM, UNSPECIFIED TYPE: ICD-10-CM

## 2020-02-15 DIAGNOSIS — E78.5 HYPERLIPIDEMIA, UNSPECIFIED HYPERLIPIDEMIA TYPE: ICD-10-CM

## 2020-02-15 RX ORDER — LEVOTHYROXINE SODIUM 0.07 MG/1
TABLET ORAL
Qty: 90 TABLET | Refills: 3 | Status: SHIPPED | OUTPATIENT
Start: 2020-02-15 | End: 2020-07-27

## 2020-02-15 RX ORDER — SIMVASTATIN 20 MG
TABLET ORAL
Qty: 90 TABLET | Refills: 3 | Status: SHIPPED | OUTPATIENT
Start: 2020-02-15 | End: 2021-02-04

## 2020-03-09 LAB
LEFT EYE DIABETIC RETINOPATHY: NORMAL
RIGHT EYE DIABETIC RETINOPATHY: NORMAL

## 2020-03-11 DIAGNOSIS — I10 ESSENTIAL HYPERTENSION: ICD-10-CM

## 2020-03-11 RX ORDER — LISINOPRIL 5 MG/1
TABLET ORAL
Qty: 30 TABLET | Refills: 6 | Status: SHIPPED | OUTPATIENT
Start: 2020-03-11 | End: 2020-10-04

## 2020-04-10 DIAGNOSIS — E11.9 TYPE 2 DIABETES MELLITUS WITHOUT COMPLICATION, WITHOUT LONG-TERM CURRENT USE OF INSULIN (HCC): ICD-10-CM

## 2020-04-10 RX ORDER — METFORMIN HYDROCHLORIDE 500 MG/1
TABLET, EXTENDED RELEASE ORAL
Qty: 180 TABLET | Refills: 1 | Status: SHIPPED | OUTPATIENT
Start: 2020-04-10 | End: 2020-07-27 | Stop reason: SDUPTHER

## 2020-05-27 ENCOUNTER — TRANSCRIBE ORDERS (OUTPATIENT)
Dept: LAB | Facility: HOSPITAL | Age: 70
End: 2020-05-27

## 2020-05-27 ENCOUNTER — APPOINTMENT (OUTPATIENT)
Dept: LAB | Facility: HOSPITAL | Age: 70
End: 2020-05-27
Attending: INTERNAL MEDICINE
Payer: MEDICARE

## 2020-05-27 DIAGNOSIS — L40.59 POLYARTICULAR PSORIATIC ARTHRITIS (HCC): ICD-10-CM

## 2020-05-27 DIAGNOSIS — L40.59 POLYARTICULAR PSORIATIC ARTHRITIS (HCC): Primary | ICD-10-CM

## 2020-05-27 LAB
ALBUMIN SERPL BCP-MCNC: 3.6 G/DL (ref 3.5–5)
ALP SERPL-CCNC: 100 U/L (ref 46–116)
ALT SERPL W P-5'-P-CCNC: 14 U/L (ref 12–78)
AST SERPL W P-5'-P-CCNC: 7 U/L (ref 5–45)
BASOPHILS # BLD AUTO: 0.05 THOUSANDS/ΜL (ref 0–0.1)
BASOPHILS NFR BLD AUTO: 1 % (ref 0–1)
BILIRUB DIRECT SERPL-MCNC: 0.22 MG/DL (ref 0–0.2)
BILIRUB SERPL-MCNC: 0.98 MG/DL (ref 0.2–1)
CREAT SERPL-MCNC: 0.93 MG/DL (ref 0.6–1.3)
CRP SERPL QL: 6.4 MG/L
EOSINOPHIL # BLD AUTO: 0.11 THOUSAND/ΜL (ref 0–0.61)
EOSINOPHIL NFR BLD AUTO: 2 % (ref 0–6)
ERYTHROCYTE [DISTWIDTH] IN BLOOD BY AUTOMATED COUNT: 14.6 % (ref 11.6–15.1)
ERYTHROCYTE [SEDIMENTATION RATE] IN BLOOD: 35 MM/HOUR (ref 0–20)
GFR SERPL CREATININE-BSD FRML MDRD: 63 ML/MIN/1.73SQ M
HCT VFR BLD AUTO: 40.4 % (ref 34.8–46.1)
HGB BLD-MCNC: 12.8 G/DL (ref 11.5–15.4)
IMM GRANULOCYTES # BLD AUTO: 0.01 THOUSAND/UL (ref 0–0.2)
IMM GRANULOCYTES NFR BLD AUTO: 0 % (ref 0–2)
LYMPHOCYTES # BLD AUTO: 0.78 THOUSANDS/ΜL (ref 0.6–4.47)
LYMPHOCYTES NFR BLD AUTO: 13 % (ref 14–44)
MCH RBC QN AUTO: 30.2 PG (ref 26.8–34.3)
MCHC RBC AUTO-ENTMCNC: 31.7 G/DL (ref 31.4–37.4)
MCV RBC AUTO: 95 FL (ref 82–98)
MONOCYTES # BLD AUTO: 0.54 THOUSAND/ΜL (ref 0.17–1.22)
MONOCYTES NFR BLD AUTO: 9 % (ref 4–12)
NEUTROPHILS # BLD AUTO: 4.35 THOUSANDS/ΜL (ref 1.85–7.62)
NEUTS SEG NFR BLD AUTO: 75 % (ref 43–75)
NRBC BLD AUTO-RTO: 0 /100 WBCS
PLATELET # BLD AUTO: 295 THOUSANDS/UL (ref 149–390)
PMV BLD AUTO: 10.6 FL (ref 8.9–12.7)
PROT SERPL-MCNC: 7.5 G/DL (ref 6.4–8.2)
RBC # BLD AUTO: 4.24 MILLION/UL (ref 3.81–5.12)
WBC # BLD AUTO: 5.84 THOUSAND/UL (ref 4.31–10.16)

## 2020-05-27 PROCEDURE — 85025 COMPLETE CBC W/AUTO DIFF WBC: CPT

## 2020-05-27 PROCEDURE — 86140 C-REACTIVE PROTEIN: CPT

## 2020-05-27 PROCEDURE — 85652 RBC SED RATE AUTOMATED: CPT

## 2020-05-27 PROCEDURE — 82565 ASSAY OF CREATININE: CPT

## 2020-05-27 PROCEDURE — 36415 COLL VENOUS BLD VENIPUNCTURE: CPT

## 2020-05-27 PROCEDURE — 80076 HEPATIC FUNCTION PANEL: CPT

## 2020-07-15 ENCOUNTER — APPOINTMENT (OUTPATIENT)
Dept: LAB | Facility: HOSPITAL | Age: 70
End: 2020-07-15
Payer: MEDICARE

## 2020-07-15 DIAGNOSIS — E11.9 TYPE 2 DIABETES MELLITUS WITHOUT COMPLICATION, WITHOUT LONG-TERM CURRENT USE OF INSULIN (HCC): ICD-10-CM

## 2020-07-15 LAB
ALBUMIN SERPL BCP-MCNC: 3.3 G/DL (ref 3.5–5)
ALP SERPL-CCNC: 97 U/L (ref 46–116)
ALT SERPL W P-5'-P-CCNC: 17 U/L (ref 12–78)
ANION GAP SERPL CALCULATED.3IONS-SCNC: 6 MMOL/L (ref 4–13)
AST SERPL W P-5'-P-CCNC: 7 U/L (ref 5–45)
BILIRUB SERPL-MCNC: 0.99 MG/DL (ref 0.2–1)
BUN SERPL-MCNC: 16 MG/DL (ref 5–25)
CALCIUM SERPL-MCNC: 9.4 MG/DL (ref 8.3–10.1)
CHLORIDE SERPL-SCNC: 110 MMOL/L (ref 100–108)
CHOLEST SERPL-MCNC: 149 MG/DL (ref 50–200)
CO2 SERPL-SCNC: 24 MMOL/L (ref 21–32)
CREAT SERPL-MCNC: 0.84 MG/DL (ref 0.6–1.3)
CREAT UR-MCNC: 225 MG/DL
EST. AVERAGE GLUCOSE BLD GHB EST-MCNC: 160 MG/DL
GFR SERPL CREATININE-BSD FRML MDRD: 72 ML/MIN/1.73SQ M
GLUCOSE P FAST SERPL-MCNC: 132 MG/DL (ref 65–99)
HBA1C MFR BLD: 7.2 %
HDLC SERPL-MCNC: 53 MG/DL
LDLC SERPL CALC-MCNC: 82 MG/DL (ref 0–100)
MICROALBUMIN UR-MCNC: 224 MG/L (ref 0–20)
MICROALBUMIN/CREAT 24H UR: 100 MG/G CREATININE (ref 0–30)
POTASSIUM SERPL-SCNC: 4.5 MMOL/L (ref 3.5–5.3)
PROT SERPL-MCNC: 6.8 G/DL (ref 6.4–8.2)
SODIUM SERPL-SCNC: 140 MMOL/L (ref 136–145)
TRIGL SERPL-MCNC: 69 MG/DL

## 2020-07-15 PROCEDURE — 36415 COLL VENOUS BLD VENIPUNCTURE: CPT

## 2020-07-15 PROCEDURE — 82043 UR ALBUMIN QUANTITATIVE: CPT

## 2020-07-15 PROCEDURE — 83036 HEMOGLOBIN GLYCOSYLATED A1C: CPT

## 2020-07-15 PROCEDURE — 80053 COMPREHEN METABOLIC PANEL: CPT

## 2020-07-15 PROCEDURE — 82570 ASSAY OF URINE CREATININE: CPT

## 2020-07-15 PROCEDURE — 80061 LIPID PANEL: CPT

## 2020-07-24 ENCOUNTER — OFFICE VISIT (OUTPATIENT)
Dept: INTERNAL MEDICINE CLINIC | Facility: CLINIC | Age: 70
End: 2020-07-24
Payer: MEDICARE

## 2020-07-24 ENCOUNTER — TELEPHONE (OUTPATIENT)
Dept: INTERNAL MEDICINE CLINIC | Facility: CLINIC | Age: 70
End: 2020-07-24

## 2020-07-24 VITALS
DIASTOLIC BLOOD PRESSURE: 70 MMHG | WEIGHT: 274 LBS | HEIGHT: 67 IN | OXYGEN SATURATION: 95 % | TEMPERATURE: 97.6 F | HEART RATE: 90 BPM | SYSTOLIC BLOOD PRESSURE: 112 MMHG | BODY MASS INDEX: 43 KG/M2

## 2020-07-24 DIAGNOSIS — E78.5 HYPERLIPIDEMIA, UNSPECIFIED HYPERLIPIDEMIA TYPE: ICD-10-CM

## 2020-07-24 DIAGNOSIS — E11.9 TYPE 2 DIABETES MELLITUS WITHOUT COMPLICATION, WITHOUT LONG-TERM CURRENT USE OF INSULIN (HCC): Primary | ICD-10-CM

## 2020-07-24 DIAGNOSIS — E66.01 CLASS 3 SEVERE OBESITY DUE TO EXCESS CALORIES WITH SERIOUS COMORBIDITY AND BODY MASS INDEX (BMI) OF 40.0 TO 44.9 IN ADULT (HCC): ICD-10-CM

## 2020-07-24 PROCEDURE — 3066F NEPHROPATHY DOC TX: CPT | Performed by: INTERNAL MEDICINE

## 2020-07-24 PROCEDURE — 99214 OFFICE O/P EST MOD 30 MIN: CPT | Performed by: INTERNAL MEDICINE

## 2020-07-24 PROCEDURE — 3051F HG A1C>EQUAL 7.0%<8.0%: CPT | Performed by: INTERNAL MEDICINE

## 2020-07-24 PROCEDURE — 3078F DIAST BP <80 MM HG: CPT | Performed by: INTERNAL MEDICINE

## 2020-07-24 PROCEDURE — 1160F RVW MEDS BY RX/DR IN RCRD: CPT | Performed by: INTERNAL MEDICINE

## 2020-07-24 PROCEDURE — 3060F POS MICROALBUMINURIA REV: CPT | Performed by: INTERNAL MEDICINE

## 2020-07-24 PROCEDURE — 1036F TOBACCO NON-USER: CPT | Performed by: INTERNAL MEDICINE

## 2020-07-24 PROCEDURE — 4040F PNEUMOC VAC/ADMIN/RCVD: CPT | Performed by: INTERNAL MEDICINE

## 2020-07-24 PROCEDURE — 3074F SYST BP LT 130 MM HG: CPT | Performed by: INTERNAL MEDICINE

## 2020-07-24 PROCEDURE — 2022F DILAT RTA XM EVC RTNOPTHY: CPT | Performed by: INTERNAL MEDICINE

## 2020-07-24 NOTE — PROGRESS NOTES
Assessment/Plan:    Type 2 diabetes mellitus without complication, without long-term current use of insulin ([facility])    Lab Results   Component Value Date    HGBA1C 7 2 (H) 07/15/2020   I have counselled the pt to follow a healthy and balanced diet ,and recommend routine exercise  Suboptimal control optimal A1c under 7 will start Jardiance 10 mg once daily reviewed the risks benefits and side effects will check diabetic labs in 3 months  Counseled patient reduce carbohydrates and sweets    Class 3 severe obesity due to excess calories with serious comorbidity and body mass index (BMI) of 40 0 to 44 9 in adult [facility])  Obesity -I have counseled patient following healthy and balanced diet, I would like the patient to lose weight, I would like the patient exercise routinely; we will continue monitor the patient's progress  Hyperlipidemia  Hyperlipidemia controlled continue with current medical regiment recommend a low-cholesterol diet and recommend routine exercise we will continue to monitor the progress  Problem List Items Addressed This Visit        Endocrine    Type 2 diabetes mellitus without complication, without long-term current use of insulin ([facility] ) - Primary       Lab Results   Component Value Date    HGBA1C 7 2 (H) 07/15/2020   I have counselled the pt to follow a healthy and balanced diet ,and recommend routine exercise  Suboptimal control optimal A1c under 7 will start Jardiance 10 mg once daily reviewed the risks benefits and side effects will check diabetic labs in 3 months  Counseled patient reduce carbohydrates and sweets         Relevant Medications    Empagliflozin (Jardiance) 10 MG TABS    Other Relevant Orders    Comprehensive metabolic panel    Hemoglobin A1C       Other    Hyperlipidemia     Hyperlipidemia controlled continue with current medical regiment recommend a low-cholesterol diet and recommend routine exercise we will continue to monitor the progress           Class 3 severe obesity due to excess calories with serious comorbidity and body mass index (BMI) of 40 0 to 44 9 in adult [facility])     Obesity -I have counseled patient following healthy and balanced diet, I would like the patient to lose weight, I would like the patient exercise routinely; we will continue monitor the patient's progress  Return to office 6  months  call if any problems  Subjective:      Patient ID: Mike Donohue is a 77 y o  female  HPI 73-year old female coming in for a follow up office visit regarding type 2 diabetes, obesity and hyperlipidemia; The patient reports me compliant taking medications without untoward side effects the  The patient is here to review his medical condition, update me on the medical condition and the patient reports me no hospitalizations and no ER visits  Reports me her diet has not been as good as usual because the COVID-19 crisis she is not exercising  She is extremely careful  The following portions of the patient's history were reviewed and updated as appropriate: allergies, current medications, past family history, past medical history, past social history, past surgical history and problem list     Review of Systems   Constitutional: Negative for activity change, appetite change and unexpected weight change  HENT: Negative for congestion and postnasal drip  Respiratory: Negative for cough and shortness of breath  Cardiovascular: Negative for chest pain  Gastrointestinal: Negative for abdominal pain, diarrhea, nausea and vomiting  Neurological: Negative for dizziness, light-headedness and headaches  Objective:    No follow-ups on file  No results found        No Known Allergies    Past Medical History:   Diagnosis Date    Breast cancer ([facility] )     History of radiation therapy 09/01/2006     Past Surgical History:   Procedure Laterality Date    BIOPSY CORE NEEDLE Right 03/03/2010    benign    BREAST CYST ASPIRATION Right 08/15/2003  BREAST CYST ASPIRATION Right     x3  (Years ago)    BREAST LUMPECTOMY Left 08/22/2006    MAMMO STEREOTACTIC BREAST BIOPSY LEFT (ALL INC) Left 08/02/2006    malignant    US GUIDED BREAST BIOPSY LEFT COMPLETE Left 02/06/2009    benign     Current Outpatient Medications on File Prior to Visit   Medication Sig Dispense Refill    aspirin 325 mg tablet Take 1 tablet by mouth daily      calcium citrate-vitamin D (CITRACAL+D) 315-200 MG-UNIT per tablet Take by mouth      Cholecalciferol (VITAMIN D) 2000 units CAPS Take 1 capsule by mouth daily      folic acid (FOLVITE) 1 mg tablet Take 1 tablet by mouth daily      levothyroxine 88 mcg tablet Take 1 tablet (88 mcg total) by mouth daily 30 tablet 6    lisinopril (ZESTRIL) 5 mg tablet TAKE 1 TABLET DAILY 30 tablet 6    meloxicam (MOBIC) 15 mg tablet Take 15 mg by mouth daily Patient takes every other day  2    metFORMIN (GLUMETZA) 1000 MG (MOD) 24 hr tablet Take 1 tablet (1,000 mg total) by mouth daily with dinner 90 tablet 3    methotrexate 2 5 mg tablet 6 once a week  1    simvastatin (ZOCOR) 20 mg tablet TAKE 1 TABLET DAILY 90 tablet 3    econazole nitrate 1 % cream Apply topically daily (Patient not taking: Reported on 11/1/2019) 15 g 0    levothyroxine 75 mcg tablet TAKE 1 TABLET BY MOUTH EVERY DAY ON EMPTY STOMACH (Patient not taking: Reported on 7/24/2020) 90 tablet 3    metFORMIN (GLUCOPHAGE-XR) 500 mg 24 hr tablet TAKE 2 TABLETS BY MOUTH EVERY DAY (Patient not taking: Reported on 7/24/2020) 180 tablet 1     No current facility-administered medications on file prior to visit        Family History   Problem Relation Age of Onset   Laurel Karly Breast cancer Mother 48    Ovarian cancer Sister 61    Lung cancer Sister     Breast cancer Maternal Aunt 37    Stomach cancer Maternal Uncle     Skin cancer Maternal Uncle     Breast cancer Paternal Aunt     Breast cancer Paternal Grandmother 80    Skin cancer Paternal Aunt      Social History     Socioeconomic History    Marital status: /Civil Union     Spouse name: Not on file    Number of children: Not on file    Years of education: Not on file    Highest education level: Not on file   Occupational History    Not on file   Social Needs    Financial resource strain: Not on file    Food insecurity:     Worry: Not on file     Inability: Not on file    Transportation needs:     Medical: Not on file     Non-medical: Not on file   Tobacco Use    Smoking status: Never Smoker    Smokeless tobacco: Never Used   Substance and Sexual Activity    Alcohol use: Yes     Comment: rarely    Drug use: No    Sexual activity: Never   Lifestyle    Physical activity:     Days per week: Not on file     Minutes per session: Not on file    Stress: Not on file   Relationships    Social connections:     Talks on phone: Not on file     Gets together: Not on file     Attends Synagogue service: Not on file     Active member of club or organization: Not on file     Attends meetings of clubs or organizations: Not on file     Relationship status: Not on file    Intimate partner violence:     Fear of current or ex partner: Not on file     Emotionally abused: Not on file     Physically abused: Not on file     Forced sexual activity: Not on file   Other Topics Concern    Not on file   Social History Narrative    Not on file     Vitals:    07/24/20 0915   BP: 112/70   Pulse: 90   Temp: 97 6 °F (36 4 °C)   SpO2: 95%   Weight: 124 kg (274 lb)   Height: 5' 7" (1 702 m)     Results for orders placed or performed in visit on 07/15/20   Comprehensive metabolic panel   Result Value Ref Range    Sodium 140 136 - 145 mmol/L    Potassium 4 5 3 5 - 5 3 mmol/L    Chloride 110 (H) 100 - 108 mmol/L    CO2 24 21 - 32 mmol/L    ANION GAP 6 4 - 13 mmol/L    BUN 16 5 - 25 mg/dL    Creatinine 0 84 0 60 - 1 30 mg/dL    Glucose, Fasting 132 (H) 65 - 99 mg/dL    Calcium 9 4 8 3 - 10 1 mg/dL    AST 7 5 - 45 U/L    ALT 17 12 - 78 U/L    Alkaline Phosphatase 97 46 - 116 U/L    Total Protein 6 8 6 4 - 8 2 g/dL    Albumin 3 3 (L) 3 5 - 5 0 g/dL    Total Bilirubin 0 99 0 20 - 1 00 mg/dL    eGFR 72 ml/min/1 73sq m   Hemoglobin A1C   Result Value Ref Range    Hemoglobin A1C 7 2 (H) Normal 3 8-5 6%; PreDiabetic 5 7-6 4%; Diabetic >=6 5%; Glycemic control for adults with diabetes <7 0% %     mg/dl   Lipid Panel with Direct LDL reflex   Result Value Ref Range    Cholesterol 149 50 - 200 mg/dL    Triglycerides 69 <=150 mg/dL    HDL, Direct 53 >=40 mg/dL    LDL Calculated 82 0 - 100 mg/dL   Microalbumin / creatinine urine ratio   Result Value Ref Range    Creatinine, Ur 225 0 mg/dL    Microalbum  ,U,Random 224 0 (H) 0 0 - 20 0 mg/L    Microalb Creat Ratio 100 (H) 0 - 30 mg/g creatinine     Weight (last 2 days)     Date/Time   Weight    07/24/20 0915   124 (274)            Body mass index is 42 91 kg/m²  BP      Temp      Pulse     Resp      SpO2        Vitals:    07/24/20 0915   Weight: 124 kg (274 lb)     Vitals:    07/24/20 0915   Weight: 124 kg (274 lb)       /70   Pulse 90   Temp 97 6 °F (36 4 °C)   Ht 5' 7" (1 702 m)   Wt 124 kg (274 lb)   SpO2 95%   BMI 42 91 kg/m²          Physical Exam   Constitutional: She appears well-developed and well-nourished  HENT:   Head: Normocephalic  Eyes: Pupils are equal, round, and reactive to light  Conjunctivae are normal  Right eye exhibits no discharge  Left eye exhibits no discharge  No scleral icterus  Neck: Neck supple  Cardiovascular: Normal rate, regular rhythm, normal heart sounds and intact distal pulses  Exam reveals no gallop and no friction rub  No murmur heard  Pulmonary/Chest: Breath sounds normal  No respiratory distress  She has no wheezes  She has no rales  Abdominal: Soft  Bowel sounds are normal  She exhibits no distension and no mass  There is no tenderness  There is no rebound and no guarding  Musculoskeletal: She exhibits no edema or deformity     Lymphadenopathy:     She has no cervical adenopathy  Neurological: She is alert   Coordination normal

## 2020-07-24 NOTE — TELEPHONE ENCOUNTER
Pt received blood work scripts today to do for October  She is asking if there is additional blood work that she needs to do before she comes back in January? If so, please enter orders and mail to patient

## 2020-07-24 NOTE — TELEPHONE ENCOUNTER
Pt now calling in stating the jardiance would cost her $462 the first time and then about $47 after each fill    She isn't sure she wants to spend that type of money on something she may not decide to keep taking - is there a cheaper alternative?  Please advise, ty

## 2020-07-24 NOTE — TELEPHONE ENCOUNTER
Message sent asking staff to assist with scheduling patient for appt with pharmacist for DM mgmt  As A1c is close to goal, patient may benefit from lifestyle modifications prior to starting additional rx

## 2020-07-25 NOTE — ASSESSMENT & PLAN NOTE
Lab Results   Component Value Date    HGBA1C 7 2 (H) 07/15/2020   I have counselled the pt to follow a healthy and balanced diet ,and recommend routine exercise  Suboptimal control optimal A1c under 7 will start Jardiance 10 mg once daily reviewed the risks benefits and side effects will check diabetic labs in 3 months    Counseled patient reduce carbohydrates and sweets

## 2020-07-27 ENCOUNTER — CLINICAL SUPPORT (OUTPATIENT)
Dept: INTERNAL MEDICINE CLINIC | Facility: CLINIC | Age: 70
End: 2020-07-27

## 2020-07-27 DIAGNOSIS — E11.9 TYPE 2 DIABETES MELLITUS WITHOUT COMPLICATION, WITHOUT LONG-TERM CURRENT USE OF INSULIN (HCC): ICD-10-CM

## 2020-07-27 RX ORDER — METFORMIN HYDROCHLORIDE 500 MG/1
2000 TABLET, EXTENDED RELEASE ORAL DAILY
Qty: 360 TABLET | Refills: 1 | Status: SHIPPED | OUTPATIENT
Start: 2020-07-27 | End: 2020-08-13

## 2020-07-27 NOTE — PROGRESS NOTES
Edith, PharmD, BCACP    Reason for visit: Appointment with 77y o  year old for management of T2DM monitoring efficacy and tolerability of anti-diabetic medications  This virtual check-in was done via phone  Encounter provider: Saadia Panchal, Pharmacist    Patient agrees to participate in a virtual check in via telephone or video visit instead of presenting to the office to address urgent/immediate medical needs  After connecting through telephone, the patient was identified by name and date of birth  Nirav Wiggins was informed that this was a telemedicine visit and that the exam was being conducted confidentially over secure lines  My office door was closed  No one else was in the room  Nirav Wiggins acknowledged consent and understanding of privacy and security of the telemedicine visit  I informed the patient that I have reviewed She record in Epic and presented the opportunity for She to ask any questions regarding the visit today  The patient agreed to participate  Current DM Regimen:   Metformin XR 1gm/day   Empagliflozin 10mg/day    ASSESSMENT/PLAN                                                                                     1  Type 2 diabetes: goal A1c <7% based on 2020 ADA guidelines  Most recent A1c slightly above goal     Patient is tolerating metformin well, could consider increasing metformin dose to improve A1c however metformin is weight neutral and no anticipated impact on weight  Could benefit from SGLT2i d/t microalb however GLP1 would provide more weight loss improvement  BMP shows hyperglycemia and slight hyperchloremia  No serum B12 while on metformin        Duration: 5 years  Microvascular complications: microalbuminuria  Macrovascular complications: none  (Yes ) Aspirin (Yes ) Statin (Yes ) ACEI/ARB     Most recent labs and diabetes goals discussed with patient in clinic today   MEDICATIONS: Discussion with patient on starting new rx to assist with weight loss vs increasing metformin; could consider sample rx for initial supply to verify patient tolerability prior to patient picking up rx on her own  D/t costs, patient would prefer to increase metformin for now  If PCP is in agreeance, will increase metformin to 2gm/day and hold off on starting SGLT2i or GLP1   TLCs: Re-enforced the importance of a Diabetic Diet, exercise 30 minutes 5 days a week as tolerated, weight loss/control  2  Medication Reconciliation: Medication list reviewed with pt at today's visit  Discrepancies as discussed below  - Medication list updated to reflect medications pt is currently taking    Follow-Up: will hold off on scheduling a f/u appt for now      SUBJECTIVE                                                                                                            Diagnosed with diabetes: ~5 years  ASCVD History: denies   Previous DM medications/tolerability: none    1  Medication Adherence: Medication list reviewed with patient, reports the following discrepancies/problems:   Empagliflozin: reports rx was a tier 3 and uncertain if she could continue taking rx, did not  rx   Metformin: takes 2 tablets daily    2  Medication Efficacy:    Review of Systems   Gastrointestinal: Negative for diarrhea and nausea  SMBG readings (no log, per memory): does not have glucometer at home    3  Lifestyle: fluctuates CHO intake  Does not eat late at night and tries to limit CHO throughout the day  Eats smaller meals as she lives alone       Limited exercise d/t heat     OBJECTIVE                                                                                                      Pertinent Lab Data:    Lab Results   Component Value Date    SODIUM 140 07/15/2020    K 4 5 07/15/2020     (H) 07/15/2020    CO2 24 07/15/2020    BUN 16 07/15/2020    CREATININE 0 84 07/15/2020    GLUC 129 03/03/2017    CALCIUM 9 4 07/15/2020       Lab Results   Component Value Date    HGBA1C 7 2 (H) 07/15/2020     No results found for: Vishnu Hoyt    Microalbumin/Creatinine: 100 (7/2020)    Demographics  Interaction Method: Phone  Type of Intervention: New    Topic(s) Addressed  Diabetes    Intervention(s) Made    Pharmacologic:  Medication Initiation: GLP1, SGLT2i    Medication Adjustment - Dose or Frequency: metformin    Non-Pharmacologic:      Other: lifestyle    Tool(s) Used  Not Applicable    Time Spent:   Time Spent in Direct Patient Care: 30 minutes    Time Spent in Care Coordination: 20 minutes    Recommendation(s) Accepted by the Patient/Caregiver: Partially Accepted

## 2020-08-10 DIAGNOSIS — E03.9 HYPOTHYROIDISM, UNSPECIFIED TYPE: ICD-10-CM

## 2020-08-10 RX ORDER — LEVOTHYROXINE SODIUM 88 UG/1
TABLET ORAL
Qty: 30 TABLET | Refills: 6 | Status: SHIPPED | OUTPATIENT
Start: 2020-08-10 | End: 2021-03-11

## 2020-08-13 ENCOUNTER — DOCUMENTATION (OUTPATIENT)
Dept: INTERNAL MEDICINE CLINIC | Facility: CLINIC | Age: 70
End: 2020-08-13

## 2020-08-13 DIAGNOSIS — E11.9 TYPE 2 DIABETES MELLITUS WITHOUT COMPLICATION, WITHOUT LONG-TERM CURRENT USE OF INSULIN (HCC): Primary | ICD-10-CM

## 2020-08-13 NOTE — PROGRESS NOTES
[address] [facility] [place] Tam, PharmD, BCACP    The following is per review of patient's pertinent medical/medication history and phone call with patient:    Reason for documentation: F/u on metformin tolerability    Subjective/Objective: has been taking metformin XR 4 tablets/day  Has also started eating more fruits and vegetables throughout the day than she was prior    Review of Systems   Gastrointestinal: Positive for diarrhea (increased since metformin dose was increased, does not experience every day but maybe every other day)  Negative for nausea  Assessment/Plan:  Diabetes: patient with possible ADR to max metformin dose, could benefit from trial with lower dose  Patient has also made lifestyle modifications which may also be contributing  · If PCP is in agreeance, will have patient try reducing metformin XR dose to 1500mg/day  If patient tolerates well after 2-3 weeks, may increase to 2000mg/day  Follow-up: pharmacist will contact patient in a few weeks to f/u on tolerability    Demographics  Interaction Method: Phone  Type of Intervention: Follow-Up    Topic(s) Addressed  Diabetes    Intervention(s) Made    Pharmacologic:      Medication Adjustment - Dose or Frequency: metformin    Prevent or Manage Adverse Drug Reaction: metformin    Tool(s) Used  Not Applicable    Time Spent:   Time Spent in Direct Patient Care: 5 minutes    Time Spent in Care Coordination: 5 minutes    Recommendation(s) Accepted by the Patient/Caregiver:  All Accepted

## 2020-08-28 ENCOUNTER — TRANSCRIBE ORDERS (OUTPATIENT)
Dept: LAB | Facility: HOSPITAL | Age: 70
End: 2020-08-28

## 2020-08-28 ENCOUNTER — APPOINTMENT (OUTPATIENT)
Dept: LAB | Facility: HOSPITAL | Age: 70
End: 2020-08-28
Attending: INTERNAL MEDICINE
Payer: MEDICARE

## 2020-08-28 DIAGNOSIS — L40.59 POLYARTICULAR PSORIATIC ARTHRITIS (HCC): Primary | ICD-10-CM

## 2020-08-28 DIAGNOSIS — L40.59 POLYARTICULAR PSORIATIC ARTHRITIS (HCC): ICD-10-CM

## 2020-08-28 LAB
ALBUMIN SERPL BCP-MCNC: 3.4 G/DL (ref 3.5–5)
ALP SERPL-CCNC: 99 U/L (ref 46–116)
ALT SERPL W P-5'-P-CCNC: 21 U/L (ref 12–78)
AST SERPL W P-5'-P-CCNC: 12 U/L (ref 5–45)
BASOPHILS # BLD AUTO: 0.04 THOUSANDS/ΜL (ref 0–0.1)
BASOPHILS NFR BLD AUTO: 1 % (ref 0–1)
BILIRUB DIRECT SERPL-MCNC: 0.24 MG/DL (ref 0–0.2)
BILIRUB SERPL-MCNC: 1.11 MG/DL (ref 0.2–1)
CREAT SERPL-MCNC: 0.94 MG/DL (ref 0.6–1.3)
CRP SERPL QL: 9.2 MG/L
EOSINOPHIL # BLD AUTO: 0.07 THOUSAND/ΜL (ref 0–0.61)
EOSINOPHIL NFR BLD AUTO: 1 % (ref 0–6)
ERYTHROCYTE [DISTWIDTH] IN BLOOD BY AUTOMATED COUNT: 14.6 % (ref 11.6–15.1)
ERYTHROCYTE [SEDIMENTATION RATE] IN BLOOD: 40 MM/HOUR (ref 0–29)
GFR SERPL CREATININE-BSD FRML MDRD: 63 ML/MIN/1.73SQ M
HCT VFR BLD AUTO: 39.7 % (ref 34.8–46.1)
HGB BLD-MCNC: 12.4 G/DL (ref 11.5–15.4)
IMM GRANULOCYTES # BLD AUTO: 0.02 THOUSAND/UL (ref 0–0.2)
IMM GRANULOCYTES NFR BLD AUTO: 0 % (ref 0–2)
LYMPHOCYTES # BLD AUTO: 0.84 THOUSANDS/ΜL (ref 0.6–4.47)
LYMPHOCYTES NFR BLD AUTO: 15 % (ref 14–44)
MCH RBC QN AUTO: 30.6 PG (ref 26.8–34.3)
MCHC RBC AUTO-ENTMCNC: 31.2 G/DL (ref 31.4–37.4)
MCV RBC AUTO: 98 FL (ref 82–98)
MONOCYTES # BLD AUTO: 0.3 THOUSAND/ΜL (ref 0.17–1.22)
MONOCYTES NFR BLD AUTO: 5 % (ref 4–12)
NEUTROPHILS # BLD AUTO: 4.44 THOUSANDS/ΜL (ref 1.85–7.62)
NEUTS SEG NFR BLD AUTO: 78 % (ref 43–75)
NRBC BLD AUTO-RTO: 0 /100 WBCS
PLATELET # BLD AUTO: 289 THOUSANDS/UL (ref 149–390)
PMV BLD AUTO: 10.3 FL (ref 8.9–12.7)
PROT SERPL-MCNC: 7.5 G/DL (ref 6.4–8.2)
RBC # BLD AUTO: 4.05 MILLION/UL (ref 3.81–5.12)
WBC # BLD AUTO: 5.71 THOUSAND/UL (ref 4.31–10.16)

## 2020-08-28 PROCEDURE — 82565 ASSAY OF CREATININE: CPT

## 2020-08-28 PROCEDURE — 85025 COMPLETE CBC W/AUTO DIFF WBC: CPT

## 2020-08-28 PROCEDURE — 86140 C-REACTIVE PROTEIN: CPT

## 2020-08-28 PROCEDURE — 80076 HEPATIC FUNCTION PANEL: CPT

## 2020-08-28 PROCEDURE — 85652 RBC SED RATE AUTOMATED: CPT

## 2020-08-28 PROCEDURE — 36415 COLL VENOUS BLD VENIPUNCTURE: CPT

## 2020-09-10 ENCOUNTER — DOCUMENTATION (OUTPATIENT)
Dept: INTERNAL MEDICINE CLINIC | Facility: CLINIC | Age: 70
End: 2020-09-10

## 2020-09-10 DIAGNOSIS — E11.9 TYPE 2 DIABETES MELLITUS WITHOUT COMPLICATION, WITHOUT LONG-TERM CURRENT USE OF INSULIN (HCC): Primary | ICD-10-CM

## 2020-09-10 RX ORDER — METFORMIN HYDROCHLORIDE 500 MG/1
1500 TABLET, EXTENDED RELEASE ORAL DAILY
Qty: 270 TABLET | Refills: 1 | Status: SHIPPED | OUTPATIENT
Start: 2020-09-10 | End: 2021-01-29 | Stop reason: SDUPTHER

## 2020-09-10 NOTE — PROGRESS NOTES
Edith, PharmD, Mari Prieto    The following is per review of patient's pertinent medical/medication history and phone call with patient:    Subjective/Objective: reports she has been taking metformin XR 1500mg/day since last pharm encounter; did not try increasing metformin dose as discussed  Has had an occasional loose stool but denies diarrhea  Does not check blood sugars  Continues with lifestyle change of increased fruits/vegetables and minimal starches  Lab Results   Component Value Date    HGBA1C 7 2 (H) 07/15/2020     Assessment/Plan:  Diabetes: most recent A1c slightly above goal however patient has increased metformin dose and made lifestyle modifications since  Patient is tolerating increased metformin dose but slight diarrhea  · If PCP is in agreeance, will have patient continue with metformin XR 1500mg/day  Will pend rx for PCP signature/concurrence  · Discussion with patient on updating A1c in 10/2020, patient voiced understanding  At this time, would prefer to have lab A1c as she lives closer to lab; will call PCP office to schedule appt if she would prefer to have POCT A1c drawn in 10/2020  A1c previously ordered by PCP  Demographics  Interaction Method: Phone  Type of Intervention: Follow-Up    Topic(s) Addressed  Diabetes    Intervention(s) Made      Non-Pharmacologic:      Other    Tool(s) Used  Not Applicable    Time Spent:   Time Spent in Direct Patient Care: 10 minutes    Time Spent in Care Coordination: 10 minutes    Recommendation(s) Accepted by the Patient/Caregiver:  All Accepted

## 2020-09-10 NOTE — TELEPHONE ENCOUNTER
Per pharmacist encounter on 09/10/20, pending orders for signature/concurrence from Jennifer Powers, DO    Metformin XR

## 2020-10-03 DIAGNOSIS — I10 ESSENTIAL HYPERTENSION: ICD-10-CM

## 2020-10-04 RX ORDER — LISINOPRIL 5 MG/1
TABLET ORAL
Qty: 30 TABLET | Refills: 6 | Status: SHIPPED | OUTPATIENT
Start: 2020-10-04 | End: 2021-06-09

## 2020-10-06 ENCOUNTER — TRANSCRIBE ORDERS (OUTPATIENT)
Dept: ADMINISTRATIVE | Facility: HOSPITAL | Age: 70
End: 2020-10-06

## 2020-10-06 DIAGNOSIS — Z12.31 ENCOUNTER FOR SCREENING MAMMOGRAM FOR MALIGNANT NEOPLASM OF BREAST: Primary | ICD-10-CM

## 2020-10-24 ENCOUNTER — LAB (OUTPATIENT)
Dept: LAB | Facility: HOSPITAL | Age: 70
End: 2020-10-24
Payer: MEDICARE

## 2020-10-24 DIAGNOSIS — E11.9 TYPE 2 DIABETES MELLITUS WITHOUT COMPLICATION, WITHOUT LONG-TERM CURRENT USE OF INSULIN (HCC): ICD-10-CM

## 2020-10-24 LAB
EST. AVERAGE GLUCOSE BLD GHB EST-MCNC: 148 MG/DL
HBA1C MFR BLD: 6.8 %

## 2020-10-24 PROCEDURE — 36415 COLL VENOUS BLD VENIPUNCTURE: CPT

## 2020-10-24 PROCEDURE — 83036 HEMOGLOBIN GLYCOSYLATED A1C: CPT

## 2020-11-02 ENCOUNTER — ANNUAL EXAM (OUTPATIENT)
Dept: OBGYN CLINIC | Facility: CLINIC | Age: 70
End: 2020-11-02
Payer: MEDICARE

## 2020-11-02 VITALS
SYSTOLIC BLOOD PRESSURE: 121 MMHG | HEIGHT: 67 IN | WEIGHT: 283 LBS | DIASTOLIC BLOOD PRESSURE: 78 MMHG | TEMPERATURE: 96 F | BODY MASS INDEX: 44.42 KG/M2

## 2020-11-02 DIAGNOSIS — Z01.419 ENCOUNTER FOR GYNECOLOGICAL EXAMINATION WITHOUT ABNORMAL FINDING: Primary | ICD-10-CM

## 2020-11-02 DIAGNOSIS — Z12.31 ENCOUNTER FOR SCREENING MAMMOGRAM FOR MALIGNANT NEOPLASM OF BREAST: ICD-10-CM

## 2020-11-02 PROCEDURE — G0101 CA SCREEN;PELVIC/BREAST EXAM: HCPCS | Performed by: OBSTETRICS & GYNECOLOGY

## 2020-11-30 ENCOUNTER — LAB (OUTPATIENT)
Dept: LAB | Facility: HOSPITAL | Age: 70
End: 2020-11-30
Attending: INTERNAL MEDICINE
Payer: MEDICARE

## 2020-11-30 ENCOUNTER — TRANSCRIBE ORDERS (OUTPATIENT)
Dept: LAB | Facility: HOSPITAL | Age: 70
End: 2020-11-30

## 2020-11-30 DIAGNOSIS — L40.59 POLYARTICULAR PSORIATIC ARTHRITIS (HCC): ICD-10-CM

## 2020-11-30 DIAGNOSIS — L40.59 POLYARTICULAR PSORIATIC ARTHRITIS (HCC): Primary | ICD-10-CM

## 2020-11-30 LAB
ALBUMIN SERPL BCP-MCNC: 3.3 G/DL (ref 3.5–5)
ALP SERPL-CCNC: 97 U/L (ref 46–116)
ALT SERPL W P-5'-P-CCNC: 15 U/L (ref 12–78)
AST SERPL W P-5'-P-CCNC: 9 U/L (ref 5–45)
BASOPHILS # BLD AUTO: 0.04 THOUSANDS/ΜL (ref 0–0.1)
BASOPHILS NFR BLD AUTO: 1 % (ref 0–1)
BILIRUB DIRECT SERPL-MCNC: 0.2 MG/DL (ref 0–0.2)
BILIRUB SERPL-MCNC: 0.95 MG/DL (ref 0.2–1)
CREAT SERPL-MCNC: 0.92 MG/DL (ref 0.6–1.3)
CRP SERPL QL: 32 MG/L
EOSINOPHIL # BLD AUTO: 0.1 THOUSAND/ΜL (ref 0–0.61)
EOSINOPHIL NFR BLD AUTO: 2 % (ref 0–6)
ERYTHROCYTE [DISTWIDTH] IN BLOOD BY AUTOMATED COUNT: 14 % (ref 11.6–15.1)
ERYTHROCYTE [SEDIMENTATION RATE] IN BLOOD: 42 MM/HOUR (ref 0–29)
GFR SERPL CREATININE-BSD FRML MDRD: 64 ML/MIN/1.73SQ M
HCT VFR BLD AUTO: 37.9 % (ref 34.8–46.1)
HGB BLD-MCNC: 12.1 G/DL (ref 11.5–15.4)
IMM GRANULOCYTES # BLD AUTO: 0.02 THOUSAND/UL (ref 0–0.2)
IMM GRANULOCYTES NFR BLD AUTO: 0 % (ref 0–2)
LYMPHOCYTES # BLD AUTO: 1.04 THOUSANDS/ΜL (ref 0.6–4.47)
LYMPHOCYTES NFR BLD AUTO: 19 % (ref 14–44)
MCH RBC QN AUTO: 30.3 PG (ref 26.8–34.3)
MCHC RBC AUTO-ENTMCNC: 31.9 G/DL (ref 31.4–37.4)
MCV RBC AUTO: 95 FL (ref 82–98)
MONOCYTES # BLD AUTO: 0.46 THOUSAND/ΜL (ref 0.17–1.22)
MONOCYTES NFR BLD AUTO: 8 % (ref 4–12)
NEUTROPHILS # BLD AUTO: 3.89 THOUSANDS/ΜL (ref 1.85–7.62)
NEUTS SEG NFR BLD AUTO: 70 % (ref 43–75)
NRBC BLD AUTO-RTO: 0 /100 WBCS
PLATELET # BLD AUTO: 281 THOUSANDS/UL (ref 149–390)
PMV BLD AUTO: 10.3 FL (ref 8.9–12.7)
PROT SERPL-MCNC: 7.1 G/DL (ref 6.4–8.2)
RBC # BLD AUTO: 3.99 MILLION/UL (ref 3.81–5.12)
WBC # BLD AUTO: 5.55 THOUSAND/UL (ref 4.31–10.16)

## 2020-11-30 PROCEDURE — 36415 COLL VENOUS BLD VENIPUNCTURE: CPT

## 2020-11-30 PROCEDURE — 86140 C-REACTIVE PROTEIN: CPT

## 2020-11-30 PROCEDURE — 82565 ASSAY OF CREATININE: CPT

## 2020-11-30 PROCEDURE — 85652 RBC SED RATE AUTOMATED: CPT

## 2020-11-30 PROCEDURE — 85025 COMPLETE CBC W/AUTO DIFF WBC: CPT

## 2020-11-30 PROCEDURE — 80076 HEPATIC FUNCTION PANEL: CPT

## 2020-12-04 ENCOUNTER — HOSPITAL ENCOUNTER (OUTPATIENT)
Dept: NON INVASIVE DIAGNOSTICS | Facility: HOSPITAL | Age: 70
Discharge: HOME | End: 2020-12-04
Payer: MEDICARE

## 2020-12-04 ENCOUNTER — OFFICE VISIT (OUTPATIENT)
Dept: INTERNAL MEDICINE CLINIC | Facility: CLINIC | Age: 70
End: 2020-12-04
Payer: MEDICARE

## 2020-12-04 ENCOUNTER — TELEPHONE (OUTPATIENT)
Dept: OTHER | Facility: OTHER | Age: 70
End: 2020-12-04

## 2020-12-04 ENCOUNTER — HOSPITAL ENCOUNTER (EMERGENCY)
Facility: HOSPITAL | Age: 70
Discharge: HOME | End: 2020-12-04
Attending: EMERGENCY MEDICINE | Admitting: EMERGENCY MEDICINE
Payer: MEDICARE

## 2020-12-04 VITALS
DIASTOLIC BLOOD PRESSURE: 65 MMHG | HEART RATE: 92 BPM | SYSTOLIC BLOOD PRESSURE: 145 MMHG | RESPIRATION RATE: 18 BRPM | TEMPERATURE: 98.3 F | OXYGEN SATURATION: 100 %

## 2020-12-04 VITALS
SYSTOLIC BLOOD PRESSURE: 119 MMHG | WEIGHT: 276.4 LBS | RESPIRATION RATE: 16 BRPM | HEIGHT: 67 IN | DIASTOLIC BLOOD PRESSURE: 72 MMHG | BODY MASS INDEX: 43.38 KG/M2

## 2020-12-04 DIAGNOSIS — M79.602 LEFT UPPER LIMB PAIN: ICD-10-CM

## 2020-12-04 DIAGNOSIS — M79.605 LOWER LIMB PAIN, POSTERIOR, LEFT: Primary | ICD-10-CM

## 2020-12-04 DIAGNOSIS — Z86.718 HISTORY OF DVT (DEEP VEIN THROMBOSIS): ICD-10-CM

## 2020-12-04 DIAGNOSIS — D68.51 FACTOR 5 LEIDEN MUTATION, HETEROZYGOUS (HCC): ICD-10-CM

## 2020-12-04 DIAGNOSIS — I82.4Y3 ACUTE DEEP VEIN THROMBOSIS (DVT) OF PROXIMAL VEIN OF BOTH LOWER EXTREMITIES (HCC): ICD-10-CM

## 2020-12-04 DIAGNOSIS — I82.409 DVT (DEEP VENOUS THROMBOSIS) (HCC): Primary | ICD-10-CM

## 2020-12-04 PROCEDURE — 93308 TTE F-UP OR LMTD: CPT | Performed by: EMERGENCY MEDICINE

## 2020-12-04 PROCEDURE — 93970 EXTREMITY STUDY: CPT

## 2020-12-04 PROCEDURE — 99283 EMERGENCY DEPT VISIT LOW MDM: CPT

## 2020-12-04 PROCEDURE — 99213 OFFICE O/P EST LOW 20 MIN: CPT | Performed by: INTERNAL MEDICINE

## 2020-12-04 PROCEDURE — 99285 EMERGENCY DEPT VISIT HI MDM: CPT | Performed by: EMERGENCY MEDICINE

## 2020-12-04 RX ADMIN — RIVAROXABAN 15 MG: 15 TABLET, FILM COATED ORAL at 20:33

## 2020-12-05 PROCEDURE — 93970 EXTREMITY STUDY: CPT | Performed by: SURGERY

## 2020-12-06 PROBLEM — Z86.718 HISTORY OF DVT (DEEP VEIN THROMBOSIS): Status: ACTIVE | Noted: 2020-12-06

## 2020-12-06 PROBLEM — M79.605: Status: ACTIVE | Noted: 2020-12-06

## 2020-12-06 PROBLEM — D68.51 FACTOR 5 LEIDEN MUTATION, HETEROZYGOUS (HCC): Status: ACTIVE | Noted: 2020-12-06

## 2020-12-08 ENCOUNTER — TELEMEDICINE (OUTPATIENT)
Dept: INTERNAL MEDICINE CLINIC | Facility: CLINIC | Age: 70
End: 2020-12-08
Payer: MEDICARE

## 2020-12-08 DIAGNOSIS — D68.51 FACTOR 5 LEIDEN MUTATION, HETEROZYGOUS (HCC): Primary | ICD-10-CM

## 2020-12-08 DIAGNOSIS — I82.4Y3 ACUTE DEEP VEIN THROMBOSIS (DVT) OF PROXIMAL VEIN OF BOTH LOWER EXTREMITIES (HCC): ICD-10-CM

## 2020-12-08 DIAGNOSIS — Z13.6 SCREENING FOR CARDIOVASCULAR CONDITION: ICD-10-CM

## 2020-12-08 PROCEDURE — 99214 OFFICE O/P EST MOD 30 MIN: CPT | Performed by: INTERNAL MEDICINE

## 2021-01-08 DIAGNOSIS — I82.409 DVT (DEEP VENOUS THROMBOSIS) (HCC): ICD-10-CM

## 2021-01-13 ENCOUNTER — HOSPITAL ENCOUNTER (OUTPATIENT)
Dept: RADIOLOGY | Facility: HOSPITAL | Age: 71
Discharge: HOME | End: 2021-01-13
Payer: MEDICARE

## 2021-01-13 ENCOUNTER — HOSPITAL ENCOUNTER (OUTPATIENT)
Dept: NON INVASIVE DIAGNOSTICS | Facility: HOSPITAL | Age: 71
Discharge: HOME | End: 2021-01-13
Payer: MEDICARE

## 2021-01-13 DIAGNOSIS — E03.9 HYPOTHYROIDISM, UNSPECIFIED TYPE: ICD-10-CM

## 2021-01-13 DIAGNOSIS — I82.4Y3 ACUTE DEEP VEIN THROMBOSIS (DVT) OF PROXIMAL VEIN OF BOTH LOWER EXTREMITIES (HCC): ICD-10-CM

## 2021-01-13 PROCEDURE — 76536 US EXAM OF HEAD AND NECK: CPT

## 2021-01-13 PROCEDURE — 93971 EXTREMITY STUDY: CPT

## 2021-01-13 PROCEDURE — 93971 EXTREMITY STUDY: CPT | Performed by: SURGERY

## 2021-01-23 ENCOUNTER — LAB (OUTPATIENT)
Dept: LAB | Facility: HOSPITAL | Age: 71
End: 2021-01-23
Payer: MEDICARE

## 2021-01-23 DIAGNOSIS — I82.4Y3 ACUTE DEEP VEIN THROMBOSIS (DVT) OF PROXIMAL VEIN OF BOTH LOWER EXTREMITIES (HCC): ICD-10-CM

## 2021-01-23 LAB
ALBUMIN SERPL BCP-MCNC: 3.2 G/DL (ref 3.5–5)
ALP SERPL-CCNC: 102 U/L (ref 46–116)
ALT SERPL W P-5'-P-CCNC: 19 U/L (ref 12–78)
ANION GAP SERPL CALCULATED.3IONS-SCNC: 4 MMOL/L (ref 4–13)
AST SERPL W P-5'-P-CCNC: 8 U/L (ref 5–45)
BILIRUB SERPL-MCNC: 1.05 MG/DL (ref 0.2–1)
BUN SERPL-MCNC: 13 MG/DL (ref 5–25)
CALCIUM ALBUM COR SERPL-MCNC: 9.8 MG/DL (ref 8.3–10.1)
CALCIUM SERPL-MCNC: 9.2 MG/DL (ref 8.3–10.1)
CHLORIDE SERPL-SCNC: 109 MMOL/L (ref 100–108)
CO2 SERPL-SCNC: 28 MMOL/L (ref 21–32)
CREAT SERPL-MCNC: 0.94 MG/DL (ref 0.6–1.3)
ERYTHROCYTE [DISTWIDTH] IN BLOOD BY AUTOMATED COUNT: 14.5 % (ref 11.6–15.1)
GFR SERPL CREATININE-BSD FRML MDRD: 62 ML/MIN/1.73SQ M
GLUCOSE P FAST SERPL-MCNC: 144 MG/DL (ref 65–99)
HCT VFR BLD AUTO: 37.9 % (ref 34.8–46.1)
HGB BLD-MCNC: 12 G/DL (ref 11.5–15.4)
MCH RBC QN AUTO: 30.2 PG (ref 26.8–34.3)
MCHC RBC AUTO-ENTMCNC: 31.7 G/DL (ref 31.4–37.4)
MCV RBC AUTO: 96 FL (ref 82–98)
PLATELET # BLD AUTO: 318 THOUSANDS/UL (ref 149–390)
PMV BLD AUTO: 9.9 FL (ref 8.9–12.7)
POTASSIUM SERPL-SCNC: 4.5 MMOL/L (ref 3.5–5.3)
PROT SERPL-MCNC: 7.1 G/DL (ref 6.4–8.2)
RBC # BLD AUTO: 3.97 MILLION/UL (ref 3.81–5.12)
SODIUM SERPL-SCNC: 141 MMOL/L (ref 136–145)
WBC # BLD AUTO: 6.9 THOUSAND/UL (ref 4.31–10.16)

## 2021-01-23 PROCEDURE — 36415 COLL VENOUS BLD VENIPUNCTURE: CPT

## 2021-01-23 PROCEDURE — 80053 COMPREHEN METABOLIC PANEL: CPT

## 2021-01-23 PROCEDURE — 85027 COMPLETE CBC AUTOMATED: CPT

## 2021-01-28 ENCOUNTER — TELEPHONE (OUTPATIENT)
Dept: INTERNAL MEDICINE CLINIC | Facility: CLINIC | Age: 71
End: 2021-01-28

## 2021-01-28 NOTE — TELEPHONE ENCOUNTER
Sirisha Sportsman please check if she has had the symptoms ongoing since starting metformin or if they are new onset

## 2021-01-28 NOTE — TELEPHONE ENCOUNTER
Spoke to the patient  She states that she has had ongoing issues with diarrhea x 6 months after increasing her Metformin dose  She states that it's intermittent and is more "loose" stools than diarrhea  She did speak with Sergio Bowman some time ago and was instructed to reduce her Metformin to 3 daily instead of 4 because of the diarrhea  Please advise

## 2021-01-28 NOTE — TELEPHONE ENCOUNTER
COVID Pre-Visit Screening     1  Is this a family member screening? No  2  Have you traveled outside of your state in the past 2 weeks? No  3  Do you presently have a fever or flu-like symptoms? No  4  Do you have symptoms of an upper respiratory infection like runny nose, sore throat, or cough? No  5  Are you suffering from new headache that you have not had in the past?  No  6  Do you have/have you experienced any new shortness of breath recently? No  7  Do you have any new diarrhea, nausea or vomiting? Yes - HAD DIARRHEA LAST WEEK, SOME LAST NIGHT - THINKS IT'S FROM HER METFORMIN  8  Have you been in contact with anyone who has been sick or diagnosed with COVID-19? No  9  Do you have any new loss of taste or smell? No  10  Are you able to wear a mask without a valve for the entire visit?  Yes

## 2021-01-29 ENCOUNTER — OFFICE VISIT (OUTPATIENT)
Dept: INTERNAL MEDICINE CLINIC | Facility: CLINIC | Age: 71
End: 2021-01-29
Payer: MEDICARE

## 2021-01-29 VITALS
SYSTOLIC BLOOD PRESSURE: 122 MMHG | HEART RATE: 80 BPM | RESPIRATION RATE: 16 BRPM | BODY MASS INDEX: 43.44 KG/M2 | DIASTOLIC BLOOD PRESSURE: 68 MMHG | OXYGEN SATURATION: 100 % | WEIGHT: 276.8 LBS | HEIGHT: 67 IN

## 2021-01-29 DIAGNOSIS — E11.9 TYPE 2 DIABETES MELLITUS WITHOUT COMPLICATION, WITHOUT LONG-TERM CURRENT USE OF INSULIN (HCC): ICD-10-CM

## 2021-01-29 DIAGNOSIS — E03.9 HYPOTHYROIDISM, UNSPECIFIED TYPE: ICD-10-CM

## 2021-01-29 DIAGNOSIS — I82.599 CHRONIC DEEP VEIN THROMBOSIS (DVT) OF OTHER VEIN OF LOWER EXTREMITY, UNSPECIFIED LATERALITY (HCC): ICD-10-CM

## 2021-01-29 DIAGNOSIS — Z00.00 MEDICARE ANNUAL WELLNESS VISIT, SUBSEQUENT: Primary | ICD-10-CM

## 2021-01-29 DIAGNOSIS — E78.5 HYPERLIPIDEMIA, UNSPECIFIED HYPERLIPIDEMIA TYPE: ICD-10-CM

## 2021-01-29 DIAGNOSIS — R73.03 PREDIABETES: ICD-10-CM

## 2021-01-29 DIAGNOSIS — E66.01 CLASS 3 SEVERE OBESITY DUE TO EXCESS CALORIES WITH SERIOUS COMORBIDITY AND BODY MASS INDEX (BMI) OF 40.0 TO 44.9 IN ADULT (HCC): ICD-10-CM

## 2021-01-29 PROCEDURE — G0438 PPPS, INITIAL VISIT: HCPCS | Performed by: INTERNAL MEDICINE

## 2021-01-29 PROCEDURE — 1123F ACP DISCUSS/DSCN MKR DOCD: CPT | Performed by: INTERNAL MEDICINE

## 2021-01-29 PROCEDURE — 99214 OFFICE O/P EST MOD 30 MIN: CPT | Performed by: INTERNAL MEDICINE

## 2021-01-29 RX ORDER — METFORMIN HYDROCHLORIDE 500 MG/1
TABLET, EXTENDED RELEASE ORAL
Qty: 270 TABLET | Refills: 1
Start: 2021-01-29 | End: 2021-03-23

## 2021-01-29 NOTE — PATIENT INSTRUCTIONS
Medicare Preventive Visit Patient Instructions  Thank you for completing your Welcome to Medicare Visit or Medicare Annual Wellness Visit today  Your next wellness visit will be due in one year (1/29/2022)  The screening/preventive services that you may require over the next 5-10 years are detailed below  Some tests may not apply to you based off risk factors and/or age  Screening tests ordered at today's visit but not completed yet may show as past due  Also, please note that scanned in results may not display below  Preventive Screenings:  Service Recommendations Previous Testing/Comments   Colorectal Cancer Screening  * Colonoscopy    * Fecal Occult Blood Test (FOBT)/Fecal Immunochemical Test (FIT)  * Fecal DNA/Cologuard Test  * Flexible Sigmoidoscopy Age: 54-65 years old   Colonoscopy: every 10 years (may be performed more frequently if at higher risk)  OR  FOBT/FIT: every 1 year  OR  Cologuard: every 3 years  OR  Sigmoidoscopy: every 5 years  Screening may be recommended earlier than age 48 if at higher risk for colorectal cancer  Also, an individualized decision between you and your healthcare provider will decide whether screening between the ages of 74-80 would be appropriate  Colonoscopy: 10/06/2014  FOBT/FIT: Not on file  Cologuard: 01/18/2021  Sigmoidoscopy: Not on file         Breast Cancer Screening Age: 36 years old  Frequency: every 1-2 years  Not required if history of left and right mastectomy Mammogram: 10/28/2019       Cervical Cancer Screening Between the ages of 21-29, pap smear recommended once every 3 years  Between the ages of 33-67, can perform pap smear with HPV co-testing every 5 years     Recommendations may differ for women with a history of total hysterectomy, cervical cancer, or abnormal pap smears in past  Pap Smear: 11/02/2020       Hepatitis C Screening Once for adults born between 1945 and 1965  More frequently in patients at high risk for Hepatitis C Hep C Antibody: Not on file       Diabetes Screening 1-2 times per year if you're at risk for diabetes or have pre-diabetes Fasting glucose: 144 mg/dL   A1C: 6 8 %       Cholesterol Screening Once every 5 years if you don't have a lipid disorder  May order more often based on risk factors  Lipid panel: 07/15/2020         Other Preventive Screenings Covered by Medicare:  1  Abdominal Aortic Aneurysm (AAA) Screening: covered once if your at risk  You're considered to be at risk if you have a family history of AAA  2  Lung Cancer Screening: covers low dose CT scan once per year if you meet all of the following conditions: (1) Age 50-69; (2) No signs or symptoms of lung cancer; (3) Current smoker or have quit smoking within the last 15 years; (4) You have a tobacco smoking history of at least 30 pack years (packs per day multiplied by number of years you smoked); (5) You get a written order from a healthcare provider  3  Glaucoma Screening: covered annually if you're considered high risk: (1) You have diabetes OR (2) Family history of glaucoma OR (3)  aged 48 and older OR (3)  American aged 72 and older  3  Osteoporosis Screening: covered every 2 years if you meet one of the following conditions: (1) You're estrogen deficient and at risk for osteoporosis based off medical history and other findings; (2) Have a vertebral abnormality; (3) On glucocorticoid therapy for more than 3 months; (4) Have primary hyperparathyroidism; (5) On osteoporosis medications and need to assess response to drug therapy  · Last bone density test (DXA Scan): 01/03/2020   5  HIV Screening: covered annually if you're between the age of 15-65  Also covered annually if you are younger than 13 and older than 72 with risk factors for HIV infection  For pregnant patients, it is covered up to 3 times per pregnancy      Immunizations:  Immunization Recommendations   Influenza Vaccine Annual influenza vaccination during flu season is recommended for all persons aged >= 6 months who do not have contraindications   Pneumococcal Vaccine (Prevnar and Pneumovax)  * Prevnar = PCV13  * Pneumovax = PPSV23   Adults 25-60 years old: 1-3 doses may be recommended based on certain risk factors  Adults 72 years old: Prevnar (PCV13) vaccine recommended followed by Pneumovax (PPSV23) vaccine  If already received PPSV23 since turning 65, then PCV13 recommended at least one year after PPSV23 dose  Hepatitis B Vaccine 3 dose series if at intermediate or high risk (ex: diabetes, end stage renal disease, liver disease)   Tetanus (Td) Vaccine - COST NOT COVERED BY MEDICARE PART B Following completion of primary series, a booster dose should be given every 10 years to maintain immunity against tetanus  Td may also be given as tetanus wound prophylaxis  Tdap Vaccine - COST NOT COVERED BY MEDICARE PART B Recommended at least once for all adults  For pregnant patients, recommended with each pregnancy  Shingles Vaccine (Shingrix) - COST NOT COVERED BY MEDICARE PART B  2 shot series recommended in those aged 48 and above     Health Maintenance Due:      Topic Date Due    MAMMOGRAM  10/28/2021    Colorectal Cancer Screening  10/06/2024    Hepatitis C Screening  Completed     Immunizations Due:  There are no preventive care reminders to display for this patient  Advance Directives   What are advance directives? Advance directives are legal documents that state your wishes and plans for medical care  These plans are made ahead of time in case you lose your ability to make decisions for yourself  Advance directives can apply to any medical decision, such as the treatments you want, and if you want to donate organs  What are the types of advance directives? There are many types of advance directives, and each state has rules about how to use them  You may choose a combination of any of the following:  · Living will: This is a written record of the treatment you want   You can also choose which treatments you do not want, which to limit, and which to stop at a certain time  This includes surgery, medicine, IV fluid, and tube feedings  · Durable power of  for healthcare [place] [facility]): This is a written record that states who you want to make healthcare choices for you when you are unable to make them for yourself  This person, called a proxy, is usually a family member or a friend  You may choose more than 1 proxy  · Do not resuscitate (DNR) order:  A DNR order is used in case your heart stops beating or you stop breathing  It is a request not to have certain forms of treatment, such as CPR  A DNR order may be included in other types of advance directives  · Medical directive: This covers the care that you want if you are in a coma, near death, or unable to make decisions for yourself  You can list the treatments you want for each condition  Treatment may include pain medicine, surgery, blood transfusions, dialysis, IV or tube feedings, and a ventilator (breathing machine)  · Values history: This document has questions about your views, beliefs, and how you feel and think about life  This information can help others choose the care that you would choose  Why are advance directives important? An advance directive helps you control your care  Although spoken wishes may be used, it is better to have your wishes written down  Spoken wishes can be misunderstood, or not followed  Treatments may be given even if you do not want them  An advance directive may make it easier for your family to make difficult choices about your care  Weight Management   Why it is important to manage your weight:  Being overweight increases your risk of health conditions such as heart disease, high blood pressure, type 2 diabetes, and certain types of cancer  It can also increase your risk for osteoarthritis, sleep apnea, and other respiratory problems  Aim for a slow, steady weight loss   Even a small amount of weight loss can lower your risk of health problems  How to lose weight safely:  A safe and healthy way to lose weight is to eat fewer calories and get regular exercise  You can lose up about 1 pound a week by decreasing the number of calories you eat by 500 calories each day  Healthy meal plan for weight management:  A healthy meal plan includes a variety of foods, contains fewer calories, and helps you stay healthy  A healthy meal plan includes the following:  · Eat whole-grain foods more often  A healthy meal plan should contain fiber  Fiber is the part of grains, fruits, and vegetables that is not broken down by your body  Whole-grain foods are healthy and provide extra fiber in your diet  Some examples of whole-grain foods are whole-wheat breads and pastas, oatmeal, brown rice, and bulgur  · Eat a variety of vegetables every day  Include dark, leafy greens such as spinach, kale, joshua greens, and mustard greens  Eat yellow and orange vegetables such as carrots, sweet potatoes, and winter squash  · Eat a variety of fruits every day  Choose fresh or canned fruit (canned in its own juice or light syrup) instead of juice  Fruit juice has very little or no fiber  · Eat low-fat dairy foods  Drink fat-free (skim) milk or 1% milk  Eat fat-free yogurt and low-fat cottage cheese  Try low-fat cheeses such as mozzarella and other reduced-fat cheeses  · Choose meat and other protein foods that are low in fat  Choose beans or other legumes such as split peas or lentils  Choose fish, skinless poultry (chicken or turkey), or lean cuts of red meat (beef or pork)  Before you cook meat or poultry, cut off any visible fat  · Use less fat and oil  Try baking foods instead of frying them  Add less fat, such as margarine, sour cream, regular salad dressing and mayonnaise to foods  Eat fewer high-fat foods  Some examples of high-fat foods include french fries, doughnuts, ice cream, and cakes    · Eat fewer sweets  Limit foods and drinks that are high in sugar  This includes candy, cookies, regular soda, and sweetened drinks  Exercise:  Exercise at least 30 minutes per day on most days of the week  Some examples of exercise include walking, biking, dancing, and swimming  You can also fit in more physical activity by taking the stairs instead of the elevator or parking farther away from stores  Ask your healthcare provider about the best exercise plan for you  © Copyright [employer]. 2018 Information is for End User's use only and may not be sold, redistributed or otherwise used for commercial purposes   All illustrations and images included in CareNotes® are the copyrighted property of A [employer] A M , Inc  or [address] [place] [address]

## 2021-01-29 NOTE — PROGRESS NOTES
Assessment and Plan:     Problem List Items Addressed This Visit        Endocrine    Type 2 diabetes mellitus without complication, without long-term current use of insulin (HCC)    Relevant Medications    metFORMIN (GLUCOPHAGE-XR) 500 mg 24 hr tablet    Other Relevant Orders    Comprehensive metabolic panel    Hemoglobin A1C    Lipid Panel with Direct LDL reflex    Microalbumin / creatinine urine ratio    Hypothyroidism       Other    Prediabetes    Medicare annual wellness visit, subsequent - Primary      Assessment and plan 1  Medicare subsequent annual wellness examination overall the patient is clinically stable and doing well, we encouraged the patient to follow a healthy and balanced diet  We recommend that the patient exercise routinely approximately 30 minutes 5 times per week   We have reviewed the patient's vaccines and have made recommendations for updates if necessary  Consider the COVID-19 vaccine       We will be ordering screening laboratories which are age appropriate  Return to the office in    6 months   call if any problems  Class 3 severe obesity due to excess calories with serious comorbidity and body mass index (BMI) of 40 0 to 44 9 in [facility])       recommend getting a  Carbon monoxide monitor     Preventive health issues were discussed with patient, and age appropriate screening tests were ordered as noted in patient's After Visit Summary  Personalized health advice and appropriate referrals for health education or preventive services given if needed, as noted in patient's After Visit Summary       History of Present Illness:     Patient presents for Medicare Annual Wellness visit    Patient Care Team:  Alexander Brennan DO as PCP - General     Problem List:     Patient Active Problem List   Diagnosis    Type 2 diabetes mellitus without complication, without long-term current use of insulin ([address] )    Diabetic nephropathy [facility] [address] [facility]    Hyperlipidemia    Hypothyroidism    Malignant neoplasm of breast ([place] [address] )    Nontoxic single thyroid nodule    Prediabetes    Vitamin D deficiency    Essential hypertension    Screening, anemia, deficiency, iron    Class 3 severe obesity due to excess calories with serious comorbidity in adult [facility])    Medicare annual wellness visit, subsequent    Screening for osteoporosis    Enlarged thyroid    Screening for malignant neoplasm of colon    Class 3 severe obesity due to excess calories with serious comorbidity and body mass index (BMI) of 40 0 to 44 9 in adult [facility])    Acute deep vein thrombosis (DVT) of proximal vein of both lower extremities (HCC)    Lower limb pain, posterior, left    Factor 5 Leiden mutation, heterozygous ([place] [address] )    History of DVT (deep vein thrombosis)      Past Medical and Surgical History:     Past Medical History:   Diagnosis Date    Breast cancer ([place] [address] )     History of radiation therapy 09/01/2006     Past Surgical History:   Procedure Laterality Date    BIOPSY CORE NEEDLE Right 03/03/2010    benign    BREAST CYST ASPIRATION Right 08/15/2003    BREAST CYST ASPIRATION Right     x3  (Years ago)    BREAST LUMPECTOMY Left 08/22/2006    MAMMO STEREOTACTIC BREAST BIOPSY LEFT (ALL INC) Left 08/02/2006    malignant    US GUIDED BREAST BIOPSY LEFT COMPLETE Left 02/06/2009    benign      Family History:     Family History   Problem Relation Age of Onset    Breast cancer Mother 48    Ovarian cancer Sister 61    Lung cancer Sister     Breast cancer Maternal Aunt 37    Stomach cancer Maternal Uncle     Skin cancer Maternal Uncle     Breast cancer Paternal Aunt     Breast cancer Paternal Grandmother 80    Skin cancer Paternal Aunt       Social History:     E-Cigarette/Vaping    E-Cigarette Use Never User      E-Cigarette/Vaping Substances    Nicotine No     THC No     CBD No     Flavoring No     Other No     Unknown No      Social History     Socioeconomic History    Marital status: /Civil Union Spouse name: None    Number of children: None    Years of education: None    Highest education level: None   Occupational History    None   Social Needs    Financial resource strain: None    Food insecurity     Worry: None     Inability: None    Transportation needs     Medical: None     Non-medical: None   Tobacco Use    Smoking status: Never Smoker    Smokeless tobacco: Never Used   Substance and Sexual Activity    Alcohol use: Yes     Comment: rarely    Drug use: No    Sexual activity: Never   Lifestyle    Physical activity     Days per week: None     Minutes per session: None    Stress: None   Relationships    Social connections     Talks on phone: None     Gets together: None     Attends Protestant service: None     Active member of club or organization: None     Attends meetings of clubs or organizations: None     Relationship status: None    Intimate partner violence     Fear of current or ex partner: None     Emotionally abused: None     Physically abused: None     Forced sexual activity: None   Other Topics Concern    None   Social History Narrative    None      Medications and Allergies:     Current Outpatient Medications   Medication Sig Dispense Refill    calcium citrate-vitamin D (CITRACAL+D) 315-200 MG-UNIT per tablet Take by mouth      Cholecalciferol (VITAMIN D) 2000 units CAPS Take 1 capsule by mouth daily      folic acid (FOLVITE) 1 mg tablet Take 1 tablet by mouth daily      levothyroxine 88 mcg tablet TAKE 1 TABLET BY MOUTH EVERY DAY 30 tablet 6    lisinopril (ZESTRIL) 5 mg tablet TAKE 1 TABLET BY MOUTH EVERY DAY 30 tablet 6    meloxicam (MOBIC) 15 mg tablet Take 15 mg by mouth daily Patient takes every other day  2    metFORMIN (GLUCOPHAGE-XR) 500 mg 24 hr tablet One tablet in the am and 2 with the pm meal 270 tablet 1    methotrexate 2 5 mg tablet 6 once a week  1    rivaroxaban (XARELTO) 15 & 20 MG starter pack Take 15 mg by mouth twice daily for 21 days, then 20 mg once daily thereafter  51 each 0    rivaroxaban (XARELTO) 20 mg tablet Take 1 tablet (20 mg total) by mouth daily with breakfast 90 tablet 1    simvastatin (ZOCOR) 20 mg tablet TAKE 1 TABLET DAILY 90 tablet 3    econazole nitrate 1 % cream Apply topically daily (Patient not taking: Reported on 1/29/2021) 15 g 0     No current facility-administered medications for this visit  No Known Allergies   Immunizations:     Immunization History   Administered Date(s) Administered    H1N1, All Formulations 10/27/2009    Influenza Quadrivalent Preservative Free 3 years and older IM 10/09/2014, 09/12/2016    Influenza, high dose seasonal 0 7 mL 10/19/2018    Influenza, recombinant, quadrivalent,injectable, preservative free 10/13/2020    Influenza, seasonal, injectable 11/16/2012, 10/13/2013, 11/20/2015, 10/28/2017    Pneumococcal Conjugate 13-Valent 09/18/2017    Pneumococcal Polysaccharide PPV23 09/18/2018    TD (adult) Preservative Free 07/05/2018    Zoster 03/20/2018      Health Maintenance:         Topic Date Due    MAMMOGRAM  10/28/2021    Colorectal Cancer Screening  10/06/2024    Hepatitis C Screening  Completed     There are no preventive care reminders to display for this patient  Medicare Health Risk Assessment:     /68   Pulse 80   Resp 16   Ht 5' 7" (1 702 m)   Wt 126 kg (276 lb 12 8 oz)   SpO2 100%   BMI 43 35 kg/m²      Amber Rhoades is here for her Subsequent Wellness visit  Health Risk Assessment:   Patient rates overall health as good  Patient feels that their physical health rating is same  Eyesight was rated as same  Hearing was rated as same  Patient feels that their emotional and mental health rating is same  Pain experienced in the last 7 days has been none  Patient states that she has experienced no weight loss or gain in last 6 months  Fall Risk Screening:    In the past year, patient has experienced: no history of falling in past year      Urinary Incontinence Screening:   Patient has not leaked urine accidently in the last six months  Home Safety:  Patient does not have trouble with stairs inside or outside of their home  Patient has working smoke alarms and has no working carbon monoxide detector  Home safety hazards include: none  Nutrition:   Current diet is Regular  Medications:   Patient is currently taking over-the-counter supplements  OTC medications include: see medication list  Patient is able to manage medications  Activities of Daily Living (ADLs)/Instrumental Activities of Daily Living (IADLs):   Walk and transfer into and out of bed and chair?: Yes  Dress and groom yourself?: Yes    Bathe or shower yourself?: Yes    Feed yourself? Yes  Do your laundry/housekeeping?: Yes  Manage your money, pay your bills and track your expenses?: Yes  Make your own meals?: Yes    Do your own shopping?: Yes    Previous Hospitalizations:   Any hospitalizations or ED visits within the last 12 months?: Yes    How many hospitalizations have you had in the last year?: 1-2    Advance Care Planning:   Living will: Yes    Durable POA for healthcare:  Yes    Advanced directive: Yes      Cognitive Screening:   Provider or family/friend/caregiver concerned regarding cognition?: No    PREVENTIVE SCREENINGS      Cardiovascular Screening:    General: Screening Not Indicated and History Lipid Disorder      Diabetes Screening:     General: Screening Not Indicated and History Diabetes      Colorectal Cancer Screening:     General: Screening Current      Breast Cancer Screening:     General: History Breast Cancer      Cervical Cancer Screening:    General: Screening Not Indicated      Lung Cancer Screening:     General: Screening Not Indicated      Hepatitis C Screening:    General: Screening Current      Cozette Brittle, DO

## 2021-01-31 PROBLEM — I82.509 CHRONIC DEEP VEIN THROMBOSIS (DVT) OF LOWER EXTREMITY (HCC): Status: ACTIVE | Noted: 2021-01-31

## 2021-01-31 NOTE — PROGRESS NOTES
Assessment/Plan:    Medicare annual wellness visit, subsequent   Assessment and plan 1  Medicare subsequent annual wellness examination overall the patient is clinically stable and doing well, we encouraged the patient to follow a healthy and balanced diet  We recommend that the patient exercise routinely approximately 30 minutes 5 times per week   We have reviewed the patient's vaccines and have made recommendations for updates if necessary  Consider the COVID-19 vaccine       We will be ordering screening laboratories which are age appropriate  Return to the office in    6 months   call if any problems  Hypothyroidism   Continue levothyroxine 88 mcg once daily    Type 2 diabetes mellitus without complication, without long-term current use of insulin (HCC)    Lab Results   Component Value Date    HGBA1C 6 8 (H) 10/24/2020    I have counselled the pt to follow a healthy and balanced diet ,and recommend routine exercise  I will be ordering diabetic laboratories including comprehensive metabolic panel, hemoglobin A1c, urine microalbumin, lipid panel  Chronic deep vein thrombosis (DVT) of lower extremity (HCC)   Review the Doppler showing chronic DVT she will be on anticoagulation for life -continue Xarelto 20 mg once daily    Class 3 severe obesity due to excess calories with serious comorbidity and body mass index (BMI) of 40 0 to 44 9 in [facility])   Obesity -I have counseled patient following healthy and balanced diet, I would like the patient to lose weight, I would like the patient exercise routinely; we will continue monitor the patient's progress  Prediabetes    Pre diabetes -I have counseled the patient to follow a healthy balanced diet, I have counseled patient reduce carbohydrates and sweets in the diet, I would like the patient exercise routinely  I will be checking hemoglobin A1c and comprehensive metabolic panel    Have counseled patient about the prevention of diabetes, and the risk of progression to type 2 diabetes  Hyperlipidemia    Hyperlipidemia controlled continue with current medical regiment recommend a low-cholesterol diet and recommend routine exercise we will continue to monitor the progress  Continue Zocor 20 mg once daily         Problem List Items Addressed This Visit        Endocrine    Type 2 diabetes mellitus without complication, without long-term current use of insulin ([facility] [address] )       Lab Results   Component Value Date    HGBA1C 6 8 (H) 10/24/2020    I have counselled the pt to follow a healthy and balanced diet ,and recommend routine exercise  I will be ordering diabetic laboratories including comprehensive metabolic panel, hemoglobin A1c, urine microalbumin, lipid panel  Relevant Medications    metFORMIN (GLUCOPHAGE-XR) 500 mg 24 hr tablet    Other Relevant Orders    Comprehensive metabolic panel    Hemoglobin A1C    Lipid Panel with Direct LDL reflex    Microalbumin / creatinine urine ratio    Hypothyroidism      Continue levothyroxine 88 mcg once daily            Cardiovascular and Mediastinum    Chronic deep vein thrombosis (DVT) of lower extremity (HCC)      Review the Doppler showing chronic DVT she will be on anticoagulation for life -continue Xarelto 20 mg once daily            Other    Hyperlipidemia       Hyperlipidemia controlled continue with current medical regiment recommend a low-cholesterol diet and recommend routine exercise we will continue to monitor the progress  Continue Zocor 20 mg once daily         Prediabetes       Pre diabetes -I have counseled the patient to follow a healthy balanced diet, I have counseled patient reduce carbohydrates and sweets in the diet, I would like the patient exercise routinely  I will be checking hemoglobin A1c and comprehensive metabolic panel  Have counseled patient about the prevention of diabetes, and the risk of progression to type 2 diabetes           Medicare annual wellness visit, subsequent - Primary Assessment and plan 1  Medicare subsequent annual wellness examination overall the patient is clinically stable and doing well, we encouraged the patient to follow a healthy and balanced diet  We recommend that the patient exercise routinely approximately 30 minutes 5 times per week   We have reviewed the patient's vaccines and have made recommendations for updates if necessary  Consider the COVID-19 vaccine       We will be ordering screening laboratories which are age appropriate  Return to the office in    6 months   call if any problems  Class 3 severe obesity due to excess calories with serious comorbidity and body mass index (BMI) of 40 0 to 44 9 in [facility])      Obesity -I have counseled patient following healthy and balanced diet, I would like the patient to lose weight, I would like the patient exercise routinely; we will continue monitor the patient's progress  return to office 6  months  call if any problems  Subjective:      Patient ID: Damion Benz is a 72 y o  female  HPI 72-year old female coming in for a follow up office visit regarding prediabetes, obesity, type 2 diabetes, DVT, hypothyroidism and hyperlipidemia; The patient reports me compliant taking medications without untoward side effects the  The patient is here to review his medical condition, update me on the medical condition and the patient reports me no hospitalizations and no ER visits  She is here to review laboratories and Doppler  She continues with Xarelto and tolerates well  He reports me she could be doing better on her diet and plans made some changes she is ordering some exercise pedals from for exercise  He overall she is well       The following portions of the patient's history were reviewed and updated as appropriate: allergies, current medications, past family history, past medical history, past social history, past surgical history and problem list     Review of Systems Constitutional: Negative for activity change, appetite change and unexpected weight change  HENT: Negative for congestion and postnasal drip  Eyes: Negative for visual disturbance  Respiratory: Negative for cough and shortness of breath  Cardiovascular: Negative for chest pain  Gastrointestinal: Negative for abdominal pain, diarrhea, nausea and vomiting  Neurological: Negative for dizziness, light-headedness and headaches  Objective:    No follow-ups on file  Procedure: Us Thyroid    Result Date: 1/19/2021  Narrative: THYROID ULTRASOUND INDICATION:    E03 9: Hypothyroidism, unspecified  COMPARISON:  Thyroid ultrasound 1/3/2020 TECHNIQUE:   Ultrasound of the thyroid was performed with a high frequency linear transducer in transverse and sagittal planes including volumetric imaging sweeps as well as traditional still imaging technique  FINDINGS:  Thyroid parenchyma is diffusely heterogeneous in echotexture  Right lobe:  6 1 x 2 1 x 2 5 cm  Volume approximately 15 cc previously 18 cc  Left lobe:  5 8 x 2 1 x 2 1 cm  Volume approximately 12 cc, unchanged  Isthmus:  0 5 cm, unchanged  Nodule #1  Image 9  Right lateral mid gland nodule measuring 1 x 0 7 x 0 9 cm  Given differences in measuring technique, no significant change from prior  COMPOSITION:  2 points, solid or almost completely solid   ECHOGENICITY:  2 points, hypoechoic  SHAPE:  0 points, wider-than-tall  MARGIN: 0 points, smooth  ECHOGENIC FOCI:  0 points, none or large comet-tail artifacts  TI-RADS Classification: TR 4 (4-6 points), FNA if > 1 5 cm  Follow if > 1cm  Nodule #2  Image 37  Right medial mid gland nodule or nodular parenchyma measuring 0 9 x 0 5 x 0 4 cm  Not definitively visualized on the prior study  COMPOSITION:  2 points, solid or almost completely solid   ECHOGENICITY:  2 points, hypoechoic  SHAPE:  3 points, taller-than-wide  MARGIN: 0 points, smooth   ECHOGENIC FOCI:  0 points, none or large comet-tail artifacts  TI-RADS Classification: TR 5 (7 or > points), Highly suspicious  FNA if > 1 cm  Follow if > 0 5 cm  Impression: No nodule meets current ACR criteria for requiring biopsy but followup ultrasound is recommended in 1 year  Reference: ACR Thyroid Imaging, Reporting and Data System (TI-RADS): White Paper of the [employer]  J AM Augustina Radiol 6164;[id number]  (additional recommendations based on American Thyroid Association 2015 guidelines ) This study demonstrates a finding requiring imaging follow-up and was documented as such in Epic  Workstation performed: [id number]     Procedure: Vas Lower Limb Venous Duplex Study, Unilateral/limited    Result Date: 1/13/2021  Narrative:  [facility] REPORT [facility]INICAL: Indications: Patient presents for follow up of previously noted DVT in her left leg  She reports her leg feels puffy but denies any pain  Operative History: Patient denies any cardiovascular surgeries Risk Factors The patient has history of Obesity, HTN, HLD and DVT  FINDINGS:  Left           Impression                           FV Prox        E1  Non Occlusive Thrombus (Chronic)  FV Mid         E2  Occlusive Subsegmental (Chronic)  FV Dist        E2  Occlusive Subsegmental (Chronic)  Gastrocnemius  E3  Occlusive Segmental (Chronic)     Popliteal      E2  Occlusive Subsegmental (Chronic)  PostTibial     E1  Non Occlusive Thrombus (Chronic)     CONCLUSION: Impression: RIGHT LOWER LIMB LIMITED: Evaluation shows no evidence of thrombus in the common femoral vein  Doppler evaluation shows a normal response to augmentation maneuvers  LEFT LOWER LIMB: Chronic occluding and non-occluding deep vein thrombosis noted in the paired posterior tibial veins extending through the gastroc, popliteal, and femoral veins  Wall thickening noted in the Common femoral vein and Saphenofemoral junction  No evidence of superficial thrombophlebitis noted   Doppler evaluation shows a normal response to augmentation maneuvers  Popliteal, posterior tibial and anterior tibial arterial Doppler waveforms are triphasic  Compared to previous study on 12/4/2020, there is minimal improvement with resolution only in the common femoral vein  SIGNATURE: Electronically Signed by: Marisol Mtz MD, RPVI on 2021-01-13 02:37:29 PM        No Known Allergies    Past Medical History:   Diagnosis Date    Breast cancer ([facility] [address] )     History of radiation therapy 09/01/2006     Past Surgical History:   Procedure Laterality Date    BIOPSY CORE NEEDLE Right 03/03/2010    benign    BREAST CYST ASPIRATION Right 08/15/2003    BREAST CYST ASPIRATION Right     x3  (Years ago)    BREAST LUMPECTOMY Left 08/22/2006    MAMMO STEREOTACTIC BREAST BIOPSY LEFT (ALL INC) Left 08/02/2006    malignant    US GUIDED BREAST BIOPSY LEFT COMPLETE Left 02/06/2009    benign     Current Outpatient Medications on File Prior to Visit   Medication Sig Dispense Refill    calcium citrate-vitamin D (CITRACAL+D) 315-200 MG-UNIT per tablet Take by mouth      Cholecalciferol (VITAMIN D) 2000 units CAPS Take 1 capsule by mouth daily      folic acid (FOLVITE) 1 mg tablet Take 1 tablet by mouth daily      levothyroxine 88 mcg tablet TAKE 1 TABLET BY MOUTH EVERY DAY 30 tablet 6    lisinopril (ZESTRIL) 5 mg tablet TAKE 1 TABLET BY MOUTH EVERY DAY 30 tablet 6    meloxicam (MOBIC) 15 mg tablet Take 15 mg by mouth daily Patient takes every other day  2    methotrexate 2 5 mg tablet 6 once a week  1    rivaroxaban (XARELTO) 15 & 20 MG starter pack Take 15 mg by mouth twice daily for 21 days, then 20 mg once daily thereafter   51 each 0    rivaroxaban (XARELTO) 20 mg tablet Take 1 tablet (20 mg total) by mouth daily with breakfast 90 tablet 1    simvastatin (ZOCOR) 20 mg tablet TAKE 1 TABLET DAILY 90 tablet 3    econazole nitrate 1 % cream Apply topically daily (Patient not taking: Reported on 1/29/2021) 15 g 0    [DISCONTINUED] aspirin 325 mg tablet Take 1 tablet by mouth daily       No current facility-administered medications on file prior to visit        Family History   Problem Relation Age of Onset    Breast cancer Mother 48    Ovarian cancer Sister 61    Lung cancer Sister     Breast cancer Maternal Aunt 37    Stomach cancer Maternal Uncle     Skin cancer Maternal Uncle     Breast cancer Paternal Aunt     Breast cancer Paternal Grandmother 80    Skin cancer Paternal Aunt      Social History     Socioeconomic History    Marital status: /Civil Union     Spouse name: Not on file    Number of children: Not on file    Years of education: Not on file    Highest education level: Not on file   Occupational History    Not on file   Social Needs    Financial resource strain: Not on file    Food insecurity     Worry: Not on file     Inability: Not on file    Transportation needs     Medical: Not on file     Non-medical: Not on file   Tobacco Use    Smoking status: Never Smoker    Smokeless tobacco: Never Used   Substance and Sexual Activity    Alcohol use: Yes     Comment: rarely    Drug use: No    Sexual activity: Never   Lifestyle    Physical activity     Days per week: Not on file     Minutes per session: Not on file    Stress: Not on file   Relationships    Social connections     Talks on phone: Not on file     Gets together: Not on file     Attends Oriental orthodox service: Not on file     Active member of club or organization: Not on file     Attends meetings of clubs or organizations: Not on file     Relationship status: Not on file    Intimate partner violence     Fear of current or ex partner: Not on file     Emotionally abused: Not on file     Physically abused: Not on file     Forced sexual activity: Not on file   Other Topics Concern    Not on file   Social History Narrative    Not on file     Vitals:    01/29/21 1052   BP: 122/68   Pulse: 80   Resp: 16   SpO2: 100%   Weight: 126 kg (276 lb 12 8 oz)   Height: 5' 7" (1 702 m)     Results for orders placed or performed in visit on 01/23/21   CBC and Platelet   Result Value Ref Range    WBC 6 90 4 31 - 10 16 Thousand/uL    RBC 3 97 3 81 - 5 12 Million/uL    Hemoglobin 12 0 11 5 - 15 4 g/dL    Hematocrit 37 9 34 8 - 46 1 %    MCV 96 82 - 98 fL    MCH 30 2 26 8 - 34 3 pg    MCHC 31 7 31 4 - 37 4 g/dL    RDW 14 5 11 6 - 15 1 %    Platelets 973 157 - 480 Thousands/uL    MPV 9 9 8 9 - 12 7 fL     Weight (last 2 days)     Date/Time   Weight    01/29/21 1052   126 (276 8)            Body mass index is 43 35 kg/m²  BP      Temp      Pulse     Resp      SpO2        Vitals:    01/29/21 1052   Weight: 126 kg (276 lb 12 8 oz)     Vitals:    01/29/21 1052   Weight: 126 kg (276 lb 12 8 oz)       /68   Pulse 80   Resp 16   Ht 5' 7" (1 702 m)   Wt 126 kg (276 lb 12 8 oz)   SpO2 100%   BMI 43 35 kg/m²          Physical Exam  Constitutional:       Appearance: She is well-developed  HENT:      Head: Normocephalic  Eyes:      General: No scleral icterus  Right eye: No discharge  Left eye: No discharge  Conjunctiva/sclera: Conjunctivae normal       Pupils: Pupils are equal, round, and reactive to light  Neck:      Musculoskeletal: Neck supple  Cardiovascular:      Rate and Rhythm: Normal rate and regular rhythm  Heart sounds: Normal heart sounds  No murmur  No friction rub  No gallop  Pulmonary:      Effort: No respiratory distress  Breath sounds: Normal breath sounds  No wheezing or rales  Abdominal:      General: Bowel sounds are normal  There is no distension  Palpations: Abdomen is soft  There is no mass  Tenderness: There is no abdominal tenderness  There is no guarding or rebound  Musculoskeletal:         General: No deformity  Lymphadenopathy:      Cervical: No cervical adenopathy  Neurological:      Mental Status: She is alert        Coordination: Coordination normal

## 2021-01-31 NOTE — ASSESSMENT & PLAN NOTE
Assessment and plan 1  Medicare subsequent annual wellness examination overall the patient is clinically stable and doing well, we encouraged the patient to follow a healthy and balanced diet  We recommend that the patient exercise routinely approximately 30 minutes 5 times per week   We have reviewed the patient's vaccines and have made recommendations for updates if necessary  Consider the COVID-19 vaccine       We will be ordering screening laboratories which are age appropriate  Return to the office in    6 months   call if any problems

## 2021-01-31 NOTE — ASSESSMENT & PLAN NOTE
Lab Results   Component Value Date    HGBA1C 6 8 (H) 10/24/2020    I have counselled the pt to follow a healthy and balanced diet ,and recommend routine exercise  I will be ordering diabetic laboratories including comprehensive metabolic panel, hemoglobin A1c, urine microalbumin, lipid panel

## 2021-01-31 NOTE — ASSESSMENT & PLAN NOTE
Review the Doppler showing chronic DVT she will be on anticoagulation for life -continue Xarelto 20 mg once daily

## 2021-02-04 DIAGNOSIS — E78.5 HYPERLIPIDEMIA, UNSPECIFIED HYPERLIPIDEMIA TYPE: ICD-10-CM

## 2021-02-04 RX ORDER — SIMVASTATIN 20 MG
TABLET ORAL
Qty: 90 TABLET | Refills: 3 | Status: SHIPPED | OUTPATIENT
Start: 2021-02-04 | End: 2022-02-28

## 2021-02-25 ENCOUNTER — IMMUNIZATIONS (OUTPATIENT)
Dept: FAMILY MEDICINE CLINIC | Facility: HOSPITAL | Age: 71
End: 2021-02-25

## 2021-02-25 DIAGNOSIS — Z23 ENCOUNTER FOR IMMUNIZATION: Primary | ICD-10-CM

## 2021-02-25 PROCEDURE — 91300 SARS-COV-2 / COVID-19 MRNA VACCINE (PFIZER-BIONTECH) 30 MCG: CPT

## 2021-02-25 PROCEDURE — 0001A SARS-COV-2 / COVID-19 MRNA VACCINE (PFIZER-BIONTECH) 30 MCG: CPT

## 2021-03-05 LAB
LEFT EYE DIABETIC RETINOPATHY: NORMAL
RIGHT EYE DIABETIC RETINOPATHY: NORMAL

## 2021-03-11 DIAGNOSIS — E03.9 HYPOTHYROIDISM, UNSPECIFIED TYPE: ICD-10-CM

## 2021-03-11 RX ORDER — LEVOTHYROXINE SODIUM 88 UG/1
TABLET ORAL
Qty: 30 TABLET | Refills: 6 | Status: SHIPPED | OUTPATIENT
Start: 2021-03-11 | End: 2021-10-11

## 2021-03-19 ENCOUNTER — IMMUNIZATIONS (OUTPATIENT)
Dept: FAMILY MEDICINE CLINIC | Facility: HOSPITAL | Age: 71
End: 2021-03-19

## 2021-03-19 DIAGNOSIS — Z23 ENCOUNTER FOR IMMUNIZATION: Primary | ICD-10-CM

## 2021-03-19 PROCEDURE — 91300 SARS-COV-2 / COVID-19 MRNA VACCINE (PFIZER-BIONTECH) 30 MCG: CPT

## 2021-03-19 PROCEDURE — 0002A SARS-COV-2 / COVID-19 MRNA VACCINE (PFIZER-BIONTECH) 30 MCG: CPT

## 2021-03-23 DIAGNOSIS — E11.9 TYPE 2 DIABETES MELLITUS WITHOUT COMPLICATION, WITHOUT LONG-TERM CURRENT USE OF INSULIN (HCC): ICD-10-CM

## 2021-03-23 RX ORDER — METFORMIN HYDROCHLORIDE 500 MG/1
TABLET, EXTENDED RELEASE ORAL
Qty: 270 TABLET | Refills: 1 | Status: SHIPPED | OUTPATIENT
Start: 2021-03-23 | End: 2021-09-27

## 2021-03-31 ENCOUNTER — HOSPITAL ENCOUNTER (OUTPATIENT)
Dept: RADIOLOGY | Age: 71
Discharge: HOME | End: 2021-03-31
Payer: MEDICARE

## 2021-03-31 ENCOUNTER — APPOINTMENT (OUTPATIENT)
Dept: LAB | Facility: HOSPITAL | Age: 71
End: 2021-03-31
Attending: INTERNAL MEDICINE
Payer: MEDICARE

## 2021-03-31 ENCOUNTER — TRANSCRIBE ORDERS (OUTPATIENT)
Dept: LAB | Facility: HOSPITAL | Age: 71
End: 2021-03-31

## 2021-03-31 VITALS — HEIGHT: 67 IN | WEIGHT: 270 LBS | BODY MASS INDEX: 42.38 KG/M2

## 2021-03-31 DIAGNOSIS — L40.59 POLYARTICULAR PSORIATIC ARTHRITIS (HCC): ICD-10-CM

## 2021-03-31 DIAGNOSIS — L40.59 POLYARTICULAR PSORIATIC ARTHRITIS (HCC): Primary | ICD-10-CM

## 2021-03-31 DIAGNOSIS — Z12.31 ENCOUNTER FOR SCREENING MAMMOGRAM FOR MALIGNANT NEOPLASM OF BREAST: ICD-10-CM

## 2021-03-31 LAB
ALBUMIN SERPL BCP-MCNC: 3.3 G/DL (ref 3.5–5)
ALP SERPL-CCNC: 98 U/L (ref 46–116)
ALT SERPL W P-5'-P-CCNC: 14 U/L (ref 12–78)
AST SERPL W P-5'-P-CCNC: 5 U/L (ref 5–45)
BASOPHILS # BLD AUTO: 0.04 THOUSANDS/ΜL (ref 0–0.1)
BASOPHILS NFR BLD AUTO: 1 % (ref 0–1)
BILIRUB DIRECT SERPL-MCNC: 0.23 MG/DL (ref 0–0.2)
BILIRUB SERPL-MCNC: 1.03 MG/DL (ref 0.2–1)
CK SERPL-CCNC: 25 U/L (ref 26–192)
CRP SERPL QL: 7.1 MG/L
EOSINOPHIL # BLD AUTO: 0.12 THOUSAND/ΜL (ref 0–0.61)
EOSINOPHIL NFR BLD AUTO: 2 % (ref 0–6)
ERYTHROCYTE [DISTWIDTH] IN BLOOD BY AUTOMATED COUNT: 14.9 % (ref 11.6–15.1)
ERYTHROCYTE [SEDIMENTATION RATE] IN BLOOD: 65 MM/HOUR (ref 0–29)
HCT VFR BLD AUTO: 38.7 % (ref 34.8–46.1)
HGB BLD-MCNC: 12.3 G/DL (ref 11.5–15.4)
IMM GRANULOCYTES # BLD AUTO: 0.02 THOUSAND/UL (ref 0–0.2)
IMM GRANULOCYTES NFR BLD AUTO: 0 % (ref 0–2)
LYMPHOCYTES # BLD AUTO: 0.98 THOUSANDS/ΜL (ref 0.6–4.47)
LYMPHOCYTES NFR BLD AUTO: 14 % (ref 14–44)
MCH RBC QN AUTO: 29.4 PG (ref 26.8–34.3)
MCHC RBC AUTO-ENTMCNC: 31.8 G/DL (ref 31.4–37.4)
MCV RBC AUTO: 92 FL (ref 82–98)
MONOCYTES # BLD AUTO: 0.6 THOUSAND/ΜL (ref 0.17–1.22)
MONOCYTES NFR BLD AUTO: 9 % (ref 4–12)
NEUTROPHILS # BLD AUTO: 5.31 THOUSANDS/ΜL (ref 1.85–7.62)
NEUTS SEG NFR BLD AUTO: 74 % (ref 43–75)
NRBC BLD AUTO-RTO: 0 /100 WBCS
PLATELET # BLD AUTO: 301 THOUSANDS/UL (ref 149–390)
PMV BLD AUTO: 9.5 FL (ref 8.9–12.7)
PROT SERPL-MCNC: 7.3 G/DL (ref 6.4–8.2)
RBC # BLD AUTO: 4.19 MILLION/UL (ref 3.81–5.12)
WBC # BLD AUTO: 7.07 THOUSAND/UL (ref 4.31–10.16)

## 2021-03-31 PROCEDURE — 36415 COLL VENOUS BLD VENIPUNCTURE: CPT

## 2021-03-31 PROCEDURE — 82550 ASSAY OF CK (CPK): CPT

## 2021-03-31 PROCEDURE — 77067 SCR MAMMO BI INCL CAD: CPT

## 2021-03-31 PROCEDURE — 85652 RBC SED RATE AUTOMATED: CPT

## 2021-03-31 PROCEDURE — 85025 COMPLETE CBC W/AUTO DIFF WBC: CPT

## 2021-03-31 PROCEDURE — 77063 BREAST TOMOSYNTHESIS BI: CPT

## 2021-03-31 PROCEDURE — 80076 HEPATIC FUNCTION PANEL: CPT

## 2021-03-31 PROCEDURE — 86140 C-REACTIVE PROTEIN: CPT

## 2021-04-13 ENCOUNTER — HOSPITAL ENCOUNTER (OUTPATIENT)
Dept: ULTRASOUND IMAGING | Facility: CLINIC | Age: 71
Discharge: HOME | End: 2021-04-13
Payer: MEDICARE

## 2021-04-13 ENCOUNTER — HOSPITAL ENCOUNTER (OUTPATIENT)
Dept: MAMMOGRAPHY | Facility: CLINIC | Age: 71
Discharge: HOME | End: 2021-04-13
Payer: MEDICARE

## 2021-04-13 VITALS — BODY MASS INDEX: 42.38 KG/M2 | WEIGHT: 270 LBS | HEIGHT: 67 IN

## 2021-04-13 DIAGNOSIS — R92.8 ABNORMAL MAMMOGRAM: ICD-10-CM

## 2021-04-13 PROCEDURE — G0279 TOMOSYNTHESIS, MAMMO: HCPCS

## 2021-04-13 PROCEDURE — 77065 DX MAMMO INCL CAD UNI: CPT

## 2021-04-13 PROCEDURE — 76642 ULTRASOUND BREAST LIMITED: CPT

## 2021-06-09 DIAGNOSIS — I10 ESSENTIAL HYPERTENSION: ICD-10-CM

## 2021-06-09 RX ORDER — LISINOPRIL 5 MG/1
TABLET ORAL
Qty: 30 TABLET | Refills: 6 | Status: SHIPPED | OUTPATIENT
Start: 2021-06-09 | End: 2022-01-07

## 2021-07-08 DIAGNOSIS — I82.409 DVT (DEEP VENOUS THROMBOSIS) (HCC): ICD-10-CM

## 2021-07-08 RX ORDER — RIVAROXABAN 20 MG/1
TABLET, FILM COATED ORAL
Qty: 90 TABLET | Refills: 1 | Status: SHIPPED | OUTPATIENT
Start: 2021-07-08 | End: 2022-01-08

## 2021-07-29 ENCOUNTER — APPOINTMENT (OUTPATIENT)
Dept: LAB | Facility: HOSPITAL | Age: 71
End: 2021-07-29
Payer: MEDICARE

## 2021-07-29 DIAGNOSIS — E11.9 TYPE 2 DIABETES MELLITUS WITHOUT COMPLICATION, WITHOUT LONG-TERM CURRENT USE OF INSULIN (HCC): ICD-10-CM

## 2021-07-29 DIAGNOSIS — L40.59 POLYARTICULAR PSORIATIC ARTHRITIS (HCC): ICD-10-CM

## 2021-07-29 LAB
ALBUMIN SERPL BCP-MCNC: 3.2 G/DL (ref 3.5–5)
ALP SERPL-CCNC: 98 U/L (ref 46–116)
ALT SERPL W P-5'-P-CCNC: 18 U/L (ref 12–78)
ANION GAP SERPL CALCULATED.3IONS-SCNC: 6 MMOL/L (ref 4–13)
AST SERPL W P-5'-P-CCNC: 10 U/L (ref 5–45)
BASOPHILS # BLD AUTO: 0.05 THOUSANDS/ΜL (ref 0–0.1)
BASOPHILS NFR BLD AUTO: 1 % (ref 0–1)
BILIRUB DIRECT SERPL-MCNC: 0.27 MG/DL (ref 0–0.2)
BILIRUB SERPL-MCNC: 1.24 MG/DL (ref 0.2–1)
BUN SERPL-MCNC: 23 MG/DL (ref 5–25)
CALCIUM ALBUM COR SERPL-MCNC: 10 MG/DL (ref 8.3–10.1)
CALCIUM SERPL-MCNC: 9.4 MG/DL (ref 8.3–10.1)
CHLORIDE SERPL-SCNC: 107 MMOL/L (ref 100–108)
CHOLEST SERPL-MCNC: 158 MG/DL (ref 50–200)
CO2 SERPL-SCNC: 23 MMOL/L (ref 21–32)
CREAT SERPL-MCNC: 0.99 MG/DL (ref 0.6–1.3)
CREAT UR-MCNC: 173 MG/DL
CRP SERPL QL: 7.1 MG/L
EOSINOPHIL # BLD AUTO: 0.08 THOUSAND/ΜL (ref 0–0.61)
EOSINOPHIL NFR BLD AUTO: 1 % (ref 0–6)
ERYTHROCYTE [DISTWIDTH] IN BLOOD BY AUTOMATED COUNT: 14.8 % (ref 11.6–15.1)
ERYTHROCYTE [SEDIMENTATION RATE] IN BLOOD: 50 MM/HOUR (ref 0–29)
EST. AVERAGE GLUCOSE BLD GHB EST-MCNC: 157 MG/DL
GFR SERPL CREATININE-BSD FRML MDRD: 58 ML/MIN/1.73SQ M
GLUCOSE P FAST SERPL-MCNC: 142 MG/DL (ref 65–99)
HBA1C MFR BLD: 7.1 %
HCT VFR BLD AUTO: 39.5 % (ref 34.8–46.1)
HDLC SERPL-MCNC: 60 MG/DL
HGB BLD-MCNC: 12.4 G/DL (ref 11.5–15.4)
IMM GRANULOCYTES # BLD AUTO: 0.04 THOUSAND/UL (ref 0–0.2)
IMM GRANULOCYTES NFR BLD AUTO: 1 % (ref 0–2)
LDLC SERPL CALC-MCNC: 84 MG/DL (ref 0–100)
LYMPHOCYTES # BLD AUTO: 0.97 THOUSANDS/ΜL (ref 0.6–4.47)
LYMPHOCYTES NFR BLD AUTO: 13 % (ref 14–44)
MCH RBC QN AUTO: 30.3 PG (ref 26.8–34.3)
MCHC RBC AUTO-ENTMCNC: 31.4 G/DL (ref 31.4–37.4)
MCV RBC AUTO: 97 FL (ref 82–98)
MICROALBUMIN UR-MCNC: 87 MG/L (ref 0–20)
MICROALBUMIN/CREAT 24H UR: 50 MG/G CREATININE (ref 0–30)
MONOCYTES # BLD AUTO: 0.47 THOUSAND/ΜL (ref 0.17–1.22)
MONOCYTES NFR BLD AUTO: 6 % (ref 4–12)
NEUTROPHILS # BLD AUTO: 5.9 THOUSANDS/ΜL (ref 1.85–7.62)
NEUTS SEG NFR BLD AUTO: 78 % (ref 43–75)
NRBC BLD AUTO-RTO: 0 /100 WBCS
PLATELET # BLD AUTO: 306 THOUSANDS/UL (ref 149–390)
PMV BLD AUTO: 10.4 FL (ref 8.9–12.7)
POTASSIUM SERPL-SCNC: 4.3 MMOL/L (ref 3.5–5.3)
PROT SERPL-MCNC: 7.5 G/DL (ref 6.4–8.2)
RBC # BLD AUTO: 4.09 MILLION/UL (ref 3.81–5.12)
SODIUM SERPL-SCNC: 136 MMOL/L (ref 136–145)
TRIGL SERPL-MCNC: 71 MG/DL
WBC # BLD AUTO: 7.51 THOUSAND/UL (ref 4.31–10.16)

## 2021-07-29 PROCEDURE — 85652 RBC SED RATE AUTOMATED: CPT

## 2021-07-29 PROCEDURE — 82043 UR ALBUMIN QUANTITATIVE: CPT

## 2021-07-29 PROCEDURE — 80053 COMPREHEN METABOLIC PANEL: CPT

## 2021-07-29 PROCEDURE — 82570 ASSAY OF URINE CREATININE: CPT

## 2021-07-29 PROCEDURE — 83036 HEMOGLOBIN GLYCOSYLATED A1C: CPT

## 2021-07-29 PROCEDURE — 80061 LIPID PANEL: CPT

## 2021-07-29 PROCEDURE — 36415 COLL VENOUS BLD VENIPUNCTURE: CPT

## 2021-07-29 PROCEDURE — 82248 BILIRUBIN DIRECT: CPT

## 2021-07-29 PROCEDURE — 85025 COMPLETE CBC W/AUTO DIFF WBC: CPT

## 2021-07-29 PROCEDURE — 86140 C-REACTIVE PROTEIN: CPT

## 2021-08-06 ENCOUNTER — OFFICE VISIT (OUTPATIENT)
Dept: INTERNAL MEDICINE CLINIC | Facility: CLINIC | Age: 71
End: 2021-08-06
Payer: MEDICARE

## 2021-08-06 VITALS
DIASTOLIC BLOOD PRESSURE: 64 MMHG | OXYGEN SATURATION: 99 % | SYSTOLIC BLOOD PRESSURE: 112 MMHG | BODY MASS INDEX: 44.98 KG/M2 | WEIGHT: 270 LBS | HEART RATE: 76 BPM | HEIGHT: 65 IN

## 2021-08-06 DIAGNOSIS — Z86.718 HISTORY OF DVT (DEEP VEIN THROMBOSIS): ICD-10-CM

## 2021-08-06 DIAGNOSIS — E78.5 HYPERLIPIDEMIA, UNSPECIFIED HYPERLIPIDEMIA TYPE: ICD-10-CM

## 2021-08-06 DIAGNOSIS — I87.2 VENOUS INSUFFICIENCY: ICD-10-CM

## 2021-08-06 DIAGNOSIS — E11.9 TYPE 2 DIABETES MELLITUS WITHOUT COMPLICATION, WITHOUT LONG-TERM CURRENT USE OF INSULIN (HCC): ICD-10-CM

## 2021-08-06 DIAGNOSIS — E66.01 CLASS 3 SEVERE OBESITY DUE TO EXCESS CALORIES WITH SERIOUS COMORBIDITY AND BODY MASS INDEX (BMI) OF 40.0 TO 44.9 IN ADULT (HCC): ICD-10-CM

## 2021-08-06 DIAGNOSIS — E03.9 HYPOTHYROIDISM, UNSPECIFIED TYPE: Primary | ICD-10-CM

## 2021-08-06 PROCEDURE — 99214 OFFICE O/P EST MOD 30 MIN: CPT | Performed by: INTERNAL MEDICINE

## 2021-08-06 NOTE — PROGRESS NOTES
Assessment/Plan:    Hypothyroidism   Hypothyroidism controlled the patient is currently euthyroid I will be ordering a TSH prior to the next office visit and the patient will continue with current medical regiment; we will continue to monitor the patient's progress  Continue levothyroxine 88 mcg once daily    Type 2 diabetes mellitus without complication, without long-term current use of insulin ([facility])    Lab Results   Component Value Date    HGBA1C 7 1 (H) 07/29/2021    I have counselled the pt to follow a healthy and balanced diet ,and recommend routine exercise  She reports me loose stool secondary to the metformin  I will send a message to pharmacist Rosemary to see if she would be a suitable candidate for Ozempic I will be ordering diabetic laboratories including comprehensive metabolic panel, hemoglobin A1c, urine microalbumin, lipid panel  Foot examination completed today    Class 3 severe obesity due to excess calories with serious comorbidity and body mass index (BMI) of 40 0 to 44 9 in [facility])   Obesity -I have counseled patient following healthy and balanced diet, I would like the patient to lose weight, I would like the patient exercise routinely; we will continue monitor the patient's progress  History of DVT (deep vein thrombosis)   Currently anticoagulated with Xarelto will check a follow-up venous Doppler if clot has resolved might be a suitable candidate for  lymphadyne pump    Hyperlipidemia   Hyperlipidemia controlled continue with current medical regiment recommend a low-cholesterol diet and recommend routine exercise we will continue to monitor the progress   Continue Zocor 20 mg once daily         Problem List Items Addressed This Visit        Endocrine    Type 2 diabetes mellitus without complication, without long-term current use of insulin ([facility] [address] )       Lab Results   Component Value Date    HGBA1C 7 1 (H) 07/29/2021    I have counselled the pt to follow a healthy and balanced diet ,and recommend routine exercise  She reports me loose stool secondary to the metformin  I will send a message to pharmacist Rosemary to see if she would be a suitable candidate for Ozempic I will be ordering diabetic laboratories including comprehensive metabolic panel, hemoglobin A1c, urine microalbumin, lipid panel  Foot examination completed today         Relevant Orders    Diabetic foot exam    Comprehensive metabolic panel    Hemoglobin A1C    Lipid Panel with Direct LDL reflex    Hypothyroidism - Primary      Hypothyroidism controlled the patient is currently euthyroid I will be ordering a TSH prior to the next office visit and the patient will continue with current medical regiment; we will continue to monitor the patient's progress  Continue levothyroxine 88 mcg once daily         Relevant Orders    TSH, 3rd generation       Other    Hyperlipidemia      Hyperlipidemia controlled continue with current medical regiment recommend a low-cholesterol diet and recommend routine exercise we will continue to monitor the progress  Continue Zocor 20 mg once daily         Class 3 severe obesity due to excess calories with serious comorbidity and body mass index (BMI) of 40 0 to 44 9 in [facility])      Obesity -I have counseled patient following healthy and balanced diet, I would like the patient to lose weight, I would like the patient exercise routinely; we will continue monitor the patient's progress  History of DVT (deep vein thrombosis)      Currently anticoagulated with Xarelto will check a follow-up venous Doppler if clot has resolved might be a suitable candidate for  lymphadyne pump         Relevant Orders    VAS lower limb venous duplex study, complete bilateral      Other Visit Diagnoses     Venous insufficiency               RTO in 6 months call if any problems  Subjective:      Patient ID: Liliana Ferraro is a 72 y o  female      HPI  72-year old female coming in for a follow up office visit regarding hypothyroidism, type 2 diabetes, obesity, DVT, hyperlipidemia and venous insufficiency; The patient reports me compliant taking medications without untoward side effects the  The patient is here to review his medical condition, update me on the medical condition and the patient reports me no hospitalizations and no ER visits  She continues to wear a mask regarding the COVID crisis; she is trying to follow healthy/balance diet she is  Considering a pneumatic compression pump for her venous insufficiency she has a known history of DVT and currently on Xarelto she is here to review her laboratories in detail she does see Rheumatology routinelyhe reports me that when she tolerated the next day she notices a fecal urgency since starting the metformin also with approved nodes she does report me at time she needs to be use Imodium also reports me she does not like to lose at leave the house because of this symptom  The following portions of the patient's history were reviewed and updated as appropriate: allergies, current medications, past family history, past medical history, past social history, past surgical history and problem list     Review of Systems   Constitutional: Negative for activity change, appetite change and unexpected weight change  HENT: Negative for congestion and postnasal drip  Eyes: Negative for visual disturbance  Respiratory: Negative for cough and shortness of breath  Cardiovascular: Negative for chest pain  Gastrointestinal: Negative for abdominal pain, diarrhea, nausea and vomiting  Neurological: Negative for dizziness, light-headedness and headaches  Hematological: Negative for adenopathy  Chronic venous insufficiency    Objective:    No follow-ups on file  No results found        No Known Allergies    Past Medical History:   Diagnosis Date    Breast cancer ([facility] [address] ) 17 yrs ago    left    History of radiation therapy 09/01/2006     Past Surgical History:   Procedure Laterality Date    BIOPSY CORE NEEDLE Right 03/03/2010    benign    BREAST CYST ASPIRATION Right 08/15/2003    BREAST CYST ASPIRATION Right     x3  (Years ago)    BREAST LUMPECTOMY Left 08/22/2006    MAMMO STEREOTACTIC BREAST BIOPSY LEFT (ALL INC) Left 08/02/2006    malignant    US GUIDED BREAST BIOPSY LEFT COMPLETE Left 02/06/2009    benign     Current Outpatient Medications on File Prior to Visit   Medication Sig Dispense Refill    calcium citrate-vitamin D (CITRACAL+D) 315-200 MG-UNIT per tablet Take by mouth      Cholecalciferol (VITAMIN D) 2000 units CAPS Take 1 capsule by mouth daily      folic acid (FOLVITE) 1 mg tablet Take 1 tablet by mouth daily      levothyroxine 88 mcg tablet TAKE 1 TABLET BY MOUTH EVERY DAY 30 tablet 6    lisinopril (ZESTRIL) 5 mg tablet TAKE 1 TABLET BY MOUTH EVERY DAY 30 tablet 6    meloxicam (MOBIC) 15 mg tablet Take 15 mg by mouth daily Patient takes every other day  2    metFORMIN (GLUCOPHAGE-XR) 500 mg 24 hr tablet TAKE 3 TABLETS (1,500 MG TOTAL) BY MOUTH DAILY FOR DIABETES 270 tablet 1    methotrexate 2 5 mg tablet 6 once a week  1    rivaroxaban (XARELTO) 15 & 20 MG starter pack Take 15 mg by mouth twice daily for 21 days, then 20 mg once daily thereafter  51 each 0    simvastatin (ZOCOR) 20 mg tablet TAKE 1 TABLET BY MOUTH EVERY DAY 90 tablet 3    Xarelto 20 MG tablet TAKE 1 TABLET BY MOUTH DAILY WITH BREAKFAST 90 tablet 1    econazole nitrate 1 % cream Apply topically daily (Patient not taking: Reported on 1/29/2021) 15 g 0    [DISCONTINUED] aspirin 325 mg tablet Take 1 tablet by mouth daily       No current facility-administered medications on file prior to visit       Family History   Problem Relation Age of Onset   Alondra Ron Breast cancer Mother 48    Ovarian cancer Sister 61    Lung cancer Sister     Breast cancer Maternal Aunt 37    Stomach cancer Maternal Uncle     Skin cancer Maternal Uncle     Breast cancer Paternal Aunt     Breast cancer Paternal Grandmother [address]    Skin cancer Paternal Aunt      Social History     Socioeconomic History    Marital status:      Spouse name: Not on file    Number of children: Not on file    Years of education: Not on file    Highest education level: Not on file   Occupational History    Not on file   Tobacco Use    Smoking status: Never Smoker    Smokeless tobacco: Never Used   Vaping Use    Vaping Use: Never used   Substance and Sexual Activity    Alcohol use: Yes     Comment: rarely    Drug use: No    Sexual activity: Never   Other Topics Concern    Not on file   Social History Narrative    Not on file     Social Determinants of Health     Financial Resource Strain:     Difficulty of Paying Living Expenses:    Food Insecurity:     Worried About Running Out of Food in the Last Year:     [address] in the Last Year:    Transportation Needs:     Lack of Transportation (Medical):      Lack of Transportation (Non-Medical):    Physical Activity:     Days of Exercise per Week:     Minutes of Exercise per Session:    Stress:     Feeling of Stress :    Social Connections:     Frequency of Communication with Friends and Family:     Frequency of Social Gatherings with Friends and Family:     Attends [employer] Services:     Active Member of Clubs or Organizations:     Attends Club or Organization Meetings:     Marital Status:    Intimate Partner Violence:     Fear of Current or Ex-Partner:     Emotionally Abused:     Physically Abused:     Sexually Abused:      Vitals:    08/06/21 1037   BP: 112/64   Pulse: 76   SpO2: 99%   Weight: 122 kg (270 lb)   Height: 5' 5" (1 651 m)     Results for orders placed or performed in visit on 07/29/21   Comprehensive metabolic panel   Result Value Ref Range    Sodium 136 136 - 145 mmol/L    Potassium 4 3 3 5 - 5 3 mmol/L    Chloride 107 100 - 108 mmol/L    CO2 23 21 - 32 mmol/L    ANION GAP 6 4 - 13 mmol/L    BUN 23 5 - 25 mg/dL    Creatinine 0 99 0 60 - 1 30 mg/dL    Glucose, Fasting 142 (H) 65 - 99 mg/dL    Calcium 9 4 8 3 - 10 1 mg/dL    Corrected Calcium 10 0 8 3 - 10 1 mg/dL    AST 10 5 - 45 U/L    ALT 18 12 - 78 U/L    Alkaline Phosphatase 98 46 - 116 U/L    Total Protein 7 5 6 4 - 8 2 g/dL    Albumin 3 2 (L) 3 5 - 5 0 g/dL    Total Bilirubin 1 24 (H) 0 20 - 1 00 mg/dL    eGFR 58 ml/min/1 73sq m   Hemoglobin A1C   Result Value Ref Range    Hemoglobin A1C 7 1 (H) Normal 3 8-5 6%; PreDiabetic 5 7-6 4%; Diabetic >=6 5%; Glycemic control for adults with diabetes <7 0% %     mg/dl   Lipid Panel with Direct LDL reflex   Result Value Ref Range    Cholesterol 158 50 - 200 mg/dL    Triglycerides 71 <=150 mg/dL    HDL, Direct 60 >=40 mg/dL    LDL Calculated 84 0 - 100 mg/dL   Microalbumin / creatinine urine ratio   Result Value Ref Range    Creatinine, Ur 173 0 mg/dL    Microalbum  ,U,Random 87 0 (H) 0 0 - 20 0 mg/L    Microalb Creat Ratio 50 (H) 0 - 30 mg/g creatinine   CBC and differential   Result Value Ref Range    WBC 7 51 4 31 - 10 16 Thousand/uL    RBC 4 09 3 81 - 5 12 Million/uL    Hemoglobin 12 4 11 5 - 15 4 g/dL    Hematocrit 39 5 34 8 - 46 1 %    MCV 97 82 - 98 fL    MCH 30 3 26 8 - 34 3 pg    MCHC 31 4 31 4 - 37 4 g/dL    RDW 14 8 11 6 - 15 1 %    MPV 10 4 8 9 - 12 7 fL    Platelets 890 365 - 900 Thousands/uL    nRBC 0 /100 WBCs    Neutrophils Relative 78 (H) 43 - 75 %    Immat GRANS % 1 0 - 2 %    Lymphocytes Relative 13 (L) 14 - 44 %    Monocytes Relative 6 4 - 12 %    Eosinophils Relative 1 0 - 6 %    Basophils Relative 1 0 - 1 %    Neutrophils Absolute 5 90 1 85 - 7 62 Thousands/µL    Immature Grans Absolute 0 04 0 00 - 0 20 Thousand/uL    Lymphocytes Absolute 0 97 0 60 - 4 47 Thousands/µL    Monocytes Absolute 0 47 0 17 - 1 22 Thousand/µL    Eosinophils Absolute 0 08 0 00 - 0 61 Thousand/µL    Basophils Absolute 0 05 0 00 - 0 10 Thousands/µL   Sedimentation rate, automated   Result Value Ref Range    Sed Rate 50 (H) 0 - 29 mm/hour   C-reactive protein Result Value Ref Range    CRP 7 1 (H) <3 0 mg/L   Bilirubin, direct   Result Value Ref Range    Bilirubin, Direct 0 27 (H) 0 00 - 0 20 mg/dL     Weight (last 2 days)     Date/Time   Weight    08/06/21 1037   122 (270)            Body mass index is 44 93 kg/m²  BP      Temp      Pulse     Resp      SpO2        Vitals:    08/06/21 1037   Weight: 122 kg (270 lb)     Vitals:    08/06/21 1037   Weight: 122 kg (270 lb)       /64   Pulse 76   Ht 5' 5" (1 651 m)   Wt 122 kg (270 lb)   SpO2 99%   BMI 44 93 kg/m²          Physical Exam  Constitutional:       Appearance: She is well-developed  HENT:      Head: Normocephalic  Eyes:      General: No scleral icterus  Right eye: No discharge  Left eye: No discharge  Conjunctiva/sclera: Conjunctivae normal       Pupils: Pupils are equal, round, and reactive to light  Cardiovascular:      Rate and Rhythm: Normal rate and regular rhythm  Heart sounds: Normal heart sounds  No murmur heard  No friction rub  No gallop  Pulmonary:      Effort: No respiratory distress  Breath sounds: Normal breath sounds  No wheezing or rales  Abdominal:      General: Bowel sounds are normal  There is no distension  Palpations: Abdomen is soft  There is no mass  Tenderness: There is no abdominal tenderness  There is no guarding or rebound  Musculoskeletal:         General: No deformity  Cervical back: Neck supple  Lymphadenopathy:      Cervical: No cervical adenopathy  Neurological:      Mental Status: She is alert        Coordination: Coordination normal         Chronic 1+ edema bilateral lower extremity

## 2021-08-08 NOTE — ASSESSMENT & PLAN NOTE
Hypothyroidism controlled the patient is currently euthyroid I will be ordering a TSH prior to the next office visit and the patient will continue with current medical regiment; we will continue to monitor the patient's progress   Continue levothyroxine 88 mcg once daily

## 2021-08-08 NOTE — ASSESSMENT & PLAN NOTE
Currently anticoagulated with Xarelto will check a follow-up venous Doppler if clot has resolved might be a suitable candidate for  lymphadyne pump

## 2021-08-08 NOTE — ASSESSMENT & PLAN NOTE
Hyperlipidemia controlled continue with current medical regiment recommend a low-cholesterol diet and recommend routine exercise we will continue to monitor the progress   Continue Zocor 20 mg once daily

## 2021-08-08 NOTE — ASSESSMENT & PLAN NOTE
Lab Results   Component Value Date    HGBA1C 7 1 (H) 07/29/2021    I have counselled the pt to follow a healthy and balanced diet ,and recommend routine exercise  She reports me loose stool secondary to the metformin  I will send a message to pharmacist Rosemary to see if she would be a suitable candidate for Ozempic I will be ordering diabetic laboratories including comprehensive metabolic panel, hemoglobin A1c, urine microalbumin, lipid panel   Foot examination completed today

## 2021-08-09 ENCOUNTER — DOCUMENTATION (OUTPATIENT)
Dept: FAMILY MEDICINE CLINIC | Facility: CLINIC | Age: 71
End: 2021-08-09

## 2021-08-09 NOTE — PROGRESS NOTES
[address]    Reason for visit: Appointment with 72y o  year old for management of T2DM  Patient was called via telephone to discuss potentially switching metformin to Ozempic per PCP request  Covering for Alejandrina Montague PharmD  ASSESSMENT/PLAN                                                                                     1  Type 2 diabetes: goal A1c <7% based on ADA guidelines   · Most recent A1c above goal at 7 1%   Most recent labs and diabetes goals discussed with patient    MEDICATIONS: If PCP is in agreeance,  o Continue Metformin  mg (1500mg total daily)- split dose up to 1 tab TID with meals   o Pt will consider starting Ozempic if metformin still causes diarrhea and if medication is affordable for her     Follow up: pt to call Alejandrina Montague PharmD in 1-2 weeks if she would like to transition to [place] education:   Educated patient on indication, side effects, dosage and administration of medication  Discussed potential benefits including improved A1c, weight loss, increased satiety, and cardioprotection  Discussed most common side effects including nausea, diarrhea or constipation, and decreased appetite  Discussed rare risk of pancreatitis  Pt has no personal history of pancreatitis  No personal or family history of MEN2 or MTC  Ozempic cost:  Based on online formulary Ozempic is Tier 3 (preferred brand)  Patient plans to call insurance company for cost estimate  Also discussed income criteria for patient assistance program (family size of 1 < $51,520)  She plans to look at her income documents to see if she meets that criteria  SUBJECTIVE                                                   1  Medication Adherence: Medication list reviewed with patient, reports the following discrepancies/problems:   metFORMIN  mg - pt takes 1 tab with brunch and 2 tabs with dinner   When dose was first increased to 1500 mg total daily she had occasional loose stools but this was tolerable  In the past couple recent months diarrhea has worsened  Pt states she is unsure if this is related to eating more fruits and vegetables  2  Medication Efficacy:    Review of Systems   Gastrointestinal: Positive for diarrhea (worsened past couple months  Related to metformin)          Social History     Tobacco Use   Smoking Status Never Smoker   Smokeless Tobacco Never Used     Social History     Substance and Sexual Activity   Alcohol Use Yes    Comment: rarely        OBJECTIVE                                                                                                      Pertinent Lab Data:     Lab Results   Component Value Date    SODIUM 136 07/29/2021    K 4 3 07/29/2021    EGFR 58 07/29/2021    CREATININE 0 99 07/29/2021    GLUF 142 (H) 07/29/2021    MICROALBCRE 50 (H) 07/29/2021       Lab Results   Component Value Date    HGBA1C 7 1 (H) 07/29/2021    HGBA1C 6 8 (H) 10/24/2020    HGBA1C 7 2 (H) 07/15/2020       Demographics  Interaction Method: Phone    Topic(s) Addressed  Diabetes    Intervention(s) Made    Pharmacologic:      Medication Adjustment - Dose or Frequency    Non-Pharmacologic:      Other: Medication education     Tool(s) Used  Not Applicable    Time Spent:   Time Spent in Direct Patient Care: 30 minutes    Time Spent in Care Coordination: 10 minutes    Recommendation(s) Accepted by the Patient/Caregiver: Not Applicable

## 2021-08-30 ENCOUNTER — HOSPITAL ENCOUNTER (OUTPATIENT)
Dept: NON INVASIVE DIAGNOSTICS | Facility: CLINIC | Age: 71
Discharge: HOME | End: 2021-08-30
Payer: MEDICARE

## 2021-08-30 DIAGNOSIS — Z86.718 HISTORY OF DVT (DEEP VEIN THROMBOSIS): ICD-10-CM

## 2021-08-30 PROCEDURE — 93970 EXTREMITY STUDY: CPT | Performed by: SURGERY

## 2021-08-30 PROCEDURE — 93970 EXTREMITY STUDY: CPT

## 2021-09-25 DIAGNOSIS — E11.9 TYPE 2 DIABETES MELLITUS WITHOUT COMPLICATION, WITHOUT LONG-TERM CURRENT USE OF INSULIN (HCC): ICD-10-CM

## 2021-09-27 RX ORDER — METFORMIN HYDROCHLORIDE 500 MG/1
TABLET, EXTENDED RELEASE ORAL
Qty: 270 TABLET | Refills: 1 | Status: SHIPPED | OUTPATIENT
Start: 2021-09-27 | End: 2022-03-26

## 2021-10-02 ENCOUNTER — IMMUNIZATIONS (OUTPATIENT)
Dept: FAMILY MEDICINE CLINIC | Facility: HOSPITAL | Age: 71
End: 2021-10-02

## 2021-10-02 DIAGNOSIS — Z23 ENCOUNTER FOR IMMUNIZATION: Primary | ICD-10-CM

## 2021-10-02 PROCEDURE — 91300 SARS-COV-2 / COVID-19 MRNA VACCINE (PFIZER-BIONTECH) 30 MCG: CPT

## 2021-10-02 PROCEDURE — 0001A SARS-COV-2 / COVID-19 MRNA VACCINE (PFIZER-BIONTECH) 30 MCG: CPT

## 2021-10-10 DIAGNOSIS — E03.9 HYPOTHYROIDISM, UNSPECIFIED TYPE: ICD-10-CM

## 2021-10-11 RX ORDER — LEVOTHYROXINE SODIUM 88 UG/1
TABLET ORAL
Qty: 30 TABLET | Refills: 6 | Status: SHIPPED | OUTPATIENT
Start: 2021-10-11 | End: 2022-05-17

## 2021-11-11 ENCOUNTER — ANNUAL EXAM (OUTPATIENT)
Dept: OBGYN CLINIC | Facility: CLINIC | Age: 71
End: 2021-11-11
Payer: MEDICARE

## 2021-11-11 VITALS
BODY MASS INDEX: 43.87 KG/M2 | HEIGHT: 66 IN | WEIGHT: 273 LBS | DIASTOLIC BLOOD PRESSURE: 72 MMHG | SYSTOLIC BLOOD PRESSURE: 118 MMHG

## 2021-11-11 DIAGNOSIS — N95.2 ATROPHIC VAGINITIS: ICD-10-CM

## 2021-11-11 DIAGNOSIS — Z12.31 ENCOUNTER FOR SCREENING MAMMOGRAM FOR MALIGNANT NEOPLASM OF BREAST: Primary | ICD-10-CM

## 2021-11-11 DIAGNOSIS — Z01.419 ENCOUNTER FOR GYNECOLOGICAL EXAMINATION WITHOUT ABNORMAL FINDING: ICD-10-CM

## 2021-11-11 PROCEDURE — G0101 CA SCREEN;PELVIC/BREAST EXAM: HCPCS | Performed by: OBSTETRICS & GYNECOLOGY

## 2021-11-30 ENCOUNTER — APPOINTMENT (OUTPATIENT)
Dept: LAB | Facility: HOSPITAL | Age: 71
End: 2021-11-30
Attending: INTERNAL MEDICINE
Payer: MEDICARE

## 2022-01-07 DIAGNOSIS — I10 ESSENTIAL HYPERTENSION: ICD-10-CM

## 2022-01-07 RX ORDER — LISINOPRIL 5 MG/1
TABLET ORAL
Qty: 30 TABLET | Refills: 6 | Status: SHIPPED | OUTPATIENT
Start: 2022-01-07 | End: 2022-07-27

## 2022-01-08 DIAGNOSIS — I82.409 DVT (DEEP VENOUS THROMBOSIS) (HCC): ICD-10-CM

## 2022-01-08 RX ORDER — RIVAROXABAN 20 MG/1
TABLET, FILM COATED ORAL
Qty: 90 TABLET | Refills: 1 | Status: SHIPPED | OUTPATIENT
Start: 2022-01-08 | End: 2022-07-08

## 2022-02-05 ENCOUNTER — APPOINTMENT (OUTPATIENT)
Dept: LAB | Facility: HOSPITAL | Age: 72
End: 2022-02-05
Payer: MEDICARE

## 2022-02-05 DIAGNOSIS — E11.9 TYPE 2 DIABETES MELLITUS WITHOUT COMPLICATION, WITHOUT LONG-TERM CURRENT USE OF INSULIN (HCC): ICD-10-CM

## 2022-02-05 DIAGNOSIS — E03.9 HYPOTHYROIDISM, UNSPECIFIED TYPE: ICD-10-CM

## 2022-02-05 LAB
ALBUMIN SERPL BCP-MCNC: 3.2 G/DL (ref 3.5–5)
ALP SERPL-CCNC: 99 U/L (ref 46–116)
ALT SERPL W P-5'-P-CCNC: 17 U/L (ref 12–78)
ANION GAP SERPL CALCULATED.3IONS-SCNC: 5 MMOL/L (ref 4–13)
AST SERPL W P-5'-P-CCNC: 8 U/L (ref 5–45)
BILIRUB SERPL-MCNC: 1.2 MG/DL (ref 0.2–1)
BUN SERPL-MCNC: 12 MG/DL (ref 5–25)
CALCIUM ALBUM COR SERPL-MCNC: 9.9 MG/DL (ref 8.3–10.1)
CALCIUM SERPL-MCNC: 9.3 MG/DL (ref 8.3–10.1)
CHLORIDE SERPL-SCNC: 107 MMOL/L (ref 100–108)
CHOLEST SERPL-MCNC: 164 MG/DL
CO2 SERPL-SCNC: 27 MMOL/L (ref 21–32)
CREAT SERPL-MCNC: 1.03 MG/DL (ref 0.6–1.3)
EST. AVERAGE GLUCOSE BLD GHB EST-MCNC: 154 MG/DL
GFR SERPL CREATININE-BSD FRML MDRD: 55 ML/MIN/1.73SQ M
GLUCOSE P FAST SERPL-MCNC: 133 MG/DL (ref 65–99)
HBA1C MFR BLD: 7 %
HDLC SERPL-MCNC: 51 MG/DL
LDLC SERPL CALC-MCNC: 97 MG/DL (ref 0–100)
POTASSIUM SERPL-SCNC: 4.2 MMOL/L (ref 3.5–5.3)
PROT SERPL-MCNC: 7.2 G/DL (ref 6.4–8.2)
SODIUM SERPL-SCNC: 139 MMOL/L (ref 136–145)
TRIGL SERPL-MCNC: 80 MG/DL
TSH SERPL DL<=0.05 MIU/L-ACNC: 3.36 UIU/ML (ref 0.36–3.74)

## 2022-02-05 PROCEDURE — 84443 ASSAY THYROID STIM HORMONE: CPT

## 2022-02-05 PROCEDURE — 36415 COLL VENOUS BLD VENIPUNCTURE: CPT

## 2022-02-05 PROCEDURE — 80053 COMPREHEN METABOLIC PANEL: CPT

## 2022-02-05 PROCEDURE — 83036 HEMOGLOBIN GLYCOSYLATED A1C: CPT

## 2022-02-05 PROCEDURE — 80061 LIPID PANEL: CPT

## 2022-02-09 ENCOUNTER — OFFICE VISIT (OUTPATIENT)
Dept: INTERNAL MEDICINE CLINIC | Facility: CLINIC | Age: 72
End: 2022-02-09
Payer: MEDICARE

## 2022-02-09 VITALS
HEIGHT: 66 IN | HEART RATE: 90 BPM | DIASTOLIC BLOOD PRESSURE: 68 MMHG | SYSTOLIC BLOOD PRESSURE: 108 MMHG | OXYGEN SATURATION: 98 % | WEIGHT: 274.6 LBS | BODY MASS INDEX: 44.13 KG/M2

## 2022-02-09 DIAGNOSIS — Z00.00 MEDICARE ANNUAL WELLNESS VISIT, SUBSEQUENT: Primary | ICD-10-CM

## 2022-02-09 DIAGNOSIS — E11.9 TYPE 2 DIABETES MELLITUS WITHOUT COMPLICATION, WITHOUT LONG-TERM CURRENT USE OF INSULIN (HCC): ICD-10-CM

## 2022-02-09 DIAGNOSIS — L40.50 PSORIATIC ARTHRITIS (HCC): ICD-10-CM

## 2022-02-09 DIAGNOSIS — E66.01 CLASS 3 SEVERE OBESITY DUE TO EXCESS CALORIES WITH SERIOUS COMORBIDITY AND BODY MASS INDEX (BMI) OF 40.0 TO 44.9 IN ADULT (HCC): ICD-10-CM

## 2022-02-09 DIAGNOSIS — I10 ESSENTIAL HYPERTENSION: ICD-10-CM

## 2022-02-09 DIAGNOSIS — Z23 NEED FOR SHINGLES VACCINE: ICD-10-CM

## 2022-02-09 PROCEDURE — G0439 PPPS, SUBSEQ VISIT: HCPCS | Performed by: INTERNAL MEDICINE

## 2022-02-09 PROCEDURE — 99213 OFFICE O/P EST LOW 20 MIN: CPT | Performed by: INTERNAL MEDICINE

## 2022-02-09 RX ORDER — ZOSTER VACCINE RECOMBINANT, ADJUVANTED 50 MCG/0.5
0.5 KIT INTRAMUSCULAR ONCE
Qty: 1 EACH | Refills: 1 | Status: SHIPPED | OUTPATIENT
Start: 2022-02-09 | End: 2022-02-09

## 2022-02-09 NOTE — ASSESSMENT & PLAN NOTE
Lab Results   Component Value Date    HGBA1C 7 0 (H) 02/05/2022   I have counselled the pt to follow a healthy and balanced diet ,and recommend routine exercise  I will be ordering diabetic laboratories including comprehensive metabolic panel, hemoglobin A1c, urine microalbumin, lipid panel    Annual eye examination recommended

## 2022-02-09 NOTE — ASSESSMENT & PLAN NOTE
Assessment and plan 1  Medicare subsequent annual wellness examination overall the patient is clinically stable and doing well, we encouraged the patient to follow a healthy and balanced diet  We recommend that the patient exercise routinely approximately 30 minutes 5 times per week   We have reviewed the patient's vaccines and have made recommendations for updates if necessary shingles vaccine recommended       We will be ordering screening laboratories which are age appropriate  Return to the office in   6 months   call if any problems

## 2022-02-09 NOTE — PATIENT INSTRUCTIONS
Medicare Preventive Visit Patient Instructions  Thank you for completing your Welcome to Medicare Visit or Medicare Annual Wellness Visit today  Your next wellness visit will be due in one year (2/10/2023)  The screening/preventive services that you may require over the next 5-10 years are detailed below  Some tests may not apply to you based off risk factors and/or age  Screening tests ordered at today's visit but not completed yet may show as past due  Also, please note that scanned in results may not display below  Preventive Screenings:  Service Recommendations Previous Testing/Comments   Colorectal Cancer Screening  * Colonoscopy    * Fecal Occult Blood Test (FOBT)/Fecal Immunochemical Test (FIT)  * Fecal DNA/Cologuard Test  * Flexible Sigmoidoscopy Age: 54-65 years old   Colonoscopy: every 10 years (may be performed more frequently if at higher risk)  OR  FOBT/FIT: every 1 year  OR  Cologuard: every 3 years  OR  Sigmoidoscopy: every 5 years  Screening may be recommended earlier than age 48 if at higher risk for colorectal cancer  Also, an individualized decision between you and your healthcare provider will decide whether screening between the ages of 74-80 would be appropriate  Colonoscopy: 01/18/2021  FOBT/FIT: Not on file  Cologuard: 01/18/2021  Sigmoidoscopy: Not on file          Breast Cancer Screening Age: 36 years old  Frequency: every 1-2 years  Not required if history of left and right mastectomy Mammogram: 04/13/2021        Cervical Cancer Screening Between the ages of 21-29, pap smear recommended once every 3 years  Between the ages of 33-67, can perform pap smear with HPV co-testing every 5 years     Recommendations may differ for women with a history of total hysterectomy, cervical cancer, or abnormal pap smears in past  Pap Smear: 11/11/2021        Hepatitis C Screening Once for adults born between 1945 and 1965  More frequently in patients at high risk for Hepatitis C Hep C Antibody: Not on file        Diabetes Screening 1-2 times per year if you're at risk for diabetes or have pre-diabetes Fasting glucose: 133 mg/dL   A1C: 7 0 %        Cholesterol Screening Once every 5 years if you don't have a lipid disorder  May order more often based on risk factors  Lipid panel: 02/05/2022          Other Preventive Screenings Covered by Medicare:  1  Abdominal Aortic Aneurysm (AAA) Screening: covered once if your at risk  You're considered to be at risk if you have a family history of AAA  2  Lung Cancer Screening: covers low dose CT scan once per year if you meet all of the following conditions: (1) Age 50-69; (2) No signs or symptoms of lung cancer; (3) Current smoker or have quit smoking within the last 15 years; (4) You have a tobacco smoking history of at least 30 pack years (packs per day multiplied by number of years you smoked); (5) You get a written order from a healthcare provider  3  Glaucoma Screening: covered annually if you're considered high risk: (1) You have diabetes OR (2) Family history of glaucoma OR (3)  aged 48 and older OR (3)  American aged 72 and older  3  Osteoporosis Screening: covered every 2 years if you meet one of the following conditions: (1) You're estrogen deficient and at risk for osteoporosis based off medical history and other findings; (2) Have a vertebral abnormality; (3) On glucocorticoid therapy for more than 3 months; (4) Have primary hyperparathyroidism; (5) On osteoporosis medications and need to assess response to drug therapy  · Last bone density test (DXA Scan): 01/03/2020   5  HIV Screening: covered annually if you're between the age of 15-65  Also covered annually if you are younger than 13 and older than 72 with risk factors for HIV infection  For pregnant patients, it is covered up to 3 times per pregnancy      Immunizations:  Immunization Recommendations   Influenza Vaccine Annual influenza vaccination during flu season is recommended for all persons aged >= 6 months who do not have contraindications   Pneumococcal Vaccine (Prevnar and Pneumovax)  * Prevnar = PCV13  * Pneumovax = PPSV23   Adults 25-60 years old: 1-3 doses may be recommended based on certain risk factors  Adults 72 years old: Prevnar (PCV13) vaccine recommended followed by Pneumovax (PPSV23) vaccine  If already received PPSV23 since turning 65, then PCV13 recommended at least one year after PPSV23 dose  Hepatitis B Vaccine 3 dose series if at intermediate or high risk (ex: diabetes, end stage renal disease, liver disease)   Tetanus (Td) Vaccine - COST NOT COVERED BY MEDICARE PART B Following completion of primary series, a booster dose should be given every 10 years to maintain immunity against tetanus  Td may also be given as tetanus wound prophylaxis  Tdap Vaccine - COST NOT COVERED BY MEDICARE PART B Recommended at least once for all adults  For pregnant patients, recommended with each pregnancy  Shingles Vaccine (Shingrix) - COST NOT COVERED BY MEDICARE PART B  2 shot series recommended in those aged 48 and above     Health Maintenance Due:      Topic Date Due    Breast Cancer Screening: Mammogram  04/13/2023    Colorectal Cancer Screening  10/06/2024    Hepatitis C Screening  Completed     Immunizations Due:      Topic Date Due    Influenza Vaccine (1) 09/01/2021     Advance Directives   What are advance directives? Advance directives are legal documents that state your wishes and plans for medical care  These plans are made ahead of time in case you lose your ability to make decisions for yourself  Advance directives can apply to any medical decision, such as the treatments you want, and if you want to donate organs  What are the types of advance directives? There are many types of advance directives, and each state has rules about how to use them  You may choose a combination of any of the following:  · Living will:   This is a written record of the treatment you want  You can also choose which treatments you do not want, which to limit, and which to stop at a certain time  This includes surgery, medicine, IV fluid, and tube feedings  · Durable power of  for healthcare [facility]): This is a written record that states who you want to make healthcare choices for you when you are unable to make them for yourself  This person, called a proxy, is usually a family member or a friend  You may choose more than 1 proxy  · Do not resuscitate (DNR) order:  A DNR order is used in case your heart stops beating or you stop breathing  It is a request not to have certain forms of treatment, such as CPR  A DNR order may be included in other types of advance directives  · Medical directive: This covers the care that you want if you are in a coma, near death, or unable to make decisions for yourself  You can list the treatments you want for each condition  Treatment may include pain medicine, surgery, blood transfusions, dialysis, IV or tube feedings, and a ventilator (breathing machine)  · Values history: This document has questions about your views, beliefs, and how you feel and think about life  This information can help others choose the care that you would choose  Why are advance directives important? An advance directive helps you control your care  Although spoken wishes may be used, it is better to have your wishes written down  Spoken wishes can be misunderstood, or not followed  Treatments may be given even if you do not want them  An advance directive may make it easier for your family to make difficult choices about your care  Weight Management   Why it is important to manage your weight:  Being overweight increases your risk of health conditions such as heart disease, high blood pressure, type 2 diabetes, and certain types of cancer  It can also increase your risk for osteoarthritis, sleep apnea, and other respiratory problems   Aim for a slow, steady weight loss  Even a small amount of weight loss can lower your risk of health problems  How to lose weight safely:  A safe and healthy way to lose weight is to eat fewer calories and get regular exercise  You can lose up about 1 pound a week by decreasing the number of calories you eat by 500 calories each day  Healthy meal plan for weight management:  A healthy meal plan includes a variety of foods, contains fewer calories, and helps you stay healthy  A healthy meal plan includes the following:  · Eat whole-grain foods more often  A healthy meal plan should contain fiber  Fiber is the part of grains, fruits, and vegetables that is not broken down by your body  Whole-grain foods are healthy and provide extra fiber in your diet  Some examples of whole-grain foods are whole-wheat breads and pastas, oatmeal, brown rice, and bulgur  · Eat a variety of vegetables every day  Include dark, leafy greens such as spinach, kale, joshua greens, and mustard greens  Eat yellow and orange vegetables such as carrots, sweet potatoes, and winter squash  · Eat a variety of fruits every day  Choose fresh or canned fruit (canned in its own juice or light syrup) instead of juice  Fruit juice has very little or no fiber  · Eat low-fat dairy foods  Drink fat-free (skim) milk or 1% milk  Eat fat-free yogurt and low-fat cottage cheese  Try low-fat cheeses such as mozzarella and other reduced-fat cheeses  · Choose meat and other protein foods that are low in fat  Choose beans or other legumes such as split peas or lentils  Choose fish, skinless poultry (chicken or turkey), or lean cuts of red meat (beef or pork)  Before you cook meat or poultry, cut off any visible fat  · Use less fat and oil  Try baking foods instead of frying them  Add less fat, such as margarine, sour cream, regular salad dressing and mayonnaise to foods  Eat fewer high-fat foods   Some examples of high-fat foods include french fries, doughnuts, ice cream, and cakes  · Eat fewer sweets  Limit foods and drinks that are high in sugar  This includes candy, cookies, regular soda, and sweetened drinks  Exercise:  Exercise at least 30 minutes per day on most days of the week  Some examples of exercise include walking, biking, dancing, and swimming  You can also fit in more physical activity by taking the stairs instead of the elevator or parking farther away from stores  Ask your healthcare provider about the best exercise plan for you  © Copyright [employer] 2018 Information is for End User's use only and may not be sold, redistributed or otherwise used for commercial purposes  All illustrations and images included in CareNotes® are the copyrighted property of [employer]  or [facility] Serena Chapman   Core Strengthening Exercises   WHAT YOU NEED TO KNOW:   Your core includes the muscles of your lower back, hip, pelvis, and abdomen  Core strengthening exercises help heal and strengthen these muscles  This helps prevent another injury, and keeps your pelvis, spine, and hips in the correct position  DISCHARGE INSTRUCTIONS:   Contact your healthcare provider if:   · You have sharp or worsening pain during exercise or at rest     · You have questions or concerns about your shoulder exercises  Safety tips:  Talk to your healthcare provider before you start an exercise program  A physical therapist can teach you how to do core strengthening exercises safely  · Do the exercises on a mat or firm surface  A firm surface will support your spine and prevent low back pain  Do not do these exercises on a bed  · Move slowly and smoothly  Avoid fast or jerky motions  · Stop if you feel pain  Core exercises should not be painful  Stop if you feel pain  · Breathe normally during core exercises  Do not hold your breath  This may cause an increase in blood pressure and prevent muscle strengthening   Your healthcare provider will tell you when to inhale and exhale during the exercise  · Begin all of your exercises with abdominal bracing  Abdominal bracing helps warm up your core muscles  You can also practice abdominal bracing throughout the day  Lie on your back with your knees bent and feet flat on the floor  Place your arms in a relaxed position beside your body  Tighten your abdominal muscles  Pull your belly button in and up toward your spine  Hold for 5 seconds  Relax your muscles  Repeat 10 times  Core strengthening exercises: Your healthcare provider will tell you how often to do these exercises  The provider will also tell you how many repetitions of each exercise you should do  Hold each exercise for 5 seconds or as directed  As you get stronger, increase your hold to 10 to 15 seconds  You can do some of these exercises on a stability ball, or with a weight  Ask your healthcare provider how to use a stability ball or weight for these exercises:  · Bridging:  Lie on your back with your knees bent and feet flat on the floor  Rest your arms at your side  Tighten your buttocks, and then lift your hips 1 inch off the floor  Hold for 5 seconds  When you can do this exercise without pain for 10 seconds, increase the distance you lift your hips  A good goal is to be able to lift your hips so that your shoulders, hips, and knees are in a straight line  · Dead bug:  Lie on your back with your knees bent and feet flat on the floor  Place your arms in a relaxed position beside your body  Begin with abdominal bracing  Next, raise one leg, keeping your knee bent  Hold for 5 seconds  Repeat with the other leg  When you can do this exercise without pain for 10 to 15 seconds, you may raise one straight leg and hold  Repeat with the other leg  · Quadruped:  Place your hands and knees on the floor  Keep your wrists directly below your shoulders and your knees directly below your hips  Pull your belly button in toward your spine   Do not flatten or arch your back  Tighten your abdominal muscles below your belly button  Hold for 5 seconds  When you can do this exercise without pain for 10 to 15 seconds, you may extend one arm and hold  Repeat on the other side  · Side bridge exercises:      ? Standing side bridge:  Stand next to a wall and extend one arm toward the wall  Place your palm flat on the wall with your fingers pointing upward  Begin with abdominal bracing  Next, without moving your feet, slowly bend your arm to 90 degrees  Hold for 5 seconds  Repeat on the other side  When you can do this exercise without pain for 10 to 15 seconds, you may do the bent leg side bridge on the floor  ? Bent leg side bridge:  Lie on one side with your legs, hips, and shoulders in a straight line  Prop yourself up onto your forearm so your elbow is directly below your shoulder  Bend your knees back to 90 degrees  Begin with abdominal bracing  Next, lift your hips and balance yourself on your forearm and knees  Hold for 5 seconds  Repeat on the other side  When you can do this exercise without pain for 10 to 15 seconds, you may do the straight leg side bridge on the floor  ? Straight leg side bridge:  Lie on one side with your legs, hips, and shoulders in a straight line  Prop yourself up onto your forearm so your elbow is directly below your shoulder  Begin with abdominal bracing  Lift your hips off the floor and balance yourself on your forearm and the outside of your flexed foot  Do not let your ankle bend sideways  Hold for 5 seconds  Repeat on the other side  When you can do this exercise without pain for 10 to 15 seconds, ask your healthcare provider for more advanced exercises  · Superman:  Lie on your stomach  Extend your arms forward on the floor  Tighten your abdominal muscles and lift your right hand and left leg off the floor  Hold this position  Slowly return to the starting position   Tighten your abdominal muscles and lift your left hand and right leg off the floor  Hold this position  Slowly return to the starting position  · Clam:  Lie on your side with your knees bent  Put your bottom arm under your head to keep your neck in line  Put your top hand on your hip to keep your pelvis from moving  Put your heels together, and keep them together during this exercise  Slowly raise your top knee toward the ceiling  Then lower your leg so your knees are together  Repeat this exercise 10 times  Then switch sides and do the exercise 10 times with the other leg  · Curl up:  Lie on your back with your knees bent and feet flat on the floor  Place your hands, palms down, underneath your lower back  Next, with your elbows on the floor, lift your shoulders and chest 2 to 3 inches off the floor  Keep your head in line with your shoulders  Hold this position  Slowly return to the starting position  · Straight leg raises:  Lie on your back with one leg straight  Bend the other knee and place your foot flat on the floor  Tighten your abdominal muscles  Keep your leg straight and slowly lift it straight up 6 to 12 inches off the floor  Hold this position  Lower your leg slowly  Do as many repetitions as directed on this side  Repeat with the other leg  · Plank:  Lie on your stomach  Bend your elbows and place your forearms flat on the floor  Lift your chest, stomach, and knees off the floor  Make sure your elbows are below your shoulders  Your body should be in a straight line  Do not let your hips or lower back sink to the ground  Squeeze your abdominal muscles together and hold for 15 seconds  To make this exercise harder, hold for 30 seconds or lift 1 leg at a time  · Bicycles:  Lie on your back  Bend both knees and bring them toward your chest  Your calves should be parallel to the floor  Place the palms of your hands on the back of your head  Straighten your right leg and keep it lifted 2 inches off the floor  Raise your head and shoulders off the floor and twist towards your left  Keep your head and shoulders lifted  Bend your right knee while you straighten your left leg  Keep your left leg 2 inches off the floor  Twist your head and chest towards the left leg  Continue to straighten 1 leg at a time and twist        Follow up with your doctor as directed:  Write down your questions so you remember to ask them during your visits  © Copyright [employer] 2021 Information is for End User's use only and may not be sold, redistributed or otherwise used for commercial purposes  All illustrations and images included in CareNotes® are the copyrighted property of [employer] A M , Inc  or [facility] Serena Chapman   The above information is an  only  It is not intended as medical advice for individual conditions or treatments  Talk to your doctor, nurse or pharmacist before following any medical regimen to see if it is safe and effective for you

## 2022-02-09 NOTE — PROGRESS NOTES
Assessment/Plan:    Medicare annual wellness visit, subsequent  Assessment and plan 1  Medicare subsequent annual wellness examination overall the patient is clinically stable and doing well, we encouraged the patient to follow a healthy and balanced diet  We recommend that the patient exercise routinely approximately 30 minutes 5 times per week   We have reviewed the patient's vaccines and have made recommendations for updates if necessary shingles vaccine recommended       We will be ordering screening laboratories which are age appropriate  Return to the office in   6 months   call if any problems  Problem List Items Addressed This Visit        Endocrine    Type 2 diabetes mellitus without complication, without long-term current use of insulin ([facility] [address] )    Relevant Orders    Comprehensive metabolic panel    Hemoglobin A1C    Lipid Panel with Direct LDL reflex    Microalbumin / creatinine urine ratio       Other    Medicare annual wellness visit, subsequent - Primary     Assessment and plan 1  Medicare subsequent annual wellness examination overall the patient is clinically stable and doing well, we encouraged the patient to follow a healthy and balanced diet  We recommend that the patient exercise routinely approximately 30 minutes 5 times per week   We have reviewed the patient's vaccines and have made recommendations for updates if necessary shingles vaccine recommended       We will be ordering screening laboratories which are age appropriate  Return to the office in   6 months   call if any problems  Other Visit Diagnoses     Need for shingles vaccine        Relevant Medications    Zoster Vac Recomb Adjuvanted (Shingrix) 50 MCG/0 5ML SUSR    Psoriatic arthritis (HCC)        Relevant Medications    methotrexate 2 5 mg tablet          RTO in 6 months call if any problems  Subjective:      Patient ID: Juan Manning is a 80 y o  female      HPI 77-year old female coming in for a follow up office visit regarding type 2 diabetes, psoriatic arthritis, essential hypertension and obesity; The patient reports me compliant taking medications without untoward side effects the  The patient is here to review his medical condition, update me on the medical condition and the patient reports me no hospitalizations and no ER visits  Reports me less activities secondary to the [place] pandemic trying to follow healthy diet psoriatic arthritis under control with methotrexate sees rheumatology routinely only 1 fall contusion of the knee that resolved currently no residual symptoms accidental fall on a carpet at the grocery store    The following portions of the patient's history were reviewed and updated as appropriate: allergies, current medications, past family history, past medical history, past social history, past surgical history and problem list     Review of Systems   Constitutional: Negative for activity change, appetite change and unexpected weight change  HENT: Negative for congestion  Eyes: Negative for visual disturbance  Respiratory: Negative for cough and shortness of breath  Cardiovascular: Negative for chest pain  Gastrointestinal: Negative for abdominal pain, diarrhea, nausea and vomiting  Neurological: Negative for dizziness, light-headedness and headaches  Objective:    No follow-ups on file  No results found        No Known Allergies    Past Medical History:   Diagnosis Date    Breast cancer ([address] ) 17 yrs ago    left    History of radiation therapy 09/01/2006     Past Surgical History:   Procedure Laterality Date    BIOPSY CORE NEEDLE Right 03/03/2010    benign    BREAST CYST ASPIRATION Right 08/15/2003    BREAST CYST ASPIRATION Right     x3  (Years ago)    BREAST LUMPECTOMY Left 08/22/2006    MAMMO STEREOTACTIC BREAST BIOPSY LEFT (ALL INC) Left 08/02/2006    malignant    US GUIDED BREAST BIOPSY LEFT COMPLETE Left 02/06/2009    benign     Current Outpatient Medications on File Prior to Visit   Medication Sig Dispense Refill    calcium citrate-vitamin D (CITRACAL+D) 315-200 MG-UNIT per tablet Take by mouth      Cholecalciferol (VITAMIN D) 2000 units CAPS Take 1 capsule by mouth daily      folic acid (FOLVITE) 1 mg tablet Take 1 tablet by mouth daily      levothyroxine 88 mcg tablet TAKE 1 TABLET BY MOUTH EVERY DAY 30 tablet 6    lisinopril (ZESTRIL) 5 mg tablet TAKE 1 TABLET BY MOUTH EVERY DAY 30 tablet 6    metFORMIN (GLUCOPHAGE-XR) 500 mg 24 hr tablet TAKE 3 TABLETS (1,500 MG TOTAL) BY MOUTH DAILY FOR DIABETES 270 tablet 1    simvastatin (ZOCOR) 20 mg tablet TAKE 1 TABLET BY MOUTH EVERY DAY 90 tablet 3    Xarelto 20 MG tablet TAKE 1 TABLET BY MOUTH DAILY WITH BREAKFAST 90 tablet 1    [DISCONTINUED] aspirin 325 mg tablet Take 1 tablet by mouth daily       No current facility-administered medications on file prior to visit  Family History   Problem Relation Age of Onset   [facility] Breast cancer Mother 48    Ovarian cancer Sister 61    Lung cancer Sister     Breast cancer Maternal Aunt 37    Stomach cancer Maternal Uncle     Skin cancer Maternal Uncle     Breast cancer Paternal Aunt     Breast cancer Paternal Grandmother 80    Skin cancer Paternal Aunt      Social History     Socioeconomic History    Marital status:       Spouse name: Not on file    Number of children: Not on file    Years of education: Not on file    Highest education level: Not on file   Occupational History    Not on file   Tobacco Use    Smoking status: Never Smoker    Smokeless tobacco: Never Used   Vaping Use    Vaping Use: Never used   Substance and Sexual Activity    Alcohol use: Yes     Comment: rarely    Drug use: No    Sexual activity: Never   Other Topics Concern    Not on file   Social History Narrative    Not on file     Social Determinants of Health     Financial Resource Strain: Not on file   Food Insecurity: Not on file   Transportation Needs: Not on file   Physical Activity: Not on file   Stress: Not on file   Social Connections: Not on file   Intimate Partner Violence: Not on file   Housing Stability: Not on file     Vitals:    02/09/22 0943   BP: 108/68   Pulse: 90   SpO2: 98%   Weight: 125 kg (274 lb 9 6 oz)   Height: 5' 6" (1 676 m)     Results for orders placed or performed in visit on 02/05/22   Comprehensive metabolic panel   Result Value Ref Range    Sodium 139 136 - 145 mmol/L    Potassium 4 2 3 5 - 5 3 mmol/L    Chloride 107 100 - 108 mmol/L    CO2 27 21 - 32 mmol/L    ANION GAP 5 4 - 13 mmol/L    BUN 12 5 - 25 mg/dL    Creatinine 1 03 0 60 - 1 30 mg/dL    Glucose, Fasting 133 (H) 65 - 99 mg/dL    Calcium 9 3 8 3 - 10 1 mg/dL    Corrected Calcium 9 9 8 3 - 10 1 mg/dL    AST 8 5 - 45 U/L    ALT 17 12 - 78 U/L    Alkaline Phosphatase 99 46 - 116 U/L    Total Protein 7 2 6 4 - 8 2 g/dL    Albumin 3 2 (L) 3 5 - 5 0 g/dL    Total Bilirubin 1 20 (H) 0 20 - 1 00 mg/dL    eGFR 55 ml/min/1 73sq m   Hemoglobin A1C   Result Value Ref Range    Hemoglobin A1C 7 0 (H) Normal 3 8-5 6%; PreDiabetic 5 7-6 4%; Diabetic >=6 5%; Glycemic control for adults with diabetes <7 0% %     mg/dl   Lipid Panel with Direct LDL reflex   Result Value Ref Range    Cholesterol 164 See Comment mg/dL    Triglycerides 80 See Comment mg/dL    HDL, Direct 51 >=50 mg/dL    LDL Calculated 97 0 - 100 mg/dL   TSH, 3rd generation   Result Value Ref Range    TSH 3RD GENERATON 3 360 0 358 - 3 740 uIU/mL     Weight (last 2 days)     Date/Time Weight    02/09/22 0943 125 (274 6)        Body mass index is 44 32 kg/m²  BP      Temp      Pulse     Resp      SpO2        Vitals:    02/09/22 0943   Weight: 125 kg (274 lb 9 6 oz)     Vitals:    02/09/22 0943   Weight: 125 kg (274 lb 9 6 oz)       /68   Pulse 90   Ht 5' 6" (1 676 m)   Wt 125 kg (274 lb 9 6 oz)   SpO2 98%   BMI 44 32 kg/m²          Physical Exam  Constitutional:       Appearance: She is well-developed     HENT: Head: Normocephalic  Eyes:      General: No scleral icterus  Right eye: No discharge  Left eye: No discharge  Conjunctiva/sclera: Conjunctivae normal       Pupils: Pupils are equal, round, and reactive to light  Cardiovascular:      Rate and Rhythm: Normal rate and regular rhythm  Heart sounds: Normal heart sounds  No murmur heard  No friction rub  No gallop  Pulmonary:      Effort: No respiratory distress  Breath sounds: Normal breath sounds  No wheezing or rales  Abdominal:      General: Bowel sounds are normal  There is no distension  Palpations: Abdomen is soft  There is no mass  Tenderness: There is no abdominal tenderness  There is no guarding or rebound  Musculoskeletal:         General: No deformity  Cervical back: Neck supple  Lymphadenopathy:      Cervical: No cervical adenopathy  Neurological:      Mental Status: She is alert        Coordination: Coordination normal

## 2022-02-09 NOTE — PROGRESS NOTES
Assessment and Plan:     Problem List Items Addressed This Visit        Endocrine    Type 2 diabetes mellitus without complication, without long-term current use of insulin ([facility] [address] )    Relevant Orders    Comprehensive metabolic panel    Hemoglobin A1C    Lipid Panel with Direct LDL reflex    Microalbumin / creatinine urine ratio       Other    Medicare annual wellness visit, subsequent - Primary     Assessment and plan 1  Medicare subsequent annual wellness examination overall the patient is clinically stable and doing well, we encouraged the patient to follow a healthy and balanced diet  We recommend that the patient exercise routinely approximately 30 minutes 5 times per week   We have reviewed the patient's vaccines and have made recommendations for updates if necessary shingles vaccine recommended       We will be ordering screening laboratories which are age appropriate  Return to the office in   6 months   call if any problems  Other Visit Diagnoses     Need for shingles vaccine        Relevant Medications    Zoster Vac Recomb Adjuvanted (Shingrix) 50 MCG/0 5ML SUSR    Psoriatic arthritis (HCC)        Relevant Medications    methotrexate 2 5 mg tablet        BMI Counseling: Body mass index is 44 32 kg/m²  The BMI is above normal  Nutrition recommendations include decreasing portion sizes and moderation in carbohydrate intake  Exercise recommendations include exercising 3-5 times per week  Rationale for BMI follow-up plan is due to patient being overweight or obese  Depression Screening and Follow-up Plan: Patient was screened for depression during today's encounter  They screened negative with a PHQ-2 score of 0  Preventive health issues were discussed with patient, and age appropriate screening tests were ordered as noted in patient's After Visit Summary    Personalized health advice and appropriate referrals for health education or preventive services given if needed, as noted in patient's After Visit Summary       History of Present Illness:     Patient presents for Medicare Annual Wellness visit    Patient Care Team:  Ida Bolaños DO as PCP - General     Problem List:     Patient Active Problem List   Diagnosis    Type 2 diabetes mellitus without complication, without long-term current use of insulin ([place] [address] )    Diabetic nephropathy ([place] [address] )    Hyperlipidemia    Hypothyroidism    Malignant neoplasm of breast ([place] [address] )    Nontoxic single thyroid nodule    Prediabetes    Vitamin D deficiency    Essential hypertension    Screening, anemia, deficiency, iron    Class 3 severe obesity due to excess calories with serious comorbidity in adult [facility])    Medicare annual wellness visit, subsequent    Screening for osteoporosis    Enlarged thyroid    Screening for malignant neoplasm of colon    Class 3 severe obesity due to excess calories with serious comorbidity and body mass index (BMI) of 40 0 to 44 9 in [facility])    Acute deep vein thrombosis (DVT) of proximal vein of both lower extremities (HCC)    Lower limb pain, posterior, left    Factor 5 Leiden mutation, heterozygous ([place] [address] )    History of DVT (deep vein thrombosis)    Chronic deep vein thrombosis (DVT) of lower extremity (HCC)      Past Medical and Surgical History:     Past Medical History:   Diagnosis Date    Breast cancer ([facility] ) 17 yrs ago    left    History of radiation therapy 09/01/2006     Past Surgical History:   Procedure Laterality Date    BIOPSY CORE NEEDLE Right 03/03/2010    benign    BREAST CYST ASPIRATION Right 08/15/2003    BREAST CYST ASPIRATION Right     x3  (Years ago)    BREAST LUMPECTOMY Left 08/22/2006    MAMMO STEREOTACTIC BREAST BIOPSY LEFT (ALL INC) Left 08/02/2006    malignant    US GUIDED BREAST BIOPSY LEFT COMPLETE Left 02/06/2009    benign      Family History:     Family History   Problem Relation Age of Onset    Breast cancer Mother 48    Ovarian cancer Sister 61    Lung cancer Sister  Breast cancer Maternal Aunt 37    Stomach cancer Maternal Uncle     Skin cancer Maternal Uncle     Breast cancer Paternal Aunt     Breast cancer Paternal Grandmother 80    Skin cancer Paternal Aunt       Social History:     Social History     Socioeconomic History    Marital status:       Spouse name: None    Number of children: None    Years of education: None    Highest education level: None   Occupational History    None   Tobacco Use    Smoking status: Never Smoker    Smokeless tobacco: Never Used   Vaping Use    Vaping Use: Never used   Substance and Sexual Activity    Alcohol use: Yes     Comment: rarely    Drug use: No    Sexual activity: Never   Other Topics Concern    None   Social History Narrative    None     Social Determinants of Health     Financial Resource Strain: Not on file   Food Insecurity: Not on file   Transportation Needs: Not on file   Physical Activity: Not on file   Stress: Not on file   Social Connections: Not on file   Intimate Partner Violence: Not on file   Housing Stability: Not on file      Medications and Allergies:     Current Outpatient Medications   Medication Sig Dispense Refill    calcium citrate-vitamin D (CITRACAL+D) 315-200 MG-UNIT per tablet Take by mouth      Cholecalciferol (VITAMIN D) 2000 units CAPS Take 1 capsule by mouth daily      folic acid (FOLVITE) 1 mg tablet Take 1 tablet by mouth daily      levothyroxine 88 mcg tablet TAKE 1 TABLET BY MOUTH EVERY DAY 30 tablet 6    lisinopril (ZESTRIL) 5 mg tablet TAKE 1 TABLET BY MOUTH EVERY DAY 30 tablet 6    metFORMIN (GLUCOPHAGE-XR) 500 mg 24 hr tablet TAKE 3 TABLETS (1,500 MG TOTAL) BY MOUTH DAILY FOR DIABETES 270 tablet 1    simvastatin (ZOCOR) 20 mg tablet TAKE 1 TABLET BY MOUTH EVERY DAY 90 tablet 3    Xarelto 20 MG tablet TAKE 1 TABLET BY MOUTH DAILY WITH BREAKFAST 90 tablet 1    methotrexate 2 5 mg tablet 8 tabs once a week      Zoster Vac Recomb Adjuvanted (Shingrix) 50 MCG/0 5ML SUSR Inject 0 5 mL into a muscle once for 1 dose Repeat dose in 2 to 6 months 1 each 1     No current facility-administered medications for this visit  No Known Allergies   Immunizations:     Immunization History   Administered Date(s) Administered    COVID-19 PFIZER VACCINE 0 3 ML IM 02/25/2021, 03/19/2021, 10/02/2021    H1N1, All Formulations 10/27/2009    Influenza Quadrivalent Preservative Free 3 years and older IM 10/09/2014, 09/12/2016    Influenza, high dose seasonal 0 7 mL 10/19/2018    Influenza, recombinant, quadrivalent,injectable, preservative free 10/13/2020    Influenza, seasonal, injectable 11/16/2012, 10/13/2013, 11/20/2015, 10/28/2017    Pneumococcal Conjugate 13-Valent 09/18/2017    Pneumococcal Polysaccharide PPV23 09/18/2018    TD (adult) Preservative Free 07/05/2018    Zoster 03/20/2018      Health Maintenance:         Topic Date Due    Breast Cancer Screening: Mammogram  04/13/2023    Colorectal Cancer Screening  10/06/2024    Hepatitis C Screening  Completed         Topic Date Due    Influenza Vaccine (1) 09/01/2021      Medicare Health Risk Assessment:     /68   Pulse 90   Ht 5' 6" (1 676 m)   Wt 125 kg (274 lb 9 6 oz)   SpO2 98%   BMI 44 32 kg/m²      Mira Cisse is here for her Subsequent Wellness visit  Health Risk Assessment:   Patient rates overall health as good  Patient feels that their physical health rating is same  Patient is satisfied with their life  Eyesight was rated as same  Hearing was rated as same  Patient feels that their emotional and mental health rating is same  Patients states they are never, rarely angry  Patient states they are never, rarely unusually tired/fatigued  Pain experienced in the last 7 days has been none  Patient states that she has experienced no weight loss or gain in last 6 months  Depression Screening:   PHQ-2 Score: 0      Fall Risk Screening:    In the past year, patient has experienced: history of falling in past year    Number of falls: 1  Injured during fall?: Yes    Feels unsteady when standing or walking?: No    Worried about falling?: Yes      Urinary Incontinence Screening:   Patient has not leaked urine accidently in the last six months  Mild contusion right knee fall no head injury  Home Safety:  Patient does not have trouble with stairs inside or outside of their home  Patient has working smoke alarms Home safety hazards include: none  Nutrition:   Current diet is Regular  Medications:   Patient is currently taking over-the-counter supplements  OTC medications include: see medication list  Patient is able to manage medications  Activities of Daily Living (ADLs)/Instrumental Activities of Daily Living (IADLs):   Walk and transfer into and out of bed and chair?: Yes  Dress and groom yourself?: Yes    Bathe or shower yourself?: Yes    Feed yourself? Yes  Do your laundry/housekeeping?: Yes  Manage your money, pay your bills and track your expenses?: Yes  Make your own meals?: Yes    Do your own shopping?: Yes    Previous Hospitalizations:   Any hospitalizations or ED visits within the last 12 months?: No      Advance Care Planning:   Living will: Yes    Durable POA for healthcare:  Yes    Advanced directive: Yes      Cognitive Screening:   Provider or family/friend/caregiver concerned regarding cognition?: No    PREVENTIVE SCREENINGS      Cardiovascular Screening:    General: Screening Not Indicated and History Lipid Disorder      Diabetes Screening:     General: Screening Not Indicated and History Diabetes      Colorectal Cancer Screening:     General: Screening Current      Breast Cancer Screening:     General: History Breast Cancer      Cervical Cancer Screening:    General: Screening Not Indicated      Lung Cancer Screening:     General: Screening Not Indicated      Hepatitis C Screening:    General: Screening Current    Screening, Brief Intervention, and Referral to Treatment (SBIRT)    Screening  Typical number of drinks in a day: 0  Typical number of drinks in a week: 0  Interpretation: Low risk drinking behavior      Single Item Drug Screening:  How often have you used an illegal drug (including marijuana) or a prescription medication for non-medical reasons in the past year? never    Single Item Drug Screen Score: 0  Interpretation: Negative screen for possible drug use disorder      Oriana Granado, DO

## 2022-02-09 NOTE — ASSESSMENT & PLAN NOTE
Likely stable continue methotrexate/folic acid and routine monitoring via Rheumatology Dr Johnie Fothergill

## 2022-02-28 DIAGNOSIS — E78.5 HYPERLIPIDEMIA, UNSPECIFIED HYPERLIPIDEMIA TYPE: ICD-10-CM

## 2022-02-28 RX ORDER — SIMVASTATIN 20 MG
TABLET ORAL
Qty: 90 TABLET | Refills: 3 | Status: SHIPPED | OUTPATIENT
Start: 2022-02-28

## 2022-03-26 DIAGNOSIS — E11.9 TYPE 2 DIABETES MELLITUS WITHOUT COMPLICATION, WITHOUT LONG-TERM CURRENT USE OF INSULIN (HCC): ICD-10-CM

## 2022-03-26 RX ORDER — METFORMIN HYDROCHLORIDE 500 MG/1
TABLET, EXTENDED RELEASE ORAL
Qty: 270 TABLET | Refills: 1 | Status: SHIPPED | OUTPATIENT
Start: 2022-03-26

## 2022-03-31 ENCOUNTER — APPOINTMENT (OUTPATIENT)
Dept: LAB | Facility: HOSPITAL | Age: 72
End: 2022-03-31
Attending: INTERNAL MEDICINE
Payer: MEDICARE

## 2022-03-31 DIAGNOSIS — L40.59 POLYARTICULAR PSORIATIC ARTHRITIS (HCC): ICD-10-CM

## 2022-03-31 LAB
ALBUMIN SERPL BCP-MCNC: 3.3 G/DL (ref 3.5–5)
ALP SERPL-CCNC: 93 U/L (ref 46–116)
ALT SERPL W P-5'-P-CCNC: 18 U/L (ref 12–78)
AST SERPL W P-5'-P-CCNC: 9 U/L (ref 5–45)
BASOPHILS # BLD AUTO: 0.05 THOUSANDS/ΜL (ref 0–0.1)
BASOPHILS NFR BLD AUTO: 1 % (ref 0–1)
BILIRUB DIRECT SERPL-MCNC: 0.26 MG/DL (ref 0–0.2)
BILIRUB SERPL-MCNC: 1 MG/DL (ref 0.2–1)
CREAT SERPL-MCNC: 0.88 MG/DL (ref 0.6–1.3)
CRP SERPL QL: 6.6 MG/L
EOSINOPHIL # BLD AUTO: 0.1 THOUSAND/ΜL (ref 0–0.61)
EOSINOPHIL NFR BLD AUTO: 2 % (ref 0–6)
ERYTHROCYTE [DISTWIDTH] IN BLOOD BY AUTOMATED COUNT: 14.3 % (ref 11.6–15.1)
ERYTHROCYTE [SEDIMENTATION RATE] IN BLOOD: 47 MM/HOUR (ref 0–29)
GFR SERPL CREATININE-BSD FRML MDRD: 66 ML/MIN/1.73SQ M
HCT VFR BLD AUTO: 37.8 % (ref 34.8–46.1)
HGB BLD-MCNC: 12.4 G/DL (ref 11.5–15.4)
IMM GRANULOCYTES # BLD AUTO: 0.01 THOUSAND/UL (ref 0–0.2)
IMM GRANULOCYTES NFR BLD AUTO: 0 % (ref 0–2)
LYMPHOCYTES # BLD AUTO: 1.07 THOUSANDS/ΜL (ref 0.6–4.47)
LYMPHOCYTES NFR BLD AUTO: 18 % (ref 14–44)
MCH RBC QN AUTO: 29 PG (ref 26.8–34.3)
MCHC RBC AUTO-ENTMCNC: 32.8 G/DL (ref 31.4–37.4)
MCV RBC AUTO: 89 FL (ref 82–98)
MONOCYTES # BLD AUTO: 0.43 THOUSAND/ΜL (ref 0.17–1.22)
MONOCYTES NFR BLD AUTO: 7 % (ref 4–12)
NEUTROPHILS # BLD AUTO: 4.35 THOUSANDS/ΜL (ref 1.85–7.62)
NEUTS SEG NFR BLD AUTO: 72 % (ref 43–75)
NRBC BLD AUTO-RTO: 0 /100 WBCS
PLATELET # BLD AUTO: 319 THOUSANDS/UL (ref 149–390)
PMV BLD AUTO: 10.4 FL (ref 8.9–12.7)
PROT SERPL-MCNC: 7 G/DL (ref 6.4–8.2)
RBC # BLD AUTO: 4.27 MILLION/UL (ref 3.81–5.12)
WBC # BLD AUTO: 6.01 THOUSAND/UL (ref 4.31–10.16)

## 2022-03-31 PROCEDURE — 85025 COMPLETE CBC W/AUTO DIFF WBC: CPT

## 2022-03-31 PROCEDURE — 82565 ASSAY OF CREATININE: CPT

## 2022-03-31 PROCEDURE — 85652 RBC SED RATE AUTOMATED: CPT

## 2022-03-31 PROCEDURE — 36415 COLL VENOUS BLD VENIPUNCTURE: CPT

## 2022-03-31 PROCEDURE — 86140 C-REACTIVE PROTEIN: CPT

## 2022-03-31 PROCEDURE — 80076 HEPATIC FUNCTION PANEL: CPT

## 2022-04-04 ENCOUNTER — HOSPITAL ENCOUNTER (OUTPATIENT)
Dept: RADIOLOGY | Age: 72
Discharge: HOME | End: 2022-04-04
Payer: MEDICARE

## 2022-04-04 VITALS — WEIGHT: 274 LBS | BODY MASS INDEX: 44.03 KG/M2 | HEIGHT: 66 IN

## 2022-04-04 DIAGNOSIS — Z12.31 ENCOUNTER FOR SCREENING MAMMOGRAM FOR MALIGNANT NEOPLASM OF BREAST: ICD-10-CM

## 2022-04-04 PROCEDURE — 77063 BREAST TOMOSYNTHESIS BI: CPT

## 2022-04-04 PROCEDURE — 77067 SCR MAMMO BI INCL CAD: CPT

## 2022-05-17 DIAGNOSIS — E03.9 HYPOTHYROIDISM, UNSPECIFIED TYPE: ICD-10-CM

## 2022-05-17 RX ORDER — LEVOTHYROXINE SODIUM 88 UG/1
TABLET ORAL
Qty: 30 TABLET | Refills: 6 | Status: SHIPPED | OUTPATIENT
Start: 2022-05-17

## 2022-07-08 DIAGNOSIS — I82.409 DVT (DEEP VENOUS THROMBOSIS) (HCC): ICD-10-CM

## 2022-07-08 RX ORDER — RIVAROXABAN 20 MG/1
TABLET, FILM COATED ORAL
Qty: 90 TABLET | Refills: 1 | Status: SHIPPED | OUTPATIENT
Start: 2022-07-08

## 2022-07-27 DIAGNOSIS — I10 ESSENTIAL HYPERTENSION: ICD-10-CM

## 2022-07-27 RX ORDER — LISINOPRIL 5 MG/1
TABLET ORAL
Qty: 30 TABLET | Refills: 6 | Status: SHIPPED | OUTPATIENT
Start: 2022-07-27

## 2022-08-01 ENCOUNTER — APPOINTMENT (OUTPATIENT)
Dept: LAB | Facility: HOSPITAL | Age: 72
End: 2022-08-01
Payer: MEDICARE

## 2022-08-01 DIAGNOSIS — E11.9 TYPE 2 DIABETES MELLITUS WITHOUT COMPLICATION, WITHOUT LONG-TERM CURRENT USE OF INSULIN (HCC): ICD-10-CM

## 2022-08-01 DIAGNOSIS — L40.59 POLYARTICULAR PSORIATIC ARTHRITIS (HCC): ICD-10-CM

## 2022-08-01 LAB
ALBUMIN SERPL BCP-MCNC: 3.1 G/DL (ref 3.5–5)
ALP SERPL-CCNC: 93 U/L (ref 46–116)
ALT SERPL W P-5'-P-CCNC: 17 U/L (ref 12–78)
ANION GAP SERPL CALCULATED.3IONS-SCNC: 3 MMOL/L (ref 4–13)
AST SERPL W P-5'-P-CCNC: 11 U/L (ref 5–45)
BASOPHILS # BLD AUTO: 0.05 THOUSANDS/ΜL (ref 0–0.1)
BASOPHILS NFR BLD AUTO: 1 % (ref 0–1)
BILIRUB DIRECT SERPL-MCNC: 0.21 MG/DL (ref 0–0.2)
BILIRUB SERPL-MCNC: 1.18 MG/DL (ref 0.2–1)
BUN SERPL-MCNC: 14 MG/DL (ref 5–25)
CALCIUM ALBUM COR SERPL-MCNC: 9.9 MG/DL (ref 8.3–10.1)
CALCIUM SERPL-MCNC: 9.2 MG/DL (ref 8.3–10.1)
CHLORIDE SERPL-SCNC: 108 MMOL/L (ref 96–108)
CHOLEST SERPL-MCNC: 145 MG/DL
CO2 SERPL-SCNC: 27 MMOL/L (ref 21–32)
CREAT SERPL-MCNC: 1.03 MG/DL (ref 0.6–1.3)
CREAT UR-MCNC: 358 MG/DL
CRP SERPL QL: 6.8 MG/L
EOSINOPHIL # BLD AUTO: 0.1 THOUSAND/ΜL (ref 0–0.61)
EOSINOPHIL NFR BLD AUTO: 2 % (ref 0–6)
ERYTHROCYTE [DISTWIDTH] IN BLOOD BY AUTOMATED COUNT: 15.5 % (ref 11.6–15.1)
ERYTHROCYTE [SEDIMENTATION RATE] IN BLOOD: 58 MM/HOUR (ref 0–29)
EST. AVERAGE GLUCOSE BLD GHB EST-MCNC: 151 MG/DL
GFR SERPL CREATININE-BSD FRML MDRD: 55 ML/MIN/1.73SQ M
GLUCOSE P FAST SERPL-MCNC: 151 MG/DL (ref 65–99)
HBA1C MFR BLD: 6.9 %
HCT VFR BLD AUTO: 39.8 % (ref 34.8–46.1)
HDLC SERPL-MCNC: 51 MG/DL
HGB BLD-MCNC: 12.5 G/DL (ref 11.5–15.4)
IMM GRANULOCYTES # BLD AUTO: 0.01 THOUSAND/UL (ref 0–0.2)
IMM GRANULOCYTES NFR BLD AUTO: 0 % (ref 0–2)
LDLC SERPL CALC-MCNC: 79 MG/DL (ref 0–100)
LYMPHOCYTES # BLD AUTO: 0.88 THOUSANDS/ΜL (ref 0.6–4.47)
LYMPHOCYTES NFR BLD AUTO: 13 % (ref 14–44)
MCH RBC QN AUTO: 30.3 PG (ref 26.8–34.3)
MCHC RBC AUTO-ENTMCNC: 31.4 G/DL (ref 31.4–37.4)
MCV RBC AUTO: 96 FL (ref 82–98)
MICROALBUMIN UR-MCNC: 199 MG/L (ref 0–20)
MICROALBUMIN/CREAT 24H UR: 56 MG/G CREATININE (ref 0–30)
MONOCYTES # BLD AUTO: 0.48 THOUSAND/ΜL (ref 0.17–1.22)
MONOCYTES NFR BLD AUTO: 7 % (ref 4–12)
NEUTROPHILS # BLD AUTO: 5.03 THOUSANDS/ΜL (ref 1.85–7.62)
NEUTS SEG NFR BLD AUTO: 77 % (ref 43–75)
NRBC BLD AUTO-RTO: 0 /100 WBCS
PLATELET # BLD AUTO: 306 THOUSANDS/UL (ref 149–390)
PMV BLD AUTO: 10.3 FL (ref 8.9–12.7)
POTASSIUM SERPL-SCNC: 4.4 MMOL/L (ref 3.5–5.3)
PROT SERPL-MCNC: 7.3 G/DL (ref 6.4–8.4)
RBC # BLD AUTO: 4.13 MILLION/UL (ref 3.81–5.12)
SODIUM SERPL-SCNC: 138 MMOL/L (ref 135–147)
TRIGL SERPL-MCNC: 77 MG/DL
WBC # BLD AUTO: 6.55 THOUSAND/UL (ref 4.31–10.16)

## 2022-08-01 PROCEDURE — 80061 LIPID PANEL: CPT

## 2022-08-01 PROCEDURE — 85025 COMPLETE CBC W/AUTO DIFF WBC: CPT

## 2022-08-01 PROCEDURE — 82043 UR ALBUMIN QUANTITATIVE: CPT

## 2022-08-01 PROCEDURE — 36415 COLL VENOUS BLD VENIPUNCTURE: CPT

## 2022-08-01 PROCEDURE — 82248 BILIRUBIN DIRECT: CPT

## 2022-08-01 PROCEDURE — 82570 ASSAY OF URINE CREATININE: CPT

## 2022-08-01 PROCEDURE — 83036 HEMOGLOBIN GLYCOSYLATED A1C: CPT

## 2022-08-01 PROCEDURE — 80053 COMPREHEN METABOLIC PANEL: CPT

## 2022-08-01 PROCEDURE — 85652 RBC SED RATE AUTOMATED: CPT

## 2022-08-01 PROCEDURE — 86140 C-REACTIVE PROTEIN: CPT

## 2022-08-10 ENCOUNTER — RA CDI HCC (OUTPATIENT)
Dept: OTHER | Facility: HOSPITAL | Age: 72
End: 2022-08-10

## 2022-08-10 NOTE — PROGRESS NOTES
Leelee [facility]  coding opportunities          Chart Reviewed number of suggestions sent to Provider: 3     Patients Insurance     Medicare Insurance: Medicare        [id number]  [id number]  [id number]

## 2022-08-17 ENCOUNTER — OFFICE VISIT (OUTPATIENT)
Dept: INTERNAL MEDICINE CLINIC | Facility: CLINIC | Age: 72
End: 2022-08-17
Payer: MEDICARE

## 2022-08-17 VITALS
HEART RATE: 99 BPM | WEIGHT: 268.4 LBS | DIASTOLIC BLOOD PRESSURE: 64 MMHG | SYSTOLIC BLOOD PRESSURE: 108 MMHG | HEIGHT: 66 IN | OXYGEN SATURATION: 100 % | BODY MASS INDEX: 43.13 KG/M2

## 2022-08-17 DIAGNOSIS — E04.1 NONTOXIC SINGLE THYROID NODULE: ICD-10-CM

## 2022-08-17 DIAGNOSIS — E11.9 TYPE 2 DIABETES MELLITUS WITHOUT COMPLICATION, WITHOUT LONG-TERM CURRENT USE OF INSULIN (HCC): Primary | ICD-10-CM

## 2022-08-17 DIAGNOSIS — E03.9 HYPOTHYROIDISM, UNSPECIFIED TYPE: ICD-10-CM

## 2022-08-17 DIAGNOSIS — L40.50 PSORIATIC ARTHRITIS (HCC): ICD-10-CM

## 2022-08-17 DIAGNOSIS — E78.5 HYPERLIPIDEMIA, UNSPECIFIED HYPERLIPIDEMIA TYPE: ICD-10-CM

## 2022-08-17 DIAGNOSIS — E66.01 CLASS 3 SEVERE OBESITY DUE TO EXCESS CALORIES WITH SERIOUS COMORBIDITY AND BODY MASS INDEX (BMI) OF 40.0 TO 44.9 IN ADULT (HCC): ICD-10-CM

## 2022-08-17 DIAGNOSIS — R21 RASH: ICD-10-CM

## 2022-08-17 PROCEDURE — 99214 OFFICE O/P EST MOD 30 MIN: CPT | Performed by: INTERNAL MEDICINE

## 2022-08-17 NOTE — PROGRESS NOTES
Assessment/Plan:    Type 2 diabetes mellitus without complication, without long-term current use of insulin ([facility])    Lab Results   Component Value Date    HGBA1C 6 9 (H) 08/01/2022   I have counselled the pt to follow a healthy and balanced diet ,and recommend routine exercise  I will be ordering diabetic laboratories including comprehensive metabolic panel, hemoglobin A1c, urine microalbumin, lipid panel  Nontoxic single thyroid nodule  Will check ultrasound thyroid    Essential hypertension  Hypertension - controlled, I have counseled patient following healthy balance diet, I would like the patient reduce sodium, exercise routinely, I would like the patient continued the med current medical regiment and we will continue to monitor  Psoriatic arthritis ([facility] )  Continue working with Rheumatology currently on methotrexate/folic acid    Hyperlipidemia  Hyperlipidemia controlled continue with current medical regiment recommend a low-cholesterol diet and recommend routine exercise we will continue to monitor the progress  Continue Zocor 20 mg once daily    Class 3 severe obesity due to excess calories with serious comorbidity and body mass index (BMI) of 40 0 to 44 9 in adult [facility])  Obesity -I have counseled patient following healthy and balanced diet, I would like the patient to lose weight, I would like the patient exercise routinely; we will continue monitor the patient's progress  Rash  Tinea verses psoriatic arthritis Rx for econazole cream 1% apply to affected area once a day times 10 days if no resolution please let me know and follow up with Rheumatology         Problem List Items Addressed This Visit        Endocrine    Type 2 diabetes mellitus without complication, without long-term current use of insulin ([facility] ) - Primary       Lab Results   Component Value Date    HGBA1C 6 9 (H) 08/01/2022   I have counselled the pt to follow a healthy and balanced diet ,and recommend routine exercise    I will be ordering diabetic laboratories including comprehensive metabolic panel, hemoglobin A1c, urine microalbumin, lipid panel  Relevant Orders    Comprehensive metabolic panel    Hemoglobin A1C    Lipid Panel with Direct LDL reflex    Hypothyroidism    Relevant Orders    TSH, 3rd generation    Nontoxic single thyroid nodule     Will check ultrasound thyroid         Relevant Orders    US thyroid       Musculoskeletal and Integument    Psoriatic arthritis ([facility] [address] )     Continue working with Rheumatology currently on methotrexate/folic acid         Rash     Tinea verses psoriatic arthritis Rx for econazole cream 1% apply to affected area once a day times 10 days if no resolution please let me know and follow up with Rheumatology         Relevant Medications    econazole nitrate 1 % cream       Other    Hyperlipidemia     Hyperlipidemia controlled continue with current medical regiment recommend a low-cholesterol diet and recommend routine exercise we will continue to monitor the progress  Continue Zocor 20 mg once daily         Class 3 severe obesity due to excess calories with serious comorbidity and body mass index (BMI) of 40 0 to 44 9 in [facility])     Obesity -I have counseled patient following healthy and balanced diet, I would like the patient to lose weight, I would like the patient exercise routinely; we will continue monitor the patient's progress  RTO in 6 months call if any problems  Subjective:      Patient ID: Meghna Espinoza is a 80 y o  female  HPI 77-year old female coming in for a follow up office visit regarding type 2 diabetes, hypothyroidism, obesity and hyperlipidemia; The patient reports me compliant taking medications without untoward side effects the  The patient is here to review his medical condition, update me on the medical condition and the patient reports me no hospitalizations and no ER visits    No injuries no illnesses reports to me trying to follow healthy imbalance diet continues work with Rheumatology currently on methotrexate for psoriatic arthritis    The following portions of the patient's history were reviewed and updated as appropriate: allergies, current medications, past family history, past medical history, past social history, past surgical history and problem list     Review of Systems   Constitutional: Negative for activity change, appetite change and unexpected weight change  HENT: Negative for congestion  Eyes: Negative for visual disturbance  Respiratory: Negative for cough and shortness of breath  Cardiovascular: Negative for chest pain  Gastrointestinal: Negative for abdominal pain, diarrhea, nausea and vomiting  Neurological: Negative for dizziness, light-headedness and headaches  Objective:    Return in about 6 months (around 2/17/2023)  No results found        No Known Allergies    Past Medical History:   Diagnosis Date    Breast cancer ([facility] [address] ) 17 yrs ago    left    History of radiation therapy 09/01/2006     Past Surgical History:   Procedure Laterality Date    BIOPSY CORE NEEDLE Right 03/03/2010    benign    BREAST CYST ASPIRATION Right 08/15/2003    BREAST CYST ASPIRATION Right     x3  (Years ago)    BREAST LUMPECTOMY Left 08/22/2006    positive    MAMMO STEREOTACTIC BREAST BIOPSY LEFT (ALL INC) Left 08/02/2006    malignant    US GUIDED BREAST BIOPSY LEFT COMPLETE Left 02/06/2009    benign     Current Outpatient Medications on File Prior to Visit   Medication Sig Dispense Refill    calcium citrate-vitamin D (CITRACAL+D) 315-200 MG-UNIT per tablet Take by mouth      Cholecalciferol (VITAMIN D) 2000 units CAPS Take 1 capsule by mouth daily      folic acid (FOLVITE) 1 mg tablet Take 1 tablet by mouth daily      levothyroxine 88 mcg tablet TAKE 1 TABLET BY MOUTH EVERY DAY 30 tablet 6    lisinopril (ZESTRIL) 5 mg tablet TAKE 1 TABLET BY MOUTH EVERY DAY 30 tablet 6    metFORMIN (GLUCOPHAGE-XR) 500 mg 24 hr tablet TAKE 3 TABLETS (1,500 MG TOTAL) BY MOUTH DAILY FOR DIABETES 270 tablet 1    methotrexate 2 5 mg tablet 8 tabs once a week      simvastatin (ZOCOR) 20 mg tablet TAKE 1 TABLET BY MOUTH EVERY DAY 90 tablet 3    Xarelto 20 MG tablet TAKE 1 TABLET BY MOUTH EVERY DAY WITH BREAKFAST 90 tablet 1    [DISCONTINUED] aspirin 325 mg tablet Take 1 tablet by mouth daily       No current facility-administered medications on file prior to visit  Family History   Problem Relation Age of Onset   Carley Irwin Breast cancer Mother 48    Ovarian cancer Sister 61    Lung cancer Sister     Breast cancer Maternal Aunt 37    Stomach cancer Maternal Uncle     Skin cancer Maternal Uncle     Breast cancer Paternal Aunt     Breast cancer Paternal Grandmother 80    Skin cancer Paternal Aunt     Breast cancer Cousin 70    Breast cancer Cousin 68     Social History     Socioeconomic History    Marital status:       Spouse name: Not on file    Number of children: Not on file    Years of education: Not on file    Highest education level: Not on file   Occupational History    Not on file   Tobacco Use    Smoking status: Never Smoker    Smokeless tobacco: Never Used   Vaping Use    Vaping Use: Never used   Substance and Sexual Activity    Alcohol use: Not Currently    Drug use: No    Sexual activity: Never   Other Topics Concern    Not on file   Social History Narrative    Not on file     Social Determinants of Health     Financial Resource Strain: Not on file   Food Insecurity: Not on file   Transportation Needs: Not on file   Physical Activity: Not on file   Stress: Not on file   Social Connections: Not on file   Intimate Partner Violence: Not on file   Housing Stability: Not on file     Vitals:    08/17/22 1043   BP: 108/64   Pulse: 99   SpO2: 100%   Weight: 122 kg (268 lb 6 4 oz)   Height: 5' 6" (1 676 m)     Results for orders placed or performed in visit on 08/01/22   Comprehensive metabolic panel   Result Value Ref Range    Sodium 138 135 - 147 mmol/L    Potassium 4 4 3 5 - 5 3 mmol/L    Chloride 108 96 - 108 mmol/L    CO2 27 21 - 32 mmol/L    ANION GAP 3 (L) 4 - 13 mmol/L    BUN 14 5 - 25 mg/dL    Creatinine 1 03 0 60 - 1 30 mg/dL    Glucose, Fasting 151 (H) 65 - 99 mg/dL    Calcium 9 2 8 3 - 10 1 mg/dL    Corrected Calcium 9 9 8 3 - 10 1 mg/dL    AST 11 5 - 45 U/L    ALT 17 12 - 78 U/L    Alkaline Phosphatase 93 46 - 116 U/L    Total Protein 7 3 6 4 - 8 4 g/dL    Albumin 3 1 (L) 3 5 - 5 0 g/dL    Total Bilirubin 1 18 (H) 0 20 - 1 00 mg/dL    eGFR 55 ml/min/1 73sq m   Hemoglobin A1C   Result Value Ref Range    Hemoglobin A1C 6 9 (H) Normal 3 8-5 6%; PreDiabetic 5 7-6 4%; Diabetic >=6 5%; Glycemic control for adults with diabetes <7 0% %     mg/dl   Lipid Panel with Direct LDL reflex   Result Value Ref Range    Cholesterol 145 See Comment mg/dL    Triglycerides 77 See Comment mg/dL    HDL, Direct 51 >=50 mg/dL    LDL Calculated 79 0 - 100 mg/dL   Microalbumin / creatinine urine ratio   Result Value Ref Range    Creatinine, Ur 358 0 mg/dL    Microalbum  ,U,Random 199 0 (H) 0 0 - 20 0 mg/L    Microalb Creat Ratio 56 (H) 0 - 30 mg/g creatinine   CBC and differential   Result Value Ref Range    WBC 6 55 4 31 - 10 16 Thousand/uL    RBC 4 13 3 81 - 5 12 Million/uL    Hemoglobin 12 5 11 5 - 15 4 g/dL    Hematocrit 39 8 34 8 - 46 1 %    MCV 96 82 - 98 fL    MCH 30 3 26 8 - 34 3 pg    MCHC 31 4 31 4 - 37 4 g/dL    RDW 15 5 (H) 11 6 - 15 1 %    MPV 10 3 8 9 - 12 7 fL    Platelets 809 957 - 570 Thousands/uL    nRBC 0 /100 WBCs    Neutrophils Relative 77 (H) 43 - 75 %    Immat GRANS % 0 0 - 2 %    Lymphocytes Relative 13 (L) 14 - 44 %    Monocytes Relative 7 4 - 12 %    Eosinophils Relative 2 0 - 6 %    Basophils Relative 1 0 - 1 %    Neutrophils Absolute 5 03 1 85 - 7 62 Thousands/µL    Immature Grans Absolute 0 01 0 00 - 0 20 Thousand/uL    Lymphocytes Absolute 0 88 0 60 - 4 47 Thousands/µL    Monocytes Absolute 0 48 0 17 - 1 22 Thousand/µL    Eosinophils Absolute 0 10 0 00 - 0 61 Thousand/µL    Basophils Absolute 0 05 0 00 - 0 10 Thousands/µL   Sedimentation rate, automated   Result Value Ref Range    Sed Rate 58 (H) 0 - 29 mm/hour   C-reactive protein   Result Value Ref Range    CRP 6 8 (H) <3 0 mg/L   Bilirubin, direct   Result Value Ref Range    Bilirubin, Direct 0 21 (H) 0 00 - 0 20 mg/dL     Weight (last 2 days)     None        Body mass index is 43 32 kg/m²  BP      Temp      Pulse     Resp      SpO2        Vitals:    08/17/22 1043   Weight: 122 kg (268 lb 6 4 oz)     Vitals:    08/17/22 1043   Weight: 122 kg (268 lb 6 4 oz)       /64   Pulse 99   Ht 5' 6" (1 676 m)   Wt 122 kg (268 lb 6 4 oz)   SpO2 100%   BMI 43 32 kg/m²          Physical Exam  Constitutional:       Appearance: She is well-developed  HENT:      Head: Normocephalic  Eyes:      General: No scleral icterus  Right eye: No discharge  Left eye: No discharge  Conjunctiva/sclera: Conjunctivae normal       Pupils: Pupils are equal, round, and reactive to light  Cardiovascular:      Rate and Rhythm: Normal rate and regular rhythm  Pulses: no weak pulses     Heart sounds: Normal heart sounds  No murmur heard  No friction rub  No gallop  Pulmonary:      Effort: No respiratory distress  Breath sounds: Normal breath sounds  No wheezing or rales  Abdominal:      General: Bowel sounds are normal  There is no distension  Palpations: Abdomen is soft  There is no mass  Tenderness: There is no abdominal tenderness  There is no guarding or rebound  Musculoskeletal:         General: No deformity  Cervical back: Neck supple  Feet:      Right foot:      Skin integrity: No ulcer, skin breakdown, erythema, warmth, callus or dry skin  Left foot:      Skin integrity: No ulcer, skin breakdown, erythema, warmth, callus or dry skin  Lymphadenopathy:      Cervical: No cervical adenopathy     Neurological:      Mental Status: She is alert  Coordination: Coordination normal        Diabetic Foot Exam    Patient's shoes and socks removed  Right Foot/Ankle   Right Foot Inspection  Skin Exam: skin normal and skin intact  No dry skin, no warmth, no callus, no erythema, no maceration, no abnormal color, no pre-ulcer, no ulcer and no callus  Toe Exam: ROM and strength within normal limits  Sensory   Monofilament testing: intact    Left Foot/Ankle  Left Foot Inspection  Skin Exam: skin normal and skin intact  No dry skin, no warmth, no erythema, no maceration, normal color, no pre-ulcer, no ulcer and no callus  Toe Exam: ROM and strength within normal limits       Sensory   Monofilament testing: intact    Assign Risk Category  No deformity present  No loss of protective sensation  No weak pulses  Risk: 0  Two 1 cm crusty erythematous skin lesions right upper extremity

## 2022-08-21 PROBLEM — R21 RASH: Status: ACTIVE | Noted: 2022-08-21

## 2022-08-21 NOTE — ASSESSMENT & PLAN NOTE
Tinea verses psoriatic arthritis Rx for econazole cream 1% apply to affected area once a day times 10 days if no resolution please let me know and follow up with Rheumatology

## 2022-08-21 NOTE — ASSESSMENT & PLAN NOTE
Lab Results   Component Value Date    HGBA1C 6 9 (H) 08/01/2022   I have counselled the pt to follow a healthy and balanced diet ,and recommend routine exercise  I will be ordering diabetic laboratories including comprehensive metabolic panel, hemoglobin A1c, urine microalbumin, lipid panel

## 2022-09-18 DIAGNOSIS — E11.9 TYPE 2 DIABETES MELLITUS WITHOUT COMPLICATION, WITHOUT LONG-TERM CURRENT USE OF INSULIN (HCC): ICD-10-CM

## 2022-09-18 RX ORDER — METFORMIN HYDROCHLORIDE 500 MG/1
TABLET, EXTENDED RELEASE ORAL
Qty: 270 TABLET | Refills: 1 | Status: SHIPPED | OUTPATIENT
Start: 2022-09-18

## 2022-11-21 ENCOUNTER — ANNUAL EXAM (OUTPATIENT)
Dept: OBGYN CLINIC | Facility: CLINIC | Age: 72
End: 2022-11-21

## 2022-11-21 VITALS
DIASTOLIC BLOOD PRESSURE: 70 MMHG | SYSTOLIC BLOOD PRESSURE: 110 MMHG | WEIGHT: 272 LBS | BODY MASS INDEX: 43.71 KG/M2 | HEIGHT: 66 IN

## 2022-11-21 DIAGNOSIS — N95.2 ATROPHIC VAGINITIS: ICD-10-CM

## 2022-11-21 DIAGNOSIS — Z12.31 ENCOUNTER FOR SCREENING MAMMOGRAM FOR BREAST CANCER: Primary | ICD-10-CM

## 2022-11-21 DIAGNOSIS — Z01.419 ENCOUNTER FOR GYNECOLOGICAL EXAMINATION WITHOUT ABNORMAL FINDING: ICD-10-CM

## 2022-11-21 NOTE — PROGRESS NOTES
Assessment/Plan:    No problem-specific Assessment & Plan notes found for this encounter  Diagnoses and all orders for this visit:    Encounter for screening mammogram for breast cancer  -     Mammo screening bilateral w 3d & cad; Future    Encounter for gynecological examination without abnormal finding    Atrophic vaginitis          Normal gynecological physical examination  Self-breast examination stressed  Mammogram ordered  Discussed regular exercise, healthy diet, importance of vitamin D and calcium supplements  Discussed importance of sun block use during periods of prolonged sun exposure  Patient will be seen in 1 year for routine gynecologic and medical examination  Patient will call office for any problems, concerns, or issues which may arise during the interim  Subjective:          Pt presents for annual exam with no complaints  Pt is postmenopausal and denies any heat intolerance, hot flashes, fatigue, palpitations  Pt denies any abdominal pain, dysuria, hematuria, changes in bowel habits, N/V/D, change in vaginal discharge  Pt is up to date with mammogram and colon cancer screening  Pt states she did a cologaurd screening last year with no issues  Pt is UTD with flu and COVID vaccine  Patient ID: Amy Napier is a 71 y o  female who presents today for her annual gynecologic and medical examination    Menstrual status: postmenopausal     Vasomotor symptoms: denies heat intolerance, hot flashes, fatigue, palpitations    Patient reports normal appetite    Patient reports normal bowel and bladder habits    Patient denies any significant pelvic or abdominal pain    Patient denies any headaches, chest pain, shortness of breath fever shakes or chills    Patient denies any COVID 19 symptoms including cough or loss of taste or smell    COVID vaccine status: vaccinated x4  Angel Clarke  Aware of guidelines and UTD with booster    Medical problems:followed for HTN, Type 2 diabetes, hypothyroidism Colonoscopy status: Pt states she did cologaurd screening last year with no abnormalities     Mammogram status:Pt last mammogram was 4/2022  Orders given at this appointment     The following portions of the patient's history were reviewed and updated as appropriate: allergies, current medications, past family history, past medical history, past social history, past surgical history and problem list     Review of Systems   Constitutional: Negative  Negative for appetite change, diaphoresis, fatigue, fever and unexpected weight change  HENT: Negative  Eyes: Negative  Negative for visual disturbance  Respiratory: Negative  Negative for cough and shortness of breath  Cardiovascular: Negative  Negative for chest pain, palpitations and leg swelling  Follow up for blood pressure, cholesterol DVT   Gastrointestinal: Negative  Negative for abdominal pain, blood in stool, constipation, diarrhea, nausea and vomiting  Endocrine: Negative  Negative for cold intolerance and heat intolerance  Followed for diabetes   Genitourinary: Negative  Negative for dysuria, frequency, hematuria, urgency, vaginal bleeding, vaginal discharge and vaginal pain  Musculoskeletal: Negative  Skin: Negative  Allergic/Immunologic: Negative  Neurological: Negative  Negative for light-headedness, numbness and headaches  Hematological: Negative  Negative for adenopathy  Psychiatric/Behavioral: Negative  Objective:      /70   Ht 5' 6" (1 676 m)   Wt 123 kg (272 lb)   BMI 43 90 kg/m²          Physical Exam  Vitals reviewed  Exam conducted with a chaperone present  Constitutional:       General: She is not in acute distress  Appearance: Normal appearance  She is well-developed  She is not ill-appearing, toxic-appearing or diaphoretic  HENT:      Head: Normocephalic and atraumatic     Eyes:      Extraocular Movements: EOM normal       Conjunctiva/sclera: Conjunctivae normal  Pupils: Pupils are equal, round, and reactive to light  Cardiovascular:      Rate and Rhythm: Normal rate and regular rhythm  Heart sounds: Normal heart sounds  No murmur heard  No friction rub  No gallop  Pulmonary:      Effort: Pulmonary effort is normal  No respiratory distress  Breath sounds: Normal breath sounds  No wheezing, rhonchi or rales  Chest:   Breasts:     Breasts are symmetrical       Right: No inverted nipple, mass, nipple discharge, skin change or tenderness  Left: No inverted nipple, mass, nipple discharge, skin change or tenderness  Abdominal:      General: Bowel sounds are normal       Palpations: Abdomen is soft  Genitourinary:     General: Normal vulva  Exam position: Supine  Labia:         Right: No rash or lesion  Left: No rash or lesion  Urethra: No urethral swelling or urethral lesion  Vagina: Normal  No vaginal discharge, erythema, tenderness or bleeding  Cervix: No discharge or friability  Uterus: Normal  Not enlarged and not tender  Adnexa:         Right: No mass, tenderness or fullness  Left: No mass, tenderness or fullness  Rectum: Normal  Guaiac result negative  Comments: Pelvic exam revealed minimal atrophic vaginitis   Good pelvic support confirmed  Musculoskeletal:         General: No edema  Normal range of motion  Cervical back: Normal range of motion and neck supple  Lymphadenopathy:      Cervical: No cervical adenopathy  Upper Body:   No axillary adenopathy present  Right upper body: No supraclavicular adenopathy  Left upper body: No supraclavicular adenopathy  Lower Body: No right inguinal adenopathy  No left inguinal adenopathy  Skin:     General: Skin is warm, dry and intact  Findings: No rash  Neurological:      General: No focal deficit present  Mental Status: She is alert and oriented to person, place, and time        Gait: Gait normal  Psychiatric:         Mood and Affect: Mood and affect and mood normal          Speech: Speech normal          Behavior: Behavior normal          Thought Content:  Thought content normal          Cognition and Memory: Cognition and memory normal          Judgment: Judgment normal

## 2022-11-30 ENCOUNTER — APPOINTMENT (OUTPATIENT)
Dept: LAB | Facility: CLINIC | Age: 72
End: 2022-11-30

## 2022-11-30 DIAGNOSIS — L40.59 OTHER PSORIATIC ARTHROPATHY (HCC): ICD-10-CM

## 2022-11-30 LAB
ALBUMIN SERPL BCP-MCNC: 3 G/DL (ref 3.5–5)
ALP SERPL-CCNC: 90 U/L (ref 46–116)
ALT SERPL W P-5'-P-CCNC: 18 U/L (ref 12–78)
AST SERPL W P-5'-P-CCNC: 11 U/L (ref 5–45)
BASOPHILS # BLD AUTO: 0.04 THOUSANDS/ÂΜL (ref 0–0.1)
BASOPHILS NFR BLD AUTO: 1 % (ref 0–1)
BILIRUB DIRECT SERPL-MCNC: 0.3 MG/DL (ref 0–0.2)
BILIRUB SERPL-MCNC: 1.04 MG/DL (ref 0.2–1)
CREAT SERPL-MCNC: 1.02 MG/DL (ref 0.6–1.3)
CRP SERPL QL: 7.7 MG/L
EOSINOPHIL # BLD AUTO: 0.12 THOUSAND/ÂΜL (ref 0–0.61)
EOSINOPHIL NFR BLD AUTO: 2 % (ref 0–6)
ERYTHROCYTE [DISTWIDTH] IN BLOOD BY AUTOMATED COUNT: 14.5 % (ref 11.6–15.1)
ERYTHROCYTE [SEDIMENTATION RATE] IN BLOOD: 50 MM/HOUR (ref 0–29)
GFR SERPL CREATININE-BSD FRML MDRD: 55 ML/MIN/1.73SQ M
HCT VFR BLD AUTO: 38.5 % (ref 34.8–46.1)
HGB BLD-MCNC: 12.1 G/DL (ref 11.5–15.4)
IMM GRANULOCYTES # BLD AUTO: 0.03 THOUSAND/UL (ref 0–0.2)
IMM GRANULOCYTES NFR BLD AUTO: 1 % (ref 0–2)
LYMPHOCYTES # BLD AUTO: 0.89 THOUSANDS/ÂΜL (ref 0.6–4.47)
LYMPHOCYTES NFR BLD AUTO: 16 % (ref 14–44)
MCH RBC QN AUTO: 30.6 PG (ref 26.8–34.3)
MCHC RBC AUTO-ENTMCNC: 31.4 G/DL (ref 31.4–37.4)
MCV RBC AUTO: 97 FL (ref 82–98)
MONOCYTES # BLD AUTO: 0.48 THOUSAND/ÂΜL (ref 0.17–1.22)
MONOCYTES NFR BLD AUTO: 9 % (ref 4–12)
NEUTROPHILS # BLD AUTO: 3.88 THOUSANDS/ÂΜL (ref 1.85–7.62)
NEUTS SEG NFR BLD AUTO: 71 % (ref 43–75)
NRBC BLD AUTO-RTO: 0 /100 WBCS
PLATELET # BLD AUTO: 276 THOUSANDS/UL (ref 149–390)
PMV BLD AUTO: 10 FL (ref 8.9–12.7)
PROT SERPL-MCNC: 7.2 G/DL (ref 6.4–8.4)
RBC # BLD AUTO: 3.96 MILLION/UL (ref 3.81–5.12)
WBC # BLD AUTO: 5.44 THOUSAND/UL (ref 4.31–10.16)

## 2022-12-11 DIAGNOSIS — E03.9 HYPOTHYROIDISM, UNSPECIFIED TYPE: ICD-10-CM

## 2022-12-11 RX ORDER — LEVOTHYROXINE SODIUM 88 UG/1
TABLET ORAL
Qty: 30 TABLET | Refills: 6 | Status: SHIPPED | OUTPATIENT
Start: 2022-12-11

## 2023-01-11 DIAGNOSIS — I82.409 DVT (DEEP VENOUS THROMBOSIS) (HCC): ICD-10-CM

## 2023-01-11 RX ORDER — RIVAROXABAN 20 MG/1
TABLET, FILM COATED ORAL
Qty: 90 TABLET | Refills: 1 | Status: SHIPPED | OUTPATIENT
Start: 2023-01-11

## 2023-02-15 ENCOUNTER — RA CDI HCC (OUTPATIENT)
Dept: OTHER | Facility: HOSPITAL | Age: 73
End: 2023-02-15

## 2023-02-15 NOTE — PROGRESS NOTES
[facility]  coding opportunities          Chart Reviewed number of suggestions sent to Provider: 3     Patients Insurance     Medicare Insurance: Medicare        [id number]  [id number]  [id number]

## 2023-02-17 ENCOUNTER — APPOINTMENT (OUTPATIENT)
Dept: LAB | Facility: CLINIC | Age: 73
End: 2023-02-17

## 2023-02-17 DIAGNOSIS — E11.9 TYPE 2 DIABETES MELLITUS WITHOUT COMPLICATION, WITHOUT LONG-TERM CURRENT USE OF INSULIN (HCC): ICD-10-CM

## 2023-02-17 DIAGNOSIS — E03.9 HYPOTHYROIDISM, UNSPECIFIED TYPE: ICD-10-CM

## 2023-02-17 DIAGNOSIS — L40.59 POLYARTICULAR PSORIATIC ARTHRITIS (HCC): Primary | ICD-10-CM

## 2023-02-17 LAB
ALBUMIN SERPL BCP-MCNC: 3.5 G/DL (ref 3.5–5)
ALP SERPL-CCNC: 84 U/L (ref 46–116)
ALT SERPL W P-5'-P-CCNC: 21 U/L (ref 12–78)
ANION GAP SERPL CALCULATED.3IONS-SCNC: 2 MMOL/L (ref 4–13)
AST SERPL W P-5'-P-CCNC: 11 U/L (ref 5–45)
BASOPHILS # BLD AUTO: 0.05 THOUSANDS/ÂΜL (ref 0–0.1)
BASOPHILS NFR BLD AUTO: 1 % (ref 0–1)
BILIRUB SERPL-MCNC: 1.13 MG/DL (ref 0.2–1)
BUN SERPL-MCNC: 22 MG/DL (ref 5–25)
CALCIUM SERPL-MCNC: 10 MG/DL (ref 8.3–10.1)
CHLORIDE SERPL-SCNC: 106 MMOL/L (ref 96–108)
CHOLEST SERPL-MCNC: 147 MG/DL
CO2 SERPL-SCNC: 28 MMOL/L (ref 21–32)
CREAT SERPL-MCNC: 1.25 MG/DL (ref 0.6–1.3)
CRP SERPL QL: 6 MG/L
EOSINOPHIL # BLD AUTO: 0.1 THOUSAND/ÂΜL (ref 0–0.61)
EOSINOPHIL NFR BLD AUTO: 2 % (ref 0–6)
ERYTHROCYTE [DISTWIDTH] IN BLOOD BY AUTOMATED COUNT: 14.3 % (ref 11.6–15.1)
ERYTHROCYTE [SEDIMENTATION RATE] IN BLOOD: 50 MM/HOUR (ref 0–29)
GFR SERPL CREATININE-BSD FRML MDRD: 43 ML/MIN/1.73SQ M
GLUCOSE P FAST SERPL-MCNC: 141 MG/DL (ref 65–99)
HCT VFR BLD AUTO: 39.4 % (ref 34.8–46.1)
HDLC SERPL-MCNC: 51 MG/DL
HGB BLD-MCNC: 12.7 G/DL (ref 11.5–15.4)
IMM GRANULOCYTES # BLD AUTO: 0.01 THOUSAND/UL (ref 0–0.2)
IMM GRANULOCYTES NFR BLD AUTO: 0 % (ref 0–2)
LDLC SERPL CALC-MCNC: 82 MG/DL (ref 0–100)
LIPASE SERPL-CCNC: 180 U/L (ref 73–393)
LYMPHOCYTES # BLD AUTO: 0.89 THOUSANDS/ÂΜL (ref 0.6–4.47)
LYMPHOCYTES NFR BLD AUTO: 16 % (ref 14–44)
MCH RBC QN AUTO: 30.8 PG (ref 26.8–34.3)
MCHC RBC AUTO-ENTMCNC: 32.2 G/DL (ref 31.4–37.4)
MCV RBC AUTO: 96 FL (ref 82–98)
MONOCYTES # BLD AUTO: 0.34 THOUSAND/ÂΜL (ref 0.17–1.22)
MONOCYTES NFR BLD AUTO: 6 % (ref 4–12)
NEUTROPHILS # BLD AUTO: 4.12 THOUSANDS/ÂΜL (ref 1.85–7.62)
NEUTS SEG NFR BLD AUTO: 75 % (ref 43–75)
NRBC BLD AUTO-RTO: 0 /100 WBCS
PLATELET # BLD AUTO: 330 THOUSANDS/UL (ref 149–390)
PMV BLD AUTO: 10.3 FL (ref 8.9–12.7)
POTASSIUM SERPL-SCNC: 5.1 MMOL/L (ref 3.5–5.3)
PROT SERPL-MCNC: 7.3 G/DL (ref 6.4–8.4)
RBC # BLD AUTO: 4.12 MILLION/UL (ref 3.81–5.12)
SODIUM SERPL-SCNC: 136 MMOL/L (ref 135–147)
TRIGL SERPL-MCNC: 72 MG/DL
TSH SERPL DL<=0.05 MIU/L-ACNC: 3 UIU/ML (ref 0.45–4.5)
WBC # BLD AUTO: 5.51 THOUSAND/UL (ref 4.31–10.16)

## 2023-02-18 LAB
EST. AVERAGE GLUCOSE BLD GHB EST-MCNC: 151 MG/DL
HBA1C MFR BLD: 6.9 %

## 2023-02-22 ENCOUNTER — OFFICE VISIT (OUTPATIENT)
Dept: INTERNAL MEDICINE CLINIC | Facility: CLINIC | Age: 73
End: 2023-02-22

## 2023-02-22 VITALS
DIASTOLIC BLOOD PRESSURE: 72 MMHG | HEIGHT: 66 IN | RESPIRATION RATE: 16 BRPM | BODY MASS INDEX: 43.49 KG/M2 | HEART RATE: 88 BPM | OXYGEN SATURATION: 98 % | SYSTOLIC BLOOD PRESSURE: 114 MMHG | WEIGHT: 270.6 LBS

## 2023-02-22 DIAGNOSIS — I82.532 CHRONIC EMBOLISM AND THROMBOSIS OF LEFT POPLITEAL VEIN (HCC): ICD-10-CM

## 2023-02-22 DIAGNOSIS — Z00.00 MEDICARE ANNUAL WELLNESS VISIT, SUBSEQUENT: Primary | ICD-10-CM

## 2023-02-22 DIAGNOSIS — N18.31 STAGE 3A CHRONIC KIDNEY DISEASE (HCC): ICD-10-CM

## 2023-02-22 DIAGNOSIS — E78.5 HYPERLIPIDEMIA, UNSPECIFIED HYPERLIPIDEMIA TYPE: ICD-10-CM

## 2023-02-22 DIAGNOSIS — L40.50 PSORIATIC ARTHRITIS (HCC): ICD-10-CM

## 2023-02-22 DIAGNOSIS — E11.9 TYPE 2 DIABETES MELLITUS WITHOUT COMPLICATION, WITHOUT LONG-TERM CURRENT USE OF INSULIN (HCC): ICD-10-CM

## 2023-02-22 DIAGNOSIS — Z23 ENCOUNTER FOR IMMUNIZATION: ICD-10-CM

## 2023-02-22 DIAGNOSIS — E11.21 DIABETIC NEPHROPATHY ASSOCIATED WITH TYPE 2 DIABETES MELLITUS (HCC): ICD-10-CM

## 2023-02-22 DIAGNOSIS — E66.01 CLASS 3 SEVERE OBESITY DUE TO EXCESS CALORIES WITH SERIOUS COMORBIDITY AND BODY MASS INDEX (BMI) OF 40.0 TO 44.9 IN ADULT (HCC): ICD-10-CM

## 2023-02-22 DIAGNOSIS — I10 ESSENTIAL HYPERTENSION: ICD-10-CM

## 2023-02-22 RX ORDER — SIMVASTATIN 20 MG
TABLET ORAL
Qty: 90 TABLET | Refills: 3 | Status: SHIPPED | OUTPATIENT
Start: 2023-02-22

## 2023-02-22 NOTE — PATIENT INSTRUCTIONS
Medicare Preventive Visit Patient Instructions  Thank you for completing your Welcome to Medicare Visit or Medicare Annual Wellness Visit today  Your next wellness visit will be due in one year (2/23/2024)  The screening/preventive services that you may require over the next 5-10 years are detailed below  Some tests may not apply to you based off risk factors and/or age  Screening tests ordered at today's visit but not completed yet may show as past due  Also, please note that scanned in results may not display below  Preventive Screenings:  Service Recommendations Previous Testing/Comments   Colorectal Cancer Screening  * Colonoscopy    * Fecal Occult Blood Test (FOBT)/Fecal Immunochemical Test (FIT)  * Fecal DNA/Cologuard Test  * Flexible Sigmoidoscopy Age: 39-70 years old   Colonoscopy: every 10 years (may be performed more frequently if at higher risk)  OR  FOBT/FIT: every 1 year  OR  Cologuard: every 3 years  OR  Sigmoidoscopy: every 5 years  Screening may be recommended earlier than age 39 if at higher risk for colorectal cancer  Also, an individualized decision between you and your healthcare provider will decide whether screening between the ages of 74-80 would be appropriate  Colonoscopy: 01/18/2021  FOBT/FIT: Not on file  Cologuard: 01/18/2021  Sigmoidoscopy: Not on file          Breast Cancer Screening Age: 36 years old  Frequency: every 1-2 years  Not required if history of left and right mastectomy Mammogram: 04/04/2022        Cervical Cancer Screening Between the ages of 21-29, pap smear recommended once every 3 years  Between the ages of 33-67, can perform pap smear with HPV co-testing every 5 years     Recommendations may differ for women with a history of total hysterectomy, cervical cancer, or abnormal pap smears in past  Pap Smear: 11/21/2022        Hepatitis C Screening Once for adults born between 1945 and 1965  More frequently in patients at high risk for Hepatitis C Hep C Antibody: Not on file        Diabetes Screening 1-2 times per year if you're at risk for diabetes or have pre-diabetes Fasting glucose: 141 mg/dL (2/17/2023)  A1C: 6 9 % (2/17/2023)      Cholesterol Screening Once every 5 years if you don't have a lipid disorder  May order more often based on risk factors  Lipid panel: 02/17/2023          Other Preventive Screenings Covered by Medicare:  1  Abdominal Aortic Aneurysm (AAA) Screening: covered once if your at risk  You're considered to be at risk if you have a family history of AAA  2  Lung Cancer Screening: covers low dose CT scan once per year if you meet all of the following conditions: (1) Age 50-69; (2) No signs or symptoms of lung cancer; (3) Current smoker or have quit smoking within the last 15 years; (4) You have a tobacco smoking history of at least 20 pack years (packs per day multiplied by number of years you smoked); (5) You get a written order from a healthcare provider  3  Glaucoma Screening: covered annually if you're considered high risk: (1) You have diabetes OR (2) Family history of glaucoma OR (3)  aged 48 and older OR (3)  American aged 72 and older  3  Osteoporosis Screening: covered every 2 years if you meet one of the following conditions: (1) You're estrogen deficient and at risk for osteoporosis based off medical history and other findings; (2) Have a vertebral abnormality; (3) On glucocorticoid therapy for more than 3 months; (4) Have primary hyperparathyroidism; (5) On osteoporosis medications and need to assess response to drug therapy  · Last bone density test (DXA Scan): 01/03/2020   5  HIV Screening: covered annually if you're between the age of 15-65  Also covered annually if you are younger than 13 and older than 72 with risk factors for HIV infection  For pregnant patients, it is covered up to 3 times per pregnancy      Immunizations:  Immunization Recommendations   Influenza Vaccine Annual influenza vaccination during flu season is recommended for all persons aged >= 6 months who do not have contraindications   Pneumococcal Vaccine   * Pneumococcal conjugate vaccine = PCV13 (Prevnar 13), PCV15 (Vaxneuvance), PCV20 (Prevnar 20)  * Pneumococcal polysaccharide vaccine = PPSV23 (Pneumovax) Adults 25-60 years old: 1-3 doses may be recommended based on certain risk factors  Adults 72 years old: 1-2 doses may be recommended based off what pneumonia vaccine you previously received   Hepatitis B Vaccine 3 dose series if at intermediate or high risk (ex: diabetes, end stage renal disease, liver disease)   Tetanus (Td) Vaccine - COST NOT COVERED BY MEDICARE PART B Following completion of primary series, a booster dose should be given every 10 years to maintain immunity against tetanus  Td may also be given as tetanus wound prophylaxis  Tdap Vaccine - COST NOT COVERED BY MEDICARE PART B Recommended at least once for all adults  For pregnant patients, recommended with each pregnancy  Shingles Vaccine (Shingrix) - COST NOT COVERED BY MEDICARE PART B  2 shot series recommended in those aged 48 and above     Health Maintenance Due:      Topic Date Due   • Breast Cancer Screening: Mammogram  04/04/2024   • Colorectal Cancer Screening  10/06/2024   • Hepatitis C Screening  Completed     Immunizations Due:      Topic Date Due   • COVID-19 Vaccine (4 - Booster for Pfizer series) 11/27/2021   • Influenza Vaccine (1) 09/01/2022     Advance Directives   What are advance directives? Advance directives are legal documents that state your wishes and plans for medical care  These plans are made ahead of time in case you lose your ability to make decisions for yourself  Advance directives can apply to any medical decision, such as the treatments you want, and if you want to donate organs  What are the types of advance directives? There are many types of advance directives, and each state has rules about how to use them   You may choose a combination of any of the following:  · Living will: This is a written record of the treatment you want  You can also choose which treatments you do not want, which to limit, and which to stop at a certain time  This includes surgery, medicine, IV fluid, and tube feedings  · Durable power of  for healthcare [facility]): This is a written record that states who you want to make healthcare choices for you when you are unable to make them for yourself  This person, called a proxy, is usually a family member or a friend  You may choose more than 1 proxy  · Do not resuscitate (DNR) order:  A DNR order is used in case your heart stops beating or you stop breathing  It is a request not to have certain forms of treatment, such as CPR  A DNR order may be included in other types of advance directives  · Medical directive: This covers the care that you want if you are in a coma, near death, or unable to make decisions for yourself  You can list the treatments you want for each condition  Treatment may include pain medicine, surgery, blood transfusions, dialysis, IV or tube feedings, and a ventilator (breathing machine)  · Values history: This document has questions about your views, beliefs, and how you feel and think about life  This information can help others choose the care that you would choose  Why are advance directives important? An advance directive helps you control your care  Although spoken wishes may be used, it is better to have your wishes written down  Spoken wishes can be misunderstood, or not followed  Treatments may be given even if you do not want them  An advance directive may make it easier for your family to make difficult choices about your care  Weight Management   Why it is important to manage your weight:  Being overweight increases your risk of health conditions such as heart disease, high blood pressure, type 2 diabetes, and certain types of cancer   It can also increase your risk for osteoarthritis, sleep apnea, and other respiratory problems  Aim for a slow, steady weight loss  Even a small amount of weight loss can lower your risk of health problems  How to lose weight safely:  A safe and healthy way to lose weight is to eat fewer calories and get regular exercise  You can lose up about 1 pound a week by decreasing the number of calories you eat by 500 calories each day  Healthy meal plan for weight management:  A healthy meal plan includes a variety of foods, contains fewer calories, and helps you stay healthy  A healthy meal plan includes the following:  · Eat whole-grain foods more often  A healthy meal plan should contain fiber  Fiber is the part of grains, fruits, and vegetables that is not broken down by your body  Whole-grain foods are healthy and provide extra fiber in your diet  Some examples of whole-grain foods are whole-wheat breads and pastas, oatmeal, brown rice, and bulgur  · Eat a variety of vegetables every day  Include dark, leafy greens such as spinach, kale, joshua greens, and mustard greens  Eat yellow and orange vegetables such as carrots, sweet potatoes, and winter squash  · Eat a variety of fruits every day  Choose fresh or canned fruit (canned in its own juice or light syrup) instead of juice  Fruit juice has very little or no fiber  · Eat low-fat dairy foods  Drink fat-free (skim) milk or 1% milk  Eat fat-free yogurt and low-fat cottage cheese  Try low-fat cheeses such as mozzarella and other reduced-fat cheeses  · Choose meat and other protein foods that are low in fat  Choose beans or other legumes such as split peas or lentils  Choose fish, skinless poultry (chicken or turkey), or lean cuts of red meat (beef or pork)  Before you cook meat or poultry, cut off any visible fat  · Use less fat and oil  Try baking foods instead of frying them  Add less fat, such as margarine, sour cream, regular salad dressing and mayonnaise to foods   Eat fewer high-fat foods  Some examples of high-fat foods include french fries, doughnuts, ice cream, and cakes  · Eat fewer sweets  Limit foods and drinks that are high in sugar  This includes candy, cookies, regular soda, and sweetened drinks  Exercise:  Exercise at least 30 minutes per day on most days of the week  Some examples of exercise include walking, biking, dancing, and swimming  You can also fit in more physical activity by taking the stairs instead of the elevator or parking farther away from stores  Ask your healthcare provider about the best exercise plan for you  © Copyright [employer] 2018 Information is for End User's use only and may not be sold, redistributed or otherwise used for commercial purposes   All illustrations and images included in CareNotes® are the copyrighted property of [employer]  or [address] [place] [address]

## 2023-02-22 NOTE — PROGRESS NOTES
Assessment and Plan:     Problem List Items Addressed This Visit        Endocrine    Type 2 diabetes mellitus without complication, without long-term current use of insulin ([facility] )    Relevant Orders    Comprehensive metabolic panel    Lipid Panel with Direct LDL reflex    Hemoglobin A1C    Diabetic nephropathy ([facility] )       Lab Results   Component Value Date    HGBA1C 6 9 (H) 02/17/2023   I have counselled the pt to follow a healthy and balanced diet ,and recommend routine exercise  I will be ordering diabetic laboratories including comprehensive metabolic panel, hemoglobin A1c, urine microalbumin, lipid panel  Annual eye examination recommended            Cardiovascular and Mediastinum    Essential hypertension     Hypertension - controlled, I have counseled patient following healthy balance diet, I would like the patient reduce sodium, exercise routinely, I would like the patient continued the med current medical regiment and we will continue to monitor  Blood pressure 114/72 continue lisinopril 5 mg once daily         Chronic embolism and thrombosis of left popliteal vein (HCC)     Clinically stable and doing well continue the current medical regiment will continue monitor  Continue Xarelto            Musculoskeletal and Integument    Psoriatic arthritis ([facility] )     Currently stable working with rheumatologist Dr Dimitris Moyer currently on methotrexate 2 5 mg 8 tablets once weekly patient reports me that hematology is recommending biological agent but the patient prefers not to start it at this point in time because of the potential risk we will continue monitor patient to continue follow-up with rheumatology            Genitourinary    Stage 3a chronic kidney disease ([facility] )     Drink adequate amount of water, avoid anti-inflammatories, reduce sodium in the diet and will continue to monitor the kidney function            Other    Medicare annual wellness visit, subsequent - Primary     Assessment and plan 1  Medicare subsequent annual wellness examination overall the patient is clinically stable and doing well, we encouraged the patient to follow a healthy and balanced diet  We recommend that the patient exercise routinely approximately 30 minutes 5 times per week   We have reviewed the patient's vaccines and have made recommendations for updates if necessary consider Shingrix vaccine      We will be ordering screening laboratories which are age appropriate  Return to the office in 6 months    call if any problems  Class 3 severe obesity due to excess calories with serious comorbidity and body mass index (BMI) of 40 0 to 44 9 in adult [facility])     Obesity -I have counseled patient following healthy and balanced diet, I would like the patient to lose weight, I would like the patient exercise routinely; we will continue monitor the patient's progress  Other Visit Diagnoses     Encounter for immunization          Return to office 6  months  call if any problems; recommend carbon monoxide monitor  BMI Counseling: Body mass index is 43 68 kg/m²  The BMI is above normal  Nutrition recommendations include decreasing portion sizes and moderation in carbohydrate intake  Exercise recommendations include exercising 3-5 times per week  Rationale for BMI follow-up plan is due to patient being overweight or obese  Depression Screening and Follow-up Plan: Patient was screened for depression during today's encounter  They screened negative with a PHQ-2 score of 0  Preventive health issues were discussed with patient, and age appropriate screening tests were ordered as noted in patient's After Visit Summary  Personalized health advice and appropriate referrals for health education or preventive services given if needed, as noted in patient's After Visit Summary       History of Present Illness:     Patient presents for a Medicare Wellness Visit    HPI 68-year old female coming in for a follow up office visit regarding type 2 diabetes, stage IIIa chronic kidney disease, psoriatic arthritis, obesity and essential hypertension; the patient reports me compliant taking medications without untoward side effects the  The patient is here to review his medical condition, update me on the medical condition and the patient reports me no hospitalizations and no ER visits  No injuries no illnesses she has seen her rheumatologist who is recommending a biological agent the patient is worried about the potential side effects and would like to hold off at this point in time  She reported to me that her arthritis is stable and not worsening over many years  She is trying to follow a healthy diet remains active  Here to review laboratories in detail  Patient Care Team:  Ida Bolaños DO as PCP - General     Review of Systems:     Review of Systems   Constitutional: Negative for activity change, appetite change and unexpected weight change  HENT: Negative for congestion and postnasal drip  Eyes: Negative for visual disturbance  Respiratory: Negative for cough and shortness of breath  Cardiovascular: Negative for chest pain  Gastrointestinal: Negative for abdominal pain, diarrhea, nausea and vomiting  Musculoskeletal: Positive for arthralgias  Neurological: Negative for dizziness, light-headedness and headaches          Problem List:     Patient Active Problem List   Diagnosis   • Type 2 diabetes mellitus without complication, without long-term current use of insulin ([address] )   • Diabetic nephropathy ([facility] )   • Hyperlipidemia   • Hypothyroidism   • Malignant neoplasm of breast ([facility] )   • Nontoxic single thyroid nodule   • Prediabetes   • Vitamin D deficiency   • Essential hypertension   • Screening, anemia, deficiency, iron   • Class 3 severe obesity due to excess calories with serious comorbidity in adult [facility])   • Medicare annual wellness visit, subsequent   • Screening for osteoporosis   • Enlarged thyroid   • Screening for malignant neoplasm of colon   • Class 3 severe obesity due to excess calories with serious comorbidity and body mass index (BMI) of 40 0 to 44 9 in adult [facility])   • Acute deep vein thrombosis (DVT) of proximal vein of both lower extremities (HCC)   • Lower limb pain, posterior, left   • Factor 5 Leiden mutation, heterozygous ([facility] )   • History of DVT (deep vein thrombosis)   • Chronic deep vein thrombosis (DVT) of lower extremity (HCC)   • Psoriatic arthritis (HCC)   • Rash   • Stage 3a chronic kidney disease (HCC)   • Chronic embolism and thrombosis of left popliteal vein (HCC)      Past Medical and Surgical History:     Past Medical History:   Diagnosis Date   • Breast cancer ([facility] ) 17 yrs ago    left   • History of radiation therapy 09/01/2006     Past Surgical History:   Procedure Laterality Date   • BIOPSY CORE NEEDLE Right 03/03/2010    benign   • BREAST CYST ASPIRATION Right 08/15/2003   • BREAST CYST ASPIRATION Right     x3  (Years ago)   • BREAST LUMPECTOMY Left 08/22/2006    positive   • MAMMO STEREOTACTIC BREAST BIOPSY LEFT (ALL INC) Left 08/02/2006    malignant   • US GUIDED BREAST BIOPSY LEFT COMPLETE Left 02/06/2009    benign      Family History:     Family History   Problem Relation Age of Onset   • Breast cancer Mother 48   • Ovarian cancer Sister 61   • Lung cancer Sister    • Breast cancer Maternal Aunt 37   • Stomach cancer Maternal Uncle    • Skin cancer Maternal Uncle    • Breast cancer Paternal Aunt    • Breast cancer Paternal Grandmother 80   • Skin cancer Paternal Aunt    • Breast cancer Cousin 70   • Breast cancer Cousin 68      Social History:     Social History     Socioeconomic History   • Marital status:       Spouse name: None   • Number of children: None   • Years of education: None   • Highest education level: None   Occupational History   • None   Tobacco Use   • Smoking status: Never   • Smokeless tobacco: Never   Vaping Use   • Vaping Use: Never used   Substance and Sexual Activity   • Alcohol use: Not Currently   • Drug use: No   • Sexual activity: Not Currently   Other Topics Concern   • None   Social History Narrative   • None     Social Determinants of Health     Financial Resource Strain: Low Risk    • Difficulty of Paying Living Expenses: Not hard at all   Food Insecurity: Not on file   Transportation Needs: No Transportation Needs   • Lack of Transportation (Medical): No   • Lack of Transportation (Non-Medical): No   Physical Activity: Not on file   Stress: Not on file   Social Connections: Not on file   Intimate Partner Violence: Not on file   Housing Stability: Not on file      Medications and Allergies:     Current Outpatient Medications   Medication Sig Dispense Refill   • Cholecalciferol (VITAMIN D) 2000 units CAPS Take 1 capsule by mouth daily     • folic acid (FOLVITE) 1 mg tablet Take 1 tablet by mouth daily     • levothyroxine 88 mcg tablet TAKE 1 TABLET BY MOUTH EVERY DAY 30 tablet 6   • lisinopril (ZESTRIL) 5 mg tablet TAKE 1 TABLET BY MOUTH EVERY DAY 30 tablet 6   • metFORMIN (GLUCOPHAGE-XR) 500 mg 24 hr tablet TAKE 3 TABLETS (1,500 MG TOTAL) BY MOUTH DAILY FOR DIABETES 270 tablet 1   • methotrexate 2 5 mg tablet 8 tabs once a week     • simvastatin (ZOCOR) 20 mg tablet TAKE 1 TABLET BY MOUTH EVERY DAY 90 tablet 3   • Xarelto 20 MG tablet TAKE 1 TABLET BY MOUTH EVERY DAY WITH BREAKFAST 90 tablet 1     No current facility-administered medications for this visit       No Known Allergies   Immunizations:     Immunization History   Administered Date(s) Administered   • COVID-19 PFIZER VACCINE 0 3 ML IM 02/25/2021, 03/19/2021, 10/02/2021   • COVID-19 Pfizer Vac BIVALENT Ad-sucrose 12 Yr+ IM (BOOSTER ONLY) 09/15/2022   • H1N1, All Formulations 10/27/2009   • Influenza Quadrivalent Preservative Free 3 years and older IM 10/09/2014, 09/12/2016   • Influenza, high dose seasonal 0 7 mL 10/19/2018, 09/30/2022   • Influenza, recombinant, quadrivalent,injectable, preservative free 10/13/2020   • Influenza, seasonal, injectable 11/16/2012, 10/13/2013, 11/20/2015, 10/28/2017   • Pneumococcal Conjugate 13-Valent 09/18/2017   • Pneumococcal Polysaccharide PPV23 09/18/2018   • TD (adult) Preservative Free 07/05/2018   • Zoster 03/20/2018      Health Maintenance:         Topic Date Due   • Breast Cancer Screening: Mammogram  04/04/2024   • Colorectal Cancer Screening  10/06/2024   • Hepatitis C Screening  Completed         Topic Date Due   • COVID-19 Vaccine (4 - Booster for Nguyen Braeden series) 11/10/2022      Medicare Screening Tests and Risk Assessments:     Ishaan Pedroza is here for her Subsequent Wellness visit  Health Risk Assessment:   Patient rates overall health as good  Patient feels that their physical health rating is same  Patient is satisfied with their life  Eyesight was rated as same  Hearing was rated as same  Patient feels that their emotional and mental health rating is same  Patients states they are never, rarely angry  Patient states they are sometimes unusually tired/fatigued  Pain experienced in the last 7 days has been none  Patient states that she has experienced no weight loss or gain in last 6 months  Depression Screening:   PHQ-2 Score: 0      Fall Risk Screening: In the past year, patient has experienced: no history of falling in past year      Urinary Incontinence Screening:   Patient has not leaked urine accidently in the last six months  Home Safety:  Patient does not have trouble with stairs inside or outside of their home  Patient has working smoke alarms and has no working carbon monoxide detector  Home safety hazards include: none  Nutrition:   Current diet is Regular  Medications:   Patient is currently taking over-the-counter supplements  OTC medications include: Centrum daily vitamins  Patient is able to manage medications       Activities of Daily Living (ADLs)/Instrumental Activities of Daily Living (IADLs):   Walk and transfer into and out of bed and chair?: Yes  Dress and groom yourself?: Yes    Bathe or shower yourself?: Yes    Feed yourself? Yes  Do your laundry/housekeeping?: Yes  Manage your money, pay your bills and track your expenses?: Yes  Make your own meals?: Yes    Do your own shopping?: Yes    Previous Hospitalizations:   Any hospitalizations or ED visits within the last 12 months?: No      Advance Care Planning:   Living will: Yes    Durable POA for healthcare: Yes    Advanced directive: Yes      Cognitive Screening:   Provider or family/friend/caregiver concerned regarding cognition?: No    PREVENTIVE SCREENINGS      Cardiovascular Screening:    General: Screening Not Indicated and History Lipid Disorder      Diabetes Screening:     General: Screening Not Indicated and History Diabetes      Colorectal Cancer Screening:     General: Screening Current      Breast Cancer Screening:     General: History Breast Cancer      Cervical Cancer Screening:    General: Screening Not Indicated      Lung Cancer Screening:     General: Screening Not Indicated      Hepatitis C Screening:    General: Screening Current    Screening, Brief Intervention, and Referral to Treatment (SBIRT)    Screening  Typical number of drinks in a day: 0  Typical number of drinks in a week: 0  Interpretation: Low risk drinking behavior      AUDIT-C Screenin) How often did you have a drink containing alcohol in the past year? never  2) How many drinks did you have on a typical day when you were drinking in the past year? 0  3) How often did you have 6 or more drinks on one occasion in the past year? never    AUDIT-C Score: 0  Interpretation: Score 0-2 (female): Negative screen for alcohol misuse    Single Item Drug Screening:  How often have you used an illegal drug (including marijuana) or a prescription medication for non-medical reasons in the past year? never    Single Item Drug Screen Score: 0  Interpretation: Negative screen for possible drug use disorder    No results found  Physical Exam:     /72   Pulse 88   Resp 16   Ht 5' 6" (1 676 m)   Wt 123 kg (270 lb 9 6 oz)   SpO2 98%   BMI 43 68 kg/m²     Physical Exam  Vitals and nursing note reviewed  Constitutional:       General: She is not in acute distress  Appearance: She is well-developed  She is obese  She is not ill-appearing, toxic-appearing or diaphoretic  HENT:      Head: Normocephalic and atraumatic  Right Ear: External ear normal       Left Ear: External ear normal       Nose: Nose normal    Eyes:      Pupils: Pupils are equal, round, and reactive to light  Cardiovascular:      Rate and Rhythm: Normal rate and regular rhythm  Heart sounds: Normal heart sounds  No murmur heard  Pulmonary:      Effort: Pulmonary effort is normal       Breath sounds: Normal breath sounds  Abdominal:      General: There is no distension  Palpations: Abdomen is soft  Tenderness: There is no abdominal tenderness  There is no guarding            Moon Grow, DO

## 2023-02-23 PROBLEM — I82.532 CHRONIC EMBOLISM AND THROMBOSIS OF LEFT POPLITEAL VEIN (HCC): Status: ACTIVE | Noted: 2023-02-23

## 2023-02-23 NOTE — ASSESSMENT & PLAN NOTE
Lab Results   Component Value Date    HGBA1C 6 9 (H) 02/17/2023   I have counselled the pt to follow a healthy and balanced diet ,and recommend routine exercise  I will be ordering diabetic laboratories including comprehensive metabolic panel, hemoglobin A1c, urine microalbumin, lipid panel    Annual eye examination recommended

## 2023-02-23 NOTE — ASSESSMENT & PLAN NOTE
Clinically stable and doing well continue the current medical regiment will continue monitor    Continue Xarelto

## 2023-02-23 NOTE — ASSESSMENT & PLAN NOTE
Assessment and plan 1  Medicare subsequent annual wellness examination overall the patient is clinically stable and doing well, we encouraged the patient to follow a healthy and balanced diet  We recommend that the patient exercise routinely approximately 30 minutes 5 times per week   We have reviewed the patient's vaccines and have made recommendations for updates if necessary consider Shingrix vaccine      We will be ordering screening laboratories which are age appropriate  Return to the office in 6 months    call if any problems

## 2023-02-23 NOTE — ASSESSMENT & PLAN NOTE
Currently stable working with rheumatologist Dr Diego Pearson currently on methotrexate 2 5 mg 8 tablets once weekly patient reports me that hematology is recommending biological agent but the patient prefers not to start it at this point in time because of the potential risk we will continue monitor patient to continue follow-up with rheumatology

## 2023-02-23 NOTE — ASSESSMENT & PLAN NOTE
Hypertension - controlled, I have counseled patient following healthy balance diet, I would like the patient reduce sodium, exercise routinely, I would like the patient continued the med current medical regiment and we will continue to monitor    Blood pressure 114/72 continue lisinopril 5 mg once daily

## 2023-03-03 DIAGNOSIS — I10 ESSENTIAL HYPERTENSION: ICD-10-CM

## 2023-03-03 RX ORDER — LISINOPRIL 5 MG/1
TABLET ORAL
Qty: 30 TABLET | Refills: 6 | Status: SHIPPED | OUTPATIENT
Start: 2023-03-03

## 2023-03-22 DIAGNOSIS — E11.9 TYPE 2 DIABETES MELLITUS WITHOUT COMPLICATION, WITHOUT LONG-TERM CURRENT USE OF INSULIN (HCC): ICD-10-CM

## 2023-03-22 RX ORDER — METFORMIN HYDROCHLORIDE 500 MG/1
TABLET, EXTENDED RELEASE ORAL
Qty: 270 TABLET | Refills: 1 | Status: SHIPPED | OUTPATIENT
Start: 2023-03-22

## 2023-03-30 LAB
LEFT EYE DIABETIC RETINOPATHY: NORMAL
RIGHT EYE DIABETIC RETINOPATHY: NORMAL

## 2023-05-15 ENCOUNTER — HOSPITAL ENCOUNTER (EMERGENCY)
Facility: HOSPITAL | Age: 73
Discharge: HOME | End: 2023-05-15
Attending: EMERGENCY MEDICINE

## 2023-05-15 ENCOUNTER — APPOINTMENT (EMERGENCY)
Dept: RADIOLOGY | Facility: HOSPITAL | Age: 73
End: 2023-05-15

## 2023-05-15 VITALS
DIASTOLIC BLOOD PRESSURE: 67 MMHG | SYSTOLIC BLOOD PRESSURE: 131 MMHG | TEMPERATURE: 98.9 F | WEIGHT: 279.54 LBS | OXYGEN SATURATION: 97 % | HEART RATE: 74 BPM | RESPIRATION RATE: 18 BRPM
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
## 2023-05-15 PROBLEM — M25.561 RIGHT KNEE PAIN: Status: ACTIVE | Noted: 2023-05-15

## 2023-05-15 PROBLEM — S09.90XA HEAD INJURY: Status: ACTIVE | Noted: 2023-05-15

## 2023-05-15 LAB
BASE EXCESS BLDA CALC-SCNC: 0 MMOL/L (ref -2–3)
CA-I BLD-SCNC: 1.3 MMOL/L (ref 1.12–1.32)
GLUCOSE SERPL-MCNC: 191 MG/DL (ref 65–140)
HCO3 BLDA-SCNC: 25 MMOL/L (ref 24–30)
HCT VFR BLD CALC: 35 % (ref 34.8–46.1)
HGB BLDA-MCNC: 11.9 G/DL (ref 11.5–15.4)
PCO2 BLD: 26 MMOL/L (ref 21–32)
PCO2 BLD: 39.1 MM HG (ref 42–50)
PH BLD: 7.41 [PH] (ref 7.3–7.4)
PO2 BLD: 29 MM HG (ref 35–45)
POTASSIUM BLD-SCNC: 4.2 MMOL/L (ref 3.5–5.3)
SAO2 % BLD FROM PO2: 57 % (ref 60–85)
SODIUM BLD-SCNC: 143 MMOL/L (ref 136–145)
SPECIMEN SOURCE: ABNORMAL

## 2023-05-15 RX ORDER — ACETAMINOPHEN 325 MG/1
975 TABLET ORAL ONCE
Status: COMPLETED | OUTPATIENT
Start: 2023-05-15 | End: 2023-05-15

## 2023-05-15 RX ORDER — LIDOCAINE 50 MG/G
1 PATCH TOPICAL DAILY
Status: DISCONTINUED | OUTPATIENT
Start: 2023-05-16 | End: 2023-05-15 | Stop reason: HOSPADM

## 2023-05-15 RX ADMIN — ACETAMINOPHEN 975 MG: 325 TABLET ORAL at 16:13
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
## 2023-05-15 NOTE — INCIDENTAL FINDINGS
The following findings require follow up:  Radiographic finding   Finding: Right thyroid lobe cyst, 5 mm  Incidental discovery of one or more thyroid nodule(s) measuring less than 1 5 cm and without suspicious features is noted in this patient who is above 28years old; according to guidelines published in the February 2015 white paper on incidental thyroid nodules in the Journal of the [employer] of Radiology [facility]), no further evaluation is recommended  Follow up required: none indicated  Follow up with PCP  Follow up should be done within 1 month(s)    Please notify the following clinician to assist with the follow up:   PCP    Patient states that she is aware of this finding and follows with PCP
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
## 2023-05-15 NOTE — QUICK NOTE
CT and xrays negative  Patient able to ambulate without issue  Patient desires to be discharged home  Will recommend OTC pain medications and heating pad as needed  Follow up with PCP as needed
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
## 2023-05-15 NOTE — QUICK NOTE
Cervical Collar Clearance: The patient had a CT scan of the cervical spine demonstrating no acute injury  On exam, the patient had no midline point tenderness or paresthesias/numbness/weakness in the extremities  The patient had full range of motion (was then able to flex, extend, and rotate head laterally) without pain  There were no distracting injuries and the patient was not intoxicated  The patient's cervical spine was cleared radiologically and clinically  Cervical collar removed at this time       JONATHAN Smalls  5/15/2023 4:04 PM Please advise

## 2023-05-25 ENCOUNTER — TELEMEDICINE (OUTPATIENT)
Dept: INTERNAL MEDICINE CLINIC | Facility: CLINIC | Age: 73
End: 2023-05-25

## 2023-05-25 VITALS — BODY MASS INDEX: 41.59 KG/M2 | HEIGHT: 67 IN | WEIGHT: 265 LBS

## 2023-05-25 DIAGNOSIS — M25.532 LEFT WRIST PAIN: Primary | ICD-10-CM
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
## 2023-06-30 ENCOUNTER — APPOINTMENT (OUTPATIENT)
Dept: LAB | Facility: CLINIC | Age: 73
End: 2023-06-30
Payer: MEDICARE

## 2023-06-30 DIAGNOSIS — L40.59 POLYARTICULAR PSORIATIC ARTHRITIS (HCC): ICD-10-CM

## 2023-06-30 LAB
ALBUMIN SERPL BCP-MCNC: 3.2 G/DL (ref 3.5–5)
ALP SERPL-CCNC: 93 U/L (ref 46–116)
ALT SERPL W P-5'-P-CCNC: 20 U/L (ref 12–78)
AST SERPL W P-5'-P-CCNC: 11 U/L (ref 5–45)
BASOPHILS # BLD AUTO: 0.05 THOUSANDS/ÂΜL (ref 0–0.1)
BASOPHILS NFR BLD AUTO: 1 % (ref 0–1)
BILIRUB DIRECT SERPL-MCNC: 0.23 MG/DL (ref 0–0.2)
BILIRUB SERPL-MCNC: 0.97 MG/DL (ref 0.2–1)
CREAT SERPL-MCNC: 0.95 MG/DL (ref 0.6–1.3)
CRP SERPL QL: 6.9 MG/L
EOSINOPHIL # BLD AUTO: 0.1 THOUSAND/ÂΜL (ref 0–0.61)
EOSINOPHIL NFR BLD AUTO: 2 % (ref 0–6)
ERYTHROCYTE [DISTWIDTH] IN BLOOD BY AUTOMATED COUNT: 14.6 % (ref 11.6–15.1)
ERYTHROCYTE [SEDIMENTATION RATE] IN BLOOD: 55 MM/HOUR (ref 0–29)
GFR SERPL CREATININE-BSD FRML MDRD: 60 ML/MIN/1.73SQ M
HCT VFR BLD AUTO: 35.6 % (ref 34.8–46.1)
HGB BLD-MCNC: 11.7 G/DL (ref 11.5–15.4)
IMM GRANULOCYTES # BLD AUTO: 0.02 THOUSAND/UL (ref 0–0.2)
IMM GRANULOCYTES NFR BLD AUTO: 0 % (ref 0–2)
LYMPHOCYTES # BLD AUTO: 0.92 THOUSANDS/ÂΜL (ref 0.6–4.47)
LYMPHOCYTES NFR BLD AUTO: 18 % (ref 14–44)
MCH RBC QN AUTO: 31.3 PG (ref 26.8–34.3)
MCHC RBC AUTO-ENTMCNC: 32.9 G/DL (ref 31.4–37.4)
MCV RBC AUTO: 95 FL (ref 82–98)
MONOCYTES # BLD AUTO: 0.34 THOUSAND/ÂΜL (ref 0.17–1.22)
MONOCYTES NFR BLD AUTO: 7 % (ref 4–12)
NEUTROPHILS # BLD AUTO: 3.61 THOUSANDS/ÂΜL (ref 1.85–7.62)
NEUTS SEG NFR BLD AUTO: 72 % (ref 43–75)
NRBC BLD AUTO-RTO: 0 /100 WBCS
PLATELET # BLD AUTO: 347 THOUSANDS/UL (ref 149–390)
PMV BLD AUTO: 10.5 FL (ref 8.9–12.7)
PROT SERPL-MCNC: 7 G/DL (ref 6.4–8.4)
RBC # BLD AUTO: 3.74 MILLION/UL (ref 3.81–5.12)
WBC # BLD AUTO: 5.04 THOUSAND/UL (ref 4.31–10.16)

## 2023-06-30 PROCEDURE — 80076 HEPATIC FUNCTION PANEL: CPT

## 2023-06-30 PROCEDURE — 85652 RBC SED RATE AUTOMATED: CPT

## 2023-06-30 PROCEDURE — 86140 C-REACTIVE PROTEIN: CPT

## 2023-06-30 PROCEDURE — 85025 COMPLETE CBC W/AUTO DIFF WBC: CPT

## 2023-06-30 PROCEDURE — 82565 ASSAY OF CREATININE: CPT

## 2023-06-30 PROCEDURE — 36415 COLL VENOUS BLD VENIPUNCTURE: CPT

## 2023-07-02 DIAGNOSIS — E03.9 HYPOTHYROIDISM, UNSPECIFIED TYPE: ICD-10-CM

## 2023-07-02 RX ORDER — LEVOTHYROXINE SODIUM 88 UG/1
TABLET ORAL
Qty: 30 TABLET | Refills: 6 | Status: SHIPPED | OUTPATIENT
Start: 2023-07-02

## 2023-07-07 DIAGNOSIS — I82.409 DVT (DEEP VENOUS THROMBOSIS) (HCC): ICD-10-CM

## 2023-07-07 RX ORDER — RIVAROXABAN 20 MG/1
TABLET, FILM COATED ORAL
Qty: 90 TABLET | Refills: 1 | Status: SHIPPED | OUTPATIENT
Start: 2023-07-07
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
## 2023-07-20 ENCOUNTER — HOSPITAL ENCOUNTER (OUTPATIENT)
Dept: RADIOLOGY | Age: 73
Discharge: HOME | End: 2023-07-20
Payer: MEDICARE

## 2023-07-20 VITALS — BODY MASS INDEX: 41.59 KG/M2 | WEIGHT: 265 LBS | HEIGHT: 67 IN

## 2023-07-20 DIAGNOSIS — Z12.31 ENCOUNTER FOR SCREENING MAMMOGRAM FOR BREAST CANCER: ICD-10-CM

## 2023-07-20 PROCEDURE — 77063 BREAST TOMOSYNTHESIS BI: CPT

## 2023-07-20 PROCEDURE — 77067 SCR MAMMO BI INCL CAD: CPT

## 2023-08-16 ENCOUNTER — RA CDI HCC (OUTPATIENT)
Dept: OTHER | Facility: HOSPITAL | Age: 73
End: 2023-08-16

## 2023-08-16 NOTE — PROGRESS NOTES
[address] [facility] coding opportunities          Chart Reviewed number of suggestions sent to Provider: 3     Patients Insurance     Medicare Insurance: Medicare        E11.22  E11.36  G32.89

## 2023-08-21 ENCOUNTER — APPOINTMENT (OUTPATIENT)
Dept: LAB | Facility: CLINIC | Age: 73
End: 2023-08-21
Payer: MEDICARE

## 2023-08-21 DIAGNOSIS — E11.9 TYPE 2 DIABETES MELLITUS WITHOUT COMPLICATION, WITHOUT LONG-TERM CURRENT USE OF INSULIN (HCC): ICD-10-CM

## 2023-08-21 LAB
ALBUMIN SERPL BCP-MCNC: 3.3 G/DL (ref 3.5–5)
ALP SERPL-CCNC: 89 U/L (ref 46–116)
ALT SERPL W P-5'-P-CCNC: 19 U/L (ref 12–78)
ANION GAP SERPL CALCULATED.3IONS-SCNC: 5 MMOL/L
AST SERPL W P-5'-P-CCNC: 8 U/L (ref 5–45)
BILIRUB SERPL-MCNC: 0.81 MG/DL (ref 0.2–1)
BUN SERPL-MCNC: 16 MG/DL (ref 5–25)
CALCIUM ALBUM COR SERPL-MCNC: 9.9 MG/DL (ref 8.3–10.1)
CALCIUM SERPL-MCNC: 9.3 MG/DL (ref 8.3–10.1)
CHLORIDE SERPL-SCNC: 109 MMOL/L (ref 96–108)
CHOLEST SERPL-MCNC: 151 MG/DL
CO2 SERPL-SCNC: 28 MMOL/L (ref 21–32)
CREAT SERPL-MCNC: 1.01 MG/DL (ref 0.6–1.3)
EST. AVERAGE GLUCOSE BLD GHB EST-MCNC: 163 MG/DL
GFR SERPL CREATININE-BSD FRML MDRD: 56 ML/MIN/1.73SQ M
GLUCOSE P FAST SERPL-MCNC: 142 MG/DL (ref 65–99)
HBA1C MFR BLD: 7.3 %
HDLC SERPL-MCNC: 56 MG/DL
LDLC SERPL CALC-MCNC: 82 MG/DL (ref 0–100)
POTASSIUM SERPL-SCNC: 4.5 MMOL/L (ref 3.5–5.3)
PROT SERPL-MCNC: 7.1 G/DL (ref 6.4–8.4)
SODIUM SERPL-SCNC: 142 MMOL/L (ref 135–147)
TRIGL SERPL-MCNC: 63 MG/DL

## 2023-08-21 PROCEDURE — 83036 HEMOGLOBIN GLYCOSYLATED A1C: CPT

## 2023-08-21 PROCEDURE — 80053 COMPREHEN METABOLIC PANEL: CPT

## 2023-08-21 PROCEDURE — 80061 LIPID PANEL: CPT

## 2023-08-21 PROCEDURE — 36415 COLL VENOUS BLD VENIPUNCTURE: CPT

## 2023-08-23 ENCOUNTER — OFFICE VISIT (OUTPATIENT)
Dept: INTERNAL MEDICINE CLINIC | Facility: CLINIC | Age: 73
End: 2023-08-23
Payer: MEDICARE

## 2023-08-23 VITALS
SYSTOLIC BLOOD PRESSURE: 124 MMHG | DIASTOLIC BLOOD PRESSURE: 64 MMHG | HEIGHT: 67 IN | RESPIRATION RATE: 16 BRPM | HEART RATE: 84 BPM | WEIGHT: 274 LBS | BODY MASS INDEX: 43 KG/M2 | OXYGEN SATURATION: 100 %

## 2023-08-23 DIAGNOSIS — E03.9 HYPOTHYROIDISM, UNSPECIFIED TYPE: ICD-10-CM

## 2023-08-23 DIAGNOSIS — C50.919 MALIGNANT NEOPLASM OF FEMALE BREAST, UNSPECIFIED ESTROGEN RECEPTOR STATUS, UNSPECIFIED LATERALITY, UNSPECIFIED SITE OF BREAST (HCC): ICD-10-CM

## 2023-08-23 DIAGNOSIS — I10 ESSENTIAL HYPERTENSION: ICD-10-CM

## 2023-08-23 DIAGNOSIS — N18.31 STAGE 3A CHRONIC KIDNEY DISEASE (HCC): ICD-10-CM

## 2023-08-23 DIAGNOSIS — R60.0 PEDAL EDEMA: ICD-10-CM

## 2023-08-23 DIAGNOSIS — E11.9 TYPE 2 DIABETES MELLITUS WITHOUT COMPLICATION, WITHOUT LONG-TERM CURRENT USE OF INSULIN (HCC): Primary | ICD-10-CM

## 2023-08-23 DIAGNOSIS — I82.599 CHRONIC DEEP VEIN THROMBOSIS (DVT) OF OTHER VEIN OF LOWER EXTREMITY, UNSPECIFIED LATERALITY (HCC): ICD-10-CM

## 2023-08-23 DIAGNOSIS — E66.01 CLASS 3 SEVERE OBESITY DUE TO EXCESS CALORIES WITH SERIOUS COMORBIDITY AND BODY MASS INDEX (BMI) OF 40.0 TO 44.9 IN ADULT (HCC): ICD-10-CM

## 2023-08-23 PROCEDURE — 99214 OFFICE O/P EST MOD 30 MIN: CPT | Performed by: INTERNAL MEDICINE

## 2023-08-23 RX ORDER — FUROSEMIDE 20 MG/1
20 TABLET ORAL DAILY PRN
Qty: 20 TABLET | Refills: 2 | Status: SHIPPED | OUTPATIENT
Start: 2023-08-23
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
## 2023-08-27 PROBLEM — R60.0 PEDAL EDEMA: Status: ACTIVE | Noted: 2023-08-27

## 2023-08-27 NOTE — ASSESSMENT & PLAN NOTE
Hypertension - controlled, I have counseled patient following healthy balance diet, I would like the patient reduce sodium, exercise routinely, I would like the patient continued the med current medical regiment and we will continue to monitor.   Blood pressure today 124/64

## 2023-08-27 NOTE — ASSESSMENT & PLAN NOTE
Clinically stable and doing well continue the current medical regiment will continue monitor.   Continue Xarelto

## 2023-08-27 NOTE — PATIENT INSTRUCTIONS
Type 2 Diabetes Management for Adults   AMBULATORY CARE:   Type 2 diabetes  is a disease that affects how your body uses glucose (sugar). Either your body cannot make enough insulin, or it cannot use the insulin correctly. It is important to keep diabetes controlled to prevent damage to your heart, blood vessels, and other organs. Management will help you feel well and enjoy your daily activities. Your diabetes care team providers can help you make a plan to fit diabetes care into your schedule. Your plan can change over time to fit your needs and your family's needs. Have someone call your local emergency number (911 in the [address]) if:   • You cannot be woken. • You have signs of diabetic ketoacidosis:     ? confusion, fatigue    ? vomiting    ? rapid heartbeat    ? fruity smelling breath    ? extreme thirst    ? dry mouth and skin    • You have any of the following signs of a heart attack:      ? Squeezing, pressure, or pain in your chest    ? You may  also have any of the following:     - Discomfort or pain in your back, neck, jaw, stomach, or arm    - Shortness of breath    - Nausea or vomiting    - Lightheadedness or a sudden cold sweat    • You have any of the following signs of a stroke:      ? Numbness or drooping on one side of your face     ? Weakness in an arm or leg    ? Confusion or difficulty speaking    ? Dizziness, a severe headache, or vision loss    Call your doctor or diabetes care team provider if:   • You have a sore or wound that will not heal.    • You have a change in the amount you urinate. • Your blood sugar levels are higher than your target goals. • You often have lower blood sugar levels than your target goals. • Your skin is red, dry, warm, or swollen. • You have trouble coping with diabetes, or you feel anxious or depressed. • You have questions or concerns about your condition or care.     What you need to know about high blood sugar levels:  High blood sugar levels may not cause any symptoms. You may feel more thirsty or urinate more often than usual. Over time, high blood sugar levels can damage your nerves, blood vessels, tissues, and organs. The following can increase your blood sugar levels:  • Large meals or large amounts of carbohydrates at one time    • Less physical activity    • Stress    • Illness    • A lower dose of diabetes medicine or insulin, or a late dose    What you need to know about low blood sugar levels:  Symptoms include feeling shaky, dizzy, irritable, or confused. You can prevent symptoms by keeping your blood sugar levels from going too low. • Treat a low blood sugar level right away:      ? Drink 4 ounces of juice or have 1 tube of glucose gel. ? Check your blood sugar level again 10 to 15 minutes later. ? When the level goes back to normal, eat a meal or snack to prevent another decrease. • Keep glucose gel, raisins, or hard candy with you at all times to treat a low blood sugar level. • Your blood sugar level can get too low if you take diabetes medicine or insulin and do not eat enough food. • If you use insulin, check your blood sugar level before you exercise. ? If your blood sugar level is below 100 mg/dL, eat 4 crackers or 2 ounces of raisins, or drink 4 ounces of juice. ? Check your level every 30 minutes if you exercise longer than 1 hour. ? You may need a snack during or after exercise. What you can do to manage your blood sugar levels:   • Check your blood sugar levels as directed and as needed. Several items are available to use to check your levels. You may need to check by testing a drop of blood in a glucose monitor. You may instead be given a continuous glucose monitoring (CGM) device. The device is worn at all times. The CGM checks your blood sugar level every 5 minutes. It sends results to an electronic device such as a smart phone. A CGM can be used with or without an insulin pump.  You and your diabetes care team providers will decide on the best method for you. The goal for blood sugar levels before meals  is between 80 and 130 mg/dL and 2 hours after eating  is lower than 180 mg/dL. • Make healthy food choices. Work with a dietitian to develop a meal plan that works for you and your schedule. A dietitian can help you learn how to eat the right amount of carbohydrates during your meals and snacks. Carbohydrates can raise your blood sugar level if you eat too many at one time. Examples of foods that contain carbohydrates are breads, cereals, rice, pasta, and sweets. • Eat high-fiber foods as directed. Fiber helps improve blood sugar levels. Fiber also lowers your risk for heart disease and other problems diabetes can cause. Examples of high-fiber foods include vegetables, whole-grain bread, and beans such as starr beans. Your dietitian can tell you how much fiber to have each day. • Get regular physical activity. Physical activity can help you get to your target blood sugar level goal and manage your weight. Get at least 150 minutes of moderate to vigorous aerobic physical activity each week. Do not miss more than 2 days in a row. Do not sit longer than 30 minutes at a time. Your healthcare provider can help you create an activity plan. The plan can include the best activities for you and can help you build your strength and endurance. • Maintain a healthy weight. Ask your team what a healthy weight is for you. A healthy weight can help you control diabetes and prevent heart disease. Ask your team to help you create a weight loss plan, if needed. Weight loss can help make a difference in managing diabetes. Your team will help you set a weight-loss goal, such as 10 to 15 pounds, or 5% of your extra weight. Together you and your team can set manageable weight loss goals. • Take your diabetes medicine or insulin as directed.   You may need diabetes medicine, insulin, or both to help control your blood sugar levels. Your healthcare provider will teach you how and when to take your diabetes medicine or insulin. You will also be taught about side effects oral diabetes medicine can cause. Insulin may be injected or given through a pump or pen. You and your providers will decide on the best method for you:    ? An insulin pump  is an implanted device that gives your insulin 24 hours a day. An insulin pump prevents the need for multiple insulin injections in a day. ? An insulin pen  is a device prefilled with the right amount of insulin. ? You and your family members will be taught how to draw up and give insulin  if this is the best method for you. Your providers will also teach you how to dispose of needles and syringes. ? You will learn how much insulin you need  and when to give it. You will be taught when not to give insulin. You will also be taught what to do if your blood sugar level drops too low. This may happen if you take insulin and do not eat the right amount of carbohydrates. More ways to manage type 2 diabetes:   • Wear medical alert identification. Wear medical alert jewelry or carry a card that says you have diabetes. Ask your provider where to get these items. • Do not smoke. Nicotine and other chemicals in cigarettes and cigars can cause lung and blood vessel damage. It also makes it more difficult to manage your diabetes. Ask your provider for information if you currently smoke and need help to quit. Do not use e-cigarettes or smokeless tobacco in place of cigarettes or to help you quit. They still contain nicotine. • Check your feet each day for cuts, scratches, calluses, or other wounds. Look for redness and swelling, and feel for warmth. Wear shoes that fit well. Check your shoes for rocks or other objects that can hurt your feet. Do not walk barefoot or wear shoes without socks.  Wear cotton socks to help keep your feet dry. • Ask about vaccines you may need. You have a higher risk for serious illness if you get the flu, pneumonia, COVID-19, or hepatitis. Ask your provider if you should get vaccines to prevent these or other diseases, and when to get the vaccines. • Talk to your provider if you become stressed about diabetes care. Sometimes being able to fit diabetes care into your life can cause increased stress. The stress can cause you not to take care of yourself properly. Your care team providers can help by offering tips about self-care. Your providers may suggest you talk to a mental health provider who can listen and offer help with self-care issues. • Have your A1c checked as directed. Your provider may check your A1c every 3 months, or 2 times each year if your diabetes is controlled. An A1c test shows the average amount of sugar in your blood over the past 2 to 3 months. Your provider will tell you what your A1c level should be. • Have screening tests as directed. Your provider may recommend screening for complications of diabetes and other conditions that may develop. Some screenings may begin right away and some may happen within the first 5 years of diagnosis:    ? Examples of diabetes complications  include kidney problems, high cholesterol, high blood pressure, blood vessel problems, eye problems, and sleep apnea. ? You may be screened for a low vitamin B level  if you take oral diabetes medicine for a long time. ? Women of childbearing years may be screened  for polycystic ovarian syndrome (PCOS). Follow up with your doctor or diabetes care team providers as directed: You may need to have blood tests done before your follow-up visit. The test results will show if changes need to be made in your treatment or self-care. Talk to your provider if you cannot afford your medicine. Write down your questions so you remember to ask them during your visits.   © Copyright [employer] 2022 Information is for End User's use only and may not be sold, redistributed or otherwise used for commercial purposes. The above information is an  only. It is not intended as medical advice for individual conditions or treatments. Talk to your doctor, nurse or pharmacist before following any medical regimen to see if it is safe and effective for you. Type 2 Diabetes Management for Adults   AMBULATORY CARE:   Type 2 diabetes  is a disease that affects how your body uses glucose (sugar). Either your body cannot make enough insulin, or it cannot use the insulin correctly. It is important to keep diabetes controlled to prevent damage to your heart, blood vessels, and other organs. Management will help you feel well and enjoy your daily activities. Your diabetes care team providers can help you make a plan to fit diabetes care into your schedule. Your plan can change over time to fit your needs and your family's needs. Have someone call your local emergency number (911 in the [address]) if:   • You cannot be woken. • You have signs of diabetic ketoacidosis:     ? confusion, fatigue    ? vomiting    ? rapid heartbeat    ? fruity smelling breath    ? extreme thirst    ? dry mouth and skin    • You have any of the following signs of a heart attack:      ? Squeezing, pressure, or pain in your chest    ? You may  also have any of the following:     - Discomfort or pain in your back, neck, jaw, stomach, or arm    - Shortness of breath    - Nausea or vomiting    - Lightheadedness or a sudden cold sweat    • You have any of the following signs of a stroke:      ? Numbness or drooping on one side of your face     ? Weakness in an arm or leg    ? Confusion or difficulty speaking    ? Dizziness, a severe headache, or vision loss    Call your doctor or diabetes care team provider if:   • You have a sore or wound that will not heal.    • You have a change in the amount you urinate.     • Your blood sugar levels are higher than your target goals. • You often have lower blood sugar levels than your target goals. • Your skin is red, dry, warm, or swollen. • You have trouble coping with diabetes, or you feel anxious or depressed. • You have questions or concerns about your condition or care. What you need to know about high blood sugar levels:  High blood sugar levels may not cause any symptoms. You may feel more thirsty or urinate more often than usual. Over time, high blood sugar levels can damage your nerves, blood vessels, tissues, and organs. The following can increase your blood sugar levels:  • Large meals or large amounts of carbohydrates at one time    • Less physical activity    • Stress    • Illness    • A lower dose of diabetes medicine or insulin, or a late dose    What you need to know about low blood sugar levels:  Symptoms include feeling shaky, dizzy, irritable, or confused. You can prevent symptoms by keeping your blood sugar levels from going too low. • Treat a low blood sugar level right away:      ? Drink 4 ounces of juice or have 1 tube of glucose gel. ? Check your blood sugar level again 10 to 15 minutes later. ? When the level goes back to normal, eat a meal or snack to prevent another decrease. • Keep glucose gel, raisins, or hard candy with you at all times to treat a low blood sugar level. • Your blood sugar level can get too low if you take diabetes medicine or insulin and do not eat enough food. • If you use insulin, check your blood sugar level before you exercise. ? If your blood sugar level is below 100 mg/dL, eat 4 crackers or 2 ounces of raisins, or drink 4 ounces of juice. ? Check your level every 30 minutes if you exercise longer than 1 hour. ? You may need a snack during or after exercise. What you can do to manage your blood sugar levels:   • Check your blood sugar levels as directed and as needed.   Several items are available to use to check your levels. You may need to check by testing a drop of blood in a glucose monitor. You may instead be given a continuous glucose monitoring (CGM) device. The device is worn at all times. The CGM checks your blood sugar level every 5 minutes. It sends results to an electronic device such as a smart phone. A CGM can be used with or without an insulin pump. You and your diabetes care team providers will decide on the best method for you. The goal for blood sugar levels before meals  is between 80 and 130 mg/dL and 2 hours after eating  is lower than 180 mg/dL. • Make healthy food choices. Work with a dietitian to develop a meal plan that works for you and your schedule. A dietitian can help you learn how to eat the right amount of carbohydrates during your meals and snacks. Carbohydrates can raise your blood sugar level if you eat too many at one time. Examples of foods that contain carbohydrates are breads, cereals, rice, pasta, and sweets. • Eat high-fiber foods as directed. Fiber helps improve blood sugar levels. Fiber also lowers your risk for heart disease and other problems diabetes can cause. Examples of high-fiber foods include vegetables, whole-grain bread, and beans such as starr beans. Your dietitian can tell you how much fiber to have each day. • Get regular physical activity. Physical activity can help you get to your target blood sugar level goal and manage your weight. Get at least 150 minutes of moderate to vigorous aerobic physical activity each week. Do not miss more than 2 days in a row. Do not sit longer than 30 minutes at a time. Your healthcare provider can help you create an activity plan. The plan can include the best activities for you and can help you build your strength and endurance. • Maintain a healthy weight. Ask your team what a healthy weight is for you. A healthy weight can help you control diabetes and prevent heart disease.  Ask your team to help you create a weight loss plan, if needed. Weight loss can help make a difference in managing diabetes. Your team will help you set a weight-loss goal, such as 10 to 15 pounds, or 5% of your extra weight. Together you and your team can set manageable weight loss goals. • Take your diabetes medicine or insulin as directed. You may need diabetes medicine, insulin, or both to help control your blood sugar levels. Your healthcare provider will teach you how and when to take your diabetes medicine or insulin. You will also be taught about side effects oral diabetes medicine can cause. Insulin may be injected or given through a pump or pen. You and your providers will decide on the best method for you:    ? An insulin pump  is an implanted device that gives your insulin 24 hours a day. An insulin pump prevents the need for multiple insulin injections in a day. ? An insulin pen  is a device prefilled with the right amount of insulin. ? You and your family members will be taught how to draw up and give insulin  if this is the best method for you. Your providers will also teach you how to dispose of needles and syringes. ? You will learn how much insulin you need  and when to give it. You will be taught when not to give insulin. You will also be taught what to do if your blood sugar level drops too low. This may happen if you take insulin and do not eat the right amount of carbohydrates. More ways to manage type 2 diabetes:   • Wear medical alert identification. Wear medical alert jewelry or carry a card that says you have diabetes. Ask your provider where to get these items. • Do not smoke. Nicotine and other chemicals in cigarettes and cigars can cause lung and blood vessel damage. It also makes it more difficult to manage your diabetes. Ask your provider for information if you currently smoke and need help to quit.  Do not use e-cigarettes or smokeless tobacco in place of cigarettes or to help you quit. They still contain nicotine. • Check your feet each day for cuts, scratches, calluses, or other wounds. Look for redness and swelling, and feel for warmth. Wear shoes that fit well. Check your shoes for rocks or other objects that can hurt your feet. Do not walk barefoot or wear shoes without socks. Wear cotton socks to help keep your feet dry. • Ask about vaccines you may need. You have a higher risk for serious illness if you get the flu, pneumonia, COVID-19, or hepatitis. Ask your provider if you should get vaccines to prevent these or other diseases, and when to get the vaccines. • Talk to your provider if you become stressed about diabetes care. Sometimes being able to fit diabetes care into your life can cause increased stress. The stress can cause you not to take care of yourself properly. Your care team providers can help by offering tips about self-care. Your providers may suggest you talk to a mental health provider who can listen and offer help with self-care issues. • Have your A1c checked as directed. Your provider may check your A1c every 3 months, or 2 times each year if your diabetes is controlled. An A1c test shows the average amount of sugar in your blood over the past 2 to 3 months. Your provider will tell you what your A1c level should be. • Have screening tests as directed. Your provider may recommend screening for complications of diabetes and other conditions that may develop. Some screenings may begin right away and some may happen within the first 5 years of diagnosis:    ? Examples of diabetes complications  include kidney problems, high cholesterol, high blood pressure, blood vessel problems, eye problems, and sleep apnea. ? You may be screened for a low vitamin B level  if you take oral diabetes medicine for a long time. ? Women of childbearing years may be screened  for polycystic ovarian syndrome (PCOS).     Follow up with your doctor or diabetes care team providers as directed: You may need to have blood tests done before your follow-up visit. The test results will show if changes need to be made in your treatment or self-care. Talk to your provider if you cannot afford your medicine. Write down your questions so you remember to ask them during your visits. © Copyright [employer] 2022 Information is for End User's use only and may not be sold, redistributed or otherwise used for commercial purposes. The above information is an  only. It is not intended as medical advice for individual conditions or treatments. Talk to your doctor, nurse or pharmacist before following any medical regimen to see if it is safe and effective for you.

## 2023-08-27 NOTE — ASSESSMENT & PLAN NOTE
assessment and plan 1. Medicare subsequentannual wellness examination overall the patient is clinically stable and doing well, we encouraged the patient to follow a healthy and balanced diet. We recommend that the patient exercise routinely approximately 30 minutes 5 times per week . We have reviewed the patient's vaccines and have made recommendations for updates if necessary   annual flu shot   . We will be ordering screening laboratories which are age appropriate. Return to the office in   6 months   call if any problems.

## 2023-08-27 NOTE — ASSESSMENT & PLAN NOTE
Lab Results   Component Value Date    HGBA1C 7.3 (H) 08/21/2023   I have counselled the pt to follow a healthy and balanced diet ,and recommend routine exercise. Annual eye examination recommended, foot examination completed today I will be ordering diabetic laboratories including comprehensive metabolic panel, hemoglobin A1c, urine microalbumin, lipid panel.   She will continue to optimize her diet we did discuss treatment options including Ozempic at this point time she does not want to add another medicine but will consider it for the future

## 2023-09-21 DIAGNOSIS — E11.9 TYPE 2 DIABETES MELLITUS WITHOUT COMPLICATION, WITHOUT LONG-TERM CURRENT USE OF INSULIN (HCC): ICD-10-CM

## 2023-09-21 RX ORDER — METFORMIN HYDROCHLORIDE 500 MG/1
TABLET, EXTENDED RELEASE ORAL
Qty: 270 TABLET | Refills: 1 | Status: SHIPPED | OUTPATIENT
Start: 2023-09-21

## 2023-10-31 ENCOUNTER — LAB (OUTPATIENT)
Dept: LAB | Facility: CLINIC | Age: 73
End: 2023-10-31
Payer: MEDICARE

## 2023-10-31 DIAGNOSIS — E11.9 TYPE 2 DIABETES MELLITUS WITHOUT COMPLICATION, WITHOUT LONG-TERM CURRENT USE OF INSULIN (HCC): ICD-10-CM

## 2023-10-31 DIAGNOSIS — L40.59 POLYARTICULAR PSORIATIC ARTHRITIS (HCC): ICD-10-CM

## 2023-10-31 DIAGNOSIS — E03.9 HYPOTHYROIDISM, UNSPECIFIED TYPE: ICD-10-CM

## 2023-10-31 LAB
ALBUMIN SERPL BCP-MCNC: 3.8 G/DL (ref 3.5–5)
ALP SERPL-CCNC: 79 U/L (ref 34–104)
ALT SERPL W P-5'-P-CCNC: 11 U/L (ref 7–52)
ANION GAP SERPL CALCULATED.3IONS-SCNC: 10 MMOL/L
AST SERPL W P-5'-P-CCNC: 9 U/L (ref 13–39)
BASOPHILS # BLD AUTO: 0.04 THOUSANDS/ÂΜL (ref 0–0.1)
BASOPHILS NFR BLD AUTO: 1 % (ref 0–1)
BILIRUB DIRECT SERPL-MCNC: 0.23 MG/DL (ref 0–0.2)
BILIRUB SERPL-MCNC: 1.19 MG/DL (ref 0.2–1)
BUN SERPL-MCNC: 17 MG/DL (ref 5–25)
CALCIUM SERPL-MCNC: 8.7 MG/DL (ref 8.4–10.2)
CHLORIDE SERPL-SCNC: 103 MMOL/L (ref 96–108)
CO2 SERPL-SCNC: 24 MMOL/L (ref 21–32)
CREAT SERPL-MCNC: 0.87 MG/DL (ref 0.6–1.3)
CREAT UR-MCNC: 336.6 MG/DL
CRP SERPL QL: 22.6 MG/L
EOSINOPHIL # BLD AUTO: 0.11 THOUSAND/ÂΜL (ref 0–0.61)
EOSINOPHIL NFR BLD AUTO: 2 % (ref 0–6)
ERYTHROCYTE [DISTWIDTH] IN BLOOD BY AUTOMATED COUNT: 15.7 % (ref 11.6–15.1)
ERYTHROCYTE [SEDIMENTATION RATE] IN BLOOD: 76 MM/HOUR (ref 0–29)
GFR SERPL CREATININE-BSD FRML MDRD: 67 ML/MIN/1.73SQ M
GLUCOSE P FAST SERPL-MCNC: 145 MG/DL (ref 65–99)
HCT VFR BLD AUTO: 36.7 % (ref 34.8–46.1)
HGB BLD-MCNC: 12 G/DL (ref 11.5–15.4)
IMM GRANULOCYTES # BLD AUTO: 0.03 THOUSAND/UL (ref 0–0.2)
IMM GRANULOCYTES NFR BLD AUTO: 1 % (ref 0–2)
LYMPHOCYTES # BLD AUTO: 1.15 THOUSANDS/ÂΜL (ref 0.6–4.47)
LYMPHOCYTES NFR BLD AUTO: 18 % (ref 14–44)
MCH RBC QN AUTO: 30.6 PG (ref 26.8–34.3)
MCHC RBC AUTO-ENTMCNC: 32.7 G/DL (ref 31.4–37.4)
MCV RBC AUTO: 94 FL (ref 82–98)
MICROALBUMIN UR-MCNC: 85.5 MG/L
MICROALBUMIN/CREAT 24H UR: 25 MG/G CREATININE (ref 0–30)
MONOCYTES # BLD AUTO: 0.62 THOUSAND/ÂΜL (ref 0.17–1.22)
MONOCYTES NFR BLD AUTO: 10 % (ref 4–12)
NEUTROPHILS # BLD AUTO: 4.54 THOUSANDS/ÂΜL (ref 1.85–7.62)
NEUTS SEG NFR BLD AUTO: 68 % (ref 43–75)
NRBC BLD AUTO-RTO: 0 /100 WBCS
PLATELET # BLD AUTO: 295 THOUSANDS/UL (ref 149–390)
PMV BLD AUTO: 10.4 FL (ref 8.9–12.7)
POTASSIUM SERPL-SCNC: 3.8 MMOL/L (ref 3.5–5.3)
PROT SERPL-MCNC: 7.1 G/DL (ref 6.4–8.4)
RBC # BLD AUTO: 3.92 MILLION/UL (ref 3.81–5.12)
SODIUM SERPL-SCNC: 137 MMOL/L (ref 135–147)
TSH SERPL DL<=0.05 MIU/L-ACNC: 2.39 UIU/ML (ref 0.45–4.5)
WBC # BLD AUTO: 6.49 THOUSAND/UL (ref 4.31–10.16)

## 2023-10-31 PROCEDURE — 80048 BASIC METABOLIC PNL TOTAL CA: CPT

## 2023-10-31 PROCEDURE — 85652 RBC SED RATE AUTOMATED: CPT

## 2023-10-31 PROCEDURE — 85025 COMPLETE CBC W/AUTO DIFF WBC: CPT

## 2023-10-31 PROCEDURE — 82043 UR ALBUMIN QUANTITATIVE: CPT

## 2023-10-31 PROCEDURE — 80076 HEPATIC FUNCTION PANEL: CPT

## 2023-10-31 PROCEDURE — 36415 COLL VENOUS BLD VENIPUNCTURE: CPT

## 2023-10-31 PROCEDURE — 86140 C-REACTIVE PROTEIN: CPT

## 2023-10-31 PROCEDURE — 82570 ASSAY OF URINE CREATININE: CPT

## 2023-10-31 PROCEDURE — 84443 ASSAY THYROID STIM HORMONE: CPT

## 2023-11-21 ENCOUNTER — ANNUAL EXAM (OUTPATIENT)
Dept: OBGYN CLINIC | Facility: CLINIC | Age: 73
End: 2023-11-21
Payer: MEDICARE

## 2023-11-21 VITALS
DIASTOLIC BLOOD PRESSURE: 58 MMHG | BODY MASS INDEX: 42.94 KG/M2 | WEIGHT: 273.6 LBS | HEIGHT: 67 IN | SYSTOLIC BLOOD PRESSURE: 118 MMHG

## 2023-11-21 DIAGNOSIS — Z01.419 ENCOUNTER FOR GYNECOLOGICAL EXAMINATION WITHOUT ABNORMAL FINDING: ICD-10-CM

## 2023-11-21 DIAGNOSIS — Z12.31 ENCOUNTER FOR SCREENING MAMMOGRAM FOR BREAST CANCER: Primary | ICD-10-CM

## 2023-11-21 DIAGNOSIS — N95.2 ATROPHIC VAGINITIS: ICD-10-CM

## 2023-11-21 PROCEDURE — G0101 CA SCREEN;PELVIC/BREAST EXAM: HCPCS | Performed by: OBSTETRICS & GYNECOLOGY

## 2023-11-21 RX ORDER — CLOBETASOL PROPIONATE 0.5 MG/G
CREAM TOPICAL
COMMUNITY
Start: 2023-09-03

## 2023-11-21 NOTE — PROGRESS NOTES
Assessment/Plan:    No problem-specific Assessment & Plan notes found for this encounter. Diagnoses and all orders for this visit:    Encounter for screening mammogram for breast cancer  -     Mammo screening bilateral w 3d & cad; Future    Encounter for gynecological examination without abnormal finding    Atrophic vaginitis    Other orders  -     clobetasol (TEMOVATE) 0.05 % cream; APPLY TO PSORIASIS SKIN PATCHES TWICE A DAY AS NEEDED          Normal gynecological physical examination. Self-breast examination stressed. Mammogram ordered. Discussed regular exercise, healthy diet, importance of vitamin D and calcium supplements. Discussed importance of sun block use during periods of prolonged sun exposure. Patient will be seen in 1 year for routine gynecologic and medical examination. Patient will call office for any problems, concerns, or issues which may arise during the interim. Subjective:          HPI    Patient ID: Marda Skiff is a 68 y.o. female who presents today for her annual gynecologic and medical examination. She denies any current concerns. Menstrual status: Postmenopausal, denies any vaginal bleeding    Vasomotor symptoms:  Reports intermittent hot flashes and night sweats    Patient reports normal appetite    Patient reports normal bowel and bladder habits    Patient denies any significant pelvic or abdominal pain    Patient denies any headaches, chest pain, shortness of breath fever shakes or chills    Patient denies any COVID 19 symptoms including cough or loss of taste or smell    COVID vaccine status:  Up to date with covid vaccine    Medical problems:  Followed for hypertension, Type II Diabetes, Hypothyroidism    Colonoscopy status: Up to date with colonoscopy (2014, return in 10 years), cologaurd in 2021    Mammogram status: Up to date with mammogram, order placed at today's visit for next mammogram    The following portions of the patient's history were reviewed and updated as appropriate: allergies, current medications, past family history, past medical history, past social history, past surgical history and problem list.    Review of Systems   Constitutional: Negative. Negative for appetite change, diaphoresis, fatigue, fever and unexpected weight change. HENT: Negative. Eyes: Negative. Respiratory: Negative. Cardiovascular: Negative. Gastrointestinal: Negative. Negative for abdominal pain, blood in stool, constipation, diarrhea, nausea and vomiting. Endocrine: Positive for heat intolerance. Negative for cold intolerance. Genitourinary: Negative. Negative for dysuria, frequency, hematuria, urgency, vaginal bleeding, vaginal discharge and vaginal pain. Musculoskeletal: Negative. Skin: Negative. Allergic/Immunologic: Negative. Neurological: Negative. Hematological: Negative. Negative for adenopathy. Psychiatric/Behavioral: Negative. Objective:      /58   Ht 5' 6.5" (1.689 m)   Wt 124 kg (273 lb 9.6 oz)   BMI 43.50 kg/m²          Physical Exam  Constitutional:       General: She is not in acute distress. Appearance: Normal appearance. She is well-developed. She is not diaphoretic. HENT:      Head: Normocephalic and atraumatic. Eyes:      Pupils: Pupils are equal, round, and reactive to light. Cardiovascular:      Rate and Rhythm: Normal rate and regular rhythm. Heart sounds: Normal heart sounds. No murmur heard. No friction rub. No gallop. Pulmonary:      Effort: Pulmonary effort is normal.      Breath sounds: Normal breath sounds. Chest:   Breasts:     Breasts are symmetrical.      Right: No inverted nipple, mass, nipple discharge, skin change or tenderness. Left: No inverted nipple, mass, nipple discharge, skin change or tenderness. Abdominal:      General: Bowel sounds are normal.      Palpations: Abdomen is soft. Genitourinary:     General: Normal vulva. Exam position: Supine. Labia:         Right: No rash or lesion. Left: No rash or lesion. Vagina: Normal. No vaginal discharge, erythema, tenderness or bleeding. Cervix: No discharge or friability. Uterus: Not enlarged and not tender. Adnexa:         Right: No mass, tenderness or fullness. Left: No mass, tenderness or fullness. Rectum: Normal. Guaiac result negative. Comments: Minimal atrophic vaginitis noted on exam. Good pelvic support noted. Musculoskeletal:         General: Normal range of motion. Cervical back: Normal range of motion and neck supple. Lymphadenopathy:      Cervical: No cervical adenopathy. Upper Body:      Right upper body: No supraclavicular adenopathy. Left upper body: No supraclavicular adenopathy. Skin:     General: Skin is warm and dry. Findings: No rash. Neurological:      General: No focal deficit present. Mental Status: She is alert and oriented to person, place, and time. Psychiatric:         Mood and Affect: Mood normal.         Speech: Speech normal.         Behavior: Behavior normal.         Thought Content:  Thought content normal.         Judgment: Judgment normal.

## 2023-12-13 DIAGNOSIS — I82.409 DVT (DEEP VENOUS THROMBOSIS) (HCC): ICD-10-CM

## 2023-12-13 RX ORDER — RIVAROXABAN 20 MG/1
TABLET, FILM COATED ORAL
Qty: 90 TABLET | Refills: 1 | Status: SHIPPED | OUTPATIENT
Start: 2023-12-13

## 2024-01-26 DIAGNOSIS — E03.9 HYPOTHYROIDISM, UNSPECIFIED TYPE: ICD-10-CM

## 2024-01-26 DIAGNOSIS — E11.9 TYPE 2 DIABETES MELLITUS WITHOUT COMPLICATION, WITHOUT LONG-TERM CURRENT USE OF INSULIN (HCC): ICD-10-CM

## 2024-01-26 RX ORDER — LEVOTHYROXINE SODIUM 88 UG/1
TABLET ORAL
Qty: 30 TABLET | Refills: 5 | Status: SHIPPED | OUTPATIENT
Start: 2024-01-26

## 2024-01-26 RX ORDER — METFORMIN HYDROCHLORIDE 500 MG/1
TABLET, EXTENDED RELEASE ORAL
Qty: 270 TABLET | Refills: 1 | Status: SHIPPED | OUTPATIENT
Start: 2024-01-26

## 2024-02-21 ENCOUNTER — RA CDI HCC (OUTPATIENT)
Dept: OTHER | Facility: HOSPITAL | Age: 74
End: 2024-02-21

## 2024-02-21 DIAGNOSIS — E78.5 HYPERLIPIDEMIA, UNSPECIFIED HYPERLIPIDEMIA TYPE: ICD-10-CM

## 2024-02-21 PROBLEM — Z12.11 SCREENING FOR MALIGNANT NEOPLASM OF COLON: Status: RESOLVED | Noted: 2020-01-13 | Resolved: 2024-02-21

## 2024-02-21 PROBLEM — Z13.0 SCREENING, ANEMIA, DEFICIENCY, IRON: Status: RESOLVED | Noted: 2019-03-11 | Resolved: 2024-02-21

## 2024-02-21 PROBLEM — Z13.820 SCREENING FOR OSTEOPOROSIS: Status: RESOLVED | Noted: 2019-10-01 | Resolved: 2024-02-21

## 2024-02-21 PROBLEM — Z00.00 MEDICARE ANNUAL WELLNESS VISIT, SUBSEQUENT: Status: RESOLVED | Noted: 2019-10-01 | Resolved: 2024-02-21

## 2024-02-21 RX ORDER — SIMVASTATIN 20 MG
TABLET ORAL
Qty: 90 TABLET | Refills: 1 | Status: SHIPPED | OUTPATIENT
Start: 2024-02-21

## 2024-02-21 NOTE — PROGRESS NOTES
HCC coding opportunities          Chart Reviewed number of suggestions sent to Provider: 3  E11.22  E11.36  D68.51     Patients Insurance     Medicare Insurance: Medicare

## 2024-02-26 ENCOUNTER — APPOINTMENT (OUTPATIENT)
Dept: LAB | Facility: CLINIC | Age: 74
End: 2024-02-26
Payer: MEDICARE

## 2024-02-26 DIAGNOSIS — E11.9 TYPE 2 DIABETES MELLITUS WITHOUT COMPLICATION, WITHOUT LONG-TERM CURRENT USE OF INSULIN (HCC): ICD-10-CM

## 2024-02-26 LAB
ALBUMIN SERPL BCP-MCNC: 3.8 G/DL (ref 3.5–5)
ALP SERPL-CCNC: 84 U/L (ref 34–104)
ALT SERPL W P-5'-P-CCNC: 12 U/L (ref 7–52)
ANION GAP SERPL CALCULATED.3IONS-SCNC: 11 MMOL/L
AST SERPL W P-5'-P-CCNC: 13 U/L (ref 13–39)
BILIRUB SERPL-MCNC: 1.22 MG/DL (ref 0.2–1)
BUN SERPL-MCNC: 14 MG/DL (ref 5–25)
CALCIUM SERPL-MCNC: 9.4 MG/DL (ref 8.4–10.2)
CHLORIDE SERPL-SCNC: 107 MMOL/L (ref 96–108)
CHOLEST SERPL-MCNC: 149 MG/DL
CO2 SERPL-SCNC: 27 MMOL/L (ref 21–32)
CREAT SERPL-MCNC: 0.91 MG/DL (ref 0.6–1.3)
EST. AVERAGE GLUCOSE BLD GHB EST-MCNC: 154 MG/DL
GFR SERPL CREATININE-BSD FRML MDRD: 63 ML/MIN/1.73SQ M
GLUCOSE P FAST SERPL-MCNC: 146 MG/DL (ref 65–99)
HBA1C MFR BLD: 7 %
HDLC SERPL-MCNC: 50 MG/DL
LDLC SERPL CALC-MCNC: 83 MG/DL (ref 0–100)
POTASSIUM SERPL-SCNC: 4.9 MMOL/L (ref 3.5–5.3)
PROT SERPL-MCNC: 6.6 G/DL (ref 6.4–8.4)
SODIUM SERPL-SCNC: 145 MMOL/L (ref 135–147)
TRIGL SERPL-MCNC: 82 MG/DL

## 2024-02-26 PROCEDURE — 80061 LIPID PANEL: CPT

## 2024-02-26 PROCEDURE — 36415 COLL VENOUS BLD VENIPUNCTURE: CPT

## 2024-02-26 PROCEDURE — 80053 COMPREHEN METABOLIC PANEL: CPT

## 2024-02-26 PROCEDURE — 83036 HEMOGLOBIN GLYCOSYLATED A1C: CPT

## 2024-02-27 ENCOUNTER — APPOINTMENT (OUTPATIENT)
Dept: LAB | Facility: CLINIC | Age: 74
End: 2024-02-27
Payer: MEDICARE

## 2024-02-27 LAB
CREAT UR-MCNC: 165.1 MG/DL
MICROALBUMIN UR-MCNC: 40.9 MG/L
MICROALBUMIN/CREAT 24H UR: 25 MG/G CREATININE (ref 0–30)

## 2024-02-27 PROCEDURE — 82043 UR ALBUMIN QUANTITATIVE: CPT

## 2024-02-27 PROCEDURE — 82570 ASSAY OF URINE CREATININE: CPT

## 2024-02-28 ENCOUNTER — OFFICE VISIT (OUTPATIENT)
Dept: INTERNAL MEDICINE CLINIC | Facility: CLINIC | Age: 74
End: 2024-02-28
Payer: MEDICARE

## 2024-02-28 VITALS
HEIGHT: 67 IN | DIASTOLIC BLOOD PRESSURE: 64 MMHG | WEIGHT: 273.8 LBS | BODY MASS INDEX: 42.97 KG/M2 | OXYGEN SATURATION: 100 % | SYSTOLIC BLOOD PRESSURE: 120 MMHG | HEART RATE: 83 BPM | RESPIRATION RATE: 16 BRPM

## 2024-02-28 DIAGNOSIS — E11.9 TYPE 2 DIABETES MELLITUS WITHOUT COMPLICATION, WITHOUT LONG-TERM CURRENT USE OF INSULIN (HCC): ICD-10-CM

## 2024-02-28 DIAGNOSIS — E66.01 CLASS 3 SEVERE OBESITY DUE TO EXCESS CALORIES WITH SERIOUS COMORBIDITY AND BODY MASS INDEX (BMI) OF 40.0 TO 44.9 IN ADULT (HCC): ICD-10-CM

## 2024-02-28 DIAGNOSIS — Z00.00 MEDICARE ANNUAL WELLNESS VISIT, SUBSEQUENT: Primary | ICD-10-CM

## 2024-02-28 DIAGNOSIS — T14.8XXA HEMATOMA: ICD-10-CM

## 2024-02-28 DIAGNOSIS — L40.50 PSORIATIC ARTHRITIS (HCC): ICD-10-CM

## 2024-02-28 DIAGNOSIS — D68.51 FACTOR 5 LEIDEN MUTATION, HETEROZYGOUS (HCC): ICD-10-CM

## 2024-02-28 DIAGNOSIS — I10 ESSENTIAL HYPERTENSION: ICD-10-CM

## 2024-02-28 DIAGNOSIS — E11.21 DIABETIC NEPHROPATHY ASSOCIATED WITH TYPE 2 DIABETES MELLITUS (HCC): ICD-10-CM

## 2024-02-28 DIAGNOSIS — Z12.11 SCREENING FOR COLON CANCER: ICD-10-CM

## 2024-02-28 DIAGNOSIS — E78.5 HYPERLIPIDEMIA, UNSPECIFIED HYPERLIPIDEMIA TYPE: ICD-10-CM

## 2024-02-28 PROBLEM — I82.509 CHRONIC DEEP VEIN THROMBOSIS (DVT) OF LOWER EXTREMITY (HCC): Status: RESOLVED | Noted: 2021-01-31 | Resolved: 2024-02-28

## 2024-02-28 PROBLEM — I82.4Y3 ACUTE DEEP VEIN THROMBOSIS (DVT) OF PROXIMAL VEIN OF BOTH LOWER EXTREMITIES (HCC): Status: RESOLVED | Noted: 2020-12-04 | Resolved: 2024-02-28

## 2024-02-28 PROBLEM — N18.31 STAGE 3A CHRONIC KIDNEY DISEASE (HCC): Status: RESOLVED | Noted: 2023-02-22 | Resolved: 2024-02-28

## 2024-02-28 PROBLEM — I82.532 CHRONIC EMBOLISM AND THROMBOSIS OF LEFT POPLITEAL VEIN (HCC): Status: RESOLVED | Noted: 2023-02-23 | Resolved: 2024-02-28

## 2024-02-28 PROCEDURE — G0439 PPPS, SUBSEQ VISIT: HCPCS | Performed by: INTERNAL MEDICINE

## 2024-02-28 PROCEDURE — 99214 OFFICE O/P EST MOD 30 MIN: CPT | Performed by: INTERNAL MEDICINE

## 2024-02-28 NOTE — PATIENT INSTRUCTIONS
Medicare Preventive Visit Patient Instructions  Thank you for completing your Welcome to Medicare Visit or Medicare Annual Wellness Visit today. Your next wellness visit will be due in one year (2/28/2025).  The screening/preventive services that you may require over the next 5-10 years are detailed below. Some tests may not apply to you based off risk factors and/or age. Screening tests ordered at today's visit but not completed yet may show as past due. Also, please note that scanned in results may not display below.  Preventive Screenings:  Service Recommendations Previous Testing/Comments   Colorectal Cancer Screening  * Colonoscopy    * Fecal Occult Blood Test (FOBT)/Fecal Immunochemical Test (FIT)  * Fecal DNA/Cologuard Test  * Flexible Sigmoidoscopy Age: 45-75 years old   Colonoscopy: every 10 years (may be performed more frequently if at higher risk)  OR  FOBT/FIT: every 1 year  OR  Cologuard: every 3 years  OR  Sigmoidoscopy: every 5 years  Screening may be recommended earlier than age 45 if at higher risk for colorectal cancer. Also, an individualized decision between you and your healthcare provider will decide whether screening between the ages of 76-85 would be appropriate. Colonoscopy: 10/06/2014  FOBT/FIT: Not on file  Cologuard: Not on file  Sigmoidoscopy: Not on file          Breast Cancer Screening Age: 40+ years old  Frequency: every 1-2 years  Not required if history of left and right mastectomy Mammogram: 07/20/2023        Cervical Cancer Screening Between the ages of 21-29, pap smear recommended once every 3 years.   Between the ages of 30-65, can perform pap smear with HPV co-testing every 5 years.   Recommendations may differ for women with a history of total hysterectomy, cervical cancer, or abnormal pap smears in past. Pap Smear: 11/21/2023        Hepatitis C Screening Once for adults born between 1945 and 1965  More frequently in patients at high risk for Hepatitis C Hep C Antibody: Not on  file        Diabetes Screening 1-2 times per year if you're at risk for diabetes or have pre-diabetes Fasting glucose: 146 mg/dL (2/26/2024)  A1C: 7.0 % (2/26/2024)      Cholesterol Screening Once every 5 years if you don't have a lipid disorder. May order more often based on risk factors. Lipid panel: 02/26/2024          Other Preventive Screenings Covered by Medicare:  Abdominal Aortic Aneurysm (AAA) Screening: covered once if your at risk. You're considered to be at risk if you have a family history of AAA.  Lung Cancer Screening: covers low dose CT scan once per year if you meet all of the following conditions: (1) Age 55-77; (2) No signs or symptoms of lung cancer; (3) Current smoker or have quit smoking within the last 15 years; (4) You have a tobacco smoking history of at least 20 pack years (packs per day multiplied by number of years you smoked); (5) You get a written order from a healthcare provider.  Glaucoma Screening: covered annually if you're considered high risk: (1) You have diabetes OR (2) Family history of glaucoma OR (3)  aged 50 and older OR (4)  American aged 65 and older  Osteoporosis Screening: covered every 2 years if you meet one of the following conditions: (1) You're estrogen deficient and at risk for osteoporosis based off medical history and other findings; (2) Have a vertebral abnormality; (3) On glucocorticoid therapy for more than 3 months; (4) Have primary hyperparathyroidism; (5) On osteoporosis medications and need to assess response to drug therapy.   Last bone density test (DXA Scan): 01/03/2020.  HIV Screening: covered annually if you're between the age of 15-65. Also covered annually if you are younger than 15 and older than 65 with risk factors for HIV infection. For pregnant patients, it is covered up to 3 times per pregnancy.    Immunizations:  Immunization Recommendations   Influenza Vaccine Annual influenza vaccination during flu season is  recommended for all persons aged >= 6 months who do not have contraindications   Pneumococcal Vaccine   * Pneumococcal conjugate vaccine = PCV13 (Prevnar 13), PCV15 (Vaxneuvance), PCV20 (Prevnar 20)  * Pneumococcal polysaccharide vaccine = PPSV23 (Pneumovax) Adults 19-65 yo with certain risk factors or if 65+ yo  If never received any pneumonia vaccine: recommend Prevnar 20 (PCV20)  Give PCV20 if previously received 1 dose of PCV13 or PPSV23   Hepatitis B Vaccine 3 dose series if at intermediate or high risk (ex: diabetes, end stage renal disease, liver disease)   Respiratory syncytial virus (RSV) Vaccine - COVERED BY MEDICARE PART D  * RSVPreF3 (Arexvy) CDC recommends that adults 60 years of age and older may receive a single dose of RSV vaccine using shared clinical decision-making (SCDM)   Tetanus (Td) Vaccine - COST NOT COVERED BY MEDICARE PART B Following completion of primary series, a booster dose should be given every 10 years to maintain immunity against tetanus. Td may also be given as tetanus wound prophylaxis.   Tdap Vaccine - COST NOT COVERED BY MEDICARE PART B Recommended at least once for all adults. For pregnant patients, recommended with each pregnancy.   Shingles Vaccine (Shingrix) - COST NOT COVERED BY MEDICARE PART B  2 shot series recommended in those 19 years and older who have or will have weakened immune systems or those 50 years and older     Health Maintenance Due:      Topic Date Due   • Colorectal Cancer Screening  10/06/2024   • Breast Cancer Screening: Mammogram  07/20/2025   • Hepatitis C Screening  Completed     Immunizations Due:      Topic Date Due   • COVID-19 Vaccine (6 - 2023-24 season) 12/18/2023     Advance Directives   What are advance directives?  Advance directives are legal documents that state your wishes and plans for medical care. These plans are made ahead of time in case you lose your ability to make decisions for yourself. Advance directives can apply to any medical  decision, such as the treatments you want, and if you want to donate organs.   What are the types of advance directives?  There are many types of advance directives, and each state has rules about how to use them. You may choose a combination of any of the following:  Living will:  This is a written record of the treatment you want. You can also choose which treatments you do not want, which to limit, and which to stop at a certain time. This includes surgery, medicine, IV fluid, and tube feedings.   Durable power of  for healthcare (DPAHC):  This is a written record that states who you want to make healthcare choices for you when you are unable to make them for yourself. This person, called a proxy, is usually a family member or a friend. You may choose more than 1 proxy.  Do not resuscitate (DNR) order:  A DNR order is used in case your heart stops beating or you stop breathing. It is a request not to have certain forms of treatment, such as CPR. A DNR order may be included in other types of advance directives.  Medical directive:  This covers the care that you want if you are in a coma, near death, or unable to make decisions for yourself. You can list the treatments you want for each condition. Treatment may include pain medicine, surgery, blood transfusions, dialysis, IV or tube feedings, and a ventilator (breathing machine).  Values history:  This document has questions about your views, beliefs, and how you feel and think about life. This information can help others choose the care that you would choose.  Why are advance directives important?  An advance directive helps you control your care. Although spoken wishes may be used, it is better to have your wishes written down. Spoken wishes can be misunderstood, or not followed. Treatments may be given even if you do not want them. An advance directive may make it easier for your family to make difficult choices about your care.   Weight Management   Why  it is important to manage your weight:  Being overweight increases your risk of health conditions such as heart disease, high blood pressure, type 2 diabetes, and certain types of cancer. It can also increase your risk for osteoarthritis, sleep apnea, and other respiratory problems. Aim for a slow, steady weight loss. Even a small amount of weight loss can lower your risk of health problems.  How to lose weight safely:  A safe and healthy way to lose weight is to eat fewer calories and get regular exercise. You can lose up about 1 pound a week by decreasing the number of calories you eat by 500 calories each day.   Healthy meal plan for weight management:  A healthy meal plan includes a variety of foods, contains fewer calories, and helps you stay healthy. A healthy meal plan includes the following:  Eat whole-grain foods more often.  A healthy meal plan should contain fiber. Fiber is the part of grains, fruits, and vegetables that is not broken down by your body. Whole-grain foods are healthy and provide extra fiber in your diet. Some examples of whole-grain foods are whole-wheat breads and pastas, oatmeal, brown rice, and bulgur.  Eat a variety of vegetables every day.  Include dark, leafy greens such as spinach, kale, joshua greens, and mustard greens. Eat yellow and orange vegetables such as carrots, sweet potatoes, and winter squash.   Eat a variety of fruits every day.  Choose fresh or canned fruit (canned in its own juice or light syrup) instead of juice. Fruit juice has very little or no fiber.  Eat low-fat dairy foods.  Drink fat-free (skim) milk or 1% milk. Eat fat-free yogurt and low-fat cottage cheese. Try low-fat cheeses such as mozzarella and other reduced-fat cheeses.  Choose meat and other protein foods that are low in fat.  Choose beans or other legumes such as split peas or lentils. Choose fish, skinless poultry (chicken or turkey), or lean cuts of red meat (beef or pork). Before you cook meat or  poultry, cut off any visible fat.   Use less fat and oil.  Try baking foods instead of frying them. Add less fat, such as margarine, sour cream, regular salad dressing and mayonnaise to foods. Eat fewer high-fat foods. Some examples of high-fat foods include french fries, doughnuts, ice cream, and cakes.  Eat fewer sweets.  Limit foods and drinks that are high in sugar. This includes candy, cookies, regular soda, and sweetened drinks.  Exercise:  Exercise at least 30 minutes per day on most days of the week. Some examples of exercise include walking, biking, dancing, and swimming. You can also fit in more physical activity by taking the stairs instead of the elevator or parking farther away from stores. Ask your healthcare provider about the best exercise plan for you.      © Copyright [employer] 2018 Information is for End User's use only and may not be sold, redistributed or otherwise used for commercial purposes. All illustrations and images included in CareNotes® are the copyrighted property of [employer] or [employer]
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
## 2024-02-29 PROBLEM — T14.8XXA HEMATOMA: Status: ACTIVE | Noted: 2024-02-29

## 2024-02-29 NOTE — ASSESSMENT & PLAN NOTE
Lab Results   Component Value Date    HGBA1C 7.0 (H) 02/26/2024   I have counselled the pt to follow a healthy and balanced diet ,and recommend routine exercise.  I have asked the patient to consider increasing her activity level we did talk about a peddle exercise machine.  Annual eye examination recommended weight loss encouraged

## 2024-02-29 NOTE — ASSESSMENT & PLAN NOTE
Right lower extremity hematoma which is slowly improving is now been present about 1 year it appears to be clotted at this point more hardened in nature we did discuss possible treatment options including ultrasound and referral to general surgery for an opinion at this point in time because it is slowly improving she would like to simply observe recommend warm moist compress  twice daily, leg elevation, if it becomes symptomatic or enlarging or any change she will notify me for further evaluation

## 2024-02-29 NOTE — ASSESSMENT & PLAN NOTE
Assessment and plan 1.   Medicare subsequent annual wellness examination overall the patient is clinically stable and doing well, we encouraged the patient to follow a healthy and balanced diet.  We recommend that the patient exercise routinely approximately 30 minutes 5 times per week .  We have reviewed the patient's vaccines and have made recommendations for updates if necessary   consider RSV vaccine, annual flu shot next fall   .  We will be ordering screening laboratories which are age appropriate.  Return to the office in    6 months call if any problems.

## 2024-02-29 NOTE — ASSESSMENT & PLAN NOTE
Clinically stable and doing well continue the current medical regiment will continue monitor.  Lab Results   Component Value Date    HGBA1C 7.0 (H) 02/26/2024

## 2024-02-29 NOTE — ASSESSMENT & PLAN NOTE
Hypertension - controlled, I have counseled patient following healthy balance diet, I would like the patient reduce sodium, exercise routinely, I would like the patient continued the med current medical regiment and we will continue to monitor.  Blood pressure today 120/64 will continue lisinopril 5 mg once daily

## 2024-02-29 NOTE — ASSESSMENT & PLAN NOTE
Clinically stable and doing well continue the current medical regiment will continue monitor.  Working with rheumatologist Dr. Pabon currently on methotrexate, folic acid 1 mg once daily.  Functional status stable

## 2024-03-17 DIAGNOSIS — I10 ESSENTIAL HYPERTENSION: ICD-10-CM

## 2024-03-17 RX ORDER — LISINOPRIL 5 MG/1
TABLET ORAL
Qty: 30 TABLET | Refills: 5 | Status: SHIPPED | OUTPATIENT
Start: 2024-03-17

## 2024-03-25 LAB — COLOGUARD RESULT REPORTABLE: NEGATIVE

## 2024-04-02 LAB
LEFT EYE DIABETIC RETINOPATHY: NORMAL
RIGHT EYE DIABETIC RETINOPATHY: NORMAL

## 2024-04-29 PROBLEM — Z00.00 MEDICARE ANNUAL WELLNESS VISIT, SUBSEQUENT: Status: RESOLVED | Noted: 2019-10-01 | Resolved: 2024-04-29

## 2024-07-04 DIAGNOSIS — I82.409 DVT (DEEP VENOUS THROMBOSIS) (HCC): ICD-10-CM

## 2024-07-04 RX ORDER — RIVAROXABAN 20 MG/1
TABLET, FILM COATED ORAL
Qty: 90 TABLET | Refills: 1 | Status: SHIPPED | OUTPATIENT
Start: 2024-07-04

## 2024-07-22 DIAGNOSIS — E03.9 HYPOTHYROIDISM, UNSPECIFIED TYPE: ICD-10-CM

## 2024-07-22 RX ORDER — LEVOTHYROXINE SODIUM 88 UG/1
TABLET ORAL
Qty: 30 TABLET | Refills: 5 | Status: SHIPPED | OUTPATIENT
Start: 2024-07-22

## 2024-08-10 DIAGNOSIS — E11.9 TYPE 2 DIABETES MELLITUS WITHOUT COMPLICATION, WITHOUT LONG-TERM CURRENT USE OF INSULIN (HCC): ICD-10-CM

## 2024-08-10 RX ORDER — METFORMIN HYDROCHLORIDE 500 MG/1
TABLET, EXTENDED RELEASE ORAL
Qty: 270 TABLET | Refills: 1 | Status: SHIPPED | OUTPATIENT
Start: 2024-08-10

## 2024-08-11 DIAGNOSIS — E78.5 HYPERLIPIDEMIA, UNSPECIFIED HYPERLIPIDEMIA TYPE: ICD-10-CM

## 2024-08-11 RX ORDER — SIMVASTATIN 20 MG
TABLET ORAL
Qty: 90 TABLET | Refills: 1 | Status: SHIPPED | OUTPATIENT
Start: 2024-08-11

## 2024-08-21 ENCOUNTER — RA CDI HCC (OUTPATIENT)
Dept: OTHER | Facility: HOSPITAL | Age: 74
End: 2024-08-21

## 2024-08-26 ENCOUNTER — APPOINTMENT (OUTPATIENT)
Dept: LAB | Facility: CLINIC | Age: 74
End: 2024-08-26
Payer: MEDICARE

## 2024-08-26 DIAGNOSIS — E11.9 DIABETES MELLITUS, STABLE (HCC): ICD-10-CM

## 2024-08-26 DIAGNOSIS — E11.9 TYPE 2 DIABETES MELLITUS WITHOUT COMPLICATION, WITHOUT LONG-TERM CURRENT USE OF INSULIN (HCC): ICD-10-CM

## 2024-08-26 DIAGNOSIS — L40.59 POLYARTICULAR PSORIATIC ARTHRITIS (HCC): ICD-10-CM

## 2024-08-26 LAB
ALBUMIN SERPL BCG-MCNC: 3.9 G/DL (ref 3.5–5)
ALP SERPL-CCNC: 85 U/L (ref 34–104)
ALT SERPL W P-5'-P-CCNC: 11 U/L (ref 7–52)
ANION GAP SERPL CALCULATED.3IONS-SCNC: 6 MMOL/L (ref 4–13)
AST SERPL W P-5'-P-CCNC: 11 U/L (ref 13–39)
BASOPHILS # BLD AUTO: 0.06 THOUSANDS/ÂΜL (ref 0–0.1)
BASOPHILS NFR BLD AUTO: 1 % (ref 0–1)
BILIRUB DIRECT SERPL-MCNC: 0.09 MG/DL (ref 0–0.2)
BILIRUB SERPL-MCNC: 0.85 MG/DL (ref 0.2–1)
BUN SERPL-MCNC: 16 MG/DL (ref 5–25)
CALCIUM SERPL-MCNC: 9.8 MG/DL (ref 8.4–10.2)
CHLORIDE SERPL-SCNC: 103 MMOL/L (ref 96–108)
CHOLEST SERPL-MCNC: 162 MG/DL
CO2 SERPL-SCNC: 29 MMOL/L (ref 21–32)
CREAT SERPL-MCNC: 1.03 MG/DL (ref 0.6–1.3)
CREAT UR-MCNC: 106.7 MG/DL
CRP SERPL QL: 5.1 MG/L
EOSINOPHIL # BLD AUTO: 0.09 THOUSAND/ÂΜL (ref 0–0.61)
EOSINOPHIL NFR BLD AUTO: 2 % (ref 0–6)
ERYTHROCYTE [DISTWIDTH] IN BLOOD BY AUTOMATED COUNT: 14.6 % (ref 11.6–15.1)
ERYTHROCYTE [SEDIMENTATION RATE] IN BLOOD: 46 MM/HOUR (ref 0–29)
EST. AVERAGE GLUCOSE BLD GHB EST-MCNC: 169 MG/DL
GFR SERPL CREATININE-BSD FRML MDRD: 54 ML/MIN/1.73SQ M
GLUCOSE P FAST SERPL-MCNC: 146 MG/DL (ref 65–99)
HBA1C MFR BLD: 7.5 %
HCT VFR BLD AUTO: 39 % (ref 34.8–46.1)
HDLC SERPL-MCNC: 52 MG/DL
HGB BLD-MCNC: 12.3 G/DL (ref 11.5–15.4)
IMM GRANULOCYTES # BLD AUTO: 0.02 THOUSAND/UL (ref 0–0.2)
IMM GRANULOCYTES NFR BLD AUTO: 0 % (ref 0–2)
LDLC SERPL CALC-MCNC: 94 MG/DL (ref 0–100)
LYMPHOCYTES # BLD AUTO: 1.11 THOUSANDS/ÂΜL (ref 0.6–4.47)
LYMPHOCYTES NFR BLD AUTO: 18 % (ref 14–44)
MCH RBC QN AUTO: 30.4 PG (ref 26.8–34.3)
MCHC RBC AUTO-ENTMCNC: 31.5 G/DL (ref 31.4–37.4)
MCV RBC AUTO: 97 FL (ref 82–98)
MICROALBUMIN UR-MCNC: 44 MG/L
MICROALBUMIN/CREAT 24H UR: 41 MG/G CREATININE (ref 0–30)
MONOCYTES # BLD AUTO: 0.44 THOUSAND/ÂΜL (ref 0.17–1.22)
MONOCYTES NFR BLD AUTO: 7 % (ref 4–12)
NEUTROPHILS # BLD AUTO: 4.46 THOUSANDS/ÂΜL (ref 1.85–7.62)
NEUTS SEG NFR BLD AUTO: 72 % (ref 43–75)
NRBC BLD AUTO-RTO: 0 /100 WBCS
PLATELET # BLD AUTO: 307 THOUSANDS/UL (ref 149–390)
PMV BLD AUTO: 10.3 FL (ref 8.9–12.7)
POTASSIUM SERPL-SCNC: 5 MMOL/L (ref 3.5–5.3)
PROT SERPL-MCNC: 7 G/DL (ref 6.4–8.4)
RBC # BLD AUTO: 4.04 MILLION/UL (ref 3.81–5.12)
SODIUM SERPL-SCNC: 138 MMOL/L (ref 135–147)
TRIGL SERPL-MCNC: 79 MG/DL
WBC # BLD AUTO: 6.18 THOUSAND/UL (ref 4.31–10.16)

## 2024-08-26 PROCEDURE — 86140 C-REACTIVE PROTEIN: CPT

## 2024-08-26 PROCEDURE — 80053 COMPREHEN METABOLIC PANEL: CPT

## 2024-08-26 PROCEDURE — 82570 ASSAY OF URINE CREATININE: CPT

## 2024-08-26 PROCEDURE — 85652 RBC SED RATE AUTOMATED: CPT

## 2024-08-26 PROCEDURE — 85025 COMPLETE CBC W/AUTO DIFF WBC: CPT

## 2024-08-26 PROCEDURE — 80061 LIPID PANEL: CPT

## 2024-08-26 PROCEDURE — 36415 COLL VENOUS BLD VENIPUNCTURE: CPT

## 2024-08-26 PROCEDURE — 83036 HEMOGLOBIN GLYCOSYLATED A1C: CPT

## 2024-08-26 PROCEDURE — 82043 UR ALBUMIN QUANTITATIVE: CPT

## 2024-08-26 PROCEDURE — 82248 BILIRUBIN DIRECT: CPT

## 2024-08-28 ENCOUNTER — OFFICE VISIT (OUTPATIENT)
Dept: INTERNAL MEDICINE CLINIC | Facility: CLINIC | Age: 74
End: 2024-08-28
Payer: MEDICARE

## 2024-08-28 VITALS
RESPIRATION RATE: 18 BRPM | WEIGHT: 272 LBS | HEART RATE: 79 BPM | TEMPERATURE: 98 F | HEIGHT: 68 IN | OXYGEN SATURATION: 99 % | DIASTOLIC BLOOD PRESSURE: 80 MMHG | BODY MASS INDEX: 41.22 KG/M2 | SYSTOLIC BLOOD PRESSURE: 100 MMHG

## 2024-08-28 DIAGNOSIS — E11.9 TYPE 2 DIABETES MELLITUS WITHOUT COMPLICATION, WITHOUT LONG-TERM CURRENT USE OF INSULIN (HCC): Primary | ICD-10-CM

## 2024-08-28 DIAGNOSIS — E66.01 CLASS 3 SEVERE OBESITY DUE TO EXCESS CALORIES WITH SERIOUS COMORBIDITY AND BODY MASS INDEX (BMI) OF 40.0 TO 44.9 IN ADULT (HCC): ICD-10-CM

## 2024-08-28 DIAGNOSIS — L40.50 PSORIATIC ARTHRITIS (HCC): ICD-10-CM

## 2024-08-28 DIAGNOSIS — I10 ESSENTIAL HYPERTENSION: ICD-10-CM

## 2024-08-28 DIAGNOSIS — E03.9 HYPOTHYROIDISM, UNSPECIFIED TYPE: ICD-10-CM

## 2024-08-28 PROCEDURE — G2211 COMPLEX E/M VISIT ADD ON: HCPCS | Performed by: INTERNAL MEDICINE

## 2024-08-28 PROCEDURE — 99214 OFFICE O/P EST MOD 30 MIN: CPT | Performed by: INTERNAL MEDICINE

## 2024-08-28 NOTE — PROGRESS NOTES
Diabetic Foot Exam    Patient's shoes and socks removed.    Right Foot/Ankle   Right Foot Inspection  Skin Exam: skin normal and skin intact. No dry skin, no warmth, no callus, no erythema, no maceration, no abnormal color, no pre-ulcer, no ulcer and no callus.     Toe Exam: ROM and strength within normal limits.     Sensory   Monofilament testing: intact    Vascular  Capillary refills: < 3 seconds  The right DP pulse is 2+. The right PT pulse is 2+.     Left Foot/Ankle  Left Foot Inspection  Skin Exam: skin normal and skin intact. No warmth, no erythema, no maceration, normal color, no pre-ulcer, no ulcer and no callus.     Toe Exam: ROM and strength within normal limits.     Sensory   Monofilament testing: intact    Vascular  Capillary refills: < 3 seconds  The left DP pulse is 2+. The left PT pulse is 2+.     Assessment/Plan:    Type 2 diabetes mellitus without complication, without long-term current use of insulin ([facility])    Lab Results   Component Value Date    HGBA1C 7.5 (H) 08/26/2024   I have counselled the pt to follow a healthy and balanced diet ,and recommend routine exercise.  Target A1c under 7 we will start Jardiance 10 mg once daily reviewed the risk benefits and side effects of medication continue metformin  mg 3 tablets daily unable to tolerate 4 tablets daily secondary to diarrhea I will be ordering diabetic laboratories including comprehensive metabolic panel, hemoglobin A1c, urine microalbumin, lipid panel.    Essential hypertension  Hypertension - controlled, I have counseled patient following healthy balance diet, I would like the patient reduce sodium, exercise routinely, I would like the patient continued the med current medical regiment and we will continue to monitor.  Blood pressure today 100/80 heart rate 79    Class 3 severe obesity due to excess calories with serious comorbidity and body mass index (BMI) of 40.0 to 44.9 in adult ([facility])  Obesity -I have counseled patient following  healthy and balanced diet, I would like the patient to lose weight, I would like the patient exercise routinely; we will continue monitor the patient's progress.    Hypothyroidism  Hypothyroidism controlled the patient is currently euthyroid I will be ordering a TSH prior to the next office visit and the patient will continue with current medical regiment; we will continue to monitor the patient's progress.    Psoriatic arthritis (HCC)  Clinically stable and doing well continue the current medical regiment will continue monitor.  Working with rheumatologist Dr. Pabon reviewed laboratories    Today we have made an agreement that she will significantly reduce the amount of bread in her diet which has been on a regular basis currently we have agreed to do 1 thing in the next 6 months which will help to optimize her A1c we will also add Jardiance 10 mg once daily     Problem List Items Addressed This Visit        Cardiovascular and Mediastinum    Essential hypertension     Hypertension - controlled, I have counseled patient following healthy balance diet, I would like the patient reduce sodium, exercise routinely, I would like the patient continued the med current medical regiment and we will continue to monitor.  Blood pressure today 100/80 heart rate 79            Endocrine    Type 2 diabetes mellitus without complication, without long-term current use of insulin ([facility]) - Primary       Lab Results   Component Value Date    HGBA1C 7.5 (H) 08/26/2024   I have counselled the pt to follow a healthy and balanced diet ,and recommend routine exercise.  Target A1c under 7 we will start Jardiance 10 mg once daily reviewed the risk benefits and side effects of medication continue metformin  mg 3 tablets daily unable to tolerate 4 tablets daily secondary to diarrhea I will be ordering diabetic laboratories including comprehensive metabolic panel, hemoglobin A1c, urine microalbumin, lipid panel.         Relevant  Medications    Empagliflozin (JARDIANCE) 10 MG TABS tablet    Other Relevant Orders    Albumin / creatinine urine ratio    Comprehensive metabolic panel    Hemoglobin A1C    Lipid Panel with Direct LDL reflex    Hypothyroidism     Hypothyroidism controlled the patient is currently euthyroid I will be ordering a TSH prior to the next office visit and the patient will continue with current medical regiment; we will continue to monitor the patient's progress.            Musculoskeletal and Integument    Psoriatic arthritis (HCC)     Clinically stable and doing well continue the current medical regiment will continue monitor.  Working with rheumatologist Dr. Pabon reviewed laboratories            Other    Class 3 severe obesity due to excess calories with serious comorbidity and body mass index (BMI) of 40.0 to 44.9 in adult (HCC)     Obesity -I have counseled patient following healthy and balanced diet, I would like the patient to lose weight, I would like the patient exercise routinely; we will continue monitor the patient's progress.              RTO in 6 months call if any problems  Subjective:      Patient ID: Sujey Webber is a 73 y.o. female.    HPI 73-year old female coming in for a follow up office visit regarding type 2 diabetes, essential hypertension, class III obesity, hypothyroidism and psoriatic arthritis the patient reports me compliant taking medications without untoward side effects the.  The patient is here to review his medical condition, update me on the medical condition and the patient reports me no hospitalizations and no ER visits.  No recent illnesses.  Reviewed laboratories and also to report updates from previous visit patient reports me trying to follow healthy balanced diet although could be better not as active as usual working with rheumatologist Dr. Pabon regarding psoriatic arthritis.  Hemoglobin A1c has increased she is unable to tolerate higher dose of metformin 4 tablets  daily secondary to diarrhea patient is willing to start a new medication to help control her diabetes better.    The following portions of the patient's history were reviewed and updated as appropriate: allergies, current medications, past family history, past medical history, past social history, past surgical history and problem list.    Review of Systems   Constitutional:  Negative for activity change, appetite change and unexpected weight change.   HENT:  Negative for congestion and postnasal drip.    Eyes:  Negative for visual disturbance.   Respiratory:  Negative for cough and shortness of breath.    Cardiovascular:  Negative for chest pain.   Gastrointestinal:  Negative for abdominal pain, diarrhea, nausea and vomiting.   Neurological:  Negative for dizziness, light-headedness and headaches.   Hematological:  Negative for adenopathy.         Objective:    Return in about 6 months (around 2/28/2025), or if symptoms worsen or fail to improve.    No results found.      No Known Allergies    Past Medical History:   Diagnosis Date   • Arthritis 2005   • Breast cancer (HCC) 17 yrs ago    left   • Cancer (HCC) 2006   • Diabetes mellitus (HCC) 2014    Type 2 Estimated. On metformin   • Disease of thyroid gland 1995   • Heart murmur 1969   • History of radiation therapy 09/01/2006     Past Surgical History:   Procedure Laterality Date   • BIOPSY CORE NEEDLE Right 03/03/2010    benign   • BREAST CYST ASPIRATION Right 08/15/2003   • BREAST CYST ASPIRATION Right     x3  (Years ago)   • BREAST LUMPECTOMY Left 08/22/2006    positive   • HERNIA REPAIR  1951   • MAMMO STEREOTACTIC BREAST BIOPSY LEFT (ALL INC) Left 08/02/2006    malignant   • TUBAL LIGATION  1983   • US GUIDED BREAST BIOPSY LEFT COMPLETE Left 02/06/2009    benign     Current Outpatient Medications on File Prior to Visit   Medication Sig Dispense Refill   • Cholecalciferol (VITAMIN D) 2000 units CAPS Take 1 capsule by mouth daily     • folic acid (FOLVITE) 1  mg tablet Take 1 tablet by mouth daily     • levothyroxine 88 mcg tablet TAKE 1 TABLET BY MOUTH EVERY DAY 30 tablet 5   • lisinopril (ZESTRIL) 5 mg tablet TAKE 1 TABLET BY MOUTH EVERY DAY 30 tablet 5   • metFORMIN (GLUCOPHAGE-XR) 500 mg 24 hr tablet TAKE 3 TABLETS (1,500 MG TOTAL) BY MOUTH DAILY FOR DIABETES 270 tablet 1   • methotrexate 2.5 mg tablet 8 tabs once a week     • simvastatin (ZOCOR) 20 mg tablet TAKE 1 TABLET BY MOUTH EVERY DAY 90 tablet 1   • Xarelto 20 MG tablet TAKE 1 TABLET BY MOUTH EVERY DAY WITH BREAKFAST 90 tablet 1   • clobetasol (TEMOVATE) 0.05 % cream APPLY TO PSORIASIS SKIN PATCHES TWICE A DAY AS NEEDED     • [DISCONTINUED] aspirin 325 mg tablet Take 1 tablet by mouth daily       No current facility-administered medications on file prior to visit.     Family History   Problem Relation Age of Onset   • Breast cancer Mother 53        Also mat aunt   • Heart disease Father    • Ovarian cancer Sister 63   • Lung cancer Sister    • No Known Problems Sister    • No Known Problems Sister    • No Known Problems Daughter    • No Known Problems Maternal Grandmother    • Heart disease Maternal Grandfather    • Breast cancer Paternal Grandmother 90   • Dementia Paternal Grandmother    • Heart disease Paternal Grandfather    • No Known Problems Son    • Breast cancer Maternal Aunt 43   • Stomach cancer Maternal Uncle    • Skin cancer Maternal Uncle    • Breast cancer Paternal Aunt    • Skin cancer Paternal Aunt    • Breast cancer Cousin 71   • Breast cancer Cousin 73   • Stroke Maternal Aunt    • Heart disease Maternal Uncle    • Cancer Sister         Lung cancer     Social History     Socioeconomic History   • Marital status:      Spouse name: Not on file   • Number of children: Not on file   • Years of education: Not on file   • Highest education level: Not on file   Occupational History   • Not on file   Tobacco Use   • Smoking status: Never   • Smokeless tobacco: Never   Vaping Use   • Vaping  "status: Never Used   Substance and Sexual Activity   • Alcohol use: Not Currently     Comment: Few drinks per year   • Drug use: Never   • Sexual activity: Not Currently   Other Topics Concern   • Not on file   Social History Narrative   • Not on file     Social Determinants of Health     Financial Resource Strain: Low Risk  (2/28/2024)    Overall Financial Resource Strain (CARDIA)    • Difficulty of Paying Living Expenses: Not hard at all   Food Insecurity: Not on file   Transportation Needs: No Transportation Needs (2/28/2024)    PRAPARE - Transportation    • Lack of Transportation (Medical): No    • Lack of Transportation (Non-Medical): No   Physical Activity: Not on file   Stress: Not on file   Social Connections: Not on file   Intimate Partner Violence: Not on file   Housing Stability: Not on file     Vitals:    08/28/24 1141   BP: 100/80   BP Location: Left arm   Patient Position: Sitting   Cuff Size: Large   Pulse: 79   Resp: 18   Temp: 98 °F (36.7 °C)   TempSrc: Tympanic   SpO2: 99%   Weight: 123 kg (272 lb)   Height: 5' 7.8\" (1.722 m)     Results for orders placed or performed in visit on 08/26/24   Comprehensive metabolic panel   Result Value Ref Range    Sodium 138 135 - 147 mmol/L    Potassium 5.0 3.5 - 5.3 mmol/L    Chloride 103 96 - 108 mmol/L    CO2 29 21 - 32 mmol/L    ANION GAP 6 4 - 13 mmol/L    BUN 16 5 - 25 mg/dL    Creatinine 1.03 0.60 - 1.30 mg/dL    Glucose, Fasting 146 (H) 65 - 99 mg/dL    Calcium 9.8 8.4 - 10.2 mg/dL    AST 11 (L) 13 - 39 U/L    ALT 11 7 - 52 U/L    Alkaline Phosphatase 85 34 - 104 U/L    Total Protein 7.0 6.4 - 8.4 g/dL    Albumin 3.9 3.5 - 5.0 g/dL    Total Bilirubin 0.85 0.20 - 1.00 mg/dL    eGFR 54 ml/min/1.73sq m   Lipid Panel with Direct LDL reflex   Result Value Ref Range    Cholesterol 162 See Comment mg/dL    Triglycerides 79 See Comment mg/dL    HDL, Direct 52 >=50 mg/dL    LDL Calculated 94 0 - 100 mg/dL   Hemoglobin A1C   Result Value Ref Range    Hemoglobin A1C " "7.5 (H) Normal 4.0-5.6%; PreDiabetic 5.7-6.4%; Diabetic >=6.5%; Glycemic control for adults with diabetes <7.0% %     mg/dl   Albumin / creatinine urine ratio   Result Value Ref Range    Creatinine, Ur 106.7 Reference range not established. mg/dL    Albumin,U,Random 44.0 (H) <20.0 mg/L    Albumin Creat Ratio 41 (H) 0 - 30 mg/g creatinine   CBC and differential   Result Value Ref Range    WBC 6.18 4.31 - 10.16 Thousand/uL    RBC 4.04 3.81 - 5.12 Million/uL    Hemoglobin 12.3 11.5 - 15.4 g/dL    Hematocrit 39.0 34.8 - 46.1 %    MCV 97 82 - 98 fL    MCH 30.4 26.8 - 34.3 pg    MCHC 31.5 31.4 - 37.4 g/dL    RDW 14.6 11.6 - 15.1 %    MPV 10.3 8.9 - 12.7 fL    Platelets 307 149 - 390 Thousands/uL    nRBC 0 /100 WBCs    Segmented % 72 43 - 75 %    Immature Grans % 0 0 - 2 %    Lymphocytes % 18 14 - 44 %    Monocytes % 7 4 - 12 %    Eosinophils Relative 2 0 - 6 %    Basophils Relative 1 0 - 1 %    Absolute Neutrophils 4.46 1.85 - 7.62 Thousands/µL    Absolute Immature Grans 0.02 0.00 - 0.20 Thousand/uL    Absolute Lymphocytes 1.11 0.60 - 4.47 Thousands/µL    Absolute Monocytes 0.44 0.17 - 1.22 Thousand/µL    Eosinophils Absolute 0.09 0.00 - 0.61 Thousand/µL    Basophils Absolute 0.06 0.00 - 0.10 Thousands/µL   Sedimentation rate, automated   Result Value Ref Range    Sed Rate 46 (H) 0 - 29 mm/hour   C-reactive protein   Result Value Ref Range    CRP 5.1 (H) <3.0 mg/L   Bilirubin, direct   Result Value Ref Range    Bilirubin, Direct 0.09 0.00 - 0.20 mg/dL     Weight (last 2 days)     None        Body mass index is 41.6 kg/m².  BP      Temp      Pulse     Resp      SpO2        Vitals:    08/28/24 1141   Weight: 123 kg (272 lb)     Vitals:    08/28/24 1141   Weight: 123 kg (272 lb)       /80 (BP Location: Left arm, Patient Position: Sitting, Cuff Size: Large)   Pulse 79   Temp 98 °F (36.7 °C) (Tympanic)   Resp 18   Ht 5' 7.8\" (1.722 m)   Wt 123 kg (272 lb)   SpO2 99%   BMI 41.60 kg/m²          Physical " Exam  Vitals and nursing note reviewed.   Constitutional:       General: She is not in acute distress.     Appearance: Normal appearance. She is well-developed. She is obese. She is not ill-appearing, toxic-appearing or diaphoretic.   HENT:      Head: Normocephalic.      Mouth/Throat:      Mouth: Oropharynx is clear and moist.   Eyes:      General: No scleral icterus.        Right eye: No discharge.         Left eye: No discharge.      Conjunctiva/sclera: Conjunctivae normal.      Pupils: Pupils are equal, round, and reactive to light.   Cardiovascular:      Rate and Rhythm: Normal rate and regular rhythm.      Pulses:           Dorsalis pedis pulses are 2+ on the right side and 2+ on the left side.        Posterior tibial pulses are 2+ on the right side and 2+ on the left side.      Heart sounds: Normal heart sounds. No murmur heard.     No friction rub. No gallop.   Pulmonary:      Effort: No respiratory distress.      Breath sounds: Normal breath sounds. No wheezing or rales.   Abdominal:      General: Bowel sounds are normal. There is no distension.      Palpations: Abdomen is soft. There is no mass.      Tenderness: There is no abdominal tenderness. There is no guarding or rebound.   Musculoskeletal:         General: No deformity or edema.      Cervical back: Neck supple.   Feet:      Right foot:      Skin integrity: No ulcer, skin breakdown, erythema, warmth, callus or dry skin.      Left foot:      Skin integrity: No ulcer, skin breakdown, erythema, warmth or callus.   Lymphadenopathy:      Cervical: No cervical adenopathy.   Neurological:      Mental Status: She is alert.      Coordination: Coordination normal.

## 2024-08-28 NOTE — PATIENT INSTRUCTIONS
"Patient Education     Type 2 diabetes   The Basics   Written by the doctors and editors at [facility]   What is type 2 diabetes? -- This is a disorder that disrupts the way the body uses sugar. It is sometimes called type 2 diabetes mellitus.  All of the cells in the body need sugar to work normally. Sugar gets into the cells with the help of a hormone called insulin. Insulin is made by the pancreas, an organ in the belly. If there is not enough insulin, or if cells in the body don't respond normally to insulin, sugar builds up in the blood. That is what happens to people with diabetes.  There are 2 different types of diabetes:   In type 1 diabetes, the pancreas makes little or no insulin.   In type 2 diabetes, the pancreas still makes some insulin, but the cells in the body stop responding normally. Eventually, the pancreas cannot make enough insulin to keep up.  Having excess body weight or obesity increases a person's risk of developing type 2 diabetes. But people without excess body weight can get diabetes, too.  What are the symptoms of type 2 diabetes? -- Type 2 diabetes usually causes no symptoms. When symptoms do happen, they include:   Needing to urinate often   Intense thirst   Blurry vision  Can diabetes lead to other health problems? -- Yes. Type 2 diabetes might not make you feel sick. But if it is not managed, it can lead to serious problems over time, such as:   Heart attacks   Strokes   Kidney disease   Vision problems (or even blindness)   Pain or loss of feeling in the hands and feet   Needing to have fingers, toes, or other body parts removed (amputated)  How do I know if I have type 2 diabetes? -- Your doctor or nurse can do a blood test. There are 2 tests that can be used for this. Both involve measuring the amount of sugar in your blood, called your \"blood sugar\" or \"blood glucose\":   One of the tests measures your blood sugar at the time the blood sample is taken. This test is done in the " "morning. You can't eat or drink anything except water for at least 8 hours before the test.   The other test shows what your average blood sugar has been for the past 2 to 3 months. This blood test is called \"hemoglobin A1C\" or just \"A1C.\" It can be checked at any time of the day, even if you have recently eaten.  How is type 2 diabetes treated? -- The goals of treatment are to manage your blood sugar and lower the risk of future problems that can happen in people with diabetes.  Treatment might include:   Lifestyle changes - This is an important part of managing diabetes. It includes eating healthy foods and getting plenty of physical activity.   Medicines - There are a few medicines that help lower blood sugar. Some people need to take pills that help the body make more insulin or that help insulin do its job. Others need insulin shots.  Depending on what medicines you take, you might need to check your blood sugar regularly at home. But not everyone with type 2 diabetes needs to do this. Your doctor or nurse will tell you if you should be checking your blood sugar, and when and how to do this.  Sometimes, people with type 2 diabetes also need medicines to help prevent problems caused by the disease. For instance, medicines used to lower blood pressure can reduce the chances of a heart attack or stroke.   General medical care - It's also important to take care of other areas of your health. This includes watching your blood pressure and cholesterol levels. You should also get certain vaccines, such as vaccines to protect against the flu and coronavirus disease 2019 (\"COVID-19\"). Some people also need a vaccine to prevent pneumonia.  Can type 2 diabetes be prevented? -- Yes. To lower your chances of getting type 2 diabetes, the most important thing you can do is eat a healthy diet and get plenty of physical activity. This can help you lose weight if you are overweight. But eating well and being active are also good " for your overall health. Even gentle activity, like walking, has benefits.  If you smoke, quitting can also lower your risk of type 2 diabetes. Quitting smoking can be difficult, but your doctor or nurse can help.  All topics are updated as new evidence becomes available and our peer review process is complete.  This topic retrieved from [employer] on: Apr 24, 2024.  Topic 28518 Version 23.0  Release: 32.3.2 - C32.113  © 2024 UpToDate, [employer] and/or its affiliates. All rights reserved.  Consumer Information Use and Disclaimer   Disclaimer: This generalized information is a limited summary of diagnosis, treatment, and/or medication information. It is not meant to be comprehensive and should be used as a tool to help the user understand and/or assess potential diagnostic and treatment options. It does NOT include all information about conditions, treatments, medications, side effects, or risks that may apply to a specific patient. It is not intended to be medical advice or a substitute for the medical advice, diagnosis, or treatment of a health care provider based on the health care provider's examination and assessment of a patient's specific and unique circumstances. Patients must speak with a health care provider for complete information about their health, medical questions, and treatment options, including any risks or benefits regarding use of medications. This information does not endorse any treatments or medications as safe, effective, or approved for treating a specific patient. Up[employer], Inc[employer] and its affiliates disclaim any warranty or liability relating to this information or the use thereof.The use of this information is governed by the Terms of Use, available at https://www.wolterskluwer.com/en/know/clinical-effectiveness-terms. 2024© UpToDate, Inc. and its affiliates and/or licensors. All rights reserved.  Copyright   © 2024 Up[employer], [employer] and/or its affiliates. All rights reserved.

## 2024-09-02 NOTE — ASSESSMENT & PLAN NOTE
Lab Results   Component Value Date    HGBA1C 7.5 (H) 08/26/2024   I have counselled the pt to follow a healthy and balanced diet ,and recommend routine exercise.  Target A1c under 7 we will start Jardiance 10 mg once daily reviewed the risk benefits and side effects of medication continue metformin  mg 3 tablets daily unable to tolerate 4 tablets daily secondary to diarrhea I will be ordering diabetic laboratories including comprehensive metabolic panel, hemoglobin A1c, urine microalbumin, lipid panel.

## 2024-09-02 NOTE — ASSESSMENT & PLAN NOTE
Clinically stable and doing well continue the current medical regiment will continue monitor.  Working with rheumatologist Dr. Pabon reviewed laboratories

## 2024-09-02 NOTE — ASSESSMENT & PLAN NOTE
Hypertension - controlled, I have counseled patient following healthy balance diet, I would like the patient reduce sodium, exercise routinely, I would like the patient continued the med current medical regiment and we will continue to monitor.  Blood pressure today 100/80 heart rate 79

## 2024-09-10 ENCOUNTER — HOSPITAL ENCOUNTER (OUTPATIENT)
Dept: RADIOLOGY | Age: 74
Discharge: HOME | End: 2024-09-10
Payer: MEDICARE

## 2024-09-10 ENCOUNTER — OFFICE VISIT (OUTPATIENT)
Dept: URGENT CARE | Age: 74
End: 2024-09-10
Payer: MEDICARE

## 2024-09-10 ENCOUNTER — APPOINTMENT (EMERGENCY)
Dept: RADIOLOGY | Facility: HOSPITAL | Age: 74
End: 2024-09-10
Payer: MEDICARE

## 2024-09-10 ENCOUNTER — HOSPITAL ENCOUNTER (OUTPATIENT)
Facility: HOSPITAL | Age: 74
Setting detail: OBSERVATION
Discharge: HOME | End: 2024-09-11
Attending: EMERGENCY MEDICINE | Admitting: FAMILY MEDICINE
Payer: MEDICARE

## 2024-09-10 VITALS
SYSTOLIC BLOOD PRESSURE: 110 MMHG | HEART RATE: 68 BPM | OXYGEN SATURATION: 99 % | DIASTOLIC BLOOD PRESSURE: 50 MMHG | TEMPERATURE: 98.4 F | RESPIRATION RATE: 16 BRPM

## 2024-09-10 VITALS — HEIGHT: 67 IN | WEIGHT: 272 LBS | BODY MASS INDEX: 42.69 KG/M2

## 2024-09-10 DIAGNOSIS — Z12.31 ENCOUNTER FOR SCREENING MAMMOGRAM FOR BREAST CANCER: ICD-10-CM

## 2024-09-10 DIAGNOSIS — R01.1 NEWLY RECOGNIZED HEART MURMUR: ICD-10-CM

## 2024-09-10 DIAGNOSIS — R42 LIGHTHEADED: Primary | ICD-10-CM

## 2024-09-10 DIAGNOSIS — E11.9 TYPE 2 DIABETES MELLITUS WITHOUT COMPLICATION, WITHOUT LONG-TERM CURRENT USE OF INSULIN (HCC): ICD-10-CM

## 2024-09-10 DIAGNOSIS — R55 NEAR SYNCOPE: Primary | ICD-10-CM

## 2024-09-10 LAB
2HR DELTA HS TROPONIN: 1 NG/L
4HR DELTA HS TROPONIN: 0 NG/L
ALBUMIN SERPL BCG-MCNC: 4.1 G/DL (ref 3.5–5)
ALP SERPL-CCNC: 87 U/L (ref 34–104)
ALT SERPL W P-5'-P-CCNC: 11 U/L (ref 7–52)
ANION GAP SERPL CALCULATED.3IONS-SCNC: 6 MMOL/L (ref 4–13)
AST SERPL W P-5'-P-CCNC: 11 U/L (ref 13–39)
ATRIAL RATE: 73 BPM
ATRIAL RATE: 84 BPM
BASOPHILS # BLD AUTO: 0.03 THOUSANDS/ÂΜL (ref 0–0.1)
BASOPHILS NFR BLD AUTO: 0 % (ref 0–1)
BILIRUB SERPL-MCNC: 0.76 MG/DL (ref 0.2–1)
BUN SERPL-MCNC: 20 MG/DL (ref 5–25)
CALCIUM SERPL-MCNC: 9.5 MG/DL (ref 8.4–10.2)
CARDIAC TROPONIN I PNL SERPL HS: 3 NG/L
CARDIAC TROPONIN I PNL SERPL HS: 3 NG/L
CARDIAC TROPONIN I PNL SERPL HS: 4 NG/L
CHLORIDE SERPL-SCNC: 104 MMOL/L (ref 96–108)
CO2 SERPL-SCNC: 29 MMOL/L (ref 21–32)
CREAT SERPL-MCNC: 1.1 MG/DL (ref 0.6–1.3)
D DIMER PPP FEU-MCNC: <0.27 UG/ML FEU
EOSINOPHIL # BLD AUTO: 0.05 THOUSAND/ÂΜL (ref 0–0.61)
EOSINOPHIL NFR BLD AUTO: 1 % (ref 0–6)
ERYTHROCYTE [DISTWIDTH] IN BLOOD BY AUTOMATED COUNT: 14.8 % (ref 11.6–15.1)
GFR SERPL CREATININE-BSD FRML MDRD: 50 ML/MIN/1.73SQ M
GLUCOSE SERPL-MCNC: 133 MG/DL (ref 65–140)
GLUCOSE SERPL-MCNC: 159 MG/DL (ref 65–140)
GLUCOSE SERPL-MCNC: 171 MG/DL (ref 65–140)
HCT VFR BLD AUTO: 39.3 % (ref 34.8–46.1)
HGB BLD-MCNC: 12.6 G/DL (ref 11.5–15.4)
IMM GRANULOCYTES # BLD AUTO: 0.02 THOUSAND/UL (ref 0–0.2)
IMM GRANULOCYTES NFR BLD AUTO: 0 % (ref 0–2)
LYMPHOCYTES # BLD AUTO: 0.6 THOUSANDS/ÂΜL (ref 0.6–4.47)
LYMPHOCYTES NFR BLD AUTO: 8 % (ref 14–44)
MCH RBC QN AUTO: 31.3 PG (ref 26.8–34.3)
MCHC RBC AUTO-ENTMCNC: 32.1 G/DL (ref 31.4–37.4)
MCV RBC AUTO: 98 FL (ref 82–98)
MONOCYTES # BLD AUTO: 0.65 THOUSAND/ÂΜL (ref 0.17–1.22)
MONOCYTES NFR BLD AUTO: 8 % (ref 4–12)
NEUTROPHILS # BLD AUTO: 6.63 THOUSANDS/ÂΜL (ref 1.85–7.62)
NEUTS SEG NFR BLD AUTO: 83 % (ref 43–75)
NRBC BLD AUTO-RTO: 0 /100 WBCS
P AXIS: 45 DEGREES
P AXIS: 46 DEGREES
PLATELET # BLD AUTO: 281 THOUSANDS/UL (ref 149–390)
PMV BLD AUTO: 10.3 FL (ref 8.9–12.7)
POTASSIUM SERPL-SCNC: 5.2 MMOL/L (ref 3.5–5.3)
PR INTERVAL: 188 MS
PR INTERVAL: 190 MS
PROT SERPL-MCNC: 7.2 G/DL (ref 6.4–8.4)
QRS AXIS: -6 DEGREES
QRS AXIS: 6 DEGREES
QRSD INTERVAL: 80 MS
QRSD INTERVAL: 88 MS
QT INTERVAL: 384 MS
QT INTERVAL: 390 MS
QTC INTERVAL: 429 MS
QTC INTERVAL: 453 MS
RBC # BLD AUTO: 4.03 MILLION/UL (ref 3.81–5.12)
SODIUM SERPL-SCNC: 139 MMOL/L (ref 135–147)
T WAVE AXIS: 13 DEGREES
T WAVE AXIS: 18 DEGREES
TSH SERPL DL<=0.05 MIU/L-ACNC: 2.56 UIU/ML (ref 0.45–4.5)
VENTRICULAR RATE: 73 BPM
VENTRICULAR RATE: 84 BPM
WBC # BLD AUTO: 7.98 THOUSAND/UL (ref 4.31–10.16)

## 2024-09-10 PROCEDURE — 93010 ELECTROCARDIOGRAM REPORT: CPT | Performed by: STUDENT IN AN ORGANIZED HEALTH CARE EDUCATION/TRAINING PROGRAM

## 2024-09-10 PROCEDURE — 99223 1ST HOSP IP/OBS HIGH 75: CPT | Performed by: FAMILY MEDICINE

## 2024-09-10 PROCEDURE — 71046 X-RAY EXAM CHEST 2 VIEWS: CPT

## 2024-09-10 PROCEDURE — 80053 COMPREHEN METABOLIC PANEL: CPT

## 2024-09-10 PROCEDURE — 96365 THER/PROPH/DIAG IV INF INIT: CPT

## 2024-09-10 PROCEDURE — 84484 ASSAY OF TROPONIN QUANT: CPT | Performed by: FAMILY MEDICINE

## 2024-09-10 PROCEDURE — 99215 OFFICE O/P EST HI 40 MIN: CPT

## 2024-09-10 PROCEDURE — 77067 SCR MAMMO BI INCL CAD: CPT

## 2024-09-10 PROCEDURE — 85025 COMPLETE CBC W/AUTO DIFF WBC: CPT

## 2024-09-10 PROCEDURE — 84443 ASSAY THYROID STIM HORMONE: CPT

## 2024-09-10 PROCEDURE — 77063 BREAST TOMOSYNTHESIS BI: CPT

## 2024-09-10 PROCEDURE — 93005 ELECTROCARDIOGRAM TRACING: CPT

## 2024-09-10 PROCEDURE — 36415 COLL VENOUS BLD VENIPUNCTURE: CPT

## 2024-09-10 PROCEDURE — 82948 REAGENT STRIP/BLOOD GLUCOSE: CPT

## 2024-09-10 PROCEDURE — G0463 HOSPITAL OUTPT CLINIC VISIT: HCPCS

## 2024-09-10 PROCEDURE — 85379 FIBRIN DEGRADATION QUANT: CPT

## 2024-09-10 PROCEDURE — 99285 EMERGENCY DEPT VISIT HI MDM: CPT | Performed by: EMERGENCY MEDICINE

## 2024-09-10 PROCEDURE — 84484 ASSAY OF TROPONIN QUANT: CPT

## 2024-09-10 PROCEDURE — 99284 EMERGENCY DEPT VISIT MOD MDM: CPT

## 2024-09-10 RX ORDER — PRAVASTATIN SODIUM 10 MG
10 TABLET ORAL
Status: DISCONTINUED | OUTPATIENT
Start: 2024-09-10 | End: 2024-09-11 | Stop reason: HOSPADM

## 2024-09-10 RX ORDER — SODIUM CHLORIDE, SODIUM GLUCONATE, SODIUM ACETATE, POTASSIUM CHLORIDE, MAGNESIUM CHLORIDE, SODIUM PHOSPHATE, DIBASIC, AND POTASSIUM PHOSPHATE .53; .5; .37; .037; .03; .012; .00082 G/100ML; G/100ML; G/100ML; G/100ML; G/100ML; G/100ML; G/100ML
1000 INJECTION, SOLUTION INTRAVENOUS ONCE
Status: COMPLETED | OUTPATIENT
Start: 2024-09-10 | End: 2024-09-10

## 2024-09-10 RX ORDER — INSULIN LISPRO 100 [IU]/ML
1-6 INJECTION, SOLUTION INTRAVENOUS; SUBCUTANEOUS
Status: DISCONTINUED | OUTPATIENT
Start: 2024-09-10 | End: 2024-09-11 | Stop reason: HOSPADM

## 2024-09-10 RX ORDER — LEVOTHYROXINE SODIUM 88 UG/1
88 TABLET ORAL DAILY
Status: DISCONTINUED | OUTPATIENT
Start: 2024-09-11 | End: 2024-09-11 | Stop reason: HOSPADM

## 2024-09-10 RX ADMIN — SODIUM CHLORIDE, SODIUM GLUCONATE, SODIUM ACETATE, POTASSIUM CHLORIDE, MAGNESIUM CHLORIDE, SODIUM PHOSPHATE, DIBASIC, AND POTASSIUM PHOSPHATE 1000 ML: .53; .5; .37; .037; .03; .012; .00082 INJECTION, SOLUTION INTRAVENOUS at 17:25

## 2024-09-10 NOTE — H&P
H&P - Hospitalist   Name: Sujey Webber 73 y.o. female I MRN: [id number]  Unit/Bed#: QCC I Date of Admission: 9/10/2024   Date of Service: 9/10/2024 I Hospital Day: 0     Assessment & Plan  Type 2 diabetes mellitus without complication, without long-term current use of insulin (HCC)  Lab Results   Component Value Date    HGBA1C 7.5 (H) 08/26/2024       Recent Labs     09/10/24  1848   POCGLU 171*       Blood Sugar Average: Last 72 hrs:  (P) 171  Hold metformin and hold Jardiance  Insulin sliding scale    Hyperlipidemia  Continue statin  Hypothyroidism  Continue levothyroxine 88 mcg daily  Essential hypertension  BP soft, hold Lisinopril at this time  Factor 5 Leiden mutation, heterozygous (HCC)  Continue Xarelto  Pre-syncope  Noted while getting a mammogram  Check orthostatic Bps  Labs WNL  PE Unremarkable  Tele  Echo    VTE Pharmacologic Prophylaxis:   High Risk (Score >/= 5) - Pharmacological DVT Prophylaxis Ordered: rivaroxaban (Xarelto). Sequential Compression Devices Ordered.  Code Status: Level 1 - Full Code Full code  Discussion with family: Patient declined call to .     Anticipated Length of Stay: Patient will be admitted on an observation basis with an anticipated length of stay of less than 2 midnights secondary to Pre syncope.    History of Present Illness   Chief Complaint: Pre-Syncope    Sujey Webber is a 73 y.o. female with a PMH of type 2 diabetes mellitus, hyperlipidemia, hypothyroidism, hypertension, factor V Ley[place] who presents with presyncopal episode.  Patient was getting her mammogram done and had a presyncopal episode.  Patient had initial workup in the emergency department which was unremarkable however patient had a soft blood pressure.  Patient denies any chest pain, shortness of breath, fever, chills, nausea or vomiting.  Patient states that the presyncopal feeling occurred primarily when she sat up.    Review of Systems   Constitutional:  Negative for chills and  fever.   HENT:  Negative for ear pain and sore throat.    Eyes:  Negative for pain and visual disturbance.   Respiratory:  Negative for cough and shortness of breath.    Cardiovascular:  Negative for chest pain and palpitations.   Gastrointestinal:  Negative for abdominal pain and vomiting.   Genitourinary:  Negative for dysuria and hematuria.   Musculoskeletal:  Negative for arthralgias and back pain.   Skin:  Negative for color change and rash.   Neurological:  Positive for dizziness and light-headedness. Negative for seizures and syncope.   All other systems reviewed and are negative.      I have reviewed the patient's PMH, PSH, Social History, Family History, Meds, and Allergies  Historical Information   Past Medical History:   Diagnosis Date    Arthritis 2005    Breast cancer (HCC) 17 yrs ago    left    Cancer (HCC) 2006    Diabetes mellitus (HCC) 2014    Type 2 Estimated. On metformin    Disease of thyroid gland 1995    Heart murmur 1969    History of radiation therapy 09/01/2006     Past Surgical History:   Procedure Laterality Date    BIOPSY CORE NEEDLE Right 03/03/2010    benign    BREAST CYST ASPIRATION Right 08/15/2003    BREAST CYST ASPIRATION Right     x3  (Years ago)    BREAST LUMPECTOMY Left 08/22/2006    positive    HERNIA REPAIR  1951    MAMMO STEREOTACTIC BREAST BIOPSY LEFT (ALL INC) Left 08/02/2006    malignant    TUBAL LIGATION  1983    US GUIDED BREAST BIOPSY LEFT COMPLETE Left 02/06/2009    benign     Social History     Tobacco Use    Smoking status: Never    Smokeless tobacco: Never   Vaping Use    Vaping status: Never Used   Substance and Sexual Activity    Alcohol use: Not Currently     Comment: Few drinks per year    Drug use: Never    Sexual activity: Not Currently     E-Cigarette/Vaping    E-Cigarette Use Never User      E-Cigarette/Vaping Substances    Nicotine No     THC No     CBD No     Flavoring No     Other No     Unknown No      Family history non-contributory    Current  Facility-Administered Medications:     insulin lispro (HumALOG/ADMELOG) 100 units/mL subcutaneous injection 1-6 Units, TID AC **AND** [START ON 9/11/2024] Fingerstick Glucose (POCT), TID AC    insulin lispro (HumALOG/ADMELOG) 100 units/mL subcutaneous injection 1-6 Units, HS    [START ON 9/11/2024] levothyroxine tablet 88 mcg, Daily    pravastatin (PRAVACHOL) tablet 10 mg, Daily With Dinner    [START ON 9/11/2024] rivaroxaban (XARELTO) tablet 20 mg, Daily With Breakfast  Patient has no known allergies.  Social History:  Marital Status:    Occupation: Retired  Patient Pre-hospital Living Situation: Home, Alone  Patient Pre-hospital Level of Mobility: walks  Patient Pre-hospital Diet Restrictions: None    Objective     Vitals:   Blood Pressure: 94/50 (09/10/24 1700)  Pulse: 75 (09/10/24 1700)  Temperature: 97.6 °F (36.4 °C) (09/10/24 1214)  Temp Source: Tympanic (09/10/24 1214)  Respirations: 20 (09/10/24 1700)  SpO2: 97 % (09/10/24 1700)    Physical Exam  Constitutional:       General: She is not in acute distress.     Appearance: She is well-developed. She is not diaphoretic.   HENT:      Head: Normocephalic and atraumatic.      Right Ear: External ear normal.      Left Ear: External ear normal.      Nose: Nose normal.      Mouth/Throat:      Mouth: Oropharynx is clear and moist.   Eyes:      General:         Right eye: No discharge.         Left eye: No discharge.      Extraocular Movements: EOM normal.      Conjunctiva/sclera: Conjunctivae normal.      Pupils: Pupils are equal, round, and reactive to light.   Neck:      Thyroid: No thyromegaly.      Vascular: No JVD.      Trachea: No tracheal deviation.   Cardiovascular:      Rate and Rhythm: Normal rate and regular rhythm.      Heart sounds: Murmur heard.      No friction rub. No gallop.   Pulmonary:      Effort: Pulmonary effort is normal. No respiratory distress.      Breath sounds: Normal breath sounds. No wheezing.   Chest:      Chest wall: No  tenderness.   Abdominal:      General: Bowel sounds are normal. There is no distension.      Palpations: Abdomen is soft.      Tenderness: There is no abdominal tenderness. There is no rebound.   Musculoskeletal:         General: No tenderness or edema. Normal range of motion.      Cervical back: Normal range of motion and neck supple.   Skin:     General: Skin is warm and dry.      Findings: No erythema or rash.   Neurological:      Mental Status: She is alert and oriented to person, place, and time.      Coordination: Coordination normal.      Deep Tendon Reflexes: Reflexes are normal and symmetric.   Psychiatric:         Mood and Affect: Mood and affect normal.         Behavior: Behavior normal.         Thought Content: Thought content normal.         Lines/Drains:  Lines/Drains/Airways       Active Status       None                        Additional Data:   Lab Results: I have reviewed the following results: CBC/BMP:   .     09/10/24  1339   WBC 7.98   HGB 12.6   HCT 39.3      SODIUM 139   K 5.2      CO2 29   BUN 20   CREATININE 1.10   GLUC 133    , Troponin,BNP:  .     09/10/24  1339 09/10/24  1556   HSTNI0 3  --    HSTNI2  --  4    , TSH:   Results from last 7 days   Lab Units 09/10/24  1339   TSH 3RD GENERATON uIU/mL 2.565     Results from last 7 days   Lab Units 09/10/24  1339   WBC Thousand/uL 7.98   HEMOGLOBIN g/dL 12.6   HEMATOCRIT % 39.3   PLATELETS Thousands/uL 281   SEGS PCT % 83*   LYMPHO PCT % 8*   MONO PCT % 8   EOS PCT % 1     Results from last 7 days   Lab Units 09/10/24  1339   SODIUM mmol/L 139   POTASSIUM mmol/L 5.2   CHLORIDE mmol/L 104   CO2 mmol/L 29   BUN mg/dL 20   CREATININE mg/dL 1.10   ANION GAP mmol/L 6   CALCIUM mg/dL 9.5   ALBUMIN g/dL 4.1   TOTAL BILIRUBIN mg/dL 0.76   ALK PHOS U/L 87   ALT U/L 11   AST U/L 11*   GLUCOSE RANDOM mg/dL 133         Results from last 7 days   Lab Units 09/10/24  1848   POC GLUCOSE mg/dl 171*     Lab Results   Component Value Date    HGBA1C  7.5 (H) 08/26/2024    HGBA1C 7.0 (H) 02/26/2024    HGBA1C 7.3 (H) 08/21/2023           Imaging Review: Reviewed radiology reports from this admission including: chest xray.  Other Studies: EKG was reviewed.     Administrative Statements   I have spent a total time of 55 minutes in caring for this patient on the day of the visit/encounter including Diagnostic results, Prognosis, Risks and benefits of tx options, Patient and family education, Impressions, Documenting in the medical record, Reviewing / ordering tests, medicine, procedures  , and Communicating with other healthcare professionals .    ** Please Note: This note has been constructed using a voice recognition system. **

## 2024-09-10 NOTE — ED NOTES
Patient brought food and hot tea at this time. Repositioned.      Francine Berrios RN  09/10/24 1954

## 2024-09-10 NOTE — PATIENT INSTRUCTIONS
Go to [facility] emergency department for further evaluation.  911 has been called for you.    Blood sugar 178 mg/dL  EKG first-degree AV block at a rate of 84.  No acute ST changes.

## 2024-09-10 NOTE — ED PROVIDER NOTES
1. Near syncope    2. Newly recognized heart murmur      ED Disposition       ED Disposition   Admit    Condition   Stable    Date/Time   Tue Sep 10, 2024  6:04 PM    Comment   Case was discussed with Ronda and the patient's admission status was agreed to be Admission Status: observation status to the service of Dr. Bond .               Assessment & Plan       Medical Decision Making  Amount and/or Complexity of Data Reviewed  Labs: ordered. Decision-making details documented in ED Course.  Radiology: ordered.    Risk  Prescription drug management.  Decision regarding hospitalization.      Patient is 73 y.o. female with PMH of iabetes, hypothyroidism and DVT on Xarelto presenting to ED for evaluation of near syncope. See history and physical documented above.     Differential diagnosis included but not limited to dehydration, arrhythmia, ACS, electrolyte disturbance, anemia, hypothyroidism . Plan CBC, CMP, TSH, troponin, EKG, CXR.     View ED course above for further discussion on patient workup.     On previous review of records no murmur mentioned in recent physical exams.    All labs reviewed and utilized in the medical decision making process  All radiology studies independently viewed by me and interpreted by the radiologist.  I reviewed all testing with the patient.     Upon re-evaluation patient remains persistently hypotensive with systolic in 90s. Given new murmur, near syncope and persistent hypotension will give IVF and admit for cardiac monitoring.    Case discussed with AUSTIN and admitted for observation.                  ED Course as of 09/10/24 1804   Tue Sep 10, 2024   1419 D-Dimer, Quant: <0.27   1430 hs TnI 0hr: 3   1432 WBC: 7.98   1432 Hemoglobin: 12.6   1432 Platelet Count: 281   1432 Comprehensive metabolic panel(!)  Unremarkable    1450 TSH 3RD GENERATON: 2.565   1613 Procedure Note: EKG  Date/Time: 09/10/24 4:13 PM   Interpreted by: CHIO ALDRIDGE  Indications / Diagnosis: near  syncope  ECG reviewed by me, the ED Provider: yes   The EKG demonstrates:  Rhythm: normal sinus,73 bpm  Intervals: normal intervals  Axis: normal axis  QRS/Blocks: normal QRS  ST Changes: No acute ST Changes, no STD/LAMONT.  New T wave inversions in III, V1, V2. Poor R wave progression    1647 Delta 2hr hsTnI: 1       Medications   multi-electrolyte (ISOLYTE-S PH 7.4) bolus 1,000 mL (1,000 mL Intravenous New Bag 9/10/24 1725)       History of Present Illness       LILIANE Webber is a 73 y.o. female with past medical history of diabetes, hypothyroidism and DVT on Xarelto presenting to ED for evaluation of near syncope.  Patient reports was at mammogram and began feeling lightheaded, hot and as though her vision was going black.  Patient was assisted to chair and found to be hypotensive into the 70s.  Blood glucose was 178.  Patient brought to ED for evaluation.  Patient reports in normal state of health prior to mammogram and ate breakfast.  Patient denies fever, chills, headache, vision changes, chest pain, shortness of breath, abdominal pain, N/B, urinary complaints.  Patient denies recent surgery, recent hospitalizations, surgery, long plane/car rides.  Murmur systolic     Review of Systems   Constitutional:  Negative for chills and fever.   HENT:  Negative for ear pain and sore throat.    Eyes:  Positive for visual disturbance.   Respiratory:  Negative for cough and shortness of breath.    Cardiovascular:  Negative for chest pain, palpitations and leg swelling.   Gastrointestinal:  Negative for abdominal pain, diarrhea, nausea and vomiting.   Genitourinary:  Negative for dysuria, frequency and hematuria.   Musculoskeletal:  Negative for back pain and neck pain.   Skin:  Negative for rash.   Neurological:  Positive for light-headedness. Negative for dizziness and headaches.        Near syncope           Objective     ED Triage Vitals [09/10/24 1214]   Temperature Pulse Blood Pressure Respirations SpO2  Patient Position - Orthostatic VS   97.6 °F (36.4 °C) 75 129/60 16 99 % Sitting      Temp Source Heart Rate Source BP Location FiO2 (%) Pain Score    Tympanic Monitor Left arm -- --        Physical Exam  Vitals and nursing note reviewed.   HENT:      Head: Normocephalic and atraumatic.      Mouth/Throat:      Mouth: Mucous membranes are moist.   Eyes:      Extraocular Movements: Extraocular movements intact.      Conjunctiva/sclera: Conjunctivae normal.      Pupils: Pupils are equal, round, and reactive to light.   Cardiovascular:      Rate and Rhythm: Normal rate and regular rhythm.      Heart sounds: Murmur heard.      Systolic murmur is present.   Pulmonary:      Effort: Pulmonary effort is normal. No respiratory distress.      Breath sounds: No wheezing, rhonchi or rales.   Abdominal:      General: Abdomen is flat.      Palpations: Abdomen is soft.      Tenderness: There is no abdominal tenderness.   Musculoskeletal:         General: No swelling or tenderness.      Right lower leg: No edema.      Left lower leg: No edema.   Skin:     General: Skin is warm and dry.      Capillary Refill: Capillary refill takes less than 2 seconds.   Neurological:      Mental Status: She is alert.      GCS: GCS eye subscore is 4. GCS verbal subscore is 5. GCS motor subscore is 6.      Cranial Nerves: Cranial nerves 2-12 are intact.      Sensory: Sensation is intact.      Motor: Motor function is intact.      Comments: 5/5 strength in all extremities         Labs Reviewed   CBC AND DIFFERENTIAL - Abnormal; Notable for the following components:       Result Value    Segmented % 83 (*)     Lymphocytes % 8 (*)     All other components within normal limits   COMPREHENSIVE METABOLIC PANEL - Abnormal; Notable for the following components:    AST 11 (*)     All other components within normal limits    Narrative:     National Kidney Disease Foundation guidelines for Chronic Kidney Disease (CKD):     Stage 1 with normal or high GFR (GFR >  90 mL/min/1.73 square meters)    Stage 2 Mild CKD (GFR = 60-89 mL/min/1.73 square meters)    Stage 3A Moderate CKD (GFR = 45-59 mL/min/1.73 square meters)    Stage 3B Moderate CKD (GFR = 30-44 mL/min/1.73 square meters)    Stage 4 Severe CKD (GFR = 15-29 mL/min/1.73 square meters)    Stage 5 End Stage CKD (GFR <15 mL/min/1.73 square meters)  Note: GFR calculation is accurate only with a steady state creatinine   D-DIMER, QUANTITATIVE - Normal    Narrative:     In the evaluation for possible pulmonary embolism, in the appropriate (Well's Score of 4 or less) patient, the age adjusted d-dimer cutoff for this patient can be calculated as:    Age x 0.01 (in ug/mL) for Age-adjusted D-dimer exclusion threshold for a patient over 50 years.   HS TROPONIN I 0HR - Normal   TSH, 3RD GENERATION WITH FREE T4 REFLEX - Normal   HS TROPONIN I 2HR - Normal   HS TROPONIN I 4HR     No orders to display       Procedures         Melva Pulido DO  09/15/24 0746

## 2024-09-10 NOTE — Clinical Note
Case was discussed with Ronda and the patient's admission status was agreed to be Admission Status: inpatient status to the service of Dr. Bond .

## 2024-09-10 NOTE — PROGRESS NOTES
[facility]        NAME: Sujey Webber is a 73 y.o. female  : 1950    MRN: [id number]  DATE: September 10, 2024  TIME: 11:40 AM    Assessment and Plan   Lightheaded [R42]  1. Lightheaded  ECG 12 lead    Fingerstick Glucose (POCT)        Near syncope while having mammogram, medical emergency, transferred to  room 3 for further evaluation given thready pulse and BP 60/30.  Pt supine, denies complaints    BS 178mg/dl.  EKG: First degree AV block 84/bpm, no acute ST changes, pending final read.  No previous EKG for comparison.     Pt reported feeling well.  Supine /55 HR 80.    Pt repositioned to sitting upright on stretcher: sitting VS 88/42.  Pt denies complaints of headaches, dizziness   Pt reports driving herself to appointment today, does not have anyone who would pick her up or stay with her today.    Discuss with pt concerned for low BP with near syncopal episode, requires further evaluation and monitoring in ER. Pt agreeable, 911 called. Report to EMS crew.        Patient Instructions       Follow up with PCP in 3-5 days.  Proceed to  ER if symptoms worsen.    If tests have been performed at [facility], our office will contact you with results if changes need to be made to the care plan discussed with you at the visit.  [facility]    Chief Complaint   No chief complaint on file.        History of Present Illness       Patient is a 73-year-old female presenting to urgent care after being called as a medical emergency while getting a mammogram in this building.  Upon my arrival to mammogram suite, patient is awake alert &oriented, sitting in chair in room, pale and mildly diaphoretic. GCS 15.  Per staff performing mammogram, patient states that she felt very dizzy, did not lose consciousness, did not fall, or hit head.  Patient reports that while getting the procedure done she began with tunnel vision, and dizziness.  She denies having  pain during the procedure, no nausea or abd pain.  She denies chest pain, shortness of breath, dizziness, headaches, or other complaints. She reports drinking one cup of tea and one glass of water today.     She reports starting Jardiance approximately 1 month ago.  No recent changes to BP meds.  Denies recent illness or injury.  She takes Xeralto for history of DVTs.         Review of Systems   Review of Systems   Constitutional:  Negative for activity change, appetite change, fatigue and fever.   Respiratory:  Negative for cough, chest tightness, shortness of breath and wheezing.    Cardiovascular:  Negative for chest pain, palpitations and leg swelling.   Gastrointestinal:  Negative for abdominal pain.   Neurological:  Positive for dizziness and light-headedness. Negative for tremors, seizures, speech difficulty, weakness and headaches.         Current Medications       Current Outpatient Medications:     Cholecalciferol (VITAMIN D) 2000 units CAPS, Take 1 capsule by mouth daily, Disp: , Rfl:     Empagliflozin (JARDIANCE) 10 MG TABS tablet, Take 1 tablet (10 mg total) by mouth daily, Disp: 30 tablet, Rfl: 5    folic acid (FOLVITE) 1 mg tablet, Take 1 tablet by mouth daily, Disp: , Rfl:     levothyroxine 88 mcg tablet, TAKE 1 TABLET BY MOUTH EVERY DAY, Disp: 30 tablet, Rfl: 5    lisinopril (ZESTRIL) 5 mg tablet, TAKE 1 TABLET BY MOUTH EVERY DAY, Disp: 30 tablet, Rfl: 5    metFORMIN (GLUCOPHAGE-XR) 500 mg 24 hr tablet, TAKE 3 TABLETS (1,500 MG TOTAL) BY MOUTH DAILY FOR DIABETES, Disp: 270 tablet, Rfl: 1    methotrexate 2.5 mg tablet, 8 tabs once a week, Disp: , Rfl:     simvastatin (ZOCOR) 20 mg tablet, TAKE 1 TABLET BY MOUTH EVERY DAY, Disp: 90 tablet, Rfl: 1    Xarelto 20 MG tablet, TAKE 1 TABLET BY MOUTH EVERY DAY WITH BREAKFAST, Disp: 90 tablet, Rfl: 1    clobetasol (TEMOVATE) 0.05 % cream, APPLY TO PSORIASIS SKIN PATCHES TWICE A DAY AS NEEDED, Disp: , Rfl:     Current Allergies     Allergies as of 09/10/2024     (No Known Allergies)            The following portions of the patient's history were reviewed and updated as appropriate: allergies, current medications, past family history, past medical history, past social history, past surgical history and problem list.     Past Medical History:   Diagnosis Date    Arthritis 2005    Breast cancer (HCC) 17 yrs ago    left    Cancer (HCC) 2006    Diabetes mellitus (HCC) 2014    Type 2 Estimated. On metformin    Disease of thyroid gland 1995    Heart murmur 1969    History of radiation therapy 09/01/2006       Past Surgical History:   Procedure Laterality Date    BIOPSY CORE NEEDLE Right 03/03/2010    benign    BREAST CYST ASPIRATION Right 08/15/2003    BREAST CYST ASPIRATION Right     x3  (Years ago)    BREAST LUMPECTOMY Left 08/22/2006    positive    HERNIA REPAIR  1951    MAMMO STEREOTACTIC BREAST BIOPSY LEFT (ALL INC) Left 08/02/2006    malignant    TUBAL LIGATION  1983    US GUIDED BREAST BIOPSY LEFT COMPLETE Left 02/06/2009    benign       Family History   Problem Relation Age of Onset    Breast cancer Mother 53        Also mat aunt    Heart disease Father     Ovarian cancer Sister 63    Lung cancer Sister     No Known Problems Sister     No Known Problems Sister     Cancer Sister         Lung cancer    No Known Problems Daughter     No Known Problems Maternal Grandmother     Heart disease Maternal Grandfather     Breast cancer Paternal Grandmother 90    Dementia Paternal Grandmother     Heart disease Paternal Grandfather     No Known Problems Son     Breast cancer Maternal Aunt 43    Stroke Maternal Aunt     Stomach cancer Maternal Uncle     Skin cancer Maternal Uncle     Heart disease Maternal Uncle     Breast cancer Paternal Aunt     Skin cancer Paternal Aunt     Breast cancer Cousin 71        maternal    Breast cancer Cousin 73        maternal         Medications have been verified.        Objective   BP (!) 88/40   Pulse 72   Temp 98.4 °F (36.9 °C)   Resp 16    SpO2 99%   No LMP recorded. Patient is postmenopausal.       Physical Exam     Physical Exam  Vitals and nursing note reviewed.   Constitutional:       Appearance: Normal appearance. She is normal weight.   HENT:      Head: Normocephalic.      Right Ear: Tympanic membrane normal.      Left Ear: Tympanic membrane normal.      Nose: Nose normal.      Mouth/Throat:      Mouth: Mucous membranes are moist.   Eyes:      Extraocular Movements: Extraocular movements intact.      Conjunctiva/sclera: Conjunctivae normal.      Pupils: Pupils are equal, round, and reactive to light.   Cardiovascular:      Rate and Rhythm: Bradycardia present. Rhythm irregular.      Pulses: Normal pulses.   Pulmonary:      Effort: Pulmonary effort is normal. No respiratory distress.      Breath sounds: Normal breath sounds. No stridor. No wheezing, rhonchi or rales.   Chest:      Chest wall: No tenderness.   Abdominal:      General: Abdomen is flat. Bowel sounds are normal.   Musculoskeletal:      Cervical back: Normal range of motion.   Skin:     Coloration: Skin is pale.   Neurological:      Mental Status: She is alert and oriented to person, place, and time. Mental status is at baseline.      Cranial Nerves: No cranial nerve deficit.

## 2024-09-10 NOTE — ASSESSMENT & PLAN NOTE
Lab Results   Component Value Date    HGBA1C 7.5 (H) 08/26/2024       Recent Labs     09/10/24  1848   POCGLU 171*       Blood Sugar Average: Last 72 hrs:  (P) 171  Hold metformin and hold Jardiance  Insulin sliding scale

## 2024-09-10 NOTE — ED ATTENDING ATTESTATION
9/10/2024  I, Khushi Jefferson MD, saw and evaluated the patient. I have discussed the patient with the resident/non-physician practitioner and agree with the resident's/non-physician practitioner's findings, Plan of Care, and MDM as documented in the resident's/non-physician practitioner's note, except where noted. All available labs and Radiology studies were reviewed.  I was present for key portions of any procedure(s) performed by the resident/non-physician practitioner and I was immediately available to provide assistance.       At this point I agree with the current assessment done in the Emergency Department.  I have conducted an independent evaluation of this patient a history and physical is as follows:    OA: 72 y/o f with h/o DM, hypothyroidism and DVT on xarelto who presents s/p near syncope while obtaining her mammogram just PTA. Pt states that she acutely began to feel hot and as though her vision was going black. Was able to be assisted to a chair and found to be hypotensive to the 70s at that time. BS was 178. Otherwise denies associated palpitations, cp, headache, n/v. Was in her normal state of health prior to and did eat breakfast. Denies similar sxms. Denies anxiety. PE, NAD resting comfortably.  Vital signs currently stable.  HEENT is normocephalic and atraumatic.  Moist mucous membranes.  Pupils equal round reactive no nystagmus.  Neck is supple full range of motion.  Heart is regular rate with systolic ejection murmur.  Lungs are clear no wheezing rhonchi rales.  Abdomen soft positive bowel sounds nontender to palpation.  No lower extremity edema.  No calf tenderness palpation.  Intact distal pulses.  5 out of 5 strength intact sensation throughout.  Awake alert oriented and appropriate.  Assessment and plan 73-year-old female who presents to the emergency department status post syncopal event while obtaining a mammogram.  Did have prodromal symptoms however has never had anything like this  before and states that she was moving around and did eat breakfast this morning.  Blood sugar was found to be within normal limits.  Given this we will do cardiac evaluation including EKG troponin chest x-ray.  Patient does have a history of factor V Leiden.  Is currently on Eliquis for lower extremity DVT.  Will add D-dimer.  Reevaluate treat accordingly.    ED Course     No documentation of murmur on previous patient records.     Critical Care Time  Procedures

## 2024-09-11 ENCOUNTER — APPOINTMENT (OUTPATIENT)
Dept: NON INVASIVE DIAGNOSTICS | Facility: HOSPITAL | Age: 74
End: 2024-09-11
Payer: MEDICARE

## 2024-09-11 VITALS
WEIGHT: 272 LBS | DIASTOLIC BLOOD PRESSURE: 70 MMHG | OXYGEN SATURATION: 99 % | SYSTOLIC BLOOD PRESSURE: 92 MMHG | RESPIRATION RATE: 16 BRPM | HEART RATE: 68 BPM | BODY MASS INDEX: 42.69 KG/M2 | TEMPERATURE: 97.6 F | HEIGHT: 67 IN

## 2024-09-11 LAB
ALBUMIN SERPL BCG-MCNC: 3.6 G/DL (ref 3.5–5)
ALP SERPL-CCNC: 72 U/L (ref 34–104)
ALT SERPL W P-5'-P-CCNC: 10 U/L (ref 7–52)
ANION GAP SERPL CALCULATED.3IONS-SCNC: 6 MMOL/L (ref 4–13)
AORTIC ROOT: 3.1 CM
AORTIC VALVE MEAN VELOCITY: 16.6 M/S
APICAL FOUR CHAMBER EJECTION FRACTION: 55 %
ASCENDING AORTA: 3.1 CM
AST SERPL W P-5'-P-CCNC: 9 U/L (ref 13–39)
AV AREA BY CONTINUOUS VTI: 1.5 CM2
AV AREA PEAK VELOCITY: 1.6 CM2
AV LVOT MEAN GRADIENT: 2 MMHG
AV LVOT PEAK GRADIENT: 4 MMHG
AV MEAN GRADIENT: 11 MMHG
AV PEAK GRADIENT: 16 MMHG
AV VALVE AREA: 1.5 CM2
AV VELOCITY RATIO: 0.5
BILIRUB SERPL-MCNC: 0.78 MG/DL (ref 0.2–1)
BSA FOR ECHO PROCEDURE: 2.3 M2
BUN SERPL-MCNC: 16 MG/DL (ref 5–25)
CALCIUM SERPL-MCNC: 8.9 MG/DL (ref 8.4–10.2)
CHLORIDE SERPL-SCNC: 106 MMOL/L (ref 96–108)
CO2 SERPL-SCNC: 25 MMOL/L (ref 21–32)
CREAT SERPL-MCNC: 0.99 MG/DL (ref 0.6–1.3)
DOP CALC AO PEAK VEL: 1.99 M/S
DOP CALC AO VTI: 46.09 CM
DOP CALC LVOT AREA: 3.14 CM2
DOP CALC LVOT CARDIAC INDEX: 2.45 L/MIN/M2
DOP CALC LVOT CARDIAC OUTPUT: 5.64 L/MIN
DOP CALC LVOT DIAMETER: 2 CM
DOP CALC LVOT PEAK VEL VTI: 22.02 CM
DOP CALC LVOT PEAK VEL: 1 M/S
DOP CALC LVOT STROKE INDEX: 30 ML/M2
DOP CALC LVOT STROKE VOLUME: 69.14
E WAVE DECELERATION TIME: 246 MS
E/A RATIO: 0.7
ERYTHROCYTE [DISTWIDTH] IN BLOOD BY AUTOMATED COUNT: 15 % (ref 11.6–15.1)
FRACTIONAL SHORTENING: 39 (ref 28–44)
GFR SERPL CREATININE-BSD FRML MDRD: 57 ML/MIN/1.73SQ M
GLUCOSE SERPL-MCNC: 130 MG/DL (ref 65–140)
GLUCOSE SERPL-MCNC: 133 MG/DL (ref 65–140)
HCT VFR BLD AUTO: 34.6 % (ref 34.8–46.1)
HGB BLD-MCNC: 11.2 G/DL (ref 11.5–15.4)
INTERVENTRICULAR SEPTUM IN DIASTOLE (PARASTERNAL SHORT AXIS VIEW): 1.3 CM
INTERVENTRICULAR SEPTUM: 1.3 CM (ref 0.6–1.1)
LAAS-AP2: 17.1 CM2
LAAS-AP4: 15.9 CM2
LEFT ATRIUM SIZE: 3.5 CM
LEFT ATRIUM VOLUME (MOD BIPLANE): 45 ML
LEFT ATRIUM VOLUME INDEX (MOD BIPLANE): 19.6 ML/M2
LEFT INTERNAL DIMENSION IN SYSTOLE: 2.7 CM (ref 2.1–4)
LEFT VENTRICULAR INTERNAL DIMENSION IN DIASTOLE: 4.4 CM (ref 3.5–6)
LEFT VENTRICULAR POSTERIOR WALL IN END DIASTOLE: 1.1 CM
LEFT VENTRICULAR STROKE VOLUME: 60 ML
LVSV (TEICH): 60 ML
MAGNESIUM SERPL-MCNC: 2.1 MG/DL (ref 1.9–2.7)
MCH RBC QN AUTO: 30.8 PG (ref 26.8–34.3)
MCHC RBC AUTO-ENTMCNC: 32.4 G/DL (ref 31.4–37.4)
MCV RBC AUTO: 95 FL (ref 82–98)
MV E'TISSUE VEL-LAT: 11 CM/S
MV E'TISSUE VEL-SEP: 7 CM/S
MV PEAK A VEL: 1.22 M/S
MV PEAK E VEL: 85 CM/S
MV STENOSIS PRESSURE HALF TIME: 71 MS
MV VALVE AREA P 1/2 METHOD: 3.1
PLATELET # BLD AUTO: 247 THOUSANDS/UL (ref 149–390)
PMV BLD AUTO: 9.8 FL (ref 8.9–12.7)
POTASSIUM SERPL-SCNC: 4.3 MMOL/L (ref 3.5–5.3)
PROT SERPL-MCNC: 6.1 G/DL (ref 6.4–8.4)
RBC # BLD AUTO: 3.64 MILLION/UL (ref 3.81–5.12)
RIGHT ATRIUM AREA SYSTOLE A4C: 16.4 CM2
RIGHT VENTRICLE ID DIMENSION: 3.9 CM
SL CV LEFT ATRIUM LENGTH A2C: 5.8 CM
SL CV LV EF: 60
SL CV PED ECHO LEFT VENTRICLE DIASTOLIC VOLUME (MOD BIPLANE) 2D: 87 ML
SL CV PED ECHO LEFT VENTRICLE SYSTOLIC VOLUME (MOD BIPLANE) 2D: 27 ML
SODIUM SERPL-SCNC: 137 MMOL/L (ref 135–147)
TR MAX PG: 23 MMHG
TR PEAK VELOCITY: 2.4 M/S
TRICUSPID ANNULAR PLANE SYSTOLIC EXCURSION: 2.5 CM
TRICUSPID VALVE PEAK REGURGITATION VELOCITY: 2.39 M/S
WBC # BLD AUTO: 6.76 THOUSAND/UL (ref 4.31–10.16)

## 2024-09-11 PROCEDURE — 93306 TTE W/DOPPLER COMPLETE: CPT | Performed by: INTERNAL MEDICINE

## 2024-09-11 PROCEDURE — 80053 COMPREHEN METABOLIC PANEL: CPT | Performed by: FAMILY MEDICINE

## 2024-09-11 PROCEDURE — 85027 COMPLETE CBC AUTOMATED: CPT | Performed by: FAMILY MEDICINE

## 2024-09-11 PROCEDURE — 93306 TTE W/DOPPLER COMPLETE: CPT

## 2024-09-11 PROCEDURE — NC001 PR NO CHARGE: Performed by: PHYSICIAN ASSISTANT

## 2024-09-11 PROCEDURE — 83735 ASSAY OF MAGNESIUM: CPT | Performed by: FAMILY MEDICINE

## 2024-09-11 PROCEDURE — 99238 HOSP IP/OBS DSCHRG MGMT 30/<: CPT | Performed by: PHYSICIAN ASSISTANT

## 2024-09-11 PROCEDURE — 36415 COLL VENOUS BLD VENIPUNCTURE: CPT | Performed by: FAMILY MEDICINE

## 2024-09-11 PROCEDURE — 82948 REAGENT STRIP/BLOOD GLUCOSE: CPT

## 2024-09-11 RX ADMIN — RIVAROXABAN 20 MG: 20 TABLET, FILM COATED ORAL at 08:00

## 2024-09-11 RX ADMIN — LEVOTHYROXINE SODIUM 88 MCG: 88 TABLET ORAL at 07:36

## 2024-09-11 NOTE — ASSESSMENT & PLAN NOTE
Patient presented with near syncopal episode while getting mammogram, reports she was standing at the machine for about 10 minutes getting different view when she felt lightheaded so she had to sit.  Resolved after about 5 minutes on its own.  Noted to have soft BP (pt reports typically runs low 110s) but was in 90s   Orthostatics not obtained, pt today has been up ambulating down heath to bathroom with no symptoms, she feels well without lightheadedness or dizziness   Labs, EKG  unremarkable   No events on tele   Check echo as does have murmur on exam, which pt does know about, which showed mild AS  Pt recently started Jardiance last week.  Would hold Jardiance and lisinopril at discharge with concern for diuretic SE and low BP.  Instructed to follow up PCP to determine if/when to resume.

## 2024-09-11 NOTE — ASSESSMENT & PLAN NOTE
Lab Results   Component Value Date    HGBA1C 7.5 (H) 08/26/2024       Recent Labs     09/10/24  1848 09/10/24  2206   POCGLU 171* 159*       Blood Sugar Average: Last 72 hrs:  (P) 165  Hold metformin and hold Jardiance.  Resume metformin at d/c, continue to hold Jardiance at d/c with near syncope as above.   Insulin sliding scale

## 2024-09-11 NOTE — ASSESSMENT & PLAN NOTE
Lab Results   Component Value Date    HGBA1C 7.5 (H) 08/26/2024       Recent Labs     09/10/24  1848 09/10/24  2206 09/11/24  1221   POCGLU 171* 159* 130       Blood Sugar Average: Last 72 hrs:  (P) 153.3734817746374425  Hold metformin and hold Jardiance.  Resume metformin at d/c, continue to hold Jardiance at d/c with near syncope as above.   Insulin sliding scale

## 2024-09-11 NOTE — PLAN OF CARE
Problem: SAFETY ADULT  Goal: Patient will remain free of falls  Description: INTERVENTIONS:  - Educate patient/family on patient safety including physical limitations  - Instruct patient to call for assistance with activity   - Consult OT/PT to assist with strengthening/mobility   - Keep Call bell within reach  - Keep bed low and locked with side rails adjusted as appropriate  - Keep care items and personal belongings within reach  - Initiate and maintain comfort rounds  - Make Fall Risk Sign visible to staff  - Offer Toileting every 2 Hours, in advance of need  - Initiate/Maintain alarm  - Obtain necessary fall risk management equipment:   - Apply yellow socks and bracelet for high fall risk patients  - Consider moving patient to room near nurses station  Outcome: Progressing  Goal: Maintain or return to baseline ADL function  Description: INTERVENTIONS:  -  Assess patient's ability to carry out ADLs; assess patient's baseline for ADL function and identify physical deficits which impact ability to perform ADLs (bathing, care of mouth/teeth, toileting, grooming, dressing, etc.)  - Assess/evaluate cause of self-care deficits   - Assess range of motion  - Assess patient's mobility; develop plan if impaired  - Assess patient's need for assistive devices and provide as appropriate  - Encourage maximum independence but intervene and supervise when necessary  - Involve family in performance of ADLs  - Assess for home care needs following discharge   - Consider OT consult to assist with ADL evaluation and planning for discharge  - Provide patient education as appropriate  Outcome: Progressing  Goal: Maintains/Returns to pre admission functional level  Description: INTERVENTIONS:  - Perform AM-PAC 6 Click Basic Mobility/ Daily Activity assessment daily.  - Set and communicate daily mobility goal to care team and patient/family/caregiver.   - Collaborate with rehabilitation services on mobility goals if consulted  -  Perform Range of Motion 3 times a day.  - Reposition patient every 2 hours.  - Dangle patient 3 times a day  - Stand patient 3 times a day  - Ambulate patient 3 times a day  - Out of bed to chair 3 times a day   - Out of bed for meals 3 times a day  - Out of bed for toileting  - Record patient progress and toleration of activity level   Outcome: Progressing     Problem: DISCHARGE PLANNING  Goal: Discharge to home or other facility with appropriate resources  Description: INTERVENTIONS:  - Identify barriers to discharge w/patient and caregiver  - Arrange for needed discharge resources and transportation as appropriate  - Identify discharge learning needs (meds, wound care, etc.)  - Arrange for interpretive services to assist at discharge as needed  - Refer to Case Management Department for coordinating discharge planning if the patient needs post-hospital services based on physician/advanced practitioner order or complex needs related to functional status, cognitive ability, or social support system  Outcome: Progressing     Problem: CARDIOVASCULAR - ADULT  Goal: Maintains optimal cardiac output and hemodynamic stability  Description: INTERVENTIONS:  - Monitor I/O, vital signs and rhythm  - Monitor for S/S and trends of decreased cardiac output  - Administer and titrate ordered vasoactive medications to optimize hemodynamic stability  - Assess quality of pulses, skin color and temperature  - Assess for signs of decreased coronary artery perfusion  - Instruct patient to report change in severity of symptoms  Outcome: Progressing  Goal: Absence of cardiac dysrhythmias or at baseline rhythm  Description: INTERVENTIONS:  - Continuous cardiac monitoring, vital signs, obtain 12 lead EKG if ordered  - Administer antiarrhythmic and heart rate control medications as ordered  - Monitor electrolytes and administer replacement therapy as ordered  Outcome: Progressing

## 2024-09-11 NOTE — DISCHARGE INSTR - AVS FIRST PAGE
Hold lisinopril and Jardiance at discharge until you can follow-up with your primary care provider for further instruction.  Follow-up with PCP within a week.

## 2024-09-11 NOTE — DISCHARGE SUMMARY
Discharge Summary - Hospitalist   Name: Sujey Webber 73 y.o. female I MRN: [id number]  Unit/Bed#: [facility] 408-01 I Date of Admission: 9/10/2024   Date of Service: 9/11/2024 I Hospital Day: 0     Assessment & Plan  Pre-syncope  Patient presented with near syncopal episode while getting mammogram, reports she was standing at the machine for about 10 minutes getting different view when she felt lightheaded so she had to sit.  Resolved after about 5 minutes on its own.  Noted to have soft BP (pt reports typically runs low 110s) but was in 90s   Orthostatics not obtained, pt today has been up ambulating down heath to bathroom with no symptoms, she feels well without lightheadedness or dizziness   Labs, EKG  unremarkable   No events on tele   Check echo as does have murmur on exam, which pt does know about, which showed mild AS  Pt recently started Jardiance last week.  Would hold Jardiance and lisinopril at discharge with concern for diuretic SE and low BP.  Instructed to follow up PCP to determine if/when to resume.   Type 2 diabetes mellitus without complication, without long-term current use of insulin (HCC)  Lab Results   Component Value Date    HGBA1C 7.5 (H) 08/26/2024       Recent Labs     09/10/24  1848 09/10/24  2206 09/11/24  1221   POCGLU 171* 159* 130       Blood Sugar Average: Last 72 hrs:  (P) 153.5467526022813870  Hold metformin and hold Jardiance.  Resume metformin at d/c, continue to hold Jardiance at d/c with near syncope as above.   Insulin sliding scale  Hyperlipidemia  Continue statin  Hypothyroidism  Continue levothyroxine 88 mcg daily  Essential hypertension  BP soft, hold Lisinopril at discharge and follow up with PCP   Factor 5 Leiden mutation, heterozygous (HCC)  Continue Xarelto     Medical Problems       Resolved Problems  Date Reviewed: 9/2/2024   None       Discharging Physician / Practitioner: Mary Anne Vasquez PA-C  PCP: Demetrius Garibay DO  Admission Date:   Admission Orders (From  admission, onward)       Ordered        09/10/24 1803  Place in Observation  Once                          Discharge Date: 09/11/24    Consultations During Hospital Stay:  none    Procedures Performed:   none    Significant Findings / Test Results:   Soft BP   Echo mild AS   No events on tele     Incidental Findings:   none     Test Results Pending at Discharge (will require follow up):   None     Outpatient Tests Requested:  none    Complications:  none    Reason for Admission: near syncope    Hospital Course:   Sujey Webber is a 73 y.o. female patient who originally presented to the hospital on 9/10/2024 due to near syncope while having mammogram with lightheadedness, tunnel vision.  Had soft BP at time of event.  Her symptoms resolved spontaneously within about 5 minutes.  She has had no recurrence of symptoms since initial episode.  No events on telemetry, echo with mild aortic stenosis.  Patient has been ambulating throughout the day without recurrence of symptoms, she is feeling well.  Patient reports that she recently started on Jardiance last week, discussed to hold Jardiance and lisinopril for now given softer blood pressures/near syncope and concern for diuretic effect with Jardiance.  She will follow with PCP on further instruction if/when to resume these medications.  Please see above list of diagnoses and related plan for additional information.     Condition at Discharge: good    Discharge Day Visit / Exam:   * Please refer to separate progress note for these details *    Discussion with Family: Updated  (sister) at bedside.    Discharge instructions/Information to patient and family:   See after visit summary for information provided to patient and family.      Provisions for Follow-Up Care:  See after visit summary for information related to follow-up care and any pertinent home health orders.      Mobility at time of Discharge:   Basic Mobility Inpatient Raw Score: 24  -M  Goal: 8: Walk 250 feet or more  JH-HLM Achieved: 6: Walk 10 steps or more  HLM Goal NOT achieved. Continue to encourage mobility in post discharge setting.     Disposition:   Home    Planned Readmission: no    Discharge Medications:  See after visit summary for reconciled discharge medications provided to patient and/or family.      Administrative Statements   Discharge Statement:  I have spent a total time of 20 minutes in caring for this patient on the day of the visit/encounter.     **Please Note: This note may have been constructed using a voice recognition system**

## 2024-09-11 NOTE — ASSESSMENT & PLAN NOTE
Patient presented with near syncopal episode while getting mammogram, reports she was standing at the machine for about 10 minutes getting different view when she felt lightheaded so she had to sit.  Resolved after about 5 minutes on its own.  Noted to have soft BP (pt reports typically runs low 110s) but was in 90s   Orthostatics not obtained, pt today has been up ambulating down heath to bathroom with no symptoms, she feels well without lightheadedness or dizziness   Labs, EKG  unremarkable   No events on tele   Check echo as does have murmur on exam, which pt does know about  Pt recently started Jardiance last week.  Would hold Jardiance and lisinopril at discharge with concern for diuretic SE and low BP.  Instructed to follow up PCP to determine if/when to resume.

## 2024-09-11 NOTE — NURSING NOTE
AVS reviewed with patient including medication changes and follow up appointments. Patient verbalizes understanding and denies any questions at this time.

## 2024-09-11 NOTE — PROGRESS NOTES
Progress Note - Hospitalist   Name: Sujey Webber 73 y.o. female I MRN: [id number]  Unit/Bed#: [facility] I Date of Admission: 9/10/2024   Date of Service: 9/11/2024 I Hospital Day: 0    Assessment & Plan  Pre-syncope  Patient presented with near syncopal episode while getting mammogram, reports she was standing at the machine for about 10 minutes getting different view when she felt lightheaded so she had to sit.  Resolved after about 5 minutes on its own.  Noted to have soft BP (pt reports typically runs low 110s) but was in 90s   Orthostatics not obtained, pt today has been up ambulating down heath to bathroom with no symptoms, she feels well without lightheadedness or dizziness   Labs, EKG  unremarkable   No events on tele   Check echo as does have murmur on exam, which pt does know about  Pt recently started Jardiance last week.  Would hold Jardiance and lisinopril at discharge with concern for diuretic SE and low BP.  Instructed to follow up PCP to determine if/when to resume.   Type 2 diabetes mellitus without complication, without long-term current use of insulin (HCC)  Lab Results   Component Value Date    HGBA1C 7.5 (H) 08/26/2024       Recent Labs     09/10/24  1848 09/10/24  2206   POCGLU 171* 159*       Blood Sugar Average: Last 72 hrs:  (P) 165  Hold metformin and hold Jardiance.  Resume metformin at d/c, continue to hold Jardiance at d/c with near syncope as above.   Insulin sliding scale    Hyperlipidemia  Continue statin  Hypothyroidism  Continue levothyroxine 88 mcg daily  Essential hypertension  BP soft, hold Lisinopril at discharge and follow up with PCP   Factor 5 Leiden mutation, heterozygous (HCC)  Continue Xarelto    VTE Pharmacologic Prophylaxis:   Moderate Risk (Score 3-4) - Pharmacological DVT Prophylaxis Ordered: rivaroxaban (Xarelto).    Mobility:          Patient Centered Rounds: I performed bedside rounds with nursing staff today.   Discussions with Specialists or Other Care Team  Provider:     Education and Discussions with Family / Patient: Patient declined call to .     Current Length of Stay: 0 day(s)  Current Patient Status: Observation   Certification Statement: continue obs, likely dc later today pending echo  Discharge Plan: Anticipate discharge later today or tomorrow to home.    Code Status: Level 1 - Full Code    Subjective   No complaints, doing well at this time.  Has not had any recurrent pre syncopal episodes.  Has been ambulating back and forth to the bathroom without difficulty, she denies lightheadedness or dizziness, sob, cp    Objective     Vitals:   Temp (24hrs), Av °F (36.7 °C), Min:97.6 °F (36.4 °C), Max:98.4 °F (36.9 °C)    Temp:  [97.6 °F (36.4 °C)-98.4 °F (36.9 °C)] 97.6 °F (36.4 °C)  HR:  [68-81] 72  Resp:  [14-20] 14  BP: ()/(40-76) 101/54  SpO2:  [94 %-100 %] 99 %  There is no height or weight on file to calculate BMI.     Input and Output Summary (last 24 hours):   No intake or output data in the 24 hours ending 24 1037    Physical Exam  Constitutional:       General: She is not in acute distress.     Appearance: She is not toxic-appearing.   HENT:      Head: Normocephalic and atraumatic.   Eyes:      Extraocular Movements: Extraocular movements intact.   Cardiovascular:      Rate and Rhythm: Regular rhythm.      Heart sounds: Murmur heard.   Pulmonary:      Effort: Pulmonary effort is normal. No respiratory distress.      Breath sounds: Normal breath sounds. No wheezing or rales.   Musculoskeletal:         General: Normal range of motion.   Skin:     General: Skin is warm and dry.   Neurological:      General: No focal deficit present.      Mental Status: She is alert and oriented to person, place, and time.   Psychiatric:         Mood and Affect: Mood normal.         Behavior: Behavior normal.         Thought Content: Thought content normal.          Lines/Drains:  Lines/Drains/Airways       Active Status       None                       Telemetry:  Telemetry Orders (From admission, onward)               24 Hour Telemetry Monitoring  Continuous x 24 Hours (Telem)        Question:  Reason for 24 Hour Telemetry  Answer:  Syncope suspected to be cardiac in origin                     Telemetry Reviewed: Normal Sinus Rhythm  Indication for Continued Telemetry Use: Syncope               Lab Results: I have reviewed the following results:    Results from last 7 days   Lab Units 09/11/24  0432 09/10/24  1339   WBC Thousand/uL 6.76 7.98   HEMOGLOBIN g/dL 11.2* 12.6   HEMATOCRIT % 34.6* 39.3   PLATELETS Thousands/uL 247 281   SEGS PCT %  --  83*   LYMPHO PCT %  --  8*   MONO PCT %  --  8   EOS PCT %  --  1     Results from last 7 days   Lab Units 09/11/24  0432   SODIUM mmol/L 137   POTASSIUM mmol/L 4.3   CHLORIDE mmol/L 106   CO2 mmol/L 25   BUN mg/dL 16   CREATININE mg/dL 0.99   ANION GAP mmol/L 6   CALCIUM mg/dL 8.9   ALBUMIN g/dL 3.6   TOTAL BILIRUBIN mg/dL 0.78   ALK PHOS U/L 72   ALT U/L 10   AST U/L 9*   GLUCOSE RANDOM mg/dL 133         Results from last 7 days   Lab Units 09/10/24  2206 09/10/24  1848   POC GLUCOSE mg/dl 159* 171*               Recent Cultures (last 7 days):         Imaging Review: No pertinent imaging studies reviewed.  Other Studies: EKG was personally reviewed and my interpretation is: NSR..    Last 24 Hours Medication List:     Current Facility-Administered Medications:     insulin lispro (HumALOG/ADMELOG) 100 units/mL subcutaneous injection 1-6 Units, TID AC **AND** Fingerstick Glucose (POCT), TID AC    insulin lispro (HumALOG/ADMELOG) 100 units/mL subcutaneous injection 1-6 Units, HS    levothyroxine tablet 88 mcg, Daily    pravastatin (PRAVACHOL) tablet 10 mg, Daily With Dinner    rivaroxaban (XARELTO) tablet 20 mg, Daily With Breakfast    Administrative Statements   Today, Patient Was Seen By: Mary Anne Vasquez PA-C      **Please Note: This note may have been constructed using a voice recognition system.**

## 2024-09-12 ENCOUNTER — TRANSITIONAL CARE MANAGEMENT (OUTPATIENT)
Dept: INTERNAL MEDICINE CLINIC | Facility: CLINIC | Age: 74
End: 2024-09-12

## 2024-09-17 ENCOUNTER — OFFICE VISIT (OUTPATIENT)
Dept: INTERNAL MEDICINE CLINIC | Facility: CLINIC | Age: 74
End: 2024-09-17
Payer: MEDICARE

## 2024-09-17 VITALS
HEART RATE: 65 BPM | BODY MASS INDEX: 42.38 KG/M2 | DIASTOLIC BLOOD PRESSURE: 64 MMHG | SYSTOLIC BLOOD PRESSURE: 120 MMHG | HEIGHT: 67 IN | WEIGHT: 270 LBS | OXYGEN SATURATION: 98 % | RESPIRATION RATE: 20 BRPM

## 2024-09-17 DIAGNOSIS — N18.31 STAGE 3A CHRONIC KIDNEY DISEASE (HCC): ICD-10-CM

## 2024-09-17 DIAGNOSIS — R92.8 ABNORMAL MAMMOGRAM: ICD-10-CM

## 2024-09-17 DIAGNOSIS — R55 PRE-SYNCOPE: Primary | ICD-10-CM

## 2024-09-17 DIAGNOSIS — E11.21 DIABETIC NEPHROPATHY ASSOCIATED WITH TYPE 2 DIABETES MELLITUS (HCC): ICD-10-CM

## 2024-09-17 DIAGNOSIS — R89.9 ABNORMAL LABORATORY TEST: ICD-10-CM

## 2024-09-17 PROCEDURE — 99495 TRANSJ CARE MGMT MOD F2F 14D: CPT | Performed by: INTERNAL MEDICINE

## 2024-09-17 RX ORDER — LISINOPRIL 2.5 MG/1
2.5 TABLET ORAL DAILY
Qty: 30 TABLET | Refills: 2 | Status: SHIPPED | OUTPATIENT
Start: 2024-09-17

## 2024-09-17 NOTE — ASSESSMENT & PLAN NOTE
Transition care management visit regarding recent hospitalization for presyncopal episode patient did have a low blood pressure readings requiring IV saline her blood pressures did improve echocardiogram was reviewed EF is normal she has mild AS; patient had recently started Jardiance had been on lisinopril his medications were hold on discharge at this point in time patient has not had any further lightheadedness dizziness or presyncope her blood pressure is now stabilized he has a history of microalbuminuria/diabetic nephropathy we will have her hold the Jardiance for now and restart the lisinopril at 2.5 mg once daily with the patient to monitor blood pressure daily she is to take the blood pressure prior to taking the lisinopril if the systolic blood pressure less than 110 she is to hold lisinopril on this day if she has any recurrent lightheadedness dizziness postural lightheadedness she is notify me I will check a CMP and also a CBC in the next 2 weeks and she has routine laboratories prior to her visit in March.  She will notify me of her blood pressures in the next several weeks if there are any problems she is to notify me

## 2024-09-17 NOTE — PROGRESS NOTES
Transition of Care Visit  Name: Sujey Webber      : 1950      MRN: [id number]  Encounter Provider: Demetrius Garibay DO  Encounter Date: 2024   Encounter department: [facility]    Assessment & Plan  Pre-syncope  Transition care management visit regarding recent hospitalization for presyncopal episode patient did have a low blood pressure readings requiring IV saline her blood pressures did improve echocardiogram was reviewed EF is normal she has mild AS; patient had recently started Jardiance had been on lisinopril his medications were hold on discharge at this point in time patient has not had any further lightheadedness dizziness or presyncope her blood pressure is now stabilized he has a history of microalbuminuria/diabetic nephropathy we will have her hold the Jardiance for now and restart the lisinopril at 2.5 mg once daily with the patient to monitor blood pressure daily she is to take the blood pressure prior to taking the lisinopril if the systolic blood pressure less than 110 she is to hold lisinopril on this day if she has any recurrent lightheadedness dizziness postural lightheadedness she is notify me I will check a CMP and also a CBC in the next 2 weeks and she has routine laboratories prior to her visit in March.  She will notify me of her blood pressures in the next several weeks if there are any problems she is to notify me            History of Present Illness  reports was at mammogram felt like going to pass out , went to the urgicenter 88/40 the pt was standing when sx occurred , since d/c 120/65    Transitional Care Management Review:   Sujey Webber is a 73 y.o. female here for TCM follow up.  Transition care management visit regarding recent hospitalization for presyncopal episode that occurred while having mammography the patient did not syncopized no chest pain no palpitation no racing heart she was found to have low blood pressure when  taken to the urgent care center in the 80s systolically was transferred to the hospital and did require IV saline.  Workup was unrevealing echo did show mild AAS felt to be related to new start of Jardiance which she had started 10 days prior she has been on lisinopril for a long period of time for diabetic nephropathy at tolerated well.  Since discharge no further symptoms no dizziness no lightheadedness no presyncope and overall is feeling well today we did review the discharge summary laboratories and test.  Patient is interested to restart the lisinopril which I think is a good idea we will start on a lower dose to help protect her diabetic nephropathy she will start monitoring her blood pressure routinely and check the blood pressure prior to starting the lisinopril if it is low under 110 she will not take the lisinopril on that day.  Work on increasing her fluid intake    Assessment & Plan  Pre-syncope  Patient presented with near syncopal episode while getting mammogram, reports she was standing at the machine for about 10 minutes getting different view when she felt lightheaded so she had to sit.  Resolved after about 5 minutes on its own.  Noted to have soft BP (pt reports typically runs low 110s) but was in 90s   Orthostatics not obtained, pt today has been up ambulating down heath to bathroom with no symptoms, she feels well without lightheadedness or dizziness   Labs, EKG  unremarkable   No events on tele   Check echo as does have murmur on exam, which pt does know about, which showed mild AS  Pt recently started Jardiance last week.  Would hold Jardiance and lisinopril at discharge with concern for diuretic SE and low BP.  Instructed to follow up PCP to determine if/when to resume.   Type 2 diabetes mellitus without complication, without long-term current use of insulin ([facility])        Lab Results   Component Value Date     HGBA1C 7.5 (H) 08/26/2024    uring the TCM phone call patient stated:  TCM Call   "     Date and time call was made  9/12/2024 10:25 AM    Hospital care reviewed  Records not available    Patient [facility]ial[facility]    Date of Admission  09/10/24    Date of discharge  09/11/24    Diagnosis  Pre-syncope    Disposition  Home    Were the patients medications reviewed and updated  No    Current Symptoms  None          TCM Call       Post hospital issues  None    Should patient be enrolled in anticoag monitoring?  Yes  Xarelto    Scheduled for follow up?  Yes    Did you obtain your prescribed medications  No    Why were you unable to obtain your medications  pts meds wwere held till PCP appt    Do you need help managing your prescriptions or medications  No    Is transportation to your appointment needed  No    I have advised the patient to call PCP with any new or worsening symptoms  Liyah MORALEZ    Living Arrangements  Alone    Are you recieving any outpatient services  No    Are you recieving home care services  No    Are you using any community resources  No    Current waiver services  No    Have you fallen in the last 12 months  No    Interperter language line needed  No          HPI  Review of Systems   Constitutional:  Negative for activity change, appetite change and unexpected weight change.   HENT:  Negative for congestion and postnasal drip.    Eyes:  Negative for visual disturbance.   Respiratory:  Negative for cough and shortness of breath.    Cardiovascular:  Negative for chest pain.   Gastrointestinal:  Negative for abdominal pain, diarrhea, nausea and vomiting.   Neurological:  Negative for dizziness, light-headedness and headaches.   Hematological:  Negative for adenopathy.     Objective     /64 (BP Location: Left arm, Patient Position: Sitting, Cuff Size: Standard)   Pulse 65   Resp 20   Ht 5' 7\" (1.702 m)   Wt 122 kg (270 lb)   SpO2 98%   BMI 42.29 kg/m²     Physical Exam  Vitals and nursing note reviewed.   Constitutional:       General: She " is not in acute distress.     Appearance: Normal appearance. She is well-developed. She is obese. She is not ill-appearing, toxic-appearing or diaphoretic.   HENT:      Head: Normocephalic and atraumatic.      Right Ear: External ear normal.      Left Ear: External ear normal.      Nose: Nose normal.      Mouth/Throat:      Mouth: Oropharynx is clear and moist.   Eyes:      Pupils: Pupils are equal, round, and reactive to light.   Cardiovascular:      Rate and Rhythm: Normal rate and regular rhythm.      Heart sounds: Normal heart sounds. No murmur heard.  Pulmonary:      Effort: Pulmonary effort is normal.      Breath sounds: Normal breath sounds.   Abdominal:      General: There is no distension.      Palpations: Abdomen is soft.      Tenderness: There is no abdominal tenderness. There is no guarding.   Musculoskeletal:         General: No edema.   Psychiatric:         Mood and Affect: Mood and affect normal.

## 2024-10-03 ENCOUNTER — TELEPHONE (OUTPATIENT)
Age: 74
End: 2024-10-03

## 2024-10-03 ENCOUNTER — APPOINTMENT (OUTPATIENT)
Dept: LAB | Facility: CLINIC | Age: 74
End: 2024-10-03
Payer: MEDICARE

## 2024-10-03 DIAGNOSIS — E11.9 TYPE 2 DIABETES MELLITUS WITHOUT COMPLICATION, WITHOUT LONG-TERM CURRENT USE OF INSULIN (HCC): ICD-10-CM

## 2024-10-03 DIAGNOSIS — R89.9 ABNORMAL LABORATORY TEST: ICD-10-CM

## 2024-10-03 LAB
ALBUMIN SERPL BCG-MCNC: 3.8 G/DL (ref 3.5–5)
ALP SERPL-CCNC: 76 U/L (ref 34–104)
ALT SERPL W P-5'-P-CCNC: 14 U/L (ref 7–52)
ANION GAP SERPL CALCULATED.3IONS-SCNC: 12 MMOL/L (ref 4–13)
AST SERPL W P-5'-P-CCNC: 17 U/L (ref 13–39)
BASOPHILS # BLD AUTO: 0.04 THOUSANDS/ΜL (ref 0–0.1)
BASOPHILS NFR BLD AUTO: 1 % (ref 0–1)
BILIRUB SERPL-MCNC: 0.86 MG/DL (ref 0.2–1)
BUN SERPL-MCNC: 14 MG/DL (ref 5–25)
CALCIUM SERPL-MCNC: 9.1 MG/DL (ref 8.4–10.2)
CHLORIDE SERPL-SCNC: 103 MMOL/L (ref 96–108)
CO2 SERPL-SCNC: 24 MMOL/L (ref 21–32)
CREAT SERPL-MCNC: 0.86 MG/DL (ref 0.6–1.3)
EOSINOPHIL # BLD AUTO: 0.07 THOUSAND/ΜL (ref 0–0.61)
EOSINOPHIL NFR BLD AUTO: 1 % (ref 0–6)
ERYTHROCYTE [DISTWIDTH] IN BLOOD BY AUTOMATED COUNT: 14.8 % (ref 11.6–15.1)
GFR SERPL CREATININE-BSD FRML MDRD: 67 ML/MIN/1.73SQ M
GLUCOSE P FAST SERPL-MCNC: 145 MG/DL (ref 65–99)
HCT VFR BLD AUTO: 38.2 % (ref 34.8–46.1)
HGB BLD-MCNC: 11.8 G/DL (ref 11.5–15.4)
IMM GRANULOCYTES # BLD AUTO: 0.03 THOUSAND/UL (ref 0–0.2)
IMM GRANULOCYTES NFR BLD AUTO: 1 % (ref 0–2)
LYMPHOCYTES # BLD AUTO: 0.88 THOUSANDS/ΜL (ref 0.6–4.47)
LYMPHOCYTES NFR BLD AUTO: 13 % (ref 14–44)
MCH RBC QN AUTO: 30.3 PG (ref 26.8–34.3)
MCHC RBC AUTO-ENTMCNC: 30.9 G/DL (ref 31.4–37.4)
MCV RBC AUTO: 98 FL (ref 82–98)
MONOCYTES # BLD AUTO: 0.45 THOUSAND/ΜL (ref 0.17–1.22)
MONOCYTES NFR BLD AUTO: 7 % (ref 4–12)
NEUTROPHILS # BLD AUTO: 5.19 THOUSANDS/ΜL (ref 1.85–7.62)
NEUTS SEG NFR BLD AUTO: 77 % (ref 43–75)
NRBC BLD AUTO-RTO: 0 /100 WBCS
PLATELET # BLD AUTO: 305 THOUSANDS/UL (ref 149–390)
PMV BLD AUTO: 10.2 FL (ref 8.9–12.7)
POTASSIUM SERPL-SCNC: 4.4 MMOL/L (ref 3.5–5.3)
PROT SERPL-MCNC: 6.8 G/DL (ref 6.4–8.4)
RBC # BLD AUTO: 3.9 MILLION/UL (ref 3.81–5.12)
SODIUM SERPL-SCNC: 139 MMOL/L (ref 135–147)
WBC # BLD AUTO: 6.66 THOUSAND/UL (ref 4.31–10.16)

## 2024-10-03 PROCEDURE — 36415 COLL VENOUS BLD VENIPUNCTURE: CPT

## 2024-10-03 PROCEDURE — 85025 COMPLETE CBC W/AUTO DIFF WBC: CPT

## 2024-10-03 PROCEDURE — 80053 COMPREHEN METABOLIC PANEL: CPT

## 2024-10-03 NOTE — TELEPHONE ENCOUNTER
Patient called and stated she was seen a couple of weeks ago and was given orders for labs and had a change in dosage of lisinopril from 5 mg to 2.5 mg.  She was told to hold the lisinopril if her systolic blood pressure was under 110.  Out of the 16 days since she has been seen, she has only taken the lisinopril 6 times, as her systolic BP was under 110 all the other times.  She feels fine, is not having any problems and does not feel dizzy.  She had labs drawn today and is waiting for the results.  Please advise of any recommendations.  Thank you!

## 2024-11-12 ENCOUNTER — HOSPITAL ENCOUNTER (OUTPATIENT)
Dept: ULTRASOUND IMAGING | Facility: CLINIC | Age: 74
Discharge: HOME | End: 2024-11-12
Payer: MEDICARE

## 2024-11-12 ENCOUNTER — HOSPITAL ENCOUNTER (OUTPATIENT)
Dept: MAMMOGRAPHY | Facility: CLINIC | Age: 74
Discharge: HOME | End: 2024-11-12
Payer: MEDICARE

## 2024-11-12 VITALS — WEIGHT: 270 LBS | BODY MASS INDEX: 42.38 KG/M2 | HEIGHT: 67 IN

## 2024-11-12 DIAGNOSIS — R92.8 ABNORMAL MAMMOGRAM: ICD-10-CM

## 2024-11-12 PROCEDURE — 77065 DX MAMMO INCL CAD UNI: CPT

## 2024-11-12 PROCEDURE — 76642 ULTRASOUND BREAST LIMITED: CPT

## 2024-11-12 PROCEDURE — G0279 TOMOSYNTHESIS, MAMMO: HCPCS

## 2024-11-13 ENCOUNTER — HOSPITAL ENCOUNTER (OUTPATIENT)
Dept: MAMMOGRAPHY | Facility: CLINIC | Age: 74
Discharge: HOME | End: 2024-11-13
Payer: MEDICARE

## 2024-11-13 ENCOUNTER — HOSPITAL ENCOUNTER (OUTPATIENT)
Dept: ULTRASOUND IMAGING | Facility: CLINIC | Age: 74
Discharge: HOME | End: 2024-11-13
Payer: MEDICARE

## 2024-11-13 VITALS — DIASTOLIC BLOOD PRESSURE: 76 MMHG | SYSTOLIC BLOOD PRESSURE: 127 MMHG | HEART RATE: 62 BPM

## 2024-11-13 DIAGNOSIS — R92.8 ABNORMAL MAMMOGRAM: ICD-10-CM

## 2024-11-13 PROCEDURE — 88341 IMHCHEM/IMCYTCHM EA ADD ANTB: CPT | Performed by: PATHOLOGY

## 2024-11-13 PROCEDURE — 88342 IMHCHEM/IMCYTCHM 1ST ANTB: CPT | Performed by: PATHOLOGY

## 2024-11-13 PROCEDURE — A4648 IMPLANTABLE TISSUE MARKER: HCPCS

## 2024-11-13 PROCEDURE — 88360 TUMOR IMMUNOHISTOCHEM/MANUAL: CPT | Performed by: PATHOLOGY

## 2024-11-13 PROCEDURE — 88305 TISSUE EXAM BY PATHOLOGIST: CPT | Performed by: PATHOLOGY

## 2024-11-13 PROCEDURE — 19083 BX BREAST 1ST LESION US IMAG: CPT

## 2024-11-13 RX ORDER — LIDOCAINE HYDROCHLORIDE 10 MG/ML
5 INJECTION, SOLUTION EPIDURAL; INFILTRATION; INTRACAUDAL; PERINEURAL ONCE
Status: COMPLETED | OUTPATIENT
Start: 2024-11-13 | End: 2024-11-13

## 2024-11-13 RX ADMIN — LIDOCAINE HYDROCHLORIDE 5 ML: 10 INJECTION, SOLUTION EPIDURAL; INFILTRATION; INTRACAUDAL; PERINEURAL at 08:38

## 2024-11-13 NOTE — PROGRESS NOTES
Procedure type:    ___x__ultrasound guided _____stereotactic    Breast:    __x___Left _____Right    Location: 2 o'clock 3cmfn    Needle: 14G    # of passes: 4    Clip:Open Coil    Performed by: Dr. Rand    Pressure held for 5 minutes by: Kailey Michael Strips:    ___X__yes _____no    Band aid:    __X___yes_____no    Tolerated procedure:    __X___yes _____no

## 2024-11-15 NOTE — PROGRESS NOTES
"Sujey SPENCE returning post call VM. She stated that she is \"doing fine\" and has \"no pain or problems\". She also stated that she does not need return call.  "

## 2024-11-19 ENCOUNTER — TELEPHONE (OUTPATIENT)
Dept: MAMMOGRAPHY | Facility: CLINIC | Age: 74
End: 2024-11-19

## 2024-11-19 ENCOUNTER — TELEPHONE (OUTPATIENT)
Dept: OBGYN CLINIC | Facility: CLINIC | Age: 74
End: 2024-11-19

## 2024-11-19 DIAGNOSIS — D05.12 DUCTAL CARCINOMA IN SITU (DCIS) OF LEFT BREAST: Primary | ICD-10-CM

## 2024-11-19 PROCEDURE — 88342 IMHCHEM/IMCYTCHM 1ST ANTB: CPT | Performed by: PATHOLOGY

## 2024-11-19 PROCEDURE — 88360 TUMOR IMMUNOHISTOCHEM/MANUAL: CPT | Performed by: PATHOLOGY

## 2024-11-19 PROCEDURE — 88305 TISSUE EXAM BY PATHOLOGIST: CPT | Performed by: PATHOLOGY

## 2024-11-19 PROCEDURE — 88341 IMHCHEM/IMCYTCHM EA ADD ANTB: CPT | Performed by: PATHOLOGY

## 2024-11-19 NOTE — TELEPHONE ENCOUNTER
Personally spoke with patient and advised her that I had placed an order for a bilateral breast MRI today.    I gave her the phone number of central scheduling so that she can make arrangements to have the study done

## 2024-11-19 NOTE — TELEPHONE ENCOUNTER
[facility] Newly Diagnosed Genetics Checklist    Please route all intake forms to 'oncology genetics breast' pool in epic.  This includes patients that decline testing.    Patient is under 60 at the time of diagnosis discuss genetics (script below)    Script for Genetics:  Approximately 5-10% of cancer can have an underlying genetic cause.  Genetic testing is recommended for women diagnosed with breast cancer under the age of 60.  Your breast surgeon recommends that you have genetic testing to help determine your surgical plan.  Our genetics team will call you in 24-48 hours to discuss this test and schedule a blood draw.  The Genetics team will be able to address your questions.          Outcome:    Patient is under 60: Yes    Referral Outcome: Patient does not meet under 60 criteria, but is still interested in pursuing genetic testing. A referral was placed to the oncology genetics program.

## 2024-11-20 ENCOUNTER — TELEPHONE (OUTPATIENT)
Dept: GENETICS | Facility: CLINIC | Age: 74
End: 2024-11-20

## 2024-11-20 ENCOUNTER — DOCUMENTATION (OUTPATIENT)
Dept: HEMATOLOGY ONCOLOGY | Facility: CLINIC | Age: 74
End: 2024-11-20

## 2024-11-20 DIAGNOSIS — D05.12 DUCTAL CARCINOMA IN SITU (DCIS) OF LEFT BREAST: ICD-10-CM

## 2024-11-20 DIAGNOSIS — Z80.41 FAMILY HISTORY OF OVARIAN CANCER: ICD-10-CM

## 2024-11-20 DIAGNOSIS — Z80.3 FAMILY HISTORY OF BREAST CANCER: ICD-10-CM

## 2024-11-20 DIAGNOSIS — Z85.3 PERSONAL HISTORY OF BREAST CANCER: Primary | ICD-10-CM

## 2024-11-20 DIAGNOSIS — Z80.0 FAMILY HISTORY OF STOMACH CANCER: ICD-10-CM

## 2024-11-20 DIAGNOSIS — Z80.49 FAMILY HISTORY OF MALIGNANT NEOPLASM OF UTERUS: ICD-10-CM

## 2024-11-20 NOTE — TELEPHONE ENCOUNTER
I introduced myself to Sujey and let her know that her nurse navigator reached out to the cancer risk and genetics program on her behalf to begin STAT genetic testing process.    I reviewed the following with Sujey:    While the majority of cancer occurs by chance, approximately 5-10% of breast cancer has an underlying genetic cause. Genetic testing is available which can determine if there is an underlying genetic cause to your cancer. Understanding if there is an underlying genetic cause can:  Provide your surgeon with additional information to help with surgical decisions (i.e. lumpectomy vs mastectomy), treatment decisions and eligibility for clinical trials.    It can determine if you have an increased risk for any additional cancers.  Help family members understand their cancer risk.       We work closely with the [facility] breast surgeons and are reaching out to see if you have interest in genetic testing. This test is not a requirement but can take 5-10 days to complete so we would like to start the process as soon as possible so the results are ready for your appointment with your surgeon.       If interested, family history will be collected to initiate STAT genetic testing process.        Patient elected to pursue testing     Diagnosis Details:  Ductal Carcinoma In situ  Left  Hormone receptors pending    Personal History:  Do you have a personal history of any other cancer? Yes  If yes type/age of diagnosis: Left Breast Cancer age 57. Patient reports negative genetic testing in 2006.    Family history:   Do you have Ashkenazi Religion ancestry? No  If yes, maternal, paternal, or both?    Do you have any children? Yes  How many sons? 1  How many daughters? 1  Do any of your children have a history of cancer? No  If yes type/age of diagnosis:     Do you have any brothers or sisters? Yes  How many brothers? 0  How many sisters? 4  Are they from the same parents? Yes  If no how maternal/paternal  half-siblings:  Do any of your brothers or sisters have a history of cancer? Yes  If yes who and the type/age of diagnosis:   Sister - Ovarian Cancer age 64  Sister - Lung Cancer age 67  Sister - Polycystic Breast          Do you have nieces or nephews? Yes  Do any of them have a history of cancer? No  If yes type/age of diagnosis:    Does your mother have a history of cancer? Yes  If yes, cancer type and age of diagnosis: Breast Cancer age 42  Is your mother still living? No  Age/Age of death: 53    Thinking about your mother's family (aunts, uncles, cousins, grandparents) is there anyone with a history of cancer? Yes  If yes, list relationship, cancer type and age of diagnosis:  Maternal Aunt - Breast Cancer age 43 ()  Maternal Aunt - Lymphoma age 52 ()  Maternal Cousin - Breast Cancer age 70  Maternal Cousin - Breast Cancer age 68  Maternal Cousin - Uterine Cancer age 53  Maternal Uncle - Skin Cancer age 48 ()  Maternal Uncle - Stomach Caner age 52 ()  Maternal Aunt - Polycystic Breast ()    Does your father have a history of cancer? No  If yes, cancer type and age of diagnosis:   Is your father still living? No  Age/age of death: 84    Thinking about your father's family (aunts, uncles, cousins, grandparents) is there anyone with a history of cancer? Yes  If yes, list relationship, cancer type and age of diagnosis:   Paternal Aunt - Breast Cancer age 88 ()  Paternal Aunt - Lung Cancer age 45 ()     Types of Results  Positive Result - May explain personal diagnosis/family history. Can give surgeon information on treatment plan, inform future screening/management or tell a person about other possible risks. Positive results can initiate testing for other family members who may be at risk (children, siblings, etc)  Negative Result - Does not give an explanation. Surgical/treatment plan will be based on clinical presentation and will be part of discussion with  surgeon. Negative result cannot be passed down to children, but they are still at elevated risk.  Uncertain Result - Common, but treated like a negative result clinically. 90% are downgraded over time.     [facility] billing policy   Most individuals pay <$100 for hereditary cancer genetic testing. If insurance covers the cost of the testing, individuals may still pay out of pocket secondary to co-pays, co-insurance, or deductibles. If the cost of the testing exceeds $100, the lab will reach out to the patient via phone or e-mail. The patient will then have the option to proceed with the testing, cancel the testing, or elect the self-pay option of $250. Sujey verbalized understanding.    We have genetic counselors available, if you have any additional questions or would like to speak with them we can schedule you a 15 minute appointment.      Plan:  An order was placed and the patient was instructed to go to a [facility] lab for a blood collection    Genetic Testing Preformed: [facility] BRCAplus STAT Panel (13 genes): CELSA, BARD1, BRCA1, BRCA2, CDH1, CHEK2, NF1, PALB2, PTEN, RAD51C, RAD51D, STK11, TP53 with reflex to [facility] CustomNext: Cancer+RNAinsight (59 genes): APC, CELSA, AXIN2, BAP1, BARD1, BMPR1A, BRCA1, BRCA2, BRIP1, CDH1, CDK4, CDKN1B, CDKN2A, CHEK2, CTNNA1, DICER1, EGLN1, EPCAM, FH, FLCN, GREM1, HOXB13, KIF1B, KIT, MAX, MEN1, MET, MITF, MLH1, MSH2, MSH3, MSH6, MUTYH, NF1, NTHL1, PALB2, PDGFRA PMS2, POLD1, POLE, POT1, PTEN, RAD51C, RAD51D, RB1, RET, SDHA, SDHAF2, SDHB, SDHC, SDHD, SMAD4, SMARCA4, STK11, RZWJ202, TP53, TSC1, TSC2, VHL     Result Call Information:  In the event that we need to reach Sujey via telephone:  I confirmed the patient's mobile number on file as the best number to call with results  I confirmed with the patient that we can leave a voicemail on the provided numbers    Initial results will take approximately 5-12 days to return     Additional results may take up to 2-3 weeks to  complete once test is started.    Patient does not have any further questions, declined meeting with genetic counselor    When your results are ready, someone from the genetics team will call you, review the results, and contact your breast surgeon. You will be contacted with any type of result - Positive, Negative, or Uncertain.

## 2024-11-20 NOTE — PROGRESS NOTES
Patient Navigator-Please attempt records retrieval from previous breast cancer diagnosis in .    Patient does have a history of left breast cancer from 2006.  Electronic records do not go back that far.  Will call patient to see where she was treated previously.  Patient had all treatment     Referral received/ Chart reviewed for work up completed     Imaging completed: [facility]   [] PET/CT   [] MRI   [] CT   [x] US   [x] Mammo   [] Bone scan   [] N/A    Pathology completed: [facility]   Date:  24   Location: left breast   []N/A    Additional records needed: Left breast DCIS-diagnosed in .  All treatment performed at [facility]   [] Genomic report   [x] Genetic testing results   [x] Office Note   [x] Procedure/ Operative note   [x] Lab results   [] N/A      [x] Radiation Oncology records retrieval needed (PN to route to rad/onc clerical pool once scheduled)  Date:   Location: left breast-[facility]      Chart reviewed for prepping for initial outreach by nurse navigator.    Diagnosis:   Left breast Ductal carcinoma in situ, fragmented, intermediate nuclear grade, grade 2, ER 99%, SC 60%    Recent Imagin/10/24-Mammogram screening bilateral  24-Mammogram diagnostic left  24-US diagnostic left breast  24-US guided left breast biopsy    Recent Pathology:   24-tissue exam-US guided left breast biopsy    Previous Breast Cancer Diagnosis:  Yes, left breast DCIS in     Does patient have a KASHIF ? No, hydromark clip    Is patient diabetic?  YES/NO: yes  Controlled? Yes, on medication-Metformin, last A1C 7.5 on 2024    Is patient on a blood thinner?  Yes, Xarelto for Factor V Leiden    Surgical Oncology: Dr Antoine-24    Medical Oncology: TBD    Radiation Oncology: TBD    Genetic testing: Completed in , updated testing ordered    Family history of cancer: Yes, extensive family history of cancer, including breast    Any further needs: n/a      Information forwarded  to Outbound PEP      Nurse Navigator will call patient for initial outreach.      Will have Patient Navigator outreach patient after surgical oncology consultation.  Any clinical questions to be directed to ONN or office nurse.

## 2024-11-21 ENCOUNTER — PATIENT OUTREACH (OUTPATIENT)
Dept: HEMATOLOGY ONCOLOGY | Facility: CLINIC | Age: 74
End: 2024-11-21

## 2024-11-21 PROBLEM — D05.12 DUCTAL CARCINOMA IN SITU (DCIS) OF LEFT BREAST: Status: ACTIVE | Noted: 2024-11-21

## 2024-11-21 NOTE — PROGRESS NOTES
Breast Oncology Nurse Navigator    Called patient for initial outreach from nurse navigator.  Introduced myself and my role as well as members of the navigation team.  Oncology Nurse Navigator will follow patient until they are seen by surgical oncology, after which the patient navigator initiate outreach and follow the patient to survivorship.      Referral received from [facility].  Breast cancer diagnosis was made after routine yearly mammogram detected an abnormality and further diagnostics were performed at the [facility].  Patient states understanding/awareness of diagnosis.  Patient was diagnosed with left breast DCIS in 2006.  She had a left breast lumpectomy performed at the time by Dr Del Valle at [facility].  She had radiation treatment to the left breast at [facility].  She was started on Tamoxifen in the adjuvant setting, but only took for three months when she was diagnosed with Factor V Leiden.  She was followed by Dr Garsia at the time.  Oncology Navigation will attempt records retrieval related to previous DCIS diagnosis.      Patient did have a few questions about the diagnosis and possible treatment options.  She has not told her family about this recent diagnosis, stating everyone has a lot going on and their own issues.  She is aware that she will need a ride to and from the hospital on the day of surgery and may tell her family once she has more information from Dr Antoine.  Support offered.  If she needs to see a medical oncologist, she does prefer going to the [facility] campus.  Advised that [facility] would be the closest location for radiation treatment, if needed.  Patient did have prior RT to the left chest wall.  Some options discussed and advised that Dr Antoine may still send her to meet with a radiation oncologist for evaluation and discussion.  Patient states understanding.    Any other issues/diagnoses?  yes.  Factor V Leiden.  Patient takes Xarelto.  Psoriatic arthritis and type 2 diabetes,  controlled with medication.  Is patient a current smoker: no, never smoked.    Confirmed upcoming appointment with Dr. Antoine, including date, time and location.  Patient is aware of breast MRI scheduled for the day prior.  Patient will attend alone.    Provided brief explanation of what to expect at this visit.  Patient has transportation to appointments.      Patient lives alone since her  passed away 7 years ago.  She is a retired [employer] nurse.    Support system includes: Her two older sister and her son and daughter.  Patient has not told anyone about her DCIS diagnosis at this time.  Referral placed to oncology social worker: no    Cancer family history includes: extensive family history of cancer, Please see note from genetics team   Referral to oncology genetics: no STAT referral already placed and patient is planning to have updated genetics due to extensive family history of cancer, as well as personal history of DCIS.  Genetic testing in 2006 was negative.  Any previous radiation treatment to the chest?  yes  Is patient pre/post menopausal?  post menopausal    Discussed the Cancer Support Community and some of the programs and services offered in person.  Discussed Breast Cancer Support group that meets monthly at [facility].  Information sent via [employer].    Patient has my contact information and knows she can reach out with questions.  Office hours provided.  Patient provided with the phone number for [employer]-[phone].  General assessment completed.  Patient does have [employer] set up  [employer] message sent with our team's contact information.  Spoke for 30 minutes.

## 2024-11-25 ENCOUNTER — TELEPHONE (OUTPATIENT)
Dept: GENETICS | Facility: CLINIC | Age: 74
End: 2024-11-25

## 2024-11-25 NOTE — TELEPHONE ENCOUNTER
I left a message for the patient returning a call that she was turned away at the lab when attempting to have sample collected for genetics. Patient is ok to complete testing. Lab must use new [facility] sample collection process. Direct number provided for a call back.

## 2024-11-26 ENCOUNTER — HOSPITAL ENCOUNTER (OUTPATIENT)
Dept: RADIOLOGY | Facility: HOSPITAL | Age: 74
Discharge: HOME | End: 2024-11-26
Attending: OBSTETRICS & GYNECOLOGY
Payer: MEDICARE

## 2024-11-26 DIAGNOSIS — D05.12 DUCTAL CARCINOMA IN SITU (DCIS) OF LEFT BREAST: ICD-10-CM

## 2024-11-26 PROCEDURE — C8937 CAD BREAST MRI: HCPCS

## 2024-11-26 PROCEDURE — C8908 MRI W/O FOL W/CONT, BREAST,: HCPCS

## 2024-11-26 PROCEDURE — A9585 GADOBUTROL INJECTION: HCPCS | Performed by: OBSTETRICS & GYNECOLOGY

## 2024-11-26 RX ORDER — GADOBUTROL 604.72 MG/ML
12 INJECTION INTRAVENOUS
Status: COMPLETED | OUTPATIENT
Start: 2024-11-26 | End: 2024-11-26

## 2024-11-26 RX ADMIN — GADOBUTROL 12 ML: 604.72 INJECTION INTRAVENOUS at 17:26

## 2024-11-27 ENCOUNTER — OFFICE VISIT (OUTPATIENT)
Dept: SURGICAL ONCOLOGY | Facility: CLINIC | Age: 74
End: 2024-11-27
Payer: MEDICARE

## 2024-11-27 ENCOUNTER — PATIENT OUTREACH (OUTPATIENT)
Dept: HEMATOLOGY ONCOLOGY | Facility: CLINIC | Age: 74
End: 2024-11-27

## 2024-11-27 VITALS
BODY MASS INDEX: 42.69 KG/M2 | TEMPERATURE: 97.3 F | HEIGHT: 67 IN | WEIGHT: 272 LBS | SYSTOLIC BLOOD PRESSURE: 112 MMHG | HEART RATE: 86 BPM | DIASTOLIC BLOOD PRESSURE: 62 MMHG | OXYGEN SATURATION: 99 %

## 2024-11-27 DIAGNOSIS — D05.12 DUCTAL CARCINOMA IN SITU (DCIS) OF LEFT BREAST: Primary | ICD-10-CM

## 2024-11-27 DIAGNOSIS — Z92.3 HISTORY OF RADIATION THERAPY: ICD-10-CM

## 2024-11-27 DIAGNOSIS — E74.818 OTHER DISORDERS OF GLUCOSE TRANSPORT (HCC): ICD-10-CM

## 2024-11-27 DIAGNOSIS — Z01.818 PRE-OP TESTING: ICD-10-CM

## 2024-11-27 DIAGNOSIS — E11.9 TYPE 2 DIABETES MELLITUS WITHOUT COMPLICATION, WITHOUT LONG-TERM CURRENT USE OF INSULIN (HCC): ICD-10-CM

## 2024-11-27 LAB
DME PARACHUTE DELIVERY DATE REQUESTED: NORMAL
DME PARACHUTE DELIVERY NOTE: NORMAL
DME PARACHUTE ITEM DESCRIPTION: NORMAL
DME PARACHUTE ORDER STATUS: NORMAL
DME PARACHUTE SUPPLIER NAME: NORMAL
DME PARACHUTE SUPPLIER PHONE: NORMAL

## 2024-11-27 PROCEDURE — 99205 OFFICE O/P NEW HI 60 MIN: CPT | Performed by: SURGERY

## 2024-11-27 RX ORDER — ACETAMINOPHEN 10 MG/ML
1000 INJECTION, SOLUTION INTRAVENOUS
OUTPATIENT
Start: 2024-11-27 | End: 2024-11-28

## 2024-11-27 RX ORDER — TRAMADOL HYDROCHLORIDE 50 MG/1
50 TABLET ORAL EVERY 8 HOURS PRN
Qty: 9 TABLET | Refills: 0 | Status: SHIPPED | OUTPATIENT
Start: 2024-11-27

## 2024-11-27 RX ORDER — ENOXAPARIN SODIUM 100 MG/ML
40 INJECTION SUBCUTANEOUS ONCE
OUTPATIENT
Start: 2024-11-27 | End: 2024-11-27

## 2024-11-27 NOTE — PROGRESS NOTES
Breast Consultation-Surgical Oncology     [address]  [facility] [place]  [address]  [facility] [address]    Name:  Sujey Webber  YOB: 1950  MRN:  [id number]    Assessment & Plan   Diagnoses and all orders for this visit:    Ductal carcinoma in situ (DCIS) of left breast  -     Ambulatory referral to Surgical Oncology  -     Case request operating room: LEFT BREAST MASTECTOMY WITH SENTINEL LYMPH NODE BIOPSY; Standing  -     UA w Reflex to Microscopic w Reflex to Culture; Future  -     HEMOGLOBIN A1C W/ EAG ESTIMATION; Future  -     NM lymphatic breast; Future  -     Case request operating room: LEFT BREAST MASTECTOMY WITH SENTINEL LYMPH NODE BIOPSY  -     traMADol (Ultram) 50 mg tablet; Take 1 tablet (50 mg total) by mouth every 8 (eight) hours as needed for severe pain    Other disorders of glucose transport (HCC)  -     HEMOGLOBIN A1C W/ EAG ESTIMATION; Future    Type 2 diabetes mellitus without complication, without long-term current use of insulin (HCC)    History of radiation therapy    Other orders  -     Incentive spirometry; Standing  -     Insert and maintain IV line; Standing  -     Void On-Call to O.R.; Standing  -     Place sequential compression device; Standing  -     ceFAZolin (ANCEF) 3,000 mg in dextrose 5 % 100 mL IVPB  -     enoxaparin (LOVENOX) subcutaneous injection 40 mg  -     acetaminophen (Ofirmev) injection 1,000 mg          HPI: Sujey Webber is a 74 y.o. year old female who presents with DCIS of the left breast.  She had an abnormal screening mammogram which led to diagnostic imaging and a biopsy.  She had an MRI yesterday showing a second focus of non-mass enhancement in the 12:00 axis for which MRI guided biopsy was recommended.  The patient has a history of DCIS in 2006 status post lumpectomy and radiation therapy.  She took tamoxifen but this was discontinued secondary to a blood clot.  She is currently on  Xarelto secondary to recurrent blood clots.    Surgical treatment to date consisted of as noted.    Oncology History:    Oncology History   Ductal carcinoma in situ (DCIS) of left breast   2006 Initial Diagnosis    Ductal Carcinoma In Situ Left breast     2006 Biopsy    Left breast wire lumpectomy  DCIS  Multifocal, 0.5 cm  Grade 2-3  All margins: no evidence of malignancy  No invasive carcinoma identified  Fibrocystic disease with microcalcifications, intraductal papillomas, micropapillary and florid intraductal hyperplasia, sclerosing adenosis, apocrine metaplasia and radial scars    ER 98%  CO 98%     2006 Genetic Testing    Pt states that her genetic testing was NEGATIVE in 2024 Biopsy    LEFT US guided breast BX  Ductal carcinoma in situ, fragmented, intermediate nuclear grade  Grade 2  2 oclock, 3 cmfn    ER 99%  CO 60%  HER2 PENDING     2024 Genetic Testing    Patient has genetic testing done for breast cancer    CONSULT COMPLETED   SAMPLE NOT YET COLLECTED     2024 Initial Diagnosis    Ductal carcinoma in situ (DCIS) of left breast         Pertinent reproductive history:  Age at menarche:    OB History          2    Para   2    Term   2            AB        Living             SAB        IAB        Ectopic        Multiple        Live Births   2           Obstetric Comments   Age of first period: 14  Age when first child was born: 25                 Problem List:   Patient Active Problem List   Diagnosis    Type 2 diabetes mellitus without complication, without long-term current use of insulin (HCC)    Diabetic nephropathy (HCC)    Hyperlipidemia    Hypothyroidism    Nontoxic single thyroid nodule    Prediabetes    Vitamin D deficiency    Essential hypertension    Class 3 severe obesity due to excess calories with serious comorbidity in adult (HCC)    Enlarged thyroid    Class 3 severe obesity due to excess calories with serious comorbidity and body mass  index (BMI) of 40.0 to 44.9 in adult (HCC)    Lower limb pain, posterior, left    Factor 5 Leiden mutation, heterozygous (HCC)    History of DVT (deep vein thrombosis)    Psoriatic arthritis (HCC)    Rash    Head injury    Right knee pain    Pedal edema    Hematoma    Pre-syncope    Stage 3a chronic kidney disease (HCC)    Ductal carcinoma in situ (DCIS) of left breast     Past Medical History:   Diagnosis Date    Arthritis 2005    Breast cancer (HCC) 17 yrs ago    left    Cancer (HCC) 2006    Diabetes mellitus (HCC) 2014    Type 2 Estimated. On metformin    Disease of thyroid gland 1995    Heart murmur 1969    History of radiation therapy 09/01/2006     Past Surgical History:   Procedure Laterality Date    BIOPSY CORE NEEDLE Right 03/03/2010    benign    BREAST CYST ASPIRATION Right 08/15/2003    BREAST CYST ASPIRATION Right     x3  (Years ago)    BREAST LUMPECTOMY Left 08/22/2006    positive    HERNIA REPAIR  1950    MAMMO STEREOTACTIC BREAST BIOPSY LEFT (ALL INC) Left 08/02/2006    malignant    TUBAL LIGATION  1983    US GUIDED BREAST BIOPSY LEFT COMPLETE Left 02/06/2009    benign    US GUIDED BREAST BIOPSY LEFT COMPLETE Left 11/13/2024     Family History   Problem Relation Age of Onset    Breast cancer Mother 53        Also mat aunt    Heart disease Father     Ovarian cancer Sister 63    No Known Problems Sister     No Known Problems Sister     Lung cancer Sister 67    Breast cancer Maternal Aunt 43    Stroke Maternal Aunt     Stomach cancer Maternal Uncle     Skin cancer Maternal Uncle     Heart disease Maternal Uncle     Cancer Paternal Aunt     Breast cancer Paternal Aunt     Skin cancer Paternal Aunt     No Known Problems Maternal Grandmother     Heart disease Maternal Grandfather     Breast cancer Paternal Grandmother 90    Dementia Paternal Grandmother     Heart disease Paternal Grandfather     No Known Problems Daughter     No Known Problems Son     Breast cancer Cousin 71        maternal    Breast cancer  Cousin 73        maternal     Social History     Socioeconomic History    Marital status:      Spouse name: Not on file    Number of children: Not on file    Years of education: Not on file    Highest education level: Not on file   Occupational History    Not on file   Tobacco Use    Smoking status: Never    Smokeless tobacco: Never   Vaping Use    Vaping status: Never Used   Substance and Sexual Activity    Alcohol use: Not Currently     Comment: Few drinks per year    Drug use: Never    Sexual activity: Not Currently   Other Topics Concern    Not on file   Social History Narrative    Not on file     Social Drivers of Health     Financial Resource Strain: Low Risk  (2/28/2024)    Overall Financial Resource Strain (CARDIA)     Difficulty of Paying Living Expenses: Not hard at all   Food Insecurity: Not on file   Transportation Needs: No Transportation Needs (2/28/2024)    PRAPARE - Transportation     Lack of Transportation (Medical): No     Lack of Transportation (Non-Medical): No   Physical Activity: Not on file   Stress: Not on file   Social Connections: Not on file   Intimate Partner Violence: Not on file   Housing Stability: Not on file     Current Outpatient Medications   Medication Sig Dispense Refill    Cholecalciferol (VITAMIN D) 2000 units CAPS Take 1 capsule by mouth daily      folic acid (FOLVITE) 1 mg tablet Take 1 tablet by mouth daily      levothyroxine 88 mcg tablet TAKE 1 TABLET BY MOUTH EVERY DAY 30 tablet 5    metFORMIN (GLUCOPHAGE-XR) 500 mg 24 hr tablet TAKE 3 TABLETS (1,500 MG TOTAL) BY MOUTH DAILY FOR DIABETES 270 tablet 1    methotrexate 2.5 mg tablet 8 tabs once a week      simvastatin (ZOCOR) 20 mg tablet TAKE 1 TABLET BY MOUTH EVERY DAY 90 tablet 1    Xarelto 20 MG tablet TAKE 1 TABLET BY MOUTH EVERY DAY WITH BREAKFAST 90 tablet 1    lisinopril (ZESTRIL) 2.5 mg tablet Take 1 tablet (2.5 mg total) by mouth daily 30 tablet 2     No current facility-administered medications for this  visit.     No Known Allergies      The following portions of the patient's history were reviewed and updated as appropriate: allergies, current medications, past family history, past medical history, past social history, past surgical history, and problem list.    Review of Systems:  Review of Systems   Constitutional: Negative.  Negative for appetite change, fever and unexpected weight change.   HENT: Negative.  Negative for trouble swallowing.    Eyes: Negative.    Respiratory: Negative.  Negative for cough and shortness of breath.    Cardiovascular:  Positive for leg swelling. Negative for chest pain.   Gastrointestinal: Negative.  Negative for abdominal pain, nausea and vomiting.   Endocrine: Negative.    Genitourinary: Negative.  Negative for dysuria.   Musculoskeletal: Negative.  Negative for arthralgias and myalgias.   Skin: Negative.    Allergic/Immunologic: Negative.    Neurological: Negative.  Negative for headaches.   Hematological:  Negative for adenopathy. Bruises/bleeds easily.   Psychiatric/Behavioral: Negative.         Physical Exam:  Physical Exam  Constitutional:       General: She is not in acute distress.     Appearance: Normal appearance. She is well-developed. She is obese.   HENT:      Head: Normocephalic and atraumatic.   Cardiovascular:      Heart sounds: Normal heart sounds.   Pulmonary:      Breath sounds: Normal breath sounds.   Chest:   Breasts:     Breasts are asymmetrical.      Right: No swelling, bleeding, inverted nipple, mass, nipple discharge, skin change or tenderness.      Left: Skin change (lumpectomy scar 12:00, resolving ecchymosis lateral) present. No swelling, bleeding, inverted nipple, mass, nipple discharge or tenderness.   Abdominal:      Palpations: Abdomen is soft.   Musculoskeletal:      Right lower leg: No edema.      Left lower leg: Edema present.   Lymphadenopathy:      Upper Body:      Right upper body: No supraclavicular, axillary or pectoral adenopathy.      Left  upper body: No supraclavicular, axillary or pectoral adenopathy.   Neurological:      Mental Status: She is alert and oriented to person, place, and time.   Psychiatric:         Mood and Affect: Mood normal.         Laboratory:  Pathology from  was reviewed showing DCIS with clean margins, no evidence of invasion    1324 core biopsy left breast 2:00    Pathology revealed: ductal carcinoma in situ    Histologic grade: moderately differentiated     Angiolymphatic invasion:  absent    Tumor node status:  Negative    Hormone receptor status: ER/MI positive    2024 hemoglobin A1c was 7.5    Imagin/10/2024 bilateral 3D screening mammogram there is a left-sided asymmetry, right side is benign    2024 left 3D diagnostic mammogram and ultrasound shows a focal asymmetry with complex mass measuring 2 cm at the 2:00 Axis III centimeters from the nipple corresponding to the area of asymmetry, normal-appearing left axillary nodes    2024 postbiopsy mammogram is concordant, MRI is recommended given the discrepancy in size for the lesion versus the pathology    2024 bilateral breast MRI shows an area of enhancement measuring 25 mm at the recent biopsy site as well as a second area of non-mass enhancement measuring 11 mm at 12:00 5 cm from the nipple for which MRI guided biopsy was recommended, right side is benign      Discussion/Summary: 74-year-old female who presents with DCIS of the left breast.  She has a history of a left lumpectomy and radiation.  This was at the 12:00 axis of the left breast.  She does have significant asymmetry compared to the native right breast.  Her MRI shows an area of non-mass enhancement at 12:00 for which MRI guided biopsy was recommended.  The patient does not think she can tolerate this.  If she did pursue this and it were benign, she could potentially have repeat breast conservation.  The asymmetry in the breast would be more pronounced than what it already is.   If she had the biopsy and it were malignant, she would not be a good candidate for breast conservation.  All things considered, she is agreeable to proceeding with a left mastectomy.  If final pathology shows DCIS only, she would not need any adjuvant therapy.  She understands if there is any invasive component that the receptors would be repeated and adjuvant medical therapy may be indicated.  Her last hemoglobin A1c was 3 months ago and elevated at 7.5.  I will repeat this now.  She understands if the hemoglobin A1c is greater than 8 then surgery will be delayed to better manage her diabetes.  She is also on Xarelto secondary to multiple blood clots.  We will reach out to her PCP to get clearance to hold this.  All of her questions were answered.  Consent was signed today in the office.  She would like to have genetic testing and will have this done with her preadmission blood work.  She will be scheduled for surgery in the near term.

## 2024-11-27 NOTE — PROGRESS NOTES
Breast Oncology Patient Navigator    Met with patient at her consult appointment with Dr. Antoine      Offered support and answered questions when appropriate.    Patient given our team's business cards.  Patient has my contact information and knows to reach out with further questions or concerns.     Patient had no concerns or questions at this time.    I reached out and spoke with Sujey now that consults have been completed with the oncology teams to review for any barriers to care and offer supportive services as needed. Distress Thermometer completed at this time. Patient scored 0/10. Based on responses to DT, no indication for referral to SW needed at this time. . I reviewed and updated the members assigned to the care team in [facility].     She knows the members of the care team as well as how and when to contact them with any needs.     She verbalizes managing the schedules well.     She is currently able to drive and denies any transportation needs.      Palliative care was not discussed.    She states that she is eating and drinking as per usual with no unintentional weight loss.       Patient does not smoke.     She states she is well supported by family and friends.  Community support information provided via pamphlets, including brochures .    She feels she has adequate insurance coverage and denies any financial concerns at this time.     Based on individual needs I will follow up in about 4-6 weeks. I have provided my direct contact information and welcome them to contact me if needs as discussed above change. She was appreciative for the call.

## 2024-12-02 ENCOUNTER — TELEPHONE (OUTPATIENT)
Dept: OBGYN CLINIC | Facility: OTHER | Age: 74
End: 2024-12-02

## 2024-12-02 ENCOUNTER — RESULTS FOLLOW-UP (OUTPATIENT)
Dept: OBGYN CLINIC | Facility: CLINIC | Age: 74
End: 2024-12-02

## 2024-12-11 ENCOUNTER — CLINICAL SUPPORT (OUTPATIENT)
Dept: OBGYN CLINIC | Facility: OTHER | Age: 74
End: 2024-12-11

## 2024-12-11 DIAGNOSIS — Z85.3 HISTORY OF BREAST CANCER: Primary | ICD-10-CM

## 2024-12-11 NOTE — PROGRESS NOTES
Mastectomy Bra Fitting Order Details    Sujey Webber  1950  [id number]    Reason For Visit  Post op bra    Precautions   Surgery on 12/27/2024    Subjective  Sujey is scheduled a mastectomy on left side without reconstruction. She     Surgery Type: Mastectomy    Lymph node removal unknown     Date of surgery 12/27/2024    Surgical side left    Objective   Sujey was educated on her post op options.     Assessment  Band- 21.5 x 2 = 43  Cup - 22 x 2 = 44    Plan  Ship to home day after surgery.     Order Details   Yennifer kit 2XL C/D x 2

## 2024-12-12 ENCOUNTER — CONSULT (OUTPATIENT)
Dept: INTERNAL MEDICINE CLINIC | Facility: CLINIC | Age: 74
End: 2024-12-12

## 2024-12-12 ENCOUNTER — APPOINTMENT (OUTPATIENT)
Dept: LAB | Facility: CLINIC | Age: 74
End: 2024-12-12
Payer: MEDICARE

## 2024-12-12 VITALS
OXYGEN SATURATION: 100 % | TEMPERATURE: 97.3 F | SYSTOLIC BLOOD PRESSURE: 138 MMHG | WEIGHT: 275.4 LBS | DIASTOLIC BLOOD PRESSURE: 66 MMHG | BODY MASS INDEX: 43.13 KG/M2 | HEART RATE: 78 BPM

## 2024-12-12 DIAGNOSIS — Z01.818 PRE-OP TESTING: ICD-10-CM

## 2024-12-12 DIAGNOSIS — Z80.0 FAMILY HISTORY OF STOMACH CANCER: ICD-10-CM

## 2024-12-12 DIAGNOSIS — Z80.41 FAMILY HISTORY OF OVARIAN CANCER: ICD-10-CM

## 2024-12-12 DIAGNOSIS — D05.12 DUCTAL CARCINOMA IN SITU (DCIS) OF LEFT BREAST: ICD-10-CM

## 2024-12-12 DIAGNOSIS — Z85.3 PERSONAL HISTORY OF BREAST CANCER: ICD-10-CM

## 2024-12-12 DIAGNOSIS — L40.59 POLYARTICULAR PSORIATIC ARTHRITIS (HCC): ICD-10-CM

## 2024-12-12 DIAGNOSIS — Z80.49 FAMILY HISTORY OF MALIGNANT NEOPLASM OF UTERUS: ICD-10-CM

## 2024-12-12 DIAGNOSIS — Z01.818 PREOP EXAMINATION: Primary | ICD-10-CM

## 2024-12-12 DIAGNOSIS — E74.818 OTHER DISORDERS OF GLUCOSE TRANSPORT (HCC): ICD-10-CM

## 2024-12-12 DIAGNOSIS — Z80.3 FAMILY HISTORY OF BREAST CANCER: ICD-10-CM

## 2024-12-12 LAB
ALBUMIN SERPL BCG-MCNC: 3.8 G/DL (ref 3.5–5)
ALP SERPL-CCNC: 79 U/L (ref 34–104)
ALT SERPL W P-5'-P-CCNC: 14 U/L (ref 7–52)
ANION GAP SERPL CALCULATED.3IONS-SCNC: 9 MMOL/L (ref 4–13)
AST SERPL W P-5'-P-CCNC: 16 U/L (ref 13–39)
BACTERIA UR QL AUTO: ABNORMAL /HPF
BASOPHILS # BLD AUTO: 0.05 THOUSANDS/ÂΜL (ref 0–0.1)
BASOPHILS NFR BLD AUTO: 1 % (ref 0–1)
BILIRUB DIRECT SERPL-MCNC: 0.19 MG/DL (ref 0–0.2)
BILIRUB SERPL-MCNC: 0.99 MG/DL (ref 0.2–1)
BILIRUB UR QL STRIP: NEGATIVE
BUN SERPL-MCNC: 10 MG/DL (ref 5–25)
CALCIUM SERPL-MCNC: 9.3 MG/DL (ref 8.4–10.2)
CHLORIDE SERPL-SCNC: 104 MMOL/L (ref 96–108)
CLARITY UR: CLEAR
CO2 SERPL-SCNC: 24 MMOL/L (ref 21–32)
COLOR UR: ABNORMAL
CREAT SERPL-MCNC: 0.89 MG/DL (ref 0.6–1.3)
CRP SERPL QL: 12 MG/L
EOSINOPHIL # BLD AUTO: 0.08 THOUSAND/ÂΜL (ref 0–0.61)
EOSINOPHIL NFR BLD AUTO: 1 % (ref 0–6)
ERYTHROCYTE [DISTWIDTH] IN BLOOD BY AUTOMATED COUNT: 14.6 % (ref 11.6–15.1)
ERYTHROCYTE [SEDIMENTATION RATE] IN BLOOD: 61 MM/HOUR (ref 0–29)
EST. AVERAGE GLUCOSE BLD GHB EST-MCNC: 166 MG/DL
GFR SERPL CREATININE-BSD FRML MDRD: 64 ML/MIN/1.73SQ M
GLUCOSE P FAST SERPL-MCNC: 143 MG/DL (ref 65–99)
GLUCOSE UR STRIP-MCNC: NEGATIVE MG/DL
HBA1C MFR BLD: 7.4 %
HCT VFR BLD AUTO: 38.5 % (ref 34.8–46.1)
HGB BLD-MCNC: 12.7 G/DL (ref 11.5–15.4)
HGB UR QL STRIP.AUTO: ABNORMAL
HYALINE CASTS #/AREA URNS LPF: ABNORMAL /LPF
IMM GRANULOCYTES # BLD AUTO: 0.01 THOUSAND/UL (ref 0–0.2)
IMM GRANULOCYTES NFR BLD AUTO: 0 % (ref 0–2)
KETONES UR STRIP-MCNC: NEGATIVE MG/DL
LEUKOCYTE ESTERASE UR QL STRIP: ABNORMAL
LYMPHOCYTES # BLD AUTO: 0.97 THOUSANDS/ÂΜL (ref 0.6–4.47)
LYMPHOCYTES NFR BLD AUTO: 16 % (ref 14–44)
MCH RBC QN AUTO: 31.6 PG (ref 26.8–34.3)
MCHC RBC AUTO-ENTMCNC: 33 G/DL (ref 31.4–37.4)
MCV RBC AUTO: 96 FL (ref 82–98)
MONOCYTES # BLD AUTO: 0.43 THOUSAND/ÂΜL (ref 0.17–1.22)
MONOCYTES NFR BLD AUTO: 7 % (ref 4–12)
MUCOUS THREADS UR QL AUTO: ABNORMAL
NEUTROPHILS # BLD AUTO: 4.63 THOUSANDS/ÂΜL (ref 1.85–7.62)
NEUTS SEG NFR BLD AUTO: 75 % (ref 43–75)
NITRITE UR QL STRIP: NEGATIVE
NON-SQ EPI CELLS URNS QL MICRO: ABNORMAL /HPF
NRBC BLD AUTO-RTO: 0 /100 WBCS
PH UR STRIP.AUTO: 5.5 [PH]
PLATELET # BLD AUTO: 307 THOUSANDS/UL (ref 149–390)
PMV BLD AUTO: 10.7 FL (ref 8.9–12.7)
POTASSIUM SERPL-SCNC: 4.2 MMOL/L (ref 3.5–5.3)
PROT SERPL-MCNC: 6.8 G/DL (ref 6.4–8.4)
PROT UR STRIP-MCNC: ABNORMAL MG/DL
RBC # BLD AUTO: 4.02 MILLION/UL (ref 3.81–5.12)
RBC #/AREA URNS AUTO: ABNORMAL /HPF
SODIUM SERPL-SCNC: 137 MMOL/L (ref 135–147)
SP GR UR STRIP.AUTO: 1.02 (ref 1–1.03)
TRANS CELLS #/AREA URNS HPF: PRESENT /[HPF]
UROBILINOGEN UR STRIP-ACNC: <2 MG/DL
WBC # BLD AUTO: 6.17 THOUSAND/UL (ref 4.31–10.16)
WBC #/AREA URNS AUTO: ABNORMAL /HPF

## 2024-12-12 PROCEDURE — 80053 COMPREHEN METABOLIC PANEL: CPT

## 2024-12-12 PROCEDURE — 85025 COMPLETE CBC W/AUTO DIFF WBC: CPT

## 2024-12-12 PROCEDURE — 85652 RBC SED RATE AUTOMATED: CPT

## 2024-12-12 PROCEDURE — 83036 HEMOGLOBIN GLYCOSYLATED A1C: CPT

## 2024-12-12 PROCEDURE — 82248 BILIRUBIN DIRECT: CPT

## 2024-12-12 PROCEDURE — 86140 C-REACTIVE PROTEIN: CPT

## 2024-12-12 PROCEDURE — 81001 URINALYSIS AUTO W/SCOPE: CPT

## 2024-12-12 PROCEDURE — 36415 COLL VENOUS BLD VENIPUNCTURE: CPT

## 2024-12-12 NOTE — ASSESSMENT & PLAN NOTE
Medically clear for surgery, low risk  Stop taking methotrexate 7 days prior to surgery  Stop taking xarelto 72 hours prior to surgery  Donot take metformin on the day of surgery  Please check a blood sugar level after the surgery in the OR  Patient may resume metformin after the surgery once she starts eating  Xarelto may be resumed after surgery as per surgery teams recommendations

## 2024-12-12 NOTE — PROGRESS NOTES
Presurgical Evaluation    Subjective:      Patient ID: Sujey Webber is a 74 y.o. female.    Chief Complaint   Patient presents with    Pre-op Exam     Medically clear for surgery, low risk  Stop taking methotrexate 7 days prior to surgery  Stop taking xarelto 72 hours prior to surgery  Donot take metformin on the day of surgery  Please check a blood sugar level after the surgery in the OR  Patient may resume metformin after the surgery once she starts eating  Xarelto may be resumed after surgery as per surgery teams recommendations  HPI    74 y old female here for preop clearance for left sided mastectomy by Dr Antoine on Dec 27 2024    Procedure date: 12/27/24    Surgeon:  Dr Kristy Antoine  Planned procedure: left  Mastectomy   Diagnosis for procedure:  Ductal carcinoma insitu of left breast    Prior anesthesia: Yes   General; Complications:  None / Tolerated well    CAD History: None   NOTE: Patient should see Cardiology if time available before surgery, and if appropriate.    Pulmonary History: None    Renal history: None    Diabetes History:  Type 2  Controlled     Neurological History: None     On Immunosuppressant meds/biologics: Yes, methotrexate      Review of Systems   Constitutional:  Negative for chills and fever.   HENT:  Negative for sore throat.    Eyes:  Negative for visual disturbance.   Respiratory:  Negative for cough and shortness of breath.    Cardiovascular:  Negative for chest pain and palpitations.   Gastrointestinal:  Negative for abdominal pain, constipation, diarrhea, nausea and vomiting.   Genitourinary:  Negative for frequency, hematuria and urgency.   Neurological:  Negative for headaches.         Current Outpatient Medications   Medication Sig Dispense Refill    Cholecalciferol (VITAMIN D) 2000 units CAPS Take 1 capsule by mouth daily      folic acid (FOLVITE) 1 mg tablet Take 1 tablet by mouth daily      levothyroxine 88 mcg tablet TAKE 1 TABLET BY MOUTH EVERY DAY 30 tablet 5     metFORMIN (GLUCOPHAGE-XR) 500 mg 24 hr tablet TAKE 3 TABLETS (1,500 MG TOTAL) BY MOUTH DAILY FOR DIABETES 270 tablet 1    methotrexate 2.5 mg tablet 8 tabs once a week      simvastatin (ZOCOR) 20 mg tablet TAKE 1 TABLET BY MOUTH EVERY DAY 90 tablet 1    Xarelto 20 MG tablet TAKE 1 TABLET BY MOUTH EVERY DAY WITH BREAKFAST 90 tablet 1    traMADol (Ultram) 50 mg tablet Take 1 tablet (50 mg total) by mouth every 8 (eight) hours as needed for severe pain (Patient not taking: Reported on 12/12/2024) 9 tablet 0     No current facility-administered medications for this visit.       Allergies on file:   Patient has no known allergies.    Patient Active Problem List   Diagnosis    Type 2 diabetes mellitus without complication, without long-term current use of insulin (HCC)    Diabetic nephropathy (HCC)    Hyperlipidemia    Hypothyroidism    Nontoxic single thyroid nodule    Prediabetes    Vitamin D deficiency    Essential hypertension    Class 3 severe obesity due to excess calories with serious comorbidity in adult (HCC)    Enlarged thyroid    Class 3 severe obesity due to excess calories with serious comorbidity and body mass index (BMI) of 40.0 to 44.9 in adult (HCC)    Lower limb pain, posterior, left    Factor 5 Leiden mutation, heterozygous (HCC)    History of DVT (deep vein thrombosis)    Psoriatic arthritis (HCC)    Rash    Head injury    Right knee pain    Pedal edema    Hematoma    Pre-syncope    Stage 3a chronic kidney disease (HCC)    Ductal carcinoma in situ (DCIS) of left breast    History of radiation therapy    Preop examination        Past Medical History:   Diagnosis Date    Arthritis 2005    Breast cancer (HCC) 17 yrs ago    Diabetes mellitus (HCC) 2014    Type 2 Estimated. On metformin    Disease of thyroid gland 1995    Heart murmur 1969    History of radiation therapy 09/01/2006       Past Surgical History:   Procedure Laterality Date    BIOPSY CORE NEEDLE Right 03/03/2010    benign    BREAST CYST  ASPIRATION Right 08/15/2003    BREAST CYST ASPIRATION Right     x3  (Years ago)    BREAST LUMPECTOMY Left 08/22/2006    positive    HERNIA REPAIR  1951    MAMMO STEREOTACTIC BREAST BIOPSY LEFT (ALL INC) Left 08/02/2006    malignant    TUBAL LIGATION  1983    US GUIDED BREAST BIOPSY LEFT COMPLETE Left 02/06/2009    benign    US GUIDED BREAST BIOPSY LEFT COMPLETE Left 11/13/2024       Family History   Problem Relation Age of Onset    Breast cancer Mother 53        Also mat aunt    Heart disease Father     Ovarian cancer Sister 63    No Known Problems Sister     No Known Problems Sister     Lung cancer Sister 67    Breast cancer Maternal Aunt 43    Stroke Maternal Aunt     Stomach cancer Maternal Uncle     Skin cancer Maternal Uncle     Heart disease Maternal Uncle     Cancer Paternal Aunt     Breast cancer Paternal Aunt     Skin cancer Paternal Aunt     No Known Problems Maternal Grandmother     Heart disease Maternal Grandfather     Breast cancer Paternal Grandmother 90    Dementia Paternal Grandmother     Heart disease Paternal Grandfather     No Known Problems Daughter     No Known Problems Son     Breast cancer Cousin 71        maternal    Breast cancer Cousin 73        maternal       Social History     Tobacco Use    Smoking status: Never    Smokeless tobacco: Never   Vaping Use    Vaping status: Never Used   Substance Use Topics    Alcohol use: Not Currently     Comment: Few drinks per year    Drug use: Never       Objective:    Vitals:    12/12/24 1346   BP: 138/66   BP Location: Right arm   Patient Position: Sitting   Cuff Size: Large   Pulse: 78   Temp: (!) 97.3 °F (36.3 °C)   TempSrc: Tympanic   SpO2: 100%   Weight: 125 kg (275 lb 6.4 oz)        Physical Exam  Constitutional:       Appearance: Normal appearance. She is obese.   Eyes:      Extraocular Movements: Extraocular movements intact.      Pupils: Pupils are equal, round, and reactive to light.   Cardiovascular:      Rate and Rhythm: Normal rate and  regular rhythm.      Pulses: Normal pulses.      Heart sounds: Normal heart sounds.   Pulmonary:      Effort: Pulmonary effort is normal.      Breath sounds: Normal breath sounds.   Abdominal:      General: Abdomen is flat.      Palpations: Abdomen is soft.   Skin:     General: Skin is warm.      Capillary Refill: Capillary refill takes less than 2 seconds.   Neurological:      Mental Status: She is alert and oriented to person, place, and time. Mental status is at baseline.           Preop labs/testing available and reviewed: yes       WBC   Date Value Ref Range Status   2024 6.17 4.31 - 10.16 Thousand/uL Final     Hemoglobin   Date Value Ref Range Status   2024 12.7 11.5 - 15.4 g/dL Final     Hematocrit   Date Value Ref Range Status   2024 38.5 34.8 - 46.1 % Final     Platelets   Date Value Ref Range Status   2024 307 149 - 390 Thousands/uL Final          EKG no    Echo yes    Stress test/cath no    PFT/Joe no    Functional capacity: Climb stairs                        4 Mets   Pick the highest level patient can comfortably perform   4 mets or greater for surgery    RCRI  High Risk surgery?         1 Point  CAD History:         1 Point   MI; Positive Stress Test; CP due to Mi;  Nitrate Usage to control Angina; Pathologic Q wave on EKG  CHF Active:         1 Point   Pulm Edema; Paroxysmal Nocturnal Dyspnea;  Bibasilar Rales (crackles);S3; CHF on CXR  Cerebrovascular Disease (TIA or CVA):     1 Point  DM on Insulin:        1 Point  Serum Creat >2.0 mg/dl:       1 Point          Total Points: 0     Scorin: Class I, Very Low Risk (0.4%)     1: Class II, Low risk (0.9%)     2: Class III Moderate (6.6%)     3: Class IV High (>11%)      ANNEL Risk:  GFR:        For PCP:  If GFR>60, Hold ACE/ARB/Diuretic on the day of surgery, and NSAIDS 10 days before.    If GFR<45, Consider PRE and POST op Nephrology Consult.    If 46 <GFR> 59 : Has Patient had ANNEL in last 6 Months? no   If YES: Preop  "Nephrology consult   If No:  Post Op Nephrology consult.           Assessment/Plan:    Patient is medically optimized (cleared) for the planned procedure.    Further testing/evaluation is not required.    Postop concerns: no    Problem List Items Addressed This Visit       Preop examination - Primary    Medically clear for surgery, low risk  Stop taking methotrexate 7 days prior to surgery  Stop taking xarelto 72 hours prior to surgery  Donot take metformin on the day of surgery  Please check a blood sugar level after the surgery in the OR  Patient may resume metformin after the surgery once she starts eating  Xarelto may be resumed after surgery as per surgery teams recommendations             Diagnoses and all orders for this visit:    Preop examination      Medically clear for surgery, low risk  Stop taking methotrexate 7 days prior to surgery  Stop taking xarelto 72 hours prior to surgery  Donot take metformin on the day of surgery  Please check a blood sugar level after the surgery in the OR  Patient may resume metformin after the surgery once she starts eating  Xarelto may be resumed after surgery as per surgery teams recommendations    Pre-Surgery Instructions:   Medication Instructions    Cholecalciferol (VITAMIN D) 2000 units CAPS per anesthesia guidelines     folic acid (FOLVITE) 1 mg tablet per anesthesia guidelines     levothyroxine 88 mcg tablet per anesthesia guidelines     metFORMIN (GLUCOPHAGE-XR) 500 mg 24 hr tablet Stop taking morning of surgery days prior to surgery. Donot take on the morning of surgery    methotrexate 2.5 mg tablet Stop taking 7 days prior to surgery    simvastatin (ZOCOR) 20 mg tablet per anesthesia guidelines     Xarelto 20 MG tablet Stop taking 3 days prior to surgery        NOTE: Please use the above to review important meds for your specialty, the remainder \"per anesthesia Guidelines.\"    NOTE: Please place an Inbasket message for \"SOC\" pool for complicated patients.     "

## 2024-12-12 NOTE — PROGRESS NOTES
Pre-operative Clearance  Name: Sujey Webber      : 1950      MRN: [id number]  Encounter Provider: Maine Alfaro MD  Encounter Date: 2024   [facility]    Assessment & Plan    Pre-op Plan     History of Present Illness   {?Quick Links Encounters * My Last Note * Last Note in Specialty * Snapshot * Since Last Visit * History :03907}  HPI  Review of Systems  Past Medical History   Past Medical History:   Diagnosis Date   • Arthritis    • Breast cancer (HCC) 17 yrs ago   • Diabetes mellitus (HCC)     Type 2 Estimated. On metformin   • Disease of thyroid gland    • Heart murmur    • History of radiation therapy 2006     Past Surgical History:   Procedure Laterality Date   • BIOPSY CORE NEEDLE Right 2010    benign   • BREAST CYST ASPIRATION Right 08/15/2003   • BREAST CYST ASPIRATION Right     x3  (Years ago)   • BREAST LUMPECTOMY Left 2006    positive   • HERNIA REPAIR     • MAMMO STEREOTACTIC BREAST BIOPSY LEFT (ALL INC) Left 2006    malignant   • TUBAL LIGATION     • US GUIDED BREAST BIOPSY LEFT COMPLETE Left 2009    benign   • US GUIDED BREAST BIOPSY LEFT COMPLETE Left 2024     Family History   Problem Relation Age of Onset   • Breast cancer Mother 53        Also mat aunt   • Heart disease Father    • Ovarian cancer Sister 63   • No Known Problems Sister    • No Known Problems Sister    • Lung cancer Sister 67   • Breast cancer Maternal Aunt 43   • Stroke Maternal Aunt    • Stomach cancer Maternal Uncle    • Skin cancer Maternal Uncle    • Heart disease Maternal Uncle    • Cancer Paternal Aunt    • Breast cancer Paternal Aunt    • Skin cancer Paternal Aunt    • No Known Problems Maternal Grandmother    • Heart disease Maternal Grandfather    • Breast cancer Paternal Grandmother 90   • Dementia Paternal Grandmother    • Heart disease Paternal Grandfather    • No Known Problems  Daughter    • No Known Problems Son    • Breast cancer Cousin 71        maternal   • Breast cancer Cousin 73        maternal     Social History     Tobacco Use   • Smoking status: Never   • Smokeless tobacco: Never   Vaping Use   • Vaping status: Never Used   Substance and Sexual Activity   • Alcohol use: Not Currently     Comment: Few drinks per year   • Drug use: Never   • Sexual activity: Not Currently     Current Outpatient Medications on File Prior to Visit   Medication Sig   • Cholecalciferol (VITAMIN D) 2000 units CAPS Take 1 capsule by mouth daily   • folic acid (FOLVITE) 1 mg tablet Take 1 tablet by mouth daily   • levothyroxine 88 mcg tablet TAKE 1 TABLET BY MOUTH EVERY DAY   • metFORMIN (GLUCOPHAGE-XR) 500 mg 24 hr tablet TAKE 3 TABLETS (1,500 MG TOTAL) BY MOUTH DAILY FOR DIABETES   • methotrexate 2.5 mg tablet 8 tabs once a week   • simvastatin (ZOCOR) 20 mg tablet TAKE 1 TABLET BY MOUTH EVERY DAY   • Xarelto 20 MG tablet TAKE 1 TABLET BY MOUTH EVERY DAY WITH BREAKFAST   • traMADol (Ultram) 50 mg tablet Take 1 tablet (50 mg total) by mouth every 8 (eight) hours as needed for severe pain (Patient not taking: Reported on 12/12/2024)   • [DISCONTINUED] aspirin 325 mg tablet Take 1 tablet by mouth daily     No Known Allergies  Objective {?Quick Links Trend Vitals * Enter New Vitals * Results Review * Timeline (Adult) * Labs * Imaging * Cardiology * Procedures * Lung Cancer Screening * Surgical eConsent :82449}  /66 (BP Location: Right arm, Patient Position: Sitting, Cuff Size: Large)   Pulse 78   Temp (!) 97.3 °F (36.3 °C) (Tympanic)   Wt 125 kg (275 lb 6.4 oz)   SpO2 100%   BMI 43.13 kg/m²     Physical Exam  {Administrative / Billing Section (Optional):47765}    Maine Alfaro MD

## 2024-12-12 NOTE — PROGRESS NOTES
Pre-operative Clearance  Name: Sujey Webber      : 1950      MRN: [id number]  Encounter Provider: Maine Alfaro MD  Encounter Date: 2024   [facility]: [facility]    Assessment & Plan    Pre-op Plan     History of Present Illness   {?Quick Links Encounters * My Last Note * Last Note in Specialty * Snapshot * Since Last Visit * History :83575}  HPI  Review of Systems  Past Medical History   Past Medical History:   Diagnosis Date   • Arthritis    • Breast cancer (HCC) 17 yrs ago   • Diabetes mellitus (HCC)     Type 2 Estimated. On metformin   • Disease of thyroid gland    • Heart murmur    • History of radiation therapy 2006     Past Surgical History:   Procedure Laterality Date   • BIOPSY CORE NEEDLE Right 2010    benign   • BREAST CYST ASPIRATION Right 08/15/2003   • BREAST CYST ASPIRATION Right     x3  (Years ago)   • BREAST LUMPECTOMY Left 2006    positive   • HERNIA REPAIR     • MAMMO STEREOTACTIC BREAST BIOPSY LEFT (ALL INC) Left 2006    malignant   • TUBAL LIGATION     • US GUIDED BREAST BIOPSY LEFT COMPLETE Left 2009    benign   • US GUIDED BREAST BIOPSY LEFT COMPLETE Left 2024     Family History   Problem Relation Age of Onset   • Breast cancer Mother 53        Also mat aunt   • Heart disease Father    • Ovarian cancer Sister 63   • No Known Problems Sister    • No Known Problems Sister    • Lung cancer Sister 67   • Breast cancer Maternal Aunt 43   • Stroke Maternal Aunt    • Stomach cancer Maternal Uncle    • Skin cancer Maternal Uncle    • Heart disease Maternal Uncle    • Cancer Paternal Aunt    • Breast cancer Paternal Aunt    • Skin cancer Paternal Aunt    • No Known Problems Maternal Grandmother    • Heart disease Maternal Grandfather    • Breast cancer Paternal Grandmother 90   • Dementia Paternal Grandmother    • Heart disease Paternal Grandfather    • No Known Problems  Daughter    • No Known Problems Son    • Breast cancer Cousin 71        maternal   • Breast cancer Cousin 73        maternal     Social History     Tobacco Use   • Smoking status: Never   • Smokeless tobacco: Never   Vaping Use   • Vaping status: Never Used   Substance and Sexual Activity   • Alcohol use: Not Currently     Comment: Few drinks per year   • Drug use: Never   • Sexual activity: Not Currently     Current Outpatient Medications on File Prior to Visit   Medication Sig   • Cholecalciferol (VITAMIN D) 2000 units CAPS Take 1 capsule by mouth daily   • folic acid (FOLVITE) 1 mg tablet Take 1 tablet by mouth daily   • levothyroxine 88 mcg tablet TAKE 1 TABLET BY MOUTH EVERY DAY   • metFORMIN (GLUCOPHAGE-XR) 500 mg 24 hr tablet TAKE 3 TABLETS (1,500 MG TOTAL) BY MOUTH DAILY FOR DIABETES   • methotrexate 2.5 mg tablet 8 tabs once a week   • simvastatin (ZOCOR) 20 mg tablet TAKE 1 TABLET BY MOUTH EVERY DAY   • Xarelto 20 MG tablet TAKE 1 TABLET BY MOUTH EVERY DAY WITH BREAKFAST   • traMADol (Ultram) 50 mg tablet Take 1 tablet (50 mg total) by mouth every 8 (eight) hours as needed for severe pain (Patient not taking: Reported on 12/12/2024)   • [DISCONTINUED] aspirin 325 mg tablet Take 1 tablet by mouth daily     No Known Allergies  Objective {?Quick Links Trend Vitals * Enter New Vitals * Results Review * Timeline (Adult) * Labs * Imaging * Cardiology * Procedures * Lung Cancer Screening * Surgical eConsent :47667}  /66 (BP Location: Right arm, Patient Position: Sitting, Cuff Size: Large)   Pulse 78   Temp (!) 97.3 °F (36.3 °C) (Tympanic)   Wt 125 kg (275 lb 6.4 oz)   SpO2 100%   BMI 43.13 kg/m²     Physical Exam  {Administrative / Billing Section (Optional):01033}    Maine Alfaro MD

## 2024-12-12 NOTE — PATIENT INSTRUCTIONS
Pre-op  Stop taking Xarelto 72 hours prior to surgery  Stop taking methotrexate 7 days prior to surgery  Donot take metformin on the day of surgery

## 2024-12-16 ENCOUNTER — TELEPHONE (OUTPATIENT)
Dept: SURGICAL ONCOLOGY | Facility: CLINIC | Age: 74
End: 2024-12-16

## 2024-12-16 NOTE — TELEPHONE ENCOUNTER
Spoke with patient at this time to review medications that need to be held prior to her surgery. Pt states she saw Dr Garibay and verbalized each medication she was to hold and how many days prior. Pt has clear understanding of directions and aware to call us with any further questions or concerns.

## 2024-12-18 NOTE — PRE-PROCEDURE INSTRUCTIONS
Pre-Surgery Instructions:   Medication Instructions    Cholecalciferol (VITAMIN D) 2000 units CAPS Stop taking 7 days prior to surgery.    folic acid (FOLVITE) 1 mg tablet Stop taking 7 days prior to surgery.    levothyroxine 88 mcg tablet Take day of surgery.    metFORMIN (GLUCOPHAGE-XR) 500 mg 24 hr tablet Hold day of surgery.    methotrexate 2.5 mg tablet Instructions provided by MD    simvastatin (ZOCOR) 20 mg tablet Take night before surgery    Xarelto 20 MG tablet Instructions provided by MD    Medication instructions for day surgery reviewed. Please use only a sip of water to take your instructed medications. Avoid all over the counter vitamins, supplements and NSAIDS for one week prior to surgery per anesthesia guidelines. Tylenol is ok to take as needed.     You will receive a call one business day prior to surgery with an arrival time and hospital directions. If your surgery is scheduled on a Monday, the hospital will be calling you on the Friday prior to your surgery. If you have not heard from anyone by 8pm, please call the hospital supervisor through the hospital  at [phone]. ([place] [phone] or [place] [phone]).    Do not eat or drink anything after midnight the night before your surgery, including candy, mints, lifesavers, or chewing gum. Do not drink alcohol 24hrs before your surgery. Try not to smoke at least 24hrs before your surgery.       Follow the pre surgery showering instructions as listed in the “My Surgical Experience Booklet” or otherwise provided by your surgeon's office. Do not use a blade to shave the surgical area 1 week before surgery. It is okay to use a clean electric clippers up to 24 hours before surgery. Do not apply any lotions, creams, including makeup, cologne, deodorant, or perfumes after showering on the day of your surgery. Do not use dry shampoo, hair spray, hair gel, or any type of hair products.     No contact lenses, eye make-up, or artificial  eyelashes. Remove nail polish, including gel polish, and any artificial, gel, or acrylic nails if possible. Remove all jewelry including rings and body piercing jewelry.     Wear causal clothing that is easy to take on and off. Consider your type of surgery.    Keep any valuables, jewelry, piercings at home. Please bring any specially ordered equipment (sling, braces) if indicated.    Arrange for a responsible person to drive you to and from the hospital on the day of your surgery. Please confirm the visitor policy for the day of your procedure when you receive your phone call with an arrival time.     Call the surgeon's office with any new illnesses, exposures, or additional questions prior to surgery.    Please reference your “My Surgical Experience Booklet” for additional information to prepare for your upcoming surgery.

## 2024-12-21 LAB
GENE DIS ANL INTERP-IMP: NORMAL
GENE DIS ANL INTERP-IMP: NORMAL
GENES NOT REPORTED: NORMAL
INTERPRETATION: NORMAL
INTERPRETATION: NORMAL

## 2024-12-23 ENCOUNTER — TELEPHONE (OUTPATIENT)
Dept: GENETICS | Facility: CLINIC | Age: 74
End: 2024-12-23

## 2024-12-23 ENCOUNTER — HOME HEALTH ADMISSION (OUTPATIENT)
Dept: HOME HEALTH SERVICES | Facility: HOME HEALTHCARE | Age: 74
End: 2024-12-23
Payer: MEDICARE

## 2024-12-23 NOTE — TELEPHONE ENCOUNTER
Genetic Test Result Note    Summary:    Result Disclosure:   Today I spoke with Sujey over the phone to review the results of her genetic test for hereditary cancer. She underwent genetic testing through our program on 11/20/2024 due to her recent diagnosis of breast cancer.    Test Performed: [employer] BRCAplus STAT Panel (13 genes): CELSA, BARD1, BRCA1, BRCA2, CDH1, CHEK2, NF1, PALB2, PTEN, RAD51C, RAD51D, STK11, TP53 [facility]: Cancer+RNAinsight (59 genes): APC, CELSA, AXIN2, BAP1, BARD1, BMPR1A, BRCA1, BRCA2, BRIP1, CDH1, CDK4, CDKN1B, CDKN2A, CHEK2, CTNNA1, DICER1, EGLN1, EPCAM, FH, FLCN, GREM1, HOXB13, KIF1B, KIT, MAX, MEN1, MET, MITF, MLH1, MSH2, MSH3, MSH6, MUTYH, NF1, NTHL1, PALB2, PDGFRA PMS2, POLD1, POLE, POT1, PTEN, RAD51C, RAD51D, RB1, RET, SDHA, SDHAF2, SDHB, SDHC, SDHD, SMAD4, SMARCA4, STK11, BREI760, TP53, TSC1, TSC2, VHL     Result: NEGATIVE: No Clinically Significant Variants Detected    Assessment:   A negative result significantly reduces the likelihood that Sujey has a hereditary cancer syndrome. However, this testing is unable to completely rule out the presence of hereditary cancer. It remains possible that:  There is a variant in an area of a gene which was not tested or there is a variant not detectable due to technical limitations of this test.     There is a variant in another gene that was not included in this test or in a gene not known to be linked to cancer or tumors.   A family member has a genetic variant that the patient did not inherit.   The cancer in the family is sporadic and is related to non-hereditary factors.     Risks and Testing for Family Members:  Sujey was made aware that all of her first-degree relatives are at increased risk to develop breast cancer based on her recent diagnosis and family history of breast cancer.  We recommend that her first-degree relatives make their healthcare providers aware of the family history and discuss their options  for screening and risk-reduction.    At this time we do not recommend testing for Sujey 's children based on her negative test result.  Sujey 's children still need to consider the history of cancer on the other side of their family when determining their risks.     If Sujey has any affected family members with a cancer diagnosis, especially at a young age, they may still consider genetic testing. Relatives who wish to pursue genetic testing can reach out to the Cancer Risk and Genetics Program at [phone] to schedule an appointment or visit [email or web address] to identify a local genetic counselor.       Plan:     Negative Result: This result does not have surgical implications and surgical options should be discussed with her healthcare provider. Sujey's breast surgeon, Dr. Antoine will be made aware of her results

## 2024-12-25 ENCOUNTER — ANESTHESIA EVENT (OUTPATIENT)
Dept: PERIOP | Facility: HOSPITAL | Age: 74
End: 2024-12-25
Payer: MEDICARE

## 2024-12-27 ENCOUNTER — HOSPITAL ENCOUNTER (OUTPATIENT)
Facility: HOSPITAL | Age: 74
Setting detail: OUTPATIENT SURGERY
Discharge: HOME HEALTH | End: 2024-12-28
Attending: SURGERY | Admitting: SURGERY
Payer: MEDICARE

## 2024-12-27 ENCOUNTER — ANESTHESIA (OUTPATIENT)
Dept: PERIOP | Facility: HOSPITAL | Age: 74
End: 2024-12-27
Payer: MEDICARE

## 2024-12-27 ENCOUNTER — HOSPITAL ENCOUNTER (OUTPATIENT)
Dept: NUCLEAR MEDICINE | Facility: HOSPITAL | Age: 74
Discharge: HOME | End: 2024-12-27
Attending: SURGERY
Payer: MEDICARE

## 2024-12-27 DIAGNOSIS — D05.12 DUCTAL CARCINOMA IN SITU (DCIS) OF LEFT BREAST: ICD-10-CM

## 2024-12-27 DIAGNOSIS — D05.12 DUCTAL CARCINOMA IN SITU (DCIS) OF LEFT BREAST: Primary | ICD-10-CM

## 2024-12-27 PROBLEM — I35.0 AORTIC STENOSIS: Status: ACTIVE | Noted: 2024-12-27

## 2024-12-27 LAB
GLUCOSE SERPL-MCNC: 138 MG/DL (ref 65–140)
GLUCOSE SERPL-MCNC: 197 MG/DL (ref 65–140)

## 2024-12-27 PROCEDURE — 88342 IMHCHEM/IMCYTCHM 1ST ANTB: CPT | Performed by: STUDENT IN AN ORGANIZED HEALTH CARE EDUCATION/TRAINING PROGRAM

## 2024-12-27 PROCEDURE — A9541 TC99M SULFUR COLLOID: HCPCS

## 2024-12-27 PROCEDURE — 19307 MAST MOD RAD: CPT | Performed by: PHYSICIAN ASSISTANT

## 2024-12-27 PROCEDURE — 19307 MAST MOD RAD: CPT | Performed by: SURGERY

## 2024-12-27 PROCEDURE — 88360 TUMOR IMMUNOHISTOCHEM/MANUAL: CPT | Performed by: STUDENT IN AN ORGANIZED HEALTH CARE EDUCATION/TRAINING PROGRAM

## 2024-12-27 PROCEDURE — 38900 IO MAP OF SENT LYMPH NODE: CPT | Performed by: PHYSICIAN ASSISTANT

## 2024-12-27 PROCEDURE — 88341 IMHCHEM/IMCYTCHM EA ADD ANTB: CPT | Performed by: STUDENT IN AN ORGANIZED HEALTH CARE EDUCATION/TRAINING PROGRAM

## 2024-12-27 PROCEDURE — 38900 IO MAP OF SENT LYMPH NODE: CPT | Performed by: SURGERY

## 2024-12-27 PROCEDURE — 82948 REAGENT STRIP/BLOOD GLUCOSE: CPT

## 2024-12-27 PROCEDURE — 88374 M/PHMTRC ALYS ISHQUANT/SEMIQ: CPT | Performed by: STUDENT IN AN ORGANIZED HEALTH CARE EDUCATION/TRAINING PROGRAM

## 2024-12-27 PROCEDURE — 78195 LYMPH SYSTEM IMAGING: CPT

## 2024-12-27 PROCEDURE — 88307 TISSUE EXAM BY PATHOLOGIST: CPT | Performed by: STUDENT IN AN ORGANIZED HEALTH CARE EDUCATION/TRAINING PROGRAM

## 2024-12-27 PROCEDURE — NC001 PR NO CHARGE: Performed by: SURGERY

## 2024-12-27 RX ORDER — LIDOCAINE HYDROCHLORIDE 20 MG/ML
INJECTION, SOLUTION EPIDURAL; INFILTRATION; INTRACAUDAL; PERINEURAL AS NEEDED
Status: DISCONTINUED | OUTPATIENT
Start: 2024-12-27 | End: 2024-12-27

## 2024-12-27 RX ORDER — MIDAZOLAM HYDROCHLORIDE 2 MG/2ML
INJECTION, SOLUTION INTRAMUSCULAR; INTRAVENOUS AS NEEDED
Status: DISCONTINUED | OUTPATIENT
Start: 2024-12-27 | End: 2024-12-27

## 2024-12-27 RX ORDER — DOCUSATE SODIUM 100 MG/1
100 CAPSULE, LIQUID FILLED ORAL 2 TIMES DAILY PRN
Status: DISCONTINUED | OUTPATIENT
Start: 2024-12-27 | End: 2024-12-28 | Stop reason: HOSPADM

## 2024-12-27 RX ORDER — LEVOTHYROXINE SODIUM 88 UG/1
88 TABLET ORAL DAILY
Status: DISCONTINUED | OUTPATIENT
Start: 2024-12-28 | End: 2024-12-28 | Stop reason: HOSPADM

## 2024-12-27 RX ORDER — SODIUM CHLORIDE, SODIUM LACTATE, POTASSIUM CHLORIDE, CALCIUM CHLORIDE 600; 310; 30; 20 MG/100ML; MG/100ML; MG/100ML; MG/100ML
75 INJECTION, SOLUTION INTRAVENOUS CONTINUOUS
Status: DISCONTINUED | OUTPATIENT
Start: 2024-12-27 | End: 2024-12-28 | Stop reason: HOSPADM

## 2024-12-27 RX ORDER — ISOSULFAN BLUE 50 MG/5ML
INJECTION, SOLUTION SUBCUTANEOUS AS NEEDED
Status: DISCONTINUED | OUTPATIENT
Start: 2024-12-27 | End: 2024-12-27 | Stop reason: HOSPADM

## 2024-12-27 RX ORDER — ONDANSETRON 2 MG/ML
INJECTION INTRAMUSCULAR; INTRAVENOUS AS NEEDED
Status: DISCONTINUED | OUTPATIENT
Start: 2024-12-27 | End: 2024-12-27

## 2024-12-27 RX ORDER — ROCURONIUM BROMIDE 10 MG/ML
INJECTION, SOLUTION INTRAVENOUS AS NEEDED
Status: DISCONTINUED | OUTPATIENT
Start: 2024-12-27 | End: 2024-12-27

## 2024-12-27 RX ORDER — HYDROMORPHONE HCL/PF 1 MG/ML
0.5 SYRINGE (ML) INJECTION EVERY 4 HOURS PRN
Status: DISCONTINUED | OUTPATIENT
Start: 2024-12-27 | End: 2024-12-28 | Stop reason: HOSPADM

## 2024-12-27 RX ORDER — ONDANSETRON 2 MG/ML
4 INJECTION INTRAMUSCULAR; INTRAVENOUS ONCE AS NEEDED
Status: DISCONTINUED | OUTPATIENT
Start: 2024-12-27 | End: 2024-12-27 | Stop reason: HOSPADM

## 2024-12-27 RX ORDER — TRAMADOL HYDROCHLORIDE 50 MG/1
50 TABLET ORAL EVERY 8 HOURS PRN
Status: DISCONTINUED | OUTPATIENT
Start: 2024-12-27 | End: 2024-12-28 | Stop reason: HOSPADM

## 2024-12-27 RX ORDER — METFORMIN HYDROCHLORIDE 500 MG/1
500 TABLET, EXTENDED RELEASE ORAL
Status: DISCONTINUED | OUTPATIENT
Start: 2024-12-28 | End: 2024-12-28 | Stop reason: HOSPADM

## 2024-12-27 RX ORDER — FENTANYL CITRATE/PF 50 MCG/ML
25 SYRINGE (ML) INJECTION
Status: DISCONTINUED | OUTPATIENT
Start: 2024-12-27 | End: 2024-12-27 | Stop reason: HOSPADM

## 2024-12-27 RX ORDER — MAGNESIUM HYDROXIDE 1200 MG/15ML
LIQUID ORAL AS NEEDED
Status: DISCONTINUED | OUTPATIENT
Start: 2024-12-27 | End: 2024-12-27 | Stop reason: HOSPADM

## 2024-12-27 RX ORDER — PROPOFOL 10 MG/ML
INJECTION, EMULSION INTRAVENOUS AS NEEDED
Status: DISCONTINUED | OUTPATIENT
Start: 2024-12-27 | End: 2024-12-27

## 2024-12-27 RX ORDER — DEXAMETHASONE SODIUM PHOSPHATE 10 MG/ML
INJECTION, SOLUTION INTRAMUSCULAR; INTRAVENOUS AS NEEDED
Status: DISCONTINUED | OUTPATIENT
Start: 2024-12-27 | End: 2024-12-27

## 2024-12-27 RX ORDER — ACETAMINOPHEN 325 MG/1
650 TABLET ORAL EVERY 6 HOURS PRN
Status: DISCONTINUED | OUTPATIENT
Start: 2024-12-27 | End: 2024-12-28 | Stop reason: HOSPADM

## 2024-12-27 RX ORDER — BUPIVACAINE HYDROCHLORIDE 5 MG/ML
INJECTION, SOLUTION EPIDURAL; INTRACAUDAL AS NEEDED
Status: DISCONTINUED | OUTPATIENT
Start: 2024-12-27 | End: 2024-12-27 | Stop reason: HOSPADM

## 2024-12-27 RX ORDER — ACETAMINOPHEN 10 MG/ML
1000 INJECTION, SOLUTION INTRAVENOUS
Status: COMPLETED | OUTPATIENT
Start: 2024-12-27 | End: 2024-12-27

## 2024-12-27 RX ORDER — PRAVASTATIN SODIUM 40 MG
40 TABLET ORAL
Status: DISCONTINUED | OUTPATIENT
Start: 2024-12-27 | End: 2024-12-28 | Stop reason: HOSPADM

## 2024-12-27 RX ORDER — SODIUM CHLORIDE, SODIUM LACTATE, POTASSIUM CHLORIDE, CALCIUM CHLORIDE 600; 310; 30; 20 MG/100ML; MG/100ML; MG/100ML; MG/100ML
125 INJECTION, SOLUTION INTRAVENOUS CONTINUOUS
Status: DISCONTINUED | OUTPATIENT
Start: 2024-12-27 | End: 2024-12-27

## 2024-12-27 RX ORDER — ENOXAPARIN SODIUM 100 MG/ML
40 INJECTION SUBCUTANEOUS ONCE
Status: COMPLETED | OUTPATIENT
Start: 2024-12-27 | End: 2024-12-27

## 2024-12-27 RX ORDER — HYDROMORPHONE HCL/PF 1 MG/ML
0.5 SYRINGE (ML) INJECTION
Status: DISCONTINUED | OUTPATIENT
Start: 2024-12-27 | End: 2024-12-27 | Stop reason: HOSPADM

## 2024-12-27 RX ORDER — HYDROMORPHONE HCL/PF 1 MG/ML
SYRINGE (ML) INJECTION AS NEEDED
Status: DISCONTINUED | OUTPATIENT
Start: 2024-12-27 | End: 2024-12-27

## 2024-12-27 RX ORDER — FENTANYL CITRATE 50 UG/ML
INJECTION, SOLUTION INTRAMUSCULAR; INTRAVENOUS AS NEEDED
Status: DISCONTINUED | OUTPATIENT
Start: 2024-12-27 | End: 2024-12-27

## 2024-12-27 RX ADMIN — PHENYLEPHRINE HYDROCHLORIDE 40 MCG/MIN: 10 INJECTION INTRAVENOUS at 13:26

## 2024-12-27 RX ADMIN — SUGAMMADEX 200 MG: 100 INJECTION, SOLUTION INTRAVENOUS at 14:41

## 2024-12-27 RX ADMIN — ACETAMINOPHEN 1000 MG: 10 INJECTION INTRAVENOUS at 10:28

## 2024-12-27 RX ADMIN — SODIUM CHLORIDE, SODIUM LACTATE, POTASSIUM CHLORIDE, AND CALCIUM CHLORIDE 75 ML/HR: .6; .31; .03; .02 INJECTION, SOLUTION INTRAVENOUS at 16:03

## 2024-12-27 RX ADMIN — ONDANSETRON 4 MG: 2 INJECTION INTRAMUSCULAR; INTRAVENOUS at 12:16

## 2024-12-27 RX ADMIN — PRAVASTATIN SODIUM 40 MG: 40 TABLET ORAL at 17:11

## 2024-12-27 RX ADMIN — HYDROMORPHONE HYDROCHLORIDE 0.5 MG: 1 INJECTION, SOLUTION INTRAMUSCULAR; INTRAVENOUS; SUBCUTANEOUS at 12:43

## 2024-12-27 RX ADMIN — DEXMEDETOMIDINE HYDROCHLORIDE 8 MCG: 100 INJECTION, SOLUTION INTRAVENOUS at 14:32

## 2024-12-27 RX ADMIN — Medication 3000 MG: at 12:11

## 2024-12-27 RX ADMIN — ACETAMINOPHEN 650 MG: 325 TABLET, FILM COATED ORAL at 23:21

## 2024-12-27 RX ADMIN — ENOXAPARIN SODIUM 40 MG: 40 INJECTION SUBCUTANEOUS at 10:26

## 2024-12-27 RX ADMIN — FENTANYL CITRATE 50 MCG: 50 INJECTION INTRAMUSCULAR; INTRAVENOUS at 14:45

## 2024-12-27 RX ADMIN — LIDOCAINE HYDROCHLORIDE 60 MG: 20 INJECTION, SOLUTION EPIDURAL; INFILTRATION; INTRACAUDAL at 12:22

## 2024-12-27 RX ADMIN — FENTANYL CITRATE 25 MCG: 50 INJECTION INTRAMUSCULAR; INTRAVENOUS at 12:22

## 2024-12-27 RX ADMIN — PROPOFOL 130 MG: 10 INJECTION, EMULSION INTRAVENOUS at 12:22

## 2024-12-27 RX ADMIN — DEXAMETHASONE SODIUM PHOSPHATE 10 MG: 10 INJECTION INTRAMUSCULAR; INTRAVENOUS at 12:25

## 2024-12-27 RX ADMIN — FENTANYL CITRATE 25 MCG: 50 INJECTION INTRAMUSCULAR; INTRAVENOUS at 13:15

## 2024-12-27 RX ADMIN — ROCURONIUM 50 MG: 50 INJECTION, SOLUTION INTRAVENOUS at 12:23

## 2024-12-27 RX ADMIN — DEXMEDETOMIDINE HYDROCHLORIDE 8 MCG: 100 INJECTION, SOLUTION INTRAVENOUS at 14:23

## 2024-12-27 RX ADMIN — SODIUM CHLORIDE, SODIUM LACTATE, POTASSIUM CHLORIDE, AND CALCIUM CHLORIDE 125 ML/HR: .6; .31; .03; .02 INJECTION, SOLUTION INTRAVENOUS at 10:11

## 2024-12-27 RX ADMIN — METFORMIN HYDROCHLORIDE 1000 MG: 500 TABLET ORAL at 17:11

## 2024-12-27 RX ADMIN — MIDAZOLAM HYDROCHLORIDE 2 MG: 1 INJECTION, SOLUTION INTRAMUSCULAR; INTRAVENOUS at 12:16

## 2024-12-27 RX ADMIN — SODIUM CHLORIDE, SODIUM LACTATE, POTASSIUM CHLORIDE, AND CALCIUM CHLORIDE: .6; .31; .03; .02 INJECTION, SOLUTION INTRAVENOUS at 14:24

## 2024-12-27 NOTE — DISCHARGE INSTR - AVS FIRST PAGE
POST-OPERATIVE CARE INSTRUCTIONS       Care after your procedure:   General  Rest and relax for 24 hours, then gradually return to normal activities.  Do not perform any heavy lifting or strenuous physical activities for 14 days.  Your activity restrictions will be re-evaluated at your post op visit.  Drink clear liquids until you are certain there is no nausea, then resume a normal diet.  Do not drink alcohol, drive any vehicle, operate mechanical equipment or make critical decisions for at least 24 hours and until you are off any narcotic pain medications.  The Incision  Your incision is closed with:   dissolvable stiches just underneath the skin.                The incision is also covered with:                          clear waterproof glue  A gauze-pad is covering the wound.  Wound care  Remove your gauze-pad after 24 hours.   You may then shower using soap and water to clean your incision. Gently dry the wound.  You may redress your wound with additional gauze and tape if you choose.  A little bruising at the wound site is normal.   Hi-Tripp Drain (JPDrain) see separate instructions.  Medication  Resume all previous medications  Pain Medication Instructions: over the counter tylenol or prescription tramadol if needed          Other (If applicable)  Wear a post-surgical bra around the clock.  May use ice to the incision site(s) for the next 24-48 hours, twice daily.   Call your  doctor if you have any of the following:  Redness, swelling, heat, drainage, and/or bleeding from your wound  Chills or fever ( above 101' F )  Pain, not relieved with the above medications  If you have any questions or problems call our office [phone]    Follow-up appointment:  As scheduled

## 2024-12-27 NOTE — H&P
Breast Consultation-Surgical Oncology     [address]  [facility] [place]  [address]  [facility] [id number]    Name:  Sujey Webber  YOB: 1950  MRN:  [id number]    Assessment & Plan   Diagnoses and all orders for this visit:    Ductal carcinoma in situ (DCIS) of left breast  -     Ambulatory referral to Surgical Oncology  -     Case request operating room: LEFT BREAST MASTECTOMY WITH SENTINEL LYMPH NODE BIOPSY; Standing  -     UA w Reflex to Microscopic w Reflex to Culture; Future  -     HEMOGLOBIN A1C W/ EAG ESTIMATION; Future  -     NM lymphatic breast; Future  -     Case request operating room: LEFT BREAST MASTECTOMY WITH SENTINEL LYMPH NODE BIOPSY  -     traMADol (Ultram) 50 mg tablet; Take 1 tablet (50 mg total) by mouth every 8 (eight) hours as needed for severe pain    Other disorders of glucose transport (HCC)  -     HEMOGLOBIN A1C W/ EAG ESTIMATION; Future    Type 2 diabetes mellitus without complication, without long-term current use of insulin (HCC)    History of radiation therapy    Other orders  -     Incentive spirometry; Standing  -     Insert and maintain IV line; Standing  -     Void On-Call to O.R.; Standing  -     Place sequential compression device; Standing  -     ceFAZolin (ANCEF) 3,000 mg in dextrose 5 % 100 mL IVPB  -     enoxaparin (LOVENOX) subcutaneous injection 40 mg  -     acetaminophen (Ofirmev) injection 1,000 mg          HPI: Sujey Webber is a 74 y.o. year old female who presents with DCIS of the left breast.  She had an abnormal screening mammogram which led to diagnostic imaging and a biopsy.  She had an MRI yesterday showing a second focus of non-mass enhancement in the 12:00 axis for which MRI guided biopsy was recommended.  The patient has a history of DCIS in 2006 status post lumpectomy and radiation therapy.  She took tamoxifen but this was discontinued secondary to a blood clot.  She is currently on  Xarelto secondary to recurrent blood clots.    Surgical treatment to date consisted of as noted.    Oncology History:    Oncology History   Ductal carcinoma in situ (DCIS) of left breast   2006 Initial Diagnosis    Ductal Carcinoma In Situ Left breast     2006 Biopsy    Left breast wire lumpectomy  DCIS  Multifocal, 0.5 cm  Grade 2-3  All margins: no evidence of malignancy  No invasive carcinoma identified  Fibrocystic disease with microcalcifications, intraductal papillomas, micropapillary and florid intraductal hyperplasia, sclerosing adenosis, apocrine metaplasia and radial scars    ER 98%  LA 98%     2006 Genetic Testing    Pt states that her genetic testing was NEGATIVE in 2024 Biopsy    LEFT US guided breast BX  Ductal carcinoma in situ, fragmented, intermediate nuclear grade  Grade 2  2 oclock, 3 cmfn    ER 99%  LA 60%  HER2 PENDING     2024 -  Cancer Staged    Staging form: Breast, AJCC 8th Edition  - Clinical stage from 2024: Stage 0 (cTis (DCIS), cN0, cM0, ER+, LA+, HER2: Not Assessed) - Signed by Kristy Antoine MD on 2024  Stage prefix: Initial diagnosis  Method of lymph node assessment: Clinical  Nuclear grade: G2       2024 Genetic Testing    Patient has genetic testing done for breast cancer    CONSULT COMPLETED   SAMPLE NOT YET COLLECTED     2024 Initial Diagnosis    Ductal carcinoma in situ (DCIS) of left breast         Pertinent reproductive history:  Age at menarche:    OB History          2    Para   2    Term   2            AB        Living             SAB        IAB        Ectopic        Multiple        Live Births   2           Obstetric Comments   Age of first period: 14  Age when first child was born: 25                 Problem List:   Patient Active Problem List   Diagnosis    Type 2 diabetes mellitus without complication, without long-term current use of insulin (HCC)    Diabetic nephropathy (HCC)    Hyperlipidemia     "Hypothyroidism    Nontoxic single thyroid nodule    Prediabetes    Vitamin D deficiency    Essential hypertension    Class 3 severe obesity due to excess calories with serious comorbidity in adult (HCC)    Enlarged thyroid    Class 3 severe obesity due to excess calories with serious comorbidity and body mass index (BMI) of 40.0 to 44.9 in adult (HCC)    Lower limb pain, posterior, left    Factor 5 Leiden mutation, heterozygous (HCC)    History of DVT (deep vein thrombosis)    Psoriatic arthritis (HCC)    Rash    Head injury    Right knee pain    Pedal edema    Hematoma    Pre-syncope    Stage 3a chronic kidney disease (HCC)    Ductal carcinoma in situ (DCIS) of left breast    History of radiation therapy    Preop examination    Aortic stenosis     Past Medical History:   Diagnosis Date    Ankle swelling     \"off and on since teenage years\"    Arthritis 2005    Breast cancer (HCC) 17 yrs ago    2006 Left breast    Cancer (HCC)     Dental crown present     Diabetes mellitus (HCC) 2014    Type 2 Estimated. On metformin    Disease of thyroid gland 1995    DVT (deep venous thrombosis) (HCC)     18yrs ago and again 2 yrs ago    Factor 5 Leiden mutation, heterozygous (HCC)     Family history of reaction to anesthesia     \"pt's sister and niece woke up after anesthesia however were sleepy for a few days afterwards\"    GERD (gastroesophageal reflux disease)     occas    Heart murmur 1969    History of radiation therapy 09/01/2006    Hyperlipidemia     Psoriatic arthritis (HCC)     Wears glasses      Past Surgical History:   Procedure Laterality Date    BIOPSY CORE NEEDLE Right 03/03/2010    benign    BREAST CYST ASPIRATION Right 08/15/2003    BREAST CYST ASPIRATION Right     x3  (Years ago)    BREAST LUMPECTOMY Left 08/22/2006    positive    COLONOSCOPY      HERNIA REPAIR  1950    MAMMO STEREOTACTIC BREAST BIOPSY LEFT (ALL INC) Left 08/02/2006    malignant    TUBAL LIGATION  1983    US GUIDED BREAST BIOPSY LEFT COMPLETE " Left 02/06/2009    benign    US GUIDED BREAST BIOPSY LEFT COMPLETE Left 11/13/2024    WISDOM TOOTH EXTRACTION       Family History   Problem Relation Age of Onset    Breast cancer Mother 53        Also mat aunt    Heart disease Father     Ovarian cancer Sister 63    No Known Problems Sister     No Known Problems Sister     Lung cancer Sister 67    Breast cancer Maternal Aunt 43    Stroke Maternal Aunt     Stomach cancer Maternal Uncle     Skin cancer Maternal Uncle     Heart disease Maternal Uncle     Cancer Paternal Aunt     Breast cancer Paternal Aunt     Skin cancer Paternal Aunt     No Known Problems Maternal Grandmother     Heart disease Maternal Grandfather     Breast cancer Paternal Grandmother 90    Dementia Paternal Grandmother     Heart disease Paternal Grandfather     No Known Problems Daughter     No Known Problems Son     Breast cancer Cousin 71        maternal    Breast cancer Cousin 73        maternal     Social History     Socioeconomic History    Marital status:      Spouse name: Not on file    Number of children: Not on file    Years of education: Not on file    Highest education level: Not on file   Occupational History    Not on file   Tobacco Use    Smoking status: Never    Smokeless tobacco: Never   Vaping Use    Vaping status: Never Used   Substance and Sexual Activity    Alcohol use: Not Currently     Comment: Few drinks per year    Drug use: Never    Sexual activity: Not on file     Comment: defer   Other Topics Concern    Not on file   Social History Narrative    Not on file     Social Drivers of Health     Financial Resource Strain: Low Risk  (2/28/2024)    Overall Financial Resource Strain (CARDIA)     Difficulty of Paying Living Expenses: Not hard at all   Food Insecurity: Not on file   Transportation Needs: No Transportation Needs (2/28/2024)    PRAPARE - Transportation     Lack of Transportation (Medical): No     Lack of Transportation (Non-Medical): No   Physical Activity: Not  on file   Stress: Not on file   Social Connections: Not on file   Intimate Partner Violence: Not on file   Housing Stability: Not on file     Current Facility-Administered Medications   Medication Dose Route Frequency Provider Last Rate Last Admin    ceFAZolin (ANCEF) 3,000 mg in dextrose 5% 100 ml IVPB  3,000 mg Intravenous Once Kristy Antoine MD        lactated ringers infusion  125 mL/hr Intravenous Continuous Tim Pedroza,  mL/hr at 12/27/24 1011 125 mL/hr at 12/27/24 1011     No Known Allergies      The following portions of the patient's history were reviewed and updated as appropriate: allergies, current medications, past family history, past medical history, past social history, past surgical history, and problem list.    Review of Systems:  Review of Systems   Constitutional: Negative.  Negative for appetite change, fever and unexpected weight change.   HENT: Negative.  Negative for trouble swallowing.    Eyes: Negative.    Respiratory: Negative.  Negative for cough and shortness of breath.    Cardiovascular:  Positive for leg swelling. Negative for chest pain.   Gastrointestinal: Negative.  Negative for abdominal pain, nausea and vomiting.   Endocrine: Negative.    Genitourinary: Negative.  Negative for dysuria.   Musculoskeletal: Negative.  Negative for arthralgias and myalgias.   Skin: Negative.    Allergic/Immunologic: Negative.    Neurological: Negative.  Negative for headaches.   Hematological:  Negative for adenopathy. Bruises/bleeds easily.   Psychiatric/Behavioral: Negative.         Physical Exam:  Physical Exam  Constitutional:       General: She is not in acute distress.     Appearance: Normal appearance. She is well-developed. She is obese.   HENT:      Head: Normocephalic and atraumatic.   Cardiovascular:      Heart sounds: Normal heart sounds.   Pulmonary:      Breath sounds: Normal breath sounds.   Chest:   Breasts:     Breasts are asymmetrical.      Right: No swelling, bleeding,  inverted nipple, mass, nipple discharge, skin change or tenderness.      Left: Skin change (lumpectomy scar 12:00, resolving ecchymosis lateral) present. No swelling, bleeding, inverted nipple, mass, nipple discharge or tenderness.   Abdominal:      Palpations: Abdomen is soft.   Musculoskeletal:      Right lower leg: No edema.      Left lower leg: Edema present.   Lymphadenopathy:      Upper Body:      Right upper body: No supraclavicular, axillary or pectoral adenopathy.      Left upper body: No supraclavicular, axillary or pectoral adenopathy.   Neurological:      Mental Status: She is alert and oriented to person, place, and time.   Psychiatric:         Mood and Affect: Mood normal.         Laboratory:  Pathology from  was reviewed showing DCIS with clean margins, no evidence of invasion    1324 core biopsy left breast 2:00    Pathology revealed: ductal carcinoma in situ    Histologic grade: moderately differentiated     Angiolymphatic invasion:  absent    Tumor node status:  Negative    Hormone receptor status: ER/CT positive    2024 hemoglobin A1c was 7.5    Imagin/10/2024 bilateral 3D screening mammogram there is a left-sided asymmetry, right side is benign    2024 left 3D diagnostic mammogram and ultrasound shows a focal asymmetry with complex mass measuring 2 cm at the 2:00 Axis III centimeters from the nipple corresponding to the area of asymmetry, normal-appearing left axillary nodes    2024 postbiopsy mammogram is concordant, MRI is recommended given the discrepancy in size for the lesion versus the pathology    2024 bilateral breast MRI shows an area of enhancement measuring 25 mm at the recent biopsy site as well as a second area of non-mass enhancement measuring 11 mm at 12:00 5 cm from the nipple for which MRI guided biopsy was recommended, right side is benign      Discussion/Summary: 74-year-old female who presents with DCIS of the left breast.  She has a history  of a left lumpectomy and radiation.  This was at the 12:00 axis of the left breast.  She does have significant asymmetry compared to the native right breast.  Her MRI shows an area of non-mass enhancement at 12:00 for which MRI guided biopsy was recommended.  The patient does not think she can tolerate this.  If she did pursue this and it were benign, she could potentially have repeat breast conservation.  The asymmetry in the breast would be more pronounced than what it already is.  If she had the biopsy and it were malignant, she would not be a good candidate for breast conservation.  All things considered, she is agreeable to proceeding with a left mastectomy.  If final pathology shows DCIS only, she would not need any adjuvant therapy.  She understands if there is any invasive component that the receptors would be repeated and adjuvant medical therapy may be indicated.  Her last hemoglobin A1c was 3 months ago and elevated at 7.5.  I will repeat this now.  She understands if the hemoglobin A1c is greater than 8 then surgery will be delayed to better manage her diabetes.  She is also on Xarelto secondary to multiple blood clots.  We will reach out to her PCP to get clearance to hold this.  All of her questions were answered.  Consent was signed today in the office.  She would like to have genetic testing and will have this done with her preadmission blood work.  She will be scheduled for surgery in the near term.

## 2024-12-27 NOTE — PLAN OF CARE
Problem: PAIN - ADULT  Goal: Verbalizes/displays adequate comfort level or baseline comfort level  Description: Interventions:  - Encourage patient to monitor pain and request assistance  - Assess pain using appropriate pain scale  - Administer analgesics based on type and severity of pain and evaluate response  - Implement non-pharmacological measures as appropriate and evaluate response  - Consider cultural and social influences on pain and pain management  - Notify physician/advanced practitioner if interventions unsuccessful or patient reports new pain  Outcome: Progressing     Problem: INFECTION - ADULT  Goal: Absence or prevention of progression during hospitalization  Description: INTERVENTIONS:  - Assess and monitor for signs and symptoms of infection  - Monitor lab/diagnostic results  - Monitor all insertion sites, i.e. indwelling lines, tubes, and drains  - Monitor endotracheal if appropriate and nasal secretions for changes in amount and color  - [place] appropriate cooling/warming therapies per order  - Administer medications as ordered  - Instruct and encourage patient and family to use good hand hygiene technique  - Identify and instruct in appropriate isolation precautions for identified infection/condition  Outcome: Progressing  Goal: Absence of fever/infection during neutropenic period  Description: INTERVENTIONS:  - Monitor WBC    Outcome: Progressing     Problem: SAFETY ADULT  Goal: Patient will remain free of falls  Description: INTERVENTIONS:  - Educate patient/family on patient safety including physical limitations  - Instruct patient to call for assistance with activity   - Consult OT/PT to assist with strengthening/mobility   - Keep Call bell within reach  - Keep bed low and locked with side rails adjusted as appropriate  - Keep care items and personal belongings within reach  - Initiate and maintain comfort rounds  - Make Fall Risk Sign visible to staff  - Offer Toileting every  Hours,  in advance of need  - Initiate/Maintain alarm  - Obtain necessary fall risk management equipment:   - Apply yellow socks and bracelet for high fall risk patients  - Consider moving patient to room near nurses station  Outcome: Progressing  Goal: Maintain or return to baseline ADL function  Description: INTERVENTIONS:  -  Assess patient's ability to carry out ADLs; assess patient's baseline for ADL function and identify physical deficits which impact ability to perform ADLs (bathing, care of mouth/teeth, toileting, grooming, dressing, etc.)  - Assess/evaluate cause of self-care deficits   - Assess range of motion  - Assess patient's mobility; develop plan if impaired  - Assess patient's need for assistive devices and provide as appropriate  - Encourage maximum independence but intervene and supervise when necessary  - Involve family in performance of ADLs  - Assess for home care needs following discharge   - Consider OT consult to assist with ADL evaluation and planning for discharge  - Provide patient education as appropriate  Outcome: Progressing  Goal: Maintains/Returns to pre admission functional level  Description: INTERVENTIONS:  - Perform AM-PAC 6 Click Basic Mobility/ Daily Activity assessment daily.  - Set and communicate daily mobility goal to care team and patient/family/caregiver.   - Collaborate with rehabilitation services on mobility goals if consulted  - Perform Range of Motion  times a day.  - Reposition patient every  hours.  - Dangle patient  times a day  - Stand patient  times a day  - Ambulate patient  times a day  - Out of bed to chair  times a day   - Out of bed for meals  times a day  - Out of bed for toileting  - Record patient progress and toleration of activity level   Outcome: Progressing     Problem: DISCHARGE PLANNING  Goal: Discharge to home or other facility with appropriate resources  Description: INTERVENTIONS:  - Identify barriers to discharge w/patient and caregiver  - Arrange for  needed discharge resources and transportation as appropriate  - Identify discharge learning needs (meds, wound care, etc.)  - Arrange for interpretive services to assist at discharge as needed  - Refer to Case Management Department for coordinating discharge planning if the patient needs post-hospital services based on physician/advanced practitioner order or complex needs related to functional status, cognitive ability, or social support system  Outcome: Progressing     Problem: Knowledge Deficit  Goal: Patient/family/caregiver demonstrates understanding of disease process, treatment plan, medications, and discharge instructions  Description: Complete learning assessment and assess knowledge base.  Interventions:  - Provide teaching at level of understanding  - Provide teaching via preferred learning methods  Outcome: Progressing

## 2024-12-27 NOTE — OP NOTE
OPERATIVE REPORT  PATIENT NAME: Sujey Webber    :  1950  MRN: [id number]  Pt Location: [facility] OR ROOM 04    SURGERY DATE: 2024    Surgeons and Role:     * Kristy Antoine MD - Primary     * Jason Harris PA-C - Assisting    Preop Diagnosis:  Ductal carcinoma in situ (DCIS) of left breast [D05.12]    Post-Op Diagnosis Codes:     * Ductal carcinoma in situ (DCIS) of left breast [D05.12]    Procedure(s):  Left - LEFT BREAST MASTECTOMY. SENTINEL LYMPH NODE BX. LYMPHOSCINTIGRAPHY. LYMPHATIC MAPPING;    Specimen(s):  ID Type Source Tests Collected by Time Destination   1 : [place] node #1 Tissue Lymph Node, [place] TISSUE EXAM Kristy Antoine MD 2024 1243    2 : [place] node #2 Tissue Lymph Node, [place] TISSUE EXAM Kristy Antoine MD 2024 1328    3 : [place] node #3 Tissue Lymph Node, [place] TISSUE EXAM Kristy Antoine MD 2024 1329    4 : Left breast mastectomy- suture-long lateral, short superior Tissue Breast, Left TISSUE EXAM Kristy Antoine MD 2024 1243        Estimated Blood Loss:   Minimal    Drains:  Closed/Suction Drain Left Breast Bulb 10 Fr. (Active)   Number of days: 0       Anesthesia Type:   General    Operative Indications:  Ductal carcinoma in situ (DCIS) of left breast [D05.12]      Operative Findings:  N/a      Complications:   None    Procedure and Technique:  Sujey is a 74-year-old female who presents with recurrent carcinoma of the left breast.  She was counseled on a mastectomy with sentinel node biopsy.  She presented the day of surgery to the radiology suite and underwent lymphoscintigraphy of the left breast.  From there she went to the operating room.  She had preoperative antibiotics.  She was administered general anesthesia.  She had a 5 cc injection of Lymphazurin into the left subareolar plexus.  She was prepped and draped in the usual standard fashion.  Timeout was performed.  Attention was turned to the left breast.  Half percent Marcaine plain was  injected for local anesthesia.  A broad elliptical incision was created around the nipple areolar complex.  Electrocautery was used to dissect her mastectomy flaps to the level of the clavicle superior, sternal border medial, inframammary fold inferior to the edge of the latissimus dorsi muscle lateral.  The breast was partially excised from the underlying pectoralis including the pectoralis fascia.  In the upper outer aspect, the clavipectoral fascia was incised to expose the axillary fat pad.  There was 1 blue lymphatic channel that traced to 2 adjacent radioactive nodes.  These were excised and submitted as sentinel node 1 and 2 of the left axilla.  There was an additional area of radioactivity subpectoral that traced to another node..  This was excised and submitted as sentinel node 3.  Following removal of this third node, there were no additional blue stained, radioactive or palpable nodes.  The remaining portion of the breast was excised from the underlying pectoralis including the pectoralis fashion.  This was marked with a short stitch superior and a long stitch lateral.  This was submitted to pathology in formalin.  Her wound was irrigated and hemostasis was achieved.  A Hi-Tripp drain was placed within the inferior mastectomy flap and sutured at the skin level with a 3-0 nylon suture.  Surgicel gauze was also applied to the muscle bed.  The wound was then closed in a layered fashion using multiple interrupted 3-0 Monocryl suture and a running 4-0 Monocryl subcuticular stitch.  All counts were correct.  The skin was cleaned and dried.  Surgical glue, fluffs and a bra were applied.  The patient was then extubated and taken to recovery in stable condition.  A qualified resident physician was not available and a physician assistant was required during the procedure for retraction, tissue handling, dissection and suturing.    Patient Disposition:  extubated and stable    [place] Node Biopsy for Breast  Cancer - Left  Operation performed with curative intent. Yes   Tracer(s) used to identify sentinel nodes in the upfront surgery (non-neoadjuvant) setting (select all that apply). Dye and Radioactive tracer   Tracer(s) used to identify sentinel nodes in the neoadjuvant setting (select all that apply). N/A   All nodes (colored or non-colored) present at the end of a dye-filled lymphatic channel were removed. Yes   All significantly radioactive nodes were removed. Yes   All palpably suspicious nodes were removed. N/A   Biopsy-proven positive nodes marked with clips prior to chemotherapy were identified and removed. N/A                 SIGNATURE: Kristy Antoine MD  DATE: December 27, 2024  TIME: 2:30 PM

## 2024-12-27 NOTE — ANESTHESIA POSTPROCEDURE EVALUATION
Post-Op Assessment Note    CV Status:  Stable    Pain management: adequate       Mental Status:  Alert and awake   Hydration Status:  Euvolemic   PONV Controlled:  Controlled   Airway Patency:  Patent     Post Op Vitals Reviewed: Yes    No anethesia notable event occurred.    Staff: CRNA           Last Filed PACU Vitals:  Vitals Value Taken Time   Temp 97 °F (36.1 °C) 12/27/24 1452   Pulse 78 12/27/24 1453   /63 12/27/24 1452   Resp 16 12/27/24 1453   SpO2 98 % 12/27/24 1453   Vitals shown include unfiled device data.    Modified Javy:  No data recorded

## 2024-12-27 NOTE — ANESTHESIA PREPROCEDURE EVALUATION
Procedure:  LEFT BREAST MASTECTOMY, SENTINEL LYMPH NODE BX, LYMPHOSCINTIGRAPHY, LYMPHATIC MAPPING; (Left: Breast)    Relevant Problems   CARDIO  Left Ventricle Left ventricular cavity size is normal. Wall thickness is mildly increased. The left ventricular ejection fraction is 60%. Systolic function is normal. Wall motion is normal. Diastolic function is mildly abnormal, consistent with grade I (abnormal) relaxation.  Right Ventricle Right ventricular cavity size is normal. Systolic function is normal.  Left Atrium The atrium is normal in size.  Right Atrium The atrium is normal in size.  Aortic Valve The aortic valve is trileaflet. The leaflets are mildly thickened. There is no evidence of regurgitation. There is mild stenosis. The aortic valve mean gradient is 11 mmHg. The dimensionless velocity index is 0.50. The aortic valve area is 1.50 cm2.  Mitral Valve The leaflets are not thickened. The leaflets are not calcified.  There is trace regurgitation. There is no evidence of stenosis.  Tricuspid Valve The leaflets are not thickened. The leaflets are not calcified. There is trace regurgitation. There is no evidence of stenosis.  Pulmonic Valve The leaflets are not thickened. There is no calcification. There is no evidence of regurgitation. There is no evidence of stenosis.  Ascending Aorta The aortic root is normal in size. The ascending aorta is normal in size.       (+) Aortic stenosis (Longstanding murmur, mild AS on recent echo, asymptomatic)   (+) Essential hypertension   (+) Hyperlipidemia      ENDO   (+) Hypothyroidism   (+) Type 2 diabetes mellitus without complication, without long-term current use of insulin (HCC)      /RENAL   (+) Diabetic nephropathy (HCC)   (+) Stage 3a chronic kidney disease (HCC)      MUSCULOSKELETAL   (+) Psoriatic arthritis (HCC)      PULMONARY (within normal limits)      Oncology   (+) Ductal carcinoma in situ (DCIS) of left breast      Other   (+) Class 3 severe obesity due to  excess calories with serious comorbidity and body mass index (BMI) of 40.0 to 44.9 in adult (HCC)   (+) History of DVT (deep vein thrombosis)        Physical Exam    Airway    Mallampati score: II  TM Distance: >3 FB  Neck ROM: full     Dental   No notable dental hx     Cardiovascular  Cardiovascular exam normal    Pulmonary  Pulmonary exam normal     Other Findings        Anesthesia Plan  ASA Score- 3     Anesthesia Type- general with ASA Monitors.         Additional Monitors:     Airway Plan:            Plan Factors-    Chart reviewed. EKG reviewed. Imaging results reviewed. Existing labs reviewed. Patient summary reviewed.                  Induction- intravenous.    Postoperative Plan-         Informed Consent- Anesthetic plan and risks discussed with patient.

## 2024-12-27 NOTE — QUICK NOTE
S: Sujey Webber is a 74 y.o. female who returns to the floor s/p left mastectomy w SLNBx. EBL minimal.  Patient over the procedure well without complication, was extubated in OR, returned to PACU in stable condition.    Patient doing well postoperatively, does report some pain in the left sided surgical area, tolerating diet without nausea or emesis, voiding, using incentive spirometry.    O:    Vitals:    12/27/24 1601   BP: 124/59   Pulse: 74   Resp: 20   Temp: 97.9 °F (36.6 °C)   SpO2: 90%     General: NAD  Skin: Warm, dry, anicteric, left breast incision clean dry intact, ORTIZ drain serosanguineous  HEENT: Normocephalic, atraumatic  CV: RRR, no m/r/g  Pulm: CTA b/l, no inc WOB  Abd: Soft, ND/NT  MSK: Symmetric, no edema, no tenderness, no deformity  Neuro: AOx3, GCS 15     A: 74yoF w L breast DCIS now s/p L mastectomy w SLNBx on 12/27    P:  -Diet as tolerated  -ORTIZ drain, monitor output and character  -Light IVF until tolerating adequate p.o.  -Xarelto resumed  -Pain control  -Encourage ambulation  -Incentive spirometry

## 2024-12-27 NOTE — ANESTHESIA POSTPROCEDURE EVALUATION
Post-Op Assessment Note    CV Status:  Stable  Pain Score: 2    Pain management: adequate       Mental Status:  Alert and awake   Hydration Status:  Euvolemic   PONV Controlled:  Controlled   Airway Patency:  Patent  Two or more mitigation strategies used for obstructive sleep apnea   Post Op Vitals Reviewed: Yes    No anethesia notable event occurred.    Staff: Anesthesiologist           Last Filed PACU Vitals:  Vitals Value Taken Time   Temp 97.4 °F (36.3 °C) 12/27/24 1522   Pulse 76 12/27/24 1538   /58 12/27/24 1536   Resp 16 12/27/24 1522   SpO2 98 % 12/27/24 1538   Vitals shown include unfiled device data.    Modified Javy:  Activity: 2 (12/27/2024  2:52 PM)  Respiration: 2 (12/27/2024  2:52 PM)  Circulation: 2 (12/27/2024  2:52 PM)  Consciousness: 1 (12/27/2024  2:52 PM)  Oxygen Saturation: 1 (12/27/2024  2:52 PM)  Modified Javy Score: 8 (12/27/2024  2:52 PM)

## 2024-12-28 ENCOUNTER — ANCILLARY PROCEDURE (OUTPATIENT)
Dept: LAB | Facility: HOSPITAL | Age: 74
End: 2024-12-28
Attending: SURGERY
Payer: MEDICARE

## 2024-12-28 VITALS
DIASTOLIC BLOOD PRESSURE: 57 MMHG | WEIGHT: 275.13 LBS | TEMPERATURE: 97.5 F | OXYGEN SATURATION: 99 % | HEIGHT: 67 IN | RESPIRATION RATE: 18 BRPM | SYSTOLIC BLOOD PRESSURE: 116 MMHG | BODY MASS INDEX: 43.18 KG/M2 | HEART RATE: 63 BPM

## 2024-12-28 PROCEDURE — 99024 POSTOP FOLLOW-UP VISIT: CPT | Performed by: STUDENT IN AN ORGANIZED HEALTH CARE EDUCATION/TRAINING PROGRAM

## 2024-12-28 RX ADMIN — METFORMIN HYDROCHLORIDE 500 MG: 500 TABLET, EXTENDED RELEASE ORAL at 08:47

## 2024-12-28 RX ADMIN — LEVOTHYROXINE SODIUM 88 MCG: 88 TABLET ORAL at 05:36

## 2024-12-28 RX ADMIN — RIVAROXABAN 20 MG: 20 TABLET, FILM COATED ORAL at 08:45

## 2024-12-28 NOTE — ASSESSMENT & PLAN NOTE
-now s/p L mastectomy w SLNBx on 12/27   -ORTIZ drain x1, will discharge with VNA  -Stable for discharge today  -Xarelto restarted

## 2024-12-28 NOTE — PROGRESS NOTES
"Progress Note - Oncology-Surgical   Name: Sujey Webber 74 y.o. female I MRN: [id number]  Unit/Bed#: E5 -01 I Date of Admission: 12/27/2024   Date of Service: 12/28/2024 I Hospital Day: 0    Assessment & Plan  Ductal carcinoma in situ (DCIS) of left breast  -now s/p L mastectomy w SLNBx on 12/27   -ORTIZ drain x1, will discharge with VNA  -Stable for discharge today  -Xarelto restarted        Subjective   Doing well, minimal pain in the left breast, tolerating diet without nausea or emesis, using incentive spirometry.    Objective :  Temp:  [97 °F (36.1 °C)-98.1 °F (36.7 °C)] 97.5 °F (36.4 °C)  HR:  [63-95] 63  BP: (103-137)/(51-68) 116/57  Resp:  [16-20] 18  SpO2:  [90 %-99 %] 99 %  O2 Device: None (Room air)  Nasal Cannula O2 Flow Rate (L/min):  [3 L/min] 3 L/min    I/O         12/26 0701  12/27 0700 12/27 0701  12/28 0700 12/28 0701  12/29 0700    P.O.  240 720    I.V. (mL/kg)  1450 (11.6)     IV Piggyback  100     Total Intake(mL/kg)  1790 (14.3) 720 (5.8)    Urine (mL/kg/hr)  1350     Drains  110 30    Blood  75     Total Output  1535 30    Net  +255 +690                 Lines/Drains/Airways       Active Status       Name Placement date Placement time Site Days    Closed/Suction Drain Left Breast Bulb 10 Fr. 12/27/24  1351  Breast  less than 1                  Physical Exam    General: NAD  Skin: Warm, dry, anicteric, left breast incision clean dry intact, ORTIZ serosanguineous  HEENT: Normocephalic, atraumatic  CV: RRR, no m/r/g  Pulm: CTA b/l, no inc WOB  Abd: Soft, ND/NT  MSK: Symmetric, no edema, no tenderness, no deformity  Neuro: AOx3, GCS 15       Lab Results: I have reviewed the following results:  No results for input(s): \"WBC\", \"HGB\", \"HCT\", \"PLT\", \"BANDSPCT\", \"SODIUM\", \"K\", \"CL\", \"CO2\", \"BUN\", \"CREATININE\", \"GLUC\", \"CAIONIZED\", \"MG\", \"PHOS\", \"AST\", \"ALT\", \"ALB\", \"TBILI\", \"DBILI\", \"ALKPHOS\", \"PTT\", \"INR\", \"HSTNI0\", \"HSTNI2\", \"BNP\", \"LACTICACID\" in the last 72 hours.          VTE Pharmacologic " Local Anesthesia Pre Procedure Assessment    Informed Consent:    Consent Obtained: Yes       Procedure Assessment:    Right paraspinal        Planned Anesthetic: Local    Medical History/Comorbid Conditions:    Significant Medical/Surgical History: (See H&P)  Normal Mental Status: Yes    Examination Pertinent to Procedure Being Performed:     See H&P    Other Findings:    Reviewed Current Medications and Allergies: Yes    Pertinent Lab/Diagnostic Tests:    Pertinent Lab / Diagnostic Tests: (See H&P)      Mario Alberto Duque MD  1:37 PM    12/6/2024     Prophylaxis: VTE covered by:  rivaroxaban, Oral, 20 mg at 12/28/24 4001     VTE Mechanical Prophylaxis: sequential compression device

## 2024-12-28 NOTE — PLAN OF CARE
Problem: PAIN - ADULT  Goal: Verbalizes/displays adequate comfort level or baseline comfort level  Description: Interventions:  - Encourage patient to monitor pain and request assistance  - Assess pain using appropriate pain scale  - Administer analgesics based on type and severity of pain and evaluate response  - Implement non-pharmacological measures as appropriate and evaluate response  - Consider cultural and social influences on pain and pain management  - Notify physician/advanced practitioner if interventions unsuccessful or patient reports new pain  Outcome: Progressing     Problem: INFECTION - ADULT  Goal: Absence or prevention of progression during hospitalization  Description: INTERVENTIONS:  - Assess and monitor for signs and symptoms of infection  - Monitor lab/diagnostic results  - Monitor all insertion sites, i.e. indwelling lines, tubes, and drains  - Monitor endotracheal if appropriate and nasal secretions for changes in amount and color  - [place] appropriate cooling/warming therapies per order  - Administer medications as ordered  - Instruct and encourage patient and family to use good hand hygiene technique  - Identify and instruct in appropriate isolation precautions for identified infection/condition  Outcome: Progressing  Goal: Absence of fever/infection during neutropenic period  Description: INTERVENTIONS:  - Monitor WBC    Outcome: Progressing     Problem: SAFETY ADULT  Goal: Patient will remain free of falls  Description: INTERVENTIONS:  - Educate patient/family on patient safety including physical limitations  - Instruct patient to call for assistance with activity   - Consult OT/PT to assist with strengthening/mobility   - Keep Call bell within reach  - Keep bed low and locked with side rails adjusted as appropriate  - Keep care items and personal belongings within reach  - Initiate and maintain comfort rounds  - Make Fall Risk Sign visible to staff  - Apply yellow socks and bracelet  for high fall risk patients  - Consider moving patient to room near nurses station  Outcome: Progressing  Goal: Maintain or return to baseline ADL function  Description: INTERVENTIONS:  -  Assess patient's ability to carry out ADLs; assess patient's baseline for ADL function and identify physical deficits which impact ability to perform ADLs (bathing, care of mouth/teeth, toileting, grooming, dressing, etc.)  - Assess/evaluate cause of self-care deficits   - Assess range of motion  - Assess patient's mobility; develop plan if impaired  - Assess patient's need for assistive devices and provide as appropriate  - Encourage maximum independence but intervene and supervise when necessary  - Involve family in performance of ADLs  - Assess for home care needs following discharge   - Consider OT consult to assist with ADL evaluation and planning for discharge  - Provide patient education as appropriate  Outcome: Progressing  Goal: Maintains/Returns to pre admission functional level  Description: INTERVENTIONS:  - Perform AM-PAC 6 Click Basic Mobility/ Daily Activity assessment daily.  - Set and communicate daily mobility goal to care team and patient/family/caregiver.   - Collaborate with rehabilitation services on mobility goals if consulted  - Out of bed for toileting  - Record patient progress and toleration of activity level   Outcome: Progressing     Problem: DISCHARGE PLANNING  Goal: Discharge to home or other facility with appropriate resources  Description: INTERVENTIONS:  - Identify barriers to discharge w/patient and caregiver  - Arrange for needed discharge resources and transportation as appropriate  - Identify discharge learning needs (meds, wound care, etc.)  - Arrange for interpretive services to assist at discharge as needed  - Refer to Case Management Department for coordinating discharge planning if the patient needs post-hospital services based on physician/advanced practitioner order or complex needs  related to functional status, cognitive ability, or social support system  Outcome: Progressing     Problem: Knowledge Deficit  Goal: Patient/family/caregiver demonstrates understanding of disease process, treatment plan, medications, and discharge instructions  Description: Complete learning assessment and assess knowledge base.  Interventions:  - Provide teaching at level of understanding  - Provide teaching via preferred learning methods  Outcome: Progressing     Problem: SKIN/TISSUE INTEGRITY - ADULT  Goal: Incision(s), wounds(s) or drain site(s) healing without S/S of infection  Description: INTERVENTIONS  - Assess and document dressing, incision, wound bed, drain sites and surrounding tissue  - Provide patient and family education  Outcome: Progressing

## 2024-12-28 NOTE — PLAN OF CARE
Problem: PAIN - ADULT  Goal: Verbalizes/displays adequate comfort level or baseline comfort level  Description: Interventions:  - Encourage patient to monitor pain and request assistance  - Assess pain using appropriate pain scale  - Administer analgesics based on type and severity of pain and evaluate response  - Implement non-pharmacological measures as appropriate and evaluate response  - Consider cultural and social influences on pain and pain management  - Notify physician/advanced practitioner if interventions unsuccessful or patient reports new pain  Outcome: Progressing     Problem: INFECTION - ADULT  Goal: Absence or prevention of progression during hospitalization  Description: INTERVENTIONS:  - Assess and monitor for signs and symptoms of infection  - Monitor lab/diagnostic results  - Monitor all insertion sites, i.e. indwelling lines, tubes, and drains  - Monitor endotracheal if appropriate and nasal secretions for changes in amount and color  - [place] appropriate cooling/warming therapies per order  - Administer medications as ordered  - Instruct and encourage patient and family to use good hand hygiene technique  - Identify and instruct in appropriate isolation precautions for identified infection/condition  Outcome: Progressing  Goal: Absence of fever/infection during neutropenic period  Description: INTERVENTIONS:  - Monitor WBC    Outcome: Progressing     Problem: SAFETY ADULT  Goal: Patient will remain free of falls  Description: INTERVENTIONS:  - Educate patient/family on patient safety including physical limitations  - Instruct patient to call for assistance with activity   - Consult OT/PT to assist with strengthening/mobility   - Keep Call bell within reach  - Keep bed low and locked with side rails adjusted as appropriate  - Keep care items and personal belongings within reach  - Initiate and maintain comfort rounds  - Make Fall Risk Sign visible to staff  - Offer Toileting every 2 Hours,  in advance of need  - Initiate/Maintain bed alarm  - Obtain necessary fall risk management equipment: bed alarm  - Apply yellow socks and bracelet for high fall risk patients  - Consider moving patient to room near nurses station  Outcome: Progressing  Goal: Maintain or return to baseline ADL function  Description: INTERVENTIONS:  -  Assess patient's ability to carry out ADLs; assess patient's baseline for ADL function and identify physical deficits which impact ability to perform ADLs (bathing, care of mouth/teeth, toileting, grooming, dressing, etc.)  - Assess/evaluate cause of self-care deficits   - Assess range of motion  - Assess patient's mobility; develop plan if impaired  - Assess patient's need for assistive devices and provide as appropriate  - Encourage maximum independence but intervene and supervise when necessary  - Involve family in performance of ADLs  - Assess for home care needs following discharge   - Consider OT consult to assist with ADL evaluation and planning for discharge  - Provide patient education as appropriate  Outcome: Progressing  Goal: Maintains/Returns to pre admission functional level  Description: INTERVENTIONS:  - Perform AM-PAC 6 Click Basic Mobility/ Daily Activity assessment daily.  - Set and communicate daily mobility goal to care team and patient/family/caregiver.   - Collaborate with rehabilitation services on mobility goals if consulted  - Perform Range of Motion 3 times a day.  - Reposition patient every 2 hours.  - Dangle patient 3 times a day  - Stand patient 3 times a day  - Ambulate patient 3 times a day  - Out of bed to chair 3 times a day   - Out of bed for meals 3 times a day  - Out of bed for toileting  - Record patient progress and toleration of activity level   Outcome: Progressing     Problem: DISCHARGE PLANNING  Goal: Discharge to home or other facility with appropriate resources  Description: INTERVENTIONS:  - Identify barriers to discharge w/patient and  caregiver  - Arrange for needed discharge resources and transportation as appropriate  - Identify discharge learning needs (meds, wound care, etc.)  - Arrange for interpretive services to assist at discharge as needed  - Refer to Case Management Department for coordinating discharge planning if the patient needs post-hospital services based on physician/advanced practitioner order or complex needs related to functional status, cognitive ability, or social support system  Outcome: Progressing     Problem: Knowledge Deficit  Goal: Patient/family/caregiver demonstrates understanding of disease process, treatment plan, medications, and discharge instructions  Description: Complete learning assessment and assess knowledge base.  Interventions:  - Provide teaching at level of understanding  - Provide teaching via preferred learning methods  Outcome: Progressing     Problem: SKIN/TISSUE INTEGRITY - ADULT  Goal: Incision(s), wounds(s) or drain site(s) healing without S/S of infection  Description: INTERVENTIONS  - Assess and document dressing, incision, wound bed, drain sites and surrounding tissue  - Provide patient and family education  - Perform skin care/dressing changes every day  Outcome: Progressing

## 2024-12-28 NOTE — CASE MANAGEMENT
Case Management Assessment & Discharge Planning Note    Patient name Sujey Webber  Location [facility] 5 /E5 -* MRN [id number]  : 1950 Date 2024       Current Admission Date: 2024  Current Admission Diagnosis:Ductal carcinoma in situ (DCIS) of left breast   Patient Active Problem List    Diagnosis Date Noted Date Diagnosed    Aortic stenosis 2024     Preop examination 2024     Ductal carcinoma in situ (DCIS) of left breast 2024     Stage 3a chronic kidney disease (HCC) 2024     Pre-syncope 09/10/2024     Hematoma 2024     Pedal edema 2023     Head injury 05/15/2023     Right knee pain 05/15/2023     Rash 2022     Psoriatic arthritis (HCC) 2022     Lower limb pain, posterior, left 2020     Factor 5 Leiden mutation, heterozygous ([facility]) 2020     History of DVT (deep vein thrombosis) 2020     Class 3 severe obesity due to excess calories with serious comorbidity and body mass index (BMI) of 40.0 to 44.9 in adult ([facility]) 2020     Enlarged thyroid 10/01/2019     Essential hypertension 2019     Class 3 severe obesity due to excess calories with serious comorbidity in adult ([facility]) 2019     Diabetic nephropathy ([facility]) 2017     Vitamin D deficiency 2016     Type 2 diabetes mellitus without complication, without long-term current use of insulin ([facility]) 2014     Prediabetes 2013     Hyperlipidemia 2012     Hypothyroidism 2012     Nontoxic single thyroid nodule 2012     History of radiation therapy 2006       LOS (days): 0  Geometric Mean LOS (GMLOS) (days):   Days to GMLOS:     OBJECTIVE:              Current admission status: Outpatient Surgery       Preferred Pharmacy:   [id number] - [place] - [address]  [address] [place] [address]  Phone: [phone] Fax: [phone]    [facility] [place] - [address],  [address]  [facility],  First Floor [facility] [address]  Phone: [phone] Fax: [phone]    Primary Care Provider: Demetrius Garibay DO    Primary Insurance: MEDICARE  Secondary Insurance: COMMERCIAL MISCELLANEOUS    ASSESSMENT:  Active Health Care Proxies    There are no active Health Care Proxies on file.                 Readmission Root Cause  30 Day Readmission: No    Patient Information  Admitted from:: Home  Mental Status: Alert  During Assessment patient was accompanied by: Daughter  Assessment information provided by:: Patient, Daughter  Primary Caregiver: Self  Support Systems: Self, Son  County of Residence: [place]  What [facility] do you live in?: [place]    Activities of Daily Living Prior to Admission  Functional Status: Independent  Completes ADLs independently?: Yes  Ambulates independently?: Yes  Does patient use assisted devices?: Yes    Current Home Health Care  Home Health Agency Name:: [facility]    Patient Information Continued  Income Source: Pension/care home  Does patient have prescription coverage?: Yes  Does patient receive dialysis treatments?: No  Does patient have a history of substance abuse?: No  Does patient have a history of Mental Health Diagnosis?: No         Means of Transportation  Means of Transport to Appts:: Family transport          DISCHARGE DETAILS:    Discharge planning discussed with:: Pt and dtr  Freedom of Choice: Yes  Comments - Freedom of Choice: Pt will be discharged and she opted to have [facility] follow  CM contacted family/caregiver?: Yes  Were Treatment Team discharge recommendations reviewed with patient/caregiver?: Yes  Did patient/caregiver verbalize understanding of patient care needs?: Yes  Were patient/caregiver advised of the risks associated with not following Treatment Team discharge recommendations?: Yes    Contacts  Patient Contacts: Patient and dtr  Relationship to Patient:: Family  Contact Method: In Person  Reason/Outcome: Continuity  of Care, Discharge Planning    Requested Home Health Care         Is the patient interested in HHC at discharge?: Yes  Home Health Discipline requested:: Nursing  Home Health Agency Name:: [facility]  Home Health Follow-Up Provider:: ELENA  Home Health Services Needed:: Other (comment) (s/p mastectomy and needs assist with ORTIZ drain management until first post op appt.)  Homebound Criteria Met:: Requires the Assistance of Another Person for Safe Ambulation or to Leave the Home  Supporting Clincal Findings:: Limited Endurance, Fatigues Easliy in Short Distances    DME Referral Provided  Referral made for DME?: No         Would you like to participate in our [facility] service program?  : No - Declined    Treatment Team Recommendation: Home with Home Health Care  Discharge Destination Plan:: Home with Home Health Care  Transport at Discharge : Family                                      Additional Comments: CM met with the pt and dtr at the bedside.  They were made aware of the CM role.  Per dtr she will be with her mother until 12/30 and pt is asking for assistance with ORTIZ drain management.  1st post-op appt in 2 weeks.  Agrees to [facility] services.  Search done in [employer].  Choices reviewed with the pt and and she chose [facility].  Reserved in [facility].  No other needs identified.  DC today.  Dtr to transport.

## 2024-12-28 NOTE — PROGRESS NOTES
Patient:    MRN:  [id number]    Chelsey Request ID:  [id number]    Level of care reserved:  Home Health Agency    Partner Reserved:  [facility], [place] [address] [phone]    Clinical needs requested:  skilled nursing    Geography searched:  [id number]    Start of Service:    Request sent:  9:11am EST on 12/28/2024 by Chase Garza    Partner reserved:  9:56am EST on 12/28/2024 by Chase Garza    Choice list shared:  9:52am EST on 12/28/2024 by Chase Garza

## 2024-12-29 ENCOUNTER — HOME CARE VISIT (OUTPATIENT)
Dept: HOME HEALTH SERVICES | Facility: HOME HEALTHCARE | Age: 74
End: 2024-12-29
Payer: MEDICARE

## 2024-12-29 VITALS
OXYGEN SATURATION: 98 % | SYSTOLIC BLOOD PRESSURE: 124 MMHG | HEART RATE: 71 BPM | RESPIRATION RATE: 20 BRPM | DIASTOLIC BLOOD PRESSURE: 72 MMHG | TEMPERATURE: 98.2 F

## 2024-12-29 PROCEDURE — 10330081 VN NO-PAY CLAIM PROCEDURE

## 2024-12-29 PROCEDURE — G0299 HHS/HOSPICE OF RN EA 15 MIN: HCPCS

## 2024-12-29 PROCEDURE — 400013 VN SOC

## 2024-12-29 NOTE — CASE COMMUNICATION
Medication discrepancies or Major drug interactions none  Abnormal clinical findings none  This report is informational only, no response is needed  [facility] has Admitted your patient to Home Health service with the following disciplines: SN  Patient stated goals of care heal from surgery   Potential barriers to goal achievement cormobidities  Primary focus of home health care:Wound  Anticipated visit pattern and next visit date 2 w3 next visit planned for Thurs 1/2/25  Thank you for allowing us to participate in the care of your patient.

## 2024-12-30 ENCOUNTER — HOME CARE VISIT (OUTPATIENT)
Dept: HOME HEALTH SERVICES | Facility: HOME HEALTHCARE | Age: 74
End: 2024-12-30
Payer: MEDICARE

## 2024-12-31 ENCOUNTER — OFFICE VISIT (OUTPATIENT)
Dept: SURGICAL ONCOLOGY | Facility: CLINIC | Age: 74
End: 2024-12-31

## 2024-12-31 ENCOUNTER — TELEPHONE (OUTPATIENT)
Age: 74
End: 2024-12-31

## 2024-12-31 VITALS
DIASTOLIC BLOOD PRESSURE: 64 MMHG | TEMPERATURE: 96.9 F | SYSTOLIC BLOOD PRESSURE: 110 MMHG | HEIGHT: 67 IN | BODY MASS INDEX: 43.09 KG/M2 | HEART RATE: 86 BPM | OXYGEN SATURATION: 99 %

## 2024-12-31 DIAGNOSIS — Z90.12 STATUS POST MASTECTOMY, LEFT: ICD-10-CM

## 2024-12-31 DIAGNOSIS — D05.12 DUCTAL CARCINOMA IN SITU (DCIS) OF LEFT BREAST: Primary | ICD-10-CM

## 2024-12-31 PROCEDURE — 99024 POSTOP FOLLOW-UP VISIT: CPT

## 2024-12-31 NOTE — TELEPHONE ENCOUNTER
How does the incision look? WNL    Do you have fever or chills? No    Are you having any pain? No    Do you have a drain(s)? Yes     Are there any concerns with your drain? Yes yesterday the drain had no output.pt milked tubing several times still no output and noticed small amt fluid from around the drain site. This AM still no output from ORTIZ. No swelling noted.    Pt has [facility] they were called by pt and will call pt this AM and check and poss make a visit.    Informed pt that the office does close today at 3PM.    Advised pt to cont to milk tubing and keep the drain site as clean and dry as possible.  Pt states no swelling at site,no issues other than no output.Cont to wear the support bra all the time except to shower.    Verify post-op appointment date and time  [x]1/13   [address]    Do you have any other questions or concerns? See above    **NOTE TO TRIAGER: If patient requires further triage, based upon the answers above, move to appropriate triage protocol.

## 2024-12-31 NOTE — PROGRESS NOTES
Name: Sujey Webber      : 1950      MRN: [id number]  Encounter Provider: JONATHAN Ahmadi  Encounter Date: 2024   Encounter department: [facility] [place]  :  Assessment & Plan  Status post mastectomy, left  Ms. Sujey Webber is a 74 y.o. female presenting today with concerns for a possible occlusion to ORTIZ drain at the left mastectomy site. She is without pain, swelling, fever, or chills. Ms. Webber underwent left breast mastectomy with SLNB on 2024 for DCIS, previously identified on biopsy. Surgical pathology is still pending. Today, incision site appears to be well approximated and healing without complications. No evidence of infection or erythema noted. ORTIZ drain is intact, and initially was without drainage in the bulb due to an occlusion, however serous fluid is draining from drain incision. I milked the tubing and drainage promptly resumed. 100cc of blood tinged serous fluid was emptied and documented appropriately in the pt's log. I encouraged pt to contact our office if any concerns moving forward. She does note that home health is set up to come on 2025, and can assist if line needs further milking. She will plan to follow up at her scheduled post-op visit with Dr. Antoine on 2025. All questions were answered today.        Ductal carcinoma in situ (DCIS) of left breast       History of Present Illness   Ms. Sujey Webber is a 74 y.o. female presenting today with concerns for a possible occlusion to ORTIZ drain. She denies pain, swelling, fever, or chills. Pt reports little to no output yesterday despite attempting to milk tubing several times. She mentions fluid is draining from her drain site.        Oncology History   Oncology History   Ductal carcinoma in situ (DCIS) of left breast   2006 Initial Diagnosis    Ductal Carcinoma In Situ Left breast     2006 Biopsy    Left breast wire  "lumpectomy  DCIS  Multifocal, 0.5 cm  Grade 2-3  All margins: no evidence of malignancy  No invasive carcinoma identified  Fibrocystic disease with microcalcifications, intraductal papillomas, micropapillary and florid intraductal hyperplasia, sclerosing adenosis, apocrine metaplasia and radial scars    ER 98%  CO 98%     2006 Genetic Testing    Pt states that her genetic testing was NEGATIVE in 2006 11/13/2024 Biopsy    LEFT US guided breast BX  Ductal carcinoma in situ, fragmented, intermediate nuclear grade  Grade 2  2 oclock, 3 cmfn    ER 99%  CO 60%  HER2 PENDING     11/13/2024 -  Cancer Staged    Staging form: Breast, AJCC 8th Edition  - Clinical stage from 11/13/2024: Stage 0 (cTis (DCIS), cN0, cM0, ER+, CO+, HER2: Not Assessed) - Signed by Kristy Antoine MD on 11/27/2024  Stage prefix: Initial diagnosis  Method of lymph node assessment: Clinical  Nuclear grade: G2       11/20/2024 Genetic Testing    Patient has genetic testing done for breast cancer    CONSULT COMPLETED 11/20  SAMPLE NOT YET COLLECTED     11/21/2024 Initial Diagnosis    Ductal carcinoma in situ (DCIS) of left breast        Review of Systems   Constitutional:  Negative for activity change, appetite change, fatigue, fever and unexpected weight change.   Skin: Negative.    Neurological: Negative.    Psychiatric/Behavioral:  Negative for confusion.     A complete review of systems is negative other than that noted above in the HPI.       Objective   /64 (BP Location: Right arm, Patient Position: Sitting, Cuff Size: Large)   Pulse 86   Temp (!) 96.9 °F (36.1 °C) (Temporal)   Ht 5' 7\" (1.702 m)   SpO2 99%   BMI 43.09 kg/m²     ECOG      Physical Exam  Vitals and nursing note reviewed.   Constitutional:       Appearance: Normal appearance. She is normal weight.   Eyes:      General: No scleral icterus.     Conjunctiva/sclera: Conjunctivae normal.   Chest:      Chest wall: No mass.   Breasts:     Right: No swelling, bleeding, inverted " nipple, mass, nipple discharge, skin change or tenderness.      Left: Skin change (surgical scar) present. No swelling, bleeding, mass or tenderness.          Comments: Incision site appears to be well approximated and healing without complications. No evidence of infection or erythema noted. ORTIZ drain is intact, and initially was without drainage in the bulb due to an occlusion.   Lymphadenopathy:      Upper Body:      Right upper body: No supraclavicular or axillary adenopathy.      Left upper body: No supraclavicular or axillary adenopathy.   Skin:     General: Skin is warm and dry.   Neurological:      Mental Status: She is alert and oriented to person, place, and time. Mental status is at baseline.   Psychiatric:         Mood and Affect: Mood normal.         Behavior: Behavior normal.         Thought Content: Thought content normal.         Judgment: Judgment normal.          Admission on 12/27/2024, Discharged on 12/28/2024   Component Date Value Ref Range Status   • POC Glucose 12/27/2024 138  65 - 140 mg/dl Final   • POC Glucose 12/27/2024 197 (H)  65 - 140 mg/dl Final   Appointment on 12/12/2024   Component Date Value Ref Range Status   • GENETIC ANALYSIS OVERALL INTERPRET* 12/12/2024 NEGATIVE: No Clinically Significant Variants Detected   Final   • INTERPRETATION 12/12/2024 See Notes   Final    No pathogenic mutations, variants of unknown significance, or gross deletions or duplications were detected.  Risk Estimate: low likelihood of variants in the genes analyzed contributing to this individual's clinical history.  Genetic counseling is a recommended option for all individuals undergoing genetic testing.  Genes Analyzed (13 total): CELSA, BARD1, BRCA1, BRCA2, CDH1, CHEK2, NF1, PALB2, PTEN, RAD51C, RAD51D, STK11 and TP53 (sequencing and deletion/duplication).   • GENES NOT REPORTED 12/12/2024 CELSA,BARD1,CDH1,CHEK2,PALB2,PTEN,RAD51C,RAD51D,STK11,TP53,NF1,BRCA1,BRCA2   Final   • GENETIC ANALYSIS OVERALL  INTERPRET* 12/12/2024 NEGATIVE: No Clinically Significant Variants Detected   Final   • INTERPRETATION 12/12/2024 See Notes   Final    No pathogenic mutations, variants of unknown significance, or gross deletions or duplications were detected.  Risk Estimate: low likelihood of variants in the genes analyzed contributing to this individual's clinical history.  Genetic counseling is a recommended option for all individuals undergoing genetic testing.  Genes Analyzed (59 total): APC, CELSA, BAP1, BARD1, BMPR1A, BRCA1, BRCA2, BRIP1, CDH1, CDK4, CDKN1B, CDKN2A, CHEK2, DICER1, FH, FLCN, KIF1B, MAX, MEN1, MET, MLH1, MSH2, MSH6, MUTYH, NF1, NTHL1, PALB2, PMS2, POT1, PTEN, RAD51C, RAD51D, RB1, RET, SDHA, SDHAF2, SDHB, SDHC, SDHD, SMAD4, SMARCA4, STK11, LNTN597, TP53, TSC1, TSC2 and VHL (sequencing and deletion/duplication); AXIN2, CTNNA1, EGLN1, HOXB13, KIT, MITF, MSH3, PDGFRA, POLD1 and POLE (sequencing only); EPCAM and GREM1 (deletion/duplication only). RNA data is routinely analyzed for use in variant interpretation for all genes.   • GENES NOT REPORTED 12/12/2024    Final                    Value:APC,CELSA,AXIN2,BAP1,BARD1,BMPR1A,BRCA1,BRCA2,BRIP1,CDH1,CDK4,CDKN1B,CDKN2A,CHEK2,CTNNA1,DICER1,EGLN1,EPCAM,FH,FLCN,GREM1,HOXB13,KIF1B,KIT,MAX,MEN1,MET,MITF,MLH1,MSH2,MSH3,MSH6,MUTYH,NF1,NTHL1,PALB2,PDGFRA,PMS2,POLD1,POLE,POT1,PTEN,RAD51C,RAD51D,RB1,RET,SD  HA,SDHAF2,SDHB,SDHC,SDHD,SMAD4,SMARCA4,STK11,VXHW480,TP53,TSC1,TSC2,VHL   • Color, UA 12/12/2024 Light Yellow   Final   • Clarity, UA 12/12/2024 Clear   Final   • Specific Gravity, UA 12/12/2024 1.016  1.003 - 1.030 Final   • pH, UA 12/12/2024 5.5  4.5, 5.0, 5.5, 6.0, 6.5, 7.0, 7.5, 8.0 Final   • Leukocytes, UA 12/12/2024 Small (A)  Negative Final   • Nitrite, UA 12/12/2024 Negative  Negative Final   • Protein, UA 12/12/2024 Trace (A)  Negative mg/dl Final   • Glucose, UA 12/12/2024 Negative  Negative mg/dl Final   • Ketones, UA 12/12/2024 Negative  Negative mg/dl Final   •  Urobilinogen, UA 12/12/2024 <2.0  <2.0 mg/dl mg/dl Final   • Bilirubin, UA 12/12/2024 Negative  Negative Final   • Occult Blood, UA 12/12/2024 Trace (A)  Negative Final   • Hemoglobin A1C 12/12/2024 7.4 (H)  Normal 4.0-5.6%; PreDiabetic 5.7-6.4%; Diabetic >=6.5%; Glycemic control for adults with diabetes <7.0% % Final   • EAG 12/12/2024 166  mg/dl Final   • WBC 12/12/2024 6.17  4.31 - 10.16 Thousand/uL Final   • RBC 12/12/2024 4.02  3.81 - 5.12 Million/uL Final   • Hemoglobin 12/12/2024 12.7  11.5 - 15.4 g/dL Final   • Hematocrit 12/12/2024 38.5  34.8 - 46.1 % Final   • MCV 12/12/2024 96  82 - 98 fL Final   • MCH 12/12/2024 31.6  26.8 - 34.3 pg Final   • MCHC 12/12/2024 33.0  31.4 - 37.4 g/dL Final   • RDW 12/12/2024 14.6  11.6 - 15.1 % Final   • MPV 12/12/2024 10.7  8.9 - 12.7 fL Final   • Platelets 12/12/2024 307  149 - 390 Thousands/uL Final   • nRBC 12/12/2024 0  /100 WBCs Final   • Segmented % 12/12/2024 75  43 - 75 % Final   • Immature Grans % 12/12/2024 0  0 - 2 % Final   • Lymphocytes % 12/12/2024 16  14 - 44 % Final   • Monocytes % 12/12/2024 7  4 - 12 % Final   • Eosinophils Relative 12/12/2024 1  0 - 6 % Final   • Basophils Relative 12/12/2024 1  0 - 1 % Final   • Absolute Neutrophils 12/12/2024 4.63  1.85 - 7.62 Thousands/µL Final   • Absolute Immature Grans 12/12/2024 0.01  0.00 - 0.20 Thousand/uL Final   • Absolute Lymphocytes 12/12/2024 0.97  0.60 - 4.47 Thousands/µL Final   • Absolute Monocytes 12/12/2024 0.43  0.17 - 1.22 Thousand/µL Final   • Eosinophils Absolute 12/12/2024 0.08  0.00 - 0.61 Thousand/µL Final   • Basophils Absolute 12/12/2024 0.05  0.00 - 0.10 Thousands/µL Final   • Sodium 12/12/2024 137  135 - 147 mmol/L Final   • Potassium 12/12/2024 4.2  3.5 - 5.3 mmol/L Final   • Chloride 12/12/2024 104  96 - 108 mmol/L Final   • CO2 12/12/2024 24  21 - 32 mmol/L Final   • ANION GAP 12/12/2024 9  4 - 13 mmol/L Final   • BUN 12/12/2024 10  5 - 25 mg/dL Final   • Creatinine 12/12/2024 0.89  0.60  - 1.30 mg/dL Final    Standardized to IDMS reference method   • Glucose, Fasting 12/12/2024 143 (H)  65 - 99 mg/dL Final   • Calcium 12/12/2024 9.3  8.4 - 10.2 mg/dL Final   • AST 12/12/2024 16  13 - 39 U/L Final   • ALT 12/12/2024 14  7 - 52 U/L Final    Specimen collection should occur prior to Sulfasalazine administration due to the potential for falsely depressed results.    • Alkaline Phosphatase 12/12/2024 79  34 - 104 U/L Final   • Total Protein 12/12/2024 6.8  6.4 - 8.4 g/dL Final   • Albumin 12/12/2024 3.8  3.5 - 5.0 g/dL Final   • Total Bilirubin 12/12/2024 0.99  0.20 - 1.00 mg/dL Final    Use of this assay is not recommended for patients undergoing treatment with eltrombopag due to the potential for falsely elevated results.  N-acetyl-p-benzoquinone imine (metabolite of Acetaminophen) will generate erroneously low results in samples for patients that have taken an overdose of Acetaminophen.   • eGFR 12/12/2024 64  ml/min/1.73sq m Final   • Sed Rate 12/12/2024 61 (H)  0 - 29 mm/hour Final   • CRP 12/12/2024 12.0 (H)  <3.0 mg/L Final   • Bilirubin, Direct 12/12/2024 0.19  0.00 - 0.20 mg/dL Final   • RBC, UA 12/12/2024 1-2  None Seen, 1-2 /hpf Final   • WBC, UA 12/12/2024 2-4 (A)  None Seen, 1-2 /hpf Final   • Epithelial Cells 12/12/2024 Occasional  None Seen, Occasional /hpf Final   • Bacteria, UA 12/12/2024 None Seen  None Seen, Occasional /hpf Final   • MUCUS THREADS 12/12/2024 Occasional (A)  None Seen Final   • Hyaline Casts, UA 12/12/2024 0-3 (A)  None Seen /lpf Final   • Transitional Epithelial Cells 12/12/2024 Present   Final

## 2024-12-31 NOTE — TELEPHONE ENCOUNTER
Called patient back to discuss. Offered appointment with JONATHAN Ceron today in [place] office if [facility] is unable to come out to see patient for a drain check. 1:00 appointment placed for patient, patient will call back to let the office know if she still needs appointment (pending if [facility] can come see patient today).    Discussed that small amount of drainage from drain site can be normal. Patient states she has a hard time stripping the drain herself, as she is unable to get close to the top in of the drain to strip it completely. No pain at this time.

## 2025-01-02 ENCOUNTER — HOME CARE VISIT (OUTPATIENT)
Dept: HOME HEALTH SERVICES | Facility: HOME HEALTHCARE | Age: 75
End: 2025-01-02
Payer: MEDICARE

## 2025-01-02 PROCEDURE — 88342 IMHCHEM/IMCYTCHM 1ST ANTB: CPT | Performed by: STUDENT IN AN ORGANIZED HEALTH CARE EDUCATION/TRAINING PROGRAM

## 2025-01-02 PROCEDURE — 88360 TUMOR IMMUNOHISTOCHEM/MANUAL: CPT | Performed by: STUDENT IN AN ORGANIZED HEALTH CARE EDUCATION/TRAINING PROGRAM

## 2025-01-02 PROCEDURE — 88341 IMHCHEM/IMCYTCHM EA ADD ANTB: CPT | Performed by: STUDENT IN AN ORGANIZED HEALTH CARE EDUCATION/TRAINING PROGRAM

## 2025-01-02 PROCEDURE — 88307 TISSUE EXAM BY PATHOLOGIST: CPT | Performed by: STUDENT IN AN ORGANIZED HEALTH CARE EDUCATION/TRAINING PROGRAM

## 2025-01-06 ENCOUNTER — TELEPHONE (OUTPATIENT)
Dept: OBGYN CLINIC | Facility: OTHER | Age: 75
End: 2025-01-06

## 2025-01-06 ENCOUNTER — HOME CARE VISIT (OUTPATIENT)
Dept: HOME HEALTH SERVICES | Facility: HOME HEALTHCARE | Age: 75
End: 2025-01-06
Payer: MEDICARE

## 2025-01-06 VITALS
HEART RATE: 80 BPM | SYSTOLIC BLOOD PRESSURE: 148 MMHG | RESPIRATION RATE: 20 BRPM | DIASTOLIC BLOOD PRESSURE: 80 MMHG | TEMPERATURE: 98.1 F | OXYGEN SATURATION: 98 %

## 2025-01-06 DIAGNOSIS — E78.5 HYPERLIPIDEMIA, UNSPECIFIED HYPERLIPIDEMIA TYPE: ICD-10-CM

## 2025-01-06 DIAGNOSIS — I82.409 DVT (DEEP VENOUS THROMBOSIS) (HCC): ICD-10-CM

## 2025-01-06 DIAGNOSIS — E11.9 TYPE 2 DIABETES MELLITUS WITHOUT COMPLICATION, WITHOUT LONG-TERM CURRENT USE OF INSULIN (HCC): ICD-10-CM

## 2025-01-06 DIAGNOSIS — E03.9 HYPOTHYROIDISM, UNSPECIFIED TYPE: ICD-10-CM

## 2025-01-06 PROCEDURE — G0299 HHS/HOSPICE OF RN EA 15 MIN: HCPCS

## 2025-01-06 PROCEDURE — G0180 MD CERTIFICATION HHA PATIENT: HCPCS | Performed by: SURGERY

## 2025-01-06 NOTE — TELEPHONE ENCOUNTER
Needs to be sent to [facility] due to insurance change      Reason for call:   [x] Refill   [] Prior Auth  [] Other:     Office:   [x] PCP/Provider -Demetrius Garibay/Eric Cheng  [] Specialty/Provider -     Medication: Xarelto    Dose/Frequency: 20 mg    Quantity: #90    Pharmacy: [facility]    Does the patient have enough for 3 days?   [x] Yes   [] No - Send as HP to POD                Reason for call:   [x] Refill   [] Prior Auth  [] Other:     Office:   [x] PCP/Provider -   [] Specialty/Provider -     Medication: Zocor    Dose/Frequency: 20 mg     Quantity: #90    Pharmacy: [facility]    Does the patient have enough for 3 days?   [x] Yes   [] No - Send as HP to POD                Reason for call:   [] Refill   [] Prior Auth  [] Other:     Office:   [] PCP/Provider -   [] Specialty/Provider -     Medication: Metformin    Dose/Frequency: 500 mg 24 hr tablet    Quantity: #270    Pharmacy: [facility]    Does the patient have enough for 3 days?   [x] Yes   [] No - Send as HP to POD                        Reason for call:   [x] Refill   [] Prior Auth  [] Other:     Office:   [x] PCP/Provider -   [] Specialty/Provider -     Medication: Levothyroxine    Dose/Frequency: 88 mcg    Quantity: #30    Pharmacy: [facility]    Does the patient have enough for 3 days?   [x] Yes   [] No - Send as HP to POD

## 2025-01-07 LAB
DME PARACHUTE DELIVERY DATE ACTUAL: NORMAL
DME PARACHUTE DELIVERY DATE REQUESTED: NORMAL
DME PARACHUTE DELIVERY NOTE: NORMAL
DME PARACHUTE ITEM DESCRIPTION: NORMAL
DME PARACHUTE ORDER STATUS: NORMAL
DME PARACHUTE SUPPLIER NAME: NORMAL
DME PARACHUTE SUPPLIER PHONE: NORMAL

## 2025-01-07 RX ORDER — SIMVASTATIN 20 MG
20 TABLET ORAL DAILY
Qty: 90 TABLET | Refills: 1 | Status: SHIPPED | OUTPATIENT
Start: 2025-01-07

## 2025-01-07 RX ORDER — METFORMIN HYDROCHLORIDE 500 MG/1
TABLET, EXTENDED RELEASE ORAL
Qty: 270 TABLET | Refills: 1 | Status: SHIPPED | OUTPATIENT
Start: 2025-01-07

## 2025-01-07 RX ORDER — LEVOTHYROXINE SODIUM 88 UG/1
88 TABLET ORAL DAILY
Qty: 30 TABLET | Refills: 5 | Status: SHIPPED | OUTPATIENT
Start: 2025-01-07

## 2025-01-07 NOTE — TELEPHONE ENCOUNTER
Pt called in stating her insurance informed her that the XR Metformin will cost her more for in the new year (by $100) so she was told to ask if she can go back on the non extended release and she understands she may have to take it more frequently - she currently does not need a script she just wants confirmation from you that she may switch back    Please advise ty Lorna YUAN

## 2025-01-08 ENCOUNTER — HOME CARE VISIT (OUTPATIENT)
Dept: HOME HEALTH SERVICES | Facility: HOME HEALTHCARE | Age: 75
End: 2025-01-08
Payer: MEDICARE

## 2025-01-09 ENCOUNTER — HOME CARE VISIT (OUTPATIENT)
Dept: HOME HEALTH SERVICES | Facility: HOME HEALTHCARE | Age: 75
End: 2025-01-09
Payer: MEDICARE

## 2025-01-09 ENCOUNTER — TELEPHONE (OUTPATIENT)
Dept: OBGYN CLINIC | Facility: OTHER | Age: 75
End: 2025-01-09

## 2025-01-09 PROCEDURE — G0299 HHS/HOSPICE OF RN EA 15 MIN: HCPCS

## 2025-01-13 ENCOUNTER — OFFICE VISIT (OUTPATIENT)
Dept: SURGICAL ONCOLOGY | Facility: CLINIC | Age: 75
End: 2025-01-13

## 2025-01-13 ENCOUNTER — DOCUMENTATION (OUTPATIENT)
Dept: HEMATOLOGY ONCOLOGY | Facility: CLINIC | Age: 75
End: 2025-01-13

## 2025-01-13 ENCOUNTER — PATIENT OUTREACH (OUTPATIENT)
Dept: HEMATOLOGY ONCOLOGY | Facility: CLINIC | Age: 75
End: 2025-01-13

## 2025-01-13 ENCOUNTER — TELEPHONE (OUTPATIENT)
Age: 75
End: 2025-01-13

## 2025-01-13 VITALS
TEMPERATURE: 97.8 F | SYSTOLIC BLOOD PRESSURE: 110 MMHG | OXYGEN SATURATION: 98 % | HEIGHT: 67 IN | BODY MASS INDEX: 42.38 KG/M2 | HEART RATE: 91 BPM | DIASTOLIC BLOOD PRESSURE: 62 MMHG | WEIGHT: 270 LBS

## 2025-01-13 DIAGNOSIS — Z92.3 HISTORY OF RADIATION THERAPY: ICD-10-CM

## 2025-01-13 DIAGNOSIS — Z86.000 HISTORY OF DUCTAL CARCINOMA IN SITU (DCIS) OF LEFT BREAST: ICD-10-CM

## 2025-01-13 DIAGNOSIS — C50.812 MALIGNANT NEOPLASM OF OVERLAPPING SITES OF LEFT BREAST IN FEMALE, ESTROGEN RECEPTOR POSITIVE (HCC): Primary | ICD-10-CM

## 2025-01-13 DIAGNOSIS — Z17.0 MALIGNANT NEOPLASM OF OVERLAPPING SITES OF LEFT BREAST IN FEMALE, ESTROGEN RECEPTOR POSITIVE (HCC): Primary | ICD-10-CM

## 2025-01-13 PROCEDURE — 99024 POSTOP FOLLOW-UP VISIT: CPT | Performed by: SURGERY

## 2025-01-13 NOTE — PROGRESS NOTES
Breast Oncology Patient Navigator    Met with patient at her Follow up appointment with Dr. Antoine.     Asked patient about her eating and drinking- patient mentioned she has been eating and drinking well.    Patient is  but mentioned she has a good support system within her family she has two sisters who have been taking patient to and from her appointments when needed. Patient mentions she is still driving as well.    Asked patient about how she keeps tracks of all her appointments- patient mentioned she keeps track of everything via my chart.    Offered support and answered questions when appropriate.    Patient has my contact information and knows to reach out with further questions or concerns.

## 2025-01-13 NOTE — TELEPHONE ENCOUNTER
Please assist the patient with a sooner appointment with Dr. Wynne at the [place] office  only. As per the clinical review patient should be seen within 14 days . Patient has no scheduling restrictions on dates and times as per the patient. Patient has been place on Dr. Wynne 2/28/2025 and placed on the wait list as well.    Patient best contact number [phone]

## 2025-01-13 NOTE — PROGRESS NOTES
74 y.o. female is here today s/p left completion mastectomy for extensive DCIS.  She has a history of ipsilateral DCIS status postlumpectomy and radiation. She reports decreasing drain output and minor discomfort only.      Physical Exam  Constitutional:       General: She is not in acute distress.     Appearance: Normal appearance.   Chest:   Breasts:     Left: Skin change (Well-healing mastectomy incision with no signs of infection, ORTIZ drain was removed today in the office) present.   Neurological:      Mental Status: She is alert and oriented to person, place, and time.   Psychiatric:         Mood and Affect: Mood normal.           Diagnoses and all orders for this visit:    Malignant neoplasm of overlapping sites of left breast in female, estrogen receptor positive (HCC)  -     Ambulatory Referral to Hematology / Oncology; Future  -     Ambulatory referral to PT/OT lymphedema therapy; Future    History of radiation therapy    History of ductal carcinoma in situ (DCIS) of left breast        Assessment/Plan: 74-year-old female status post completion mastectomy and sentinel node biopsy of the left side for recurrent extensive DCIS.  Final pathology reveals an invasive component.  This is ER/GA positive and HER2 equivocal.  FISH analysis is pending.  She is healing well with no signs of infection.  Her drain was removed today in the office.  I am referring her to physical therapy.  She also needs to meet with medical oncology.  We will see her again in 6 months for survivorship visit or sooner should the need arise.

## 2025-01-13 NOTE — PROGRESS NOTES
Referral to med onc received.  Chart reviewed by oncology nurse navigator at this time.      Diagnosis: recurrent breast cancer  After review of chart, instructions for scheduling added to referral and sent to be scheduled as advised.

## 2025-01-14 ENCOUNTER — HOME CARE VISIT (OUTPATIENT)
Dept: HOME HEALTH SERVICES | Facility: HOME HEALTHCARE | Age: 75
End: 2025-01-14
Payer: MEDICARE

## 2025-01-14 VITALS
HEART RATE: 72 BPM | TEMPERATURE: 97.8 F | SYSTOLIC BLOOD PRESSURE: 120 MMHG | RESPIRATION RATE: 12 BRPM | OXYGEN SATURATION: 100 % | DIASTOLIC BLOOD PRESSURE: 66 MMHG

## 2025-01-14 VITALS
OXYGEN SATURATION: 97 % | SYSTOLIC BLOOD PRESSURE: 140 MMHG | TEMPERATURE: 98 F | RESPIRATION RATE: 20 BRPM | HEART RATE: 78 BPM | DIASTOLIC BLOOD PRESSURE: 78 MMHG

## 2025-01-14 PROCEDURE — G0300 HHS/HOSPICE OF LPN EA 15 MIN: HCPCS

## 2025-01-15 ENCOUNTER — TELEPHONE (OUTPATIENT)
Dept: HEMATOLOGY ONCOLOGY | Facility: CLINIC | Age: 75
End: 2025-01-15

## 2025-01-15 NOTE — TELEPHONE ENCOUNTER
Called patient and offered sooner appointment with Dr Novak at the [facility]. Patient stated she is 5 mins from the [place] location and would like to stay there. Patient stated the [place] office is too far of a drive for her. Patient would like to remain on the wait list and if anything opens she would like us to call her.

## 2025-01-17 ENCOUNTER — HOME CARE VISIT (OUTPATIENT)
Dept: HOME HEALTH SERVICES | Facility: HOME HEALTHCARE | Age: 75
End: 2025-01-17
Payer: MEDICARE

## 2025-01-17 ENCOUNTER — TELEPHONE (OUTPATIENT)
Age: 75
End: 2025-01-17

## 2025-01-17 VITALS
DIASTOLIC BLOOD PRESSURE: 68 MMHG | OXYGEN SATURATION: 98 % | HEART RATE: 80 BPM | SYSTOLIC BLOOD PRESSURE: 118 MMHG | RESPIRATION RATE: 20 BRPM | TEMPERATURE: 98.2 F

## 2025-01-17 PROCEDURE — G0299 HHS/HOSPICE OF RN EA 15 MIN: HCPCS

## 2025-01-17 NOTE — TELEPHONE ENCOUNTER
Called patient to discuss that PT referral was ordered. [facility] PT office phone number provided for patient to call back to discuss.

## 2025-01-17 NOTE — TELEPHONE ENCOUNTER
Patient called, stating she was there was going to be a referral placed to pt/ot and has not heard anything from them to get it scheduled. She would like the orders placed so she can start

## 2025-01-29 ENCOUNTER — EVALUATION (OUTPATIENT)
Dept: PHYSICAL THERAPY | Facility: REHABILITATION | Age: 75
End: 2025-01-29
Payer: MEDICARE

## 2025-01-29 DIAGNOSIS — Z17.0 MALIGNANT NEOPLASM OF OVERLAPPING SITES OF LEFT BREAST IN FEMALE, ESTROGEN RECEPTOR POSITIVE (HCC): ICD-10-CM

## 2025-01-29 DIAGNOSIS — C50.812 MALIGNANT NEOPLASM OF OVERLAPPING SITES OF LEFT BREAST IN FEMALE, ESTROGEN RECEPTOR POSITIVE (HCC): ICD-10-CM

## 2025-01-29 PROCEDURE — 97161 PT EVAL LOW COMPLEX 20 MIN: CPT | Performed by: PHYSICAL THERAPIST

## 2025-01-29 PROCEDURE — 97530 THERAPEUTIC ACTIVITIES: CPT | Performed by: PHYSICAL THERAPIST

## 2025-01-29 NOTE — PROGRESS NOTES
Cancer Rehab Evaluation    Today's Date: 2025  Patient name: Sujey Webber  : 1950  MRN: [id number]  Referring provider: Kristy Antoine MD  Dx:  Encounter Diagnosis     ICD-10-CM    1. Malignant neoplasm of overlapping sites of left breast in female, estrogen receptor positive (HCC)  C50.812 Ambulatory referral to PT/OT lymphedema therapy    Z17.0 PT plan of care cert/re-cert        Assessment    Assessment details: Sujey Webber is a 74 y.o. female with history of Left DCIS breast cancer s/p mastectomy 4 weeks ago presenting for rehabilitation of upper quarter function.  She is a good candidate for Strength After Breast Cancer program due to impairments of upper quarter strength and mobility. She has mild scar restrictions and radiation fibrosis of the chest wall (from prior RT) that will be addressed with manual therapy techniques. Upon examination, she does not have UE or breast lymphedema.  Skilled rehabilitative exercise program to include aerobic, strengthening of core/UE/LE, and generalized flexibility. She will be educated on monitoring symptoms of pain vs lymphedema and how to progress exercise safely and effectively.     During initial evaluation, education was provided on lymphatic anatomy and physiology, individualized lymphedema risk; signs and symptoms for self-monitoring, lymphedema risk reduction behaviors, and healthy habits. She is in agreement with recommended plan of care and goals for therapy, and demonstrates motivation for active participation in proposed plan of care.    Understanding of Dx/Px/POC: good     Prognosis: good    Goals  -Patient will demonstrate no worsening of lymphedema volumetrics after 4 weeks of progressive resistance exercise.  -Patient will demonstrate compliance and adherence to compression garment wear/care with exercise.  -Patient will report 25% improvement in upper quarter function after 8 weeks of strengthening exercise.  -Upon discharge,  patient will verbalize how/when to progress/regress strengthening exercise and how/when to re-order compression garments and how/when to follow up with a certified lymphedema therapist for re-evaluation.    Plan    Frequency: 1x week  Duration in weeks: 4  Plan of Care beginning date: 1/29/2025  Plan of Care expiration date: 2/26/2025  Treatment plan discussed with: patient and referring physician      Subjective/History:  Chief Complaint: s/p L breast mastectomy with limits in strength and ROM and L upper quarter. Aching pain in axilla and posterior upper arm.  Struggling to feel comfortable in bras- they all slide up. Returning to the bra clinic in 2-4 weeks.  Cancer history and treatments  Type of CA: L breast cancer recurrent DCIS with invasive component, ER+, IA+  Stage: 0  rdGrdrrdarddrderd:rd rd3rd Date of diagnosis: 2006, 2024  Care team: Elina,   Surgical excision: 12/27/24  Current Post-op precautions? no  Lymph node dissection? SLNB 0/3  Reconstruction: none  Radiation: 2006 s/p lumpectomy  Chemotherapy: none  Immunotherapy: none  Hormone therapy: in 2006- Tamoxifen, d/c due to LLE DVT; then again 2-3 years ago suffered a LLE DVT so was put on Xarelto  Side effects of CA/Treatment:   Fatigue: no  Pain: no  Lymphedema: no  Neuropathy: no  Falls: no  Cognitive changes/brain fog: no  GI toxicity: no  Bone Mets: no  Speech and swallowing dysfunction: no  Other: none    Systems Review:  Cardiovascular hx: congenital murur  Thyroid dysfunction: Yes hypothyroid, stable  Diabetes: Yes Type 2, on metformin   Kidney disease:No   Liver disease: No  Abdominal surgeries: tubal ligation (1980s), pediatric inguinal hernia repairs.   Orthopedic injuries/surgeries: L shoulder adhesive capsulitis 12 years ago.    Lymphedema screening  Feeling of heaviness, fullness, pressure? no  Resolves with elevation: n/a  Sleeping location? bed  Change in fit of shoes/clothes/jewelry?no  History of Cellulitis?  no  History of S/DVT? Yes- on Xarelto  after Tamoxifen    Pain: 0/10, aching at worst 3/10 in axilla  Function: mod I for dressing; unable to vacuum or do household chores.  Working Status: retired  Exercise status: none pre-op, none post-op  Patient goals: recover full function  Functional self-reported outcome measure: QuickDASH    Objective  Lymphedema Screening:   - Arm: none  - Chest wall/thorax: none  - Abdomen: none  - Volumetrics completed? no  - Photos taken? no  Functional Capacity:  Assistive device use? none  Ability to don/doff clothing/garments: mod I  Transfer status: indep  Gait assessment: indep  6mwt: not tested  30 second sit to stand: not tested  Upper quarter Sensation: Normal  Lower quarter Sensation: Normal    Strength and Mobility  Upper quarter Range of Motion: Normal  Upper Quarter Strength: Normal  Arm dominance: right    Chest wall exam:  - L mastectomy incision healing well, no signs/symptoms of infection, moderate restrictions; painfree  - Mild post-op edema of L lateral thorax just proximal to the incision.  - Axilla- mild restrictions, painfree.       Precautions: h/o L breast CA    POC expires Auth Status? (BOMN, approved, pending) Unit limit (Daily) Auth Start date Expiration date PT/OT + Visit Limit?   2/28/25 BOMN         Date of Service 1/29        Visits used 1        Visits remaining 99        Patient Education         [facility] Education provided        Lymphedema risk education Low risk- discussed                 Manual Ther         Scar mob         MLD                                    Ther Ex         Aerobic exe  **       Balance exe  **       Strengthening-UE  **       Strengthening- LE  **       Flexibility- UE  **       Flexibility- LE  **                Ther Activity & Self Care

## 2025-01-30 ENCOUNTER — DOCUMENTATION (OUTPATIENT)
Dept: HEMATOLOGY ONCOLOGY | Facility: CLINIC | Age: 75
End: 2025-01-30

## 2025-01-30 ENCOUNTER — CONSULT (OUTPATIENT)
Dept: HEMATOLOGY ONCOLOGY | Facility: CLINIC | Age: 75
End: 2025-01-30
Payer: MEDICARE

## 2025-01-30 ENCOUNTER — TELEPHONE (OUTPATIENT)
Age: 75
End: 2025-01-30

## 2025-01-30 VITALS
TEMPERATURE: 97.9 F | HEIGHT: 67 IN | HEART RATE: 67 BPM | RESPIRATION RATE: 18 BRPM | OXYGEN SATURATION: 100 % | DIASTOLIC BLOOD PRESSURE: 72 MMHG | BODY MASS INDEX: 41.44 KG/M2 | WEIGHT: 264 LBS | SYSTOLIC BLOOD PRESSURE: 130 MMHG

## 2025-01-30 DIAGNOSIS — M81.0 AGE-RELATED OSTEOPOROSIS WITHOUT CURRENT PATHOLOGICAL FRACTURE: ICD-10-CM

## 2025-01-30 DIAGNOSIS — C50.812 MALIGNANT NEOPLASM OF OVERLAPPING SITES OF LEFT BREAST IN FEMALE, ESTROGEN RECEPTOR POSITIVE (HCC): Primary | ICD-10-CM

## 2025-01-30 DIAGNOSIS — Z86.39 HISTORY OF DIABETES MELLITUS: ICD-10-CM

## 2025-01-30 DIAGNOSIS — Z86.79 HISTORY OF SUPERFICIAL PHLEBITIS: ICD-10-CM

## 2025-01-30 DIAGNOSIS — Z86.718 HISTORY OF DVT OF LOWER EXTREMITY: ICD-10-CM

## 2025-01-30 DIAGNOSIS — E03.9 ACQUIRED HYPOTHYROIDISM: ICD-10-CM

## 2025-01-30 DIAGNOSIS — Z17.0 MALIGNANT NEOPLASM OF OVERLAPPING SITES OF LEFT BREAST IN FEMALE, ESTROGEN RECEPTOR POSITIVE (HCC): Primary | ICD-10-CM

## 2025-01-30 DIAGNOSIS — E66.813 CLASS 3 SEVERE OBESITY DUE TO EXCESS CALORIES WITH SERIOUS COMORBIDITY AND BODY MASS INDEX (BMI) OF 40.0 TO 44.9 IN ADULT (HCC): ICD-10-CM

## 2025-01-30 DIAGNOSIS — Z86.2 HISTORY OF FACTOR V LEIDEN MUTATION: ICD-10-CM

## 2025-01-30 DIAGNOSIS — E66.01 CLASS 3 SEVERE OBESITY DUE TO EXCESS CALORIES WITH SERIOUS COMORBIDITY AND BODY MASS INDEX (BMI) OF 40.0 TO 44.9 IN ADULT (HCC): ICD-10-CM

## 2025-01-30 DIAGNOSIS — E78.2 MIXED HYPERLIPIDEMIA: ICD-10-CM

## 2025-01-30 PROCEDURE — G2211 COMPLEX E/M VISIT ADD ON: HCPCS | Performed by: INTERNAL MEDICINE

## 2025-01-30 PROCEDURE — 99205 OFFICE O/P NEW HI 60 MIN: CPT | Performed by: INTERNAL MEDICINE

## 2025-01-30 NOTE — ASSESSMENT & PLAN NOTE
Will have DEXA scan.  Has been taking vitamin D and calcium  Orders:  •  DXA bone density spine hip and pelvis; Future

## 2025-01-30 NOTE — ASSESSMENT & PLAN NOTE
[facility]  [place] Discharge Summary                                                                             Chiki Wright Patient Status:  [place]    2019 MRN [id number]   [facility] 2SW-N Attending MD Marianne Trejo Patient had DCIS in the same breast in 2016.  Now she has invasive hormone receptor positive and HER2 negative cancer in the left breast.  She would not consider chemotherapy.  She will take hormonal therapy.    Orders:  •  Ambulatory Referral to Hematology / Oncology  •  CBC and differential; Future  •  Comprehensive metabolic panel; Future   Antibody Screen OB Negative  02/01/19 1842    Serology (RPR) OB       HGB 9.9 g/dL 02/03/19 0752    HCT 30.2 % 02/03/19 0752    Glucose 1 hour 43 mg/dL 12/10/18 1021    Glucose Bari 3 hr Gestational Fasting       1 Hour glucose       2 Hour glucose       3 HEENT:  AFOSF, no eye discharge, no nasal discharge, no nasal flaring, oral mucous membranes moist  Lungs:   Clear to auscultation bilaterally, equal air entry, no wheezing, no crackles  Chest:  Regular rate and rhythm, no murmur present  Abd:   Soft, nont Assessment:   Infant is a  Gestational Age: 44w7d  female born via Normal spontaneous vaginal delivery    Plan:    Discharge home with mother. Follow up with pediatrician in 1-2 days.   Mother to notify pediatrician if temp greater than 100.3, poor feeding

## 2025-01-30 NOTE — PATIENT INSTRUCTIONS
Informed consent for all 3 aromatase inhibitors and to start on Arimidex once daily.  Patient to stay on Xarelto while on Arimidex.  Ordered DEXA scan in a month.  Blood work prior to next visit in 2 months.  She will continue taking vitamin D3 and multivitamin with calcium.

## 2025-01-30 NOTE — PROGRESS NOTES
Name: Sujey Webber      : 1950      MRN: [id number]  Encounter Provider: Byron Garsia MD  Encounter Date: 2025   Encounter department: [facility].  Ambulatory consultation on 2025  Referring physician: Dr. Kristy Antoine  Reason for consultation: Left breast cancer  : Ambulatory consultation on  Assessment & Plan  Malignant neoplasm of overlapping sites of left breast in female, estrogen receptor positive (HCC)  Patient had DCIS in the same breast in 2016.  Now she has invasive hormone receptor positive and HER2 negative cancer in the left breast.  She would not consider chemotherapy.  She will take hormonal therapy.    Orders:  •  Ambulatory Referral to Hematology / Oncology  •  CBC and differential; Future  •  Comprehensive metabolic panel; Future    History of diabetes mellitus  Being managed by PCP       History of factor V Leiden mutation  She had DVT and SVT previously       History of DVT of lower extremity  Patient has been on Xarelto and will stay on it       History of superficial phlebitis  Patient has been on Xarelto and will stay on it       Acquired hypothyroidism  Being managed by PCP       Class 3 severe obesity due to excess calories with serious comorbidity and body mass index (BMI) of 40.0 to 44.9 in adult (HCC)  Being managed by PCP       Age-related osteoporosis without current pathological fracture  Will have DEXA scan.  Has been taking vitamin D and calcium  Orders:  •  DXA bone density spine hip and pelvis; Future    Mixed hyperlipidemia  Being managed by PCP     Discussed cancer characteristics.  Discussed patient characteristics.  Patient will take hormone therapy but not chemotherapy.  No reason to start specimen for Oncotype or MammaPrint for that reason.  Informed consent for all 3 aromatase inhibitors and to start on Arimidex once daily.  Patient received a detailed information on aromatase inhibitors, verbally and  in printed form.  Patient to stay on Xarelto while on Arimidex.  Ordered DEXA scan in a month.  Blood work prior to next visit in 2 months.  She will continue taking vitamin D3 and multivitamin with calcium.  I suggested self breast examination, eating healthy foods, staying active but to avoid falls and trauma.  Suggested health screening tests. Patient to continue to follow-up with primary physician and other consultants.  Provided counseling and support.  I used a dictation device to dictate this note and there could be mistakes in my note and for that patient may contact my office.      History of Present Illness   Chief Complaint   Patient presents with   • Consult   Consultation for invasive carcinoma in the left breast, the same breast where she had DCIS in 2006 for which she had lumpectomy and radiation and a short course of tamoxifen. Tamoxifen was stopped because of superficial phlebitis and workup showed one copy of factor V Leiden mutation. Patient had a short course of anticoagulation and was then switched over to one aspirin daily.  In 2020 she had DVT in the left tibial to femoral vein and she has been on Xarelto and she plans to stay on Xarelto indefinitely.  In September 2024 she had abnormal mammogram of the left breast and after that she had diagnostic mammography, ultrasound and MRI scan, ultrasound-guided biopsy and finally left breast mastectomy and sentinel lymph node sampling and 12/27/2024.  See report below.  T1cN0 M0 ER positive MD positive HER2 negative by FISH..  Patient already had radiation previously.  She had mastectomy this time.  She has decided against chemotherapy.  She has recovered from left mastectomy surgery.  Has some tiredness and arthritic symptoms    Pertinent Medical History   1/31/2021:   See details in HPI and assessment section  Review of Systems  Reviewed 12 systems: See symptoms in HPI  Presently no other neurological, cardiac, pulmonary, GI and  symptoms other  "than mentioned in HPI. .  Other symptoms are in HPI.  No  fever, chills, bleeding, bone pains, skin rash, weight loss, night sweats, weakness, numbness, claudication and gait problem. No frequent infections.  Not unusually sensitive to heat or cold. No swelling of the ankles. No swollen glands.  Patient is anxious.         Objective   /72 (BP Location: Right arm, Patient Position: Sitting, Cuff Size: Large)   Pulse 67   Temp 97.9 °F (36.6 °C) (Temporal)   Resp 18   Ht 5' 7\" (1.702 m)   Wt 120 kg (264 lb)   SpO2 100%   BMI 41.35 kg/m²     Physical Exam  Constitutional:       General: She is not in acute distress.     Appearance: Normal appearance. She is not ill-appearing.   HENT:      Head: Normocephalic and atraumatic.      Mouth/Throat:      Comments: No thrush.  Eyes:      General: No scleral icterus.  Cardiovascular:      Rate and Rhythm: Normal rate and regular rhythm.      Heart sounds: Normal heart sounds. No murmur heard.  Pulmonary:      Effort: Pulmonary effort is normal. No respiratory distress.      Breath sounds: Normal breath sounds. No rhonchi or rales.   Abdominal:      General: Abdomen is flat. There is no distension.      Palpations: Abdomen is soft. There is no mass.      Tenderness: There is no abdominal tenderness.      Comments: No ascites.    Musculoskeletal:      Cervical back: Normal range of motion.      Right lower leg: No edema.      Left lower leg: No edema.      Comments: No calf tenderness.  No lymphedema.     Lymphadenopathy:      Cervical: No cervical adenopathy.      Upper Body:      Right upper body: No supraclavicular or axillary adenopathy.      Left upper body: No supraclavicular or axillary adenopathy.   Skin:     Coloration: Skin is not jaundiced or pale.      Findings: No bruising or rash.      Nails: There is no clubbing.      Comments: Left mastectomy scar.  Right breast is unremarkable.   Neurological:      General: No focal deficit present.      Mental " "Status: She is alert and oriented to person, place, and time.      Motor: No weakness.      Coordination: Coordination normal.      Gait: Gait normal.   Psychiatric:         Behavior: Behavior normal.         Thought Content: Thought content normal.      Comments: Anxious       My JOSE L Nobles was in the room with me during breast examination.    Labs: I have reviewed the following labs:  Results for orders placed or performed during the hospital encounter of 12/27/24   Tissue Exam   Result Value Ref Range    Case Report       Surgical Pathology Report                         Case: [id number]                                  Authorizing Provider:  Kristy Antoine MD           Collected:           12/27/2024 1243              Ordering Location:     [facility]        Received:            12/27/2024 1536                                     [facility]                                                     Pathologist:           Dylan Rossi MD                                                         Specimens:   A) - Lymph Node, [place], [place] node #1                                                         B) - Lymph Node, [place], [place] node #2                                                         C) - Lymph Node, [place], [place] node #3                                                         D) - Breast, Left, Left breast mastectomy- suture-long lateral, short superior             Addendum               Final Diagnosis       A. Lymph node (1), \"sentinel node #1\"; biopsy:       - One lymph node, negative for malignancy (0/1), see note    B. Lymph node (1), \"sentinel node #2\"; biopsy:       - One lymph node, negative for malignancy (0/1), see note    C. Lymph nodes (3), \"sentinel node #3\"; biopsy:       - Three lymph nodes, negative for malignancy (0/3), see note    D. Breast, \"left\"; mastectomy:       - Invasive mammary carcinoma of no special type (ductal NST/invasive ductal carcinoma), " see note        - [place] grade 2 of 3 (total score: 6 of 9)            - Tubule formation: 10% to 75%, score 2            - Nuclear grade 2 of 3, score 2            - Mitoses 8/mm2, score 2        - Invasive carcinoma, max. dimension = 12 millimeters      - Ductal carcinoma in situ (DCIS): Present; positive for extensive intraductal component (EIC)         - Cribriform and micropapillary architectural patterns, nuclear grade: intermediate to high, without necrosis      - Lymphovascular invasion: Not identified      - Microcalcifications: Present in non-neoplastic tissue, DCIS, and invasive carcinoma       - All margins negative for invasive carcinoma and DCIS       - Previous biopsy site changes       - Unremarkable nipple      - Benign skin       - Background breast parenchyma with usual ductal hyperplasia, adenosis, intraductal papilloma, and fibroadenoma      - See synoptic report        Note       If clinically indicated, the most appropriate tissue block(s) for ancillary testing are block(s): D8 > D9    On blocks A3, B2, and C1, immunohistochemical stains for AE1/AE3 are all negative supporting the diagnosis. Additional immunohistochemical stain for CAM5.2 on block C9 is also negative supporting the diagnosis.     On part D, immunohistochemical stains performed on blocks D8, D9, D10 for SMMHC and p63 are negative in the areas of invasive carcinoma supporting the diagnosis. Immunohistochemical stains on block D6 for Calponin is positive supporting the diagnosis of DCIS. Additional immunohistochemical stains for E-cadherin and p120 on block D23 are positive supporting the diagnosis of DCIS and immunohistochemical stains for CK5/6 on blocks D20 and D21 are used to support the diagnosis.     Prognostic markers for estrogen, progesterone & HER2 receptor studies are pending and will be reported in an addendum.    Intradepartmental consultation is in agreement (RP).    Dr. Antoine is notified of the diagnosis in  EPIC via SecureChat on 1/2/25.      Additional Information       Interpretation performed at [facility], [address] [address]    All reported additional testing was performed with appropriately reactive controls.  These tests were developed and their performance characteristics determined by [facility] or appropriate performing facility, though some tests may be performed on tissues which have not been validated for performance characteristics (such as staining performed on alcohol exposed cell blocks and decalcified tissues).  Results should be interpreted with caution and in the context of the patients’ clinical condition. These tests may not be cleared or approved by the U.S. Food and Drug Administration, though the FDA has determined that such clearance or approval is not necessary. These tests are used for clinical purposes and they should not be regarded as investigational or for research. This laboratory has been approved by CLIA 88, designated as a high-complexity laboratory and is qualified to perform these tests.  .      Synoptic Checklist       INVASIVE CARCINOMA OF THE BREAST: Resection   INVASIVE CARCINOMA OF THE BREAST: RESECTION - All Specimens   8th Edition - Protocol posted: 6/19/2024      SPECIMEN      Procedure:    Total mastectomy       Specimen Laterality:    Left       TUMOR      Tumor Site:    Clock position       :    2 o'clock     Tumor Site:    Distance from nipple (Centimeters): 3 cm    Histologic Type:    Invasive carcinoma of no special type (ductal)     Histologic Grade (Chelo Histologic Score):          Glandular (Acinar) / Tubular Differentiation:    Score 2       Nuclear Pleomorphism:    Score 2       Mitotic Rate:    Score 2       Overall Grade:    Grade 2 (scores of 6 or 7)     Tumor Size:    Greatest dimension of largest invasive focus (Millimeters): 12 mm    Tumor Focality:    Single focus of invasive carcinoma     Ductal Carcinoma In  Situ (DCIS):    Present       :    Positive for extensive intraductal component (EIC)       Size (Extent) of DCIS:    Estimated size (extent) of DCIS is at least (Millimeters): 20 mm      Architectural Patterns:    Cribriform       Architectural Patterns:    Micropapillary       Nuclear Grade:    Grade III (high)       Necrosis:    Not identified       Number of Blocks with DCIS:    9       Number of Blocks Examined:    24     Lobular Carcinoma In Situ (LCIS):    Not identified     Lymphatic and / or Vascular Invasion:    Not identified     Dermal Lymphatic and / or Vascular Invasion:    Not identified     Microcalcifications:    Present in DCIS     Microcalcifications:    Present in invasive carcinoma     Microcalcifications:    Present in non-neoplastic tissue     Treatment Effect in the Breast:    No known presurgical therapy       MARGINS    Margin Status for Invasive Carcinoma:    All margins negative for invasive carcinoma       Distance from Invasive Carcinoma to Closest Margin:    25 mm    Margin Status for DCIS:    All margins negative for DCIS       Distance from DCIS to Closest Margin:    7 mm      Closest Margin(s) to DCIS:    Posterior       REGIONAL LYMPH NODES    Regional Lymph Node Status:          :    All regional lymph nodes negative for tumor       Total Number of Lymph Nodes Examined (sentinel and non-sentinel):    5       Number of [place] Nodes Examined:    5       pTNM CLASSIFICATION (AJCC 8th Edition)      Reporting of pT, pN, and (when applicable) pM categories is based on information available to the pathologist at the time the report is issued. As per the AJCC (Chapter 1, 8th Ed.) it is the managing physician's responsibility to establish the final pathologic stage based upon all pertinent information, including but potentially not limited to this pathology report.    pT Category:    pT1c     pN Category:    pN0     N Suffix:    (sn)        Comment(s):    ER/AK/HER2 pending    Breast  "Biomarker Reporting Template   (Added in Addendum) BREAST BIOMARKER REPORTING TEMPLATE - D   Protocol posted: 12/13/2023         Test(s) Performed:            Estrogen Receptor (ER) Status:    Positive (greater than 10% of cells demonstrate nuclear positivity)           Percentage of Cells with Nuclear Positivity:    80 %          Average Intensity of Staining:    Strong         Test Type:    Food and Drug Administration (FDA) cleared (test / vendor): CONFIRM anti-Estrogen Receptor (ER)/[employer]         Primary Antibody:    SP1       Test(s) Performed:            Progesterone Receptor (PgR) Status:    Positive           Percentage of Cells with Nuclear Positivity:    30 %          Average Intensity of Staining:    Strong         Test Type:    Food and Drug Administration (FDA) cleared (test / vendor): CONFIRM anti-Progesterone Receptor (MD)/[place] [employer]         Primary Antibody:    1E2       Test(s) Performed:            HER2 by Immunohistochemistry:    Equivocal (Score 2+)         Test Type:    Food and Drug Administration (FDA) cleared (test / vendor): PATHWAY anti-HER-2/dinora (4B5)/[employer]         Primary Antibody:    4B5       Cold Ischemia and Fixation Times:    Meet requirements specified in latest version of the ASCO / CAP Guidelines       Testing Performed on Block Number(s):    D8       Gross Description       A. The specimen is received in formalin, labeled with the patient's name and hospital number, and is designated \" sentinel node #1.\"  Specimen consists of a single nodule of yellow soft fibroadipose tissue measuring 2.9 x 2.8 x 1.7 cm.  The excess fat is removed.  There is a remaining tan rubbery nodule measuring 2.4 cm.  Cut surfaces of the nodule reveal tan tissue with fatty yellow infiltrate.  The nodule is serially sectioned and submitted in 4 cassettes.  Note: The estimated total formalin fixation time based upon information provided by the submitting clinician and the standard " "processing schedule is approximately 53.25 hours.  The cold ischemia time based upon information provided by the submitting clinician and receiving staff in the laboratory is within 1 minute.  44 minutes.  B. The specimen is received in formalin, labeled with the patient's name and hospital number, and is designated \" sentinel node #2.\"  Specimen consists of a single yellow fatty tissue nodule that is 1.2 x 1.1 x 0.6 cm.  Sections revealed pink-tan rubbery nodule measuring up to 0.4 cm in greatest dimension.  The nodules totally submitted in 3 cassettes.  Note: The estimated total formalin fixation time based upon information provided by the submitting clinician and the standard processing schedule is approximately 52.5 hours.  The cold ischemia time based upon information provided by the submitting clinician and receiving staff in the laboratory is within 1 minute.  C. The specimen is received in formalin, labeled with the patient's name and hospital number, and is designated \" sentinel node #3.\"  Specimen consists of a single nodule of soft yellow fibroadipose tissue that is 2.4 x 2.0 x 1.2 cm.  The specimen is section revealing 3 pink-tan rubbery tissue nodules with areas of the yellow fat.  These nodules range from 0.5 to 1.7 cm.  The nodules are totally submitted in 9 cassettes.  1: Smallest bisected nodule between sponges  2-5: Second nodule  6-9: Third nodule  Note: The estimated total formalin fixation time based upon information provided by the submitting clinician and the standard processing schedule is approximately 52.5 hours.  The cold ischemia time based upon information provided by the submitting clinician and receiving staff in the laboratory is within 1 minute.  D. The specimen is received in formalin, labeled with the patient's name and hospital number, and is designated \" left breast mastectomy-suture long lateral, short superior.\" The specimen consists of a left side mastectomy specimen with a " short suture at superior, and a long suture at lateral.  The specimen measures 21.6 cm superior to inferior, 28.4 cm medial to lateral, and has a thickness from anterior to posterior of 1.6 to 7.0 cm.  The anterior surface has a tan ellipse of skin that is 22 x 14 cm.  The normal-appearing nipple with areola is 4.7 x 4.2 cm.  The specimen is inked per protocol; upper outer quadrant-yellow, upper inner quadrant-blue, lower outer quadrant-green, lower inner quadrant-orange, and entire deep margin black.  The specimen is serially section and a medial to lateral progression.  Faxitron images are taken for placement of the HydroMARK clip.  At approximately the 2 o'clock position, 3 cm from the nipple there is a hemorrhagic area with firmer red-tan tissue from previous possible biopsy measuring 1.8 x 1.3 x 1.0 cm.  This nodule is 2.5 cm from the closest posterior margin, 2.3 cm from the closest anterior skin, and 4.4 cm from the closest skin margin.  There is a cavity filled with gelatin and the HydroMARK clip immediately superior to this hemorrhagic nodule.  This cavity is 0.5 x 0.3 x 0.3 cm, (D8-D9).  There is a firm calcified nodule at approximately the 12 o'clock position measuring 1.0 x 1.0 x 0.8 cm.  This calcified areas 1.7 cm from the closest deep inked margin.  The remainder the specimen has firmer dense tan fibrous tissue, but no other well-defined lesions.  Representative sections are submitted in 24 cassettes.  1: Nipple with areola  2-11: Entire hemorrhagic nodule (biopsy cavity 8 and 9)  12: Closest posterior margin  13: Closest skin margin  14-16: Calcified area 12:00 and surrounding tissue  17-18: Sections upper inner quadrant  19-20: Sections of the upper outer quadrant  21-22: Sections lower inner quadrant  23-24: Sections lower outer quadrant  Note: The estimated total formalin fixation time based upon information provided by the submitting clinician and the standard processing schedule is approximately  53.25 hours.  The cold ischemia time based upon information provided by the submitting clinician and receiving staff in the laboratory is 52 minutes.  TStevens     Fingerstick Glucose (POCT)   Result Value Ref Range    POC Glucose 138 65 - 140 mg/dl   Fingerstick Glucose (POCT)   Result Value Ref Range    POC Glucose 197 (H) 65 - 140 mg/dl       Order: [id number]   Status: Final result       Next appt: 02/03/2025 at 12:30 PM in Physical Therapy (Rimma Munguia PT)       Dx: Pre-op testing; Ductal carcinoma in s...    Test Result Released: Yes (seen)    0 Result Notes            Component  Ref Range & Units (hover) 12/12/24  8:46 AM 10/3/24  8:40 AM 9/11/24  4:32 AM 9/10/24  1:39 PM 8/26/24  8:07 AM 10/31/23  9:16 AM 6/30/23  8:27 AM   WBC 6.17 6.66 6.76 7.98 6.18 6.49 5.04   RBC 4.02 3.90 3.64 Low  4.03 4.04 3.92 3.74 Low    Hemoglobin 12.7 11.8 11.2 Low  12.6 12.3 12.0 11.7   Hematocrit 38.5 38.2 34.6 Low  39.3 39.0 36.7 35.6   MCV 96 98 95 98 97 94 95   MCH 31.6 30.3 30.8 31.3 30.4 30.6 31.3   MCHC 33.0 30.9 Low  32.4 32.1 31.5 32.7 32.9   RDW 14.6 14.8 15.0 14.8 14.6 15.7 High  14.6   MPV 10.7 10.2 9.8 10.3 10.3 10.4 10.5   Platelets 307 305 247 281 307 295 347   nRBC 0 0  0 0 0 0   Segmented % 75 77 High   83 High  72 68 72   Immature Grans % 0 1  0 0 1 0   Lymphocytes % 16 13 Low   8 Low  18 18 18   Monocytes % 7 7  8 7 10 7   Eosinophils Relative 1 1  1 2 2 2   Basophils Relative 1 1  0 1 1 1   Absolute Neutrophils 4.63 5.19  6.63 4.46 4.54 3.61   Absolute Immature Grans 0.01 0.03  0.02 0.02 0.03 0.02   Absolute Lymphocytes 0.97 0.88  0.60 1.11 1.15 0.92   Absolute Monocytes 0.43 0.45  0.65 0.44 0.62 0.34   Eosinophils Absolute 0.08 0.07  0.05 0.09 0.11 0.10   Basophils Absolute 0.05 0.04  0.03 0.06 0.04 0.05                        Component  Ref Range & Units (jackie) 12/12/24  8:46 AM 10/3/24  8:40 AM 9/11/24  4:32 AM 9/10/24  1:39 PM 8/26/24  8:07 AM 2/26/24  8:54 AM 10/31/23  9:16 AM 10/31/23  9:16 AM    Sodium 137 139 137 139 138 145  137   Potassium 4.2 4.4 4.3 5.2 5.0 4.9  3.8   Chloride 104 103 106 104 103 107  103   CO2 24 24 25 29 29 27  24   ANION GAP 9 12 6 6 6 11 R  10 R   BUN 10 14 16 20 16 14  17   Creatinine 0.89 0.86 CM 0.99 CM 1.10 CM 1.03 CM 0.91 CM  0.87 CM   Comment: Standardized to IDMS reference method   Glucose, Fasting 143 High  145 High    146 High  146 High   145 High    Calcium 9.3 9.1 8.9 9.5 9.8 9.4  8.7   AST 16 17 9 Low  11 Low  11 Low  13 9 Low     ALT 14 14 CM 10 CM 11 CM 11 CM 12 CM 11 CM    Comment: Specimen collection should occur prior to Sulfasalazine administration due to the potential for falsely depressed results.   Alkaline Phosphatase 79 76 72 87 85 84 79    Total Protein 6.8 6.8 6.1 Low  7.2 7.0 6.6 7.1    Albumin 3.8 3.8 3.6 4.1 3.9 3.8 3.8    Total Bilirubin 0.99 0.86 CM 0.78 CM 0.76 CM 0.85 CM 1.22 High  CM 1.19 High  CM    Comment: Use of this assay is not recommended for patients undergoing treatment with eltrombopag due to the potential for falsely elevated results.  N-acetyl-p-benzoquinone imine (metabolite of Acetaminophen) will generate erroneously low results in samples for patients that have taken an overdose of Acetaminophen.   eGFR 64 67 57 50 54 63  67                         MPRESSION: 1. MRI-guided biopsy recommended for focal NME 12 o'clock left breast, 3 cm posterior to biopsy confirmed DCIS to exclude multicentric disease.  2. Biopsy confirmed DCIS 2 o'clock left breast demonstrates focal NME measuring 17 mm x 9 mm x 25 mm with no extension to skin or nipple.     ASSESSMENT/BI-RADS CATEGORY:  Left: 4 - Suspicious  Right: 1 - Negative  Overall: 4 - Suspicious     RECOMMENDATION:       - Surgical consultation for the left breast.       - MRI-guided breast biopsy for the left breast.       - Routine screening mammogram in 1 year for the right breast.     Workstation ID: [id number]       Signed by:  Toney Burger MD                 Imaging    MRI  breast bilateral w and wo contrast w cad (Order: [id number]) - 11/26/2024

## 2025-01-30 NOTE — TELEPHONE ENCOUNTER
Patient called, stating they discussed her going on the Arimidex medication. She wants to make sure it is sent to the correct pharmacy.    It should be sent over to:  [facility] [id number] [facility] [address]

## 2025-01-30 NOTE — ASSESSMENT & PLAN NOTE
Being managed by PCP     Discussed cancer characteristics.  Discussed patient characteristics.  Patient will take hormone therapy but not chemotherapy.  No reason to start specimen for Oncotype or MammaPrint for that reason.

## 2025-01-31 RX ORDER — ANASTROZOLE 1 MG/1
1 TABLET ORAL DAILY
Qty: 90 TABLET | Refills: 1 | Status: SHIPPED | OUTPATIENT
Start: 2025-01-31

## 2025-02-03 ENCOUNTER — OFFICE VISIT (OUTPATIENT)
Dept: PHYSICAL THERAPY | Facility: REHABILITATION | Age: 75
End: 2025-02-03
Payer: MEDICARE

## 2025-02-03 DIAGNOSIS — C50.812 MALIGNANT NEOPLASM OF OVERLAPPING SITES OF LEFT BREAST IN FEMALE, ESTROGEN RECEPTOR POSITIVE (HCC): Primary | ICD-10-CM

## 2025-02-03 DIAGNOSIS — Z17.0 MALIGNANT NEOPLASM OF OVERLAPPING SITES OF LEFT BREAST IN FEMALE, ESTROGEN RECEPTOR POSITIVE (HCC): Primary | ICD-10-CM

## 2025-02-03 PROCEDURE — 97110 THERAPEUTIC EXERCISES: CPT | Performed by: PHYSICAL THERAPIST

## 2025-02-03 NOTE — PROGRESS NOTES
Daily Note     Today's date: 2/3/2025  Patient name: Sujey Webber  : 1950  MRN: [id number]  Referring provider: Kristy Antoine MD  Dx:   Encounter Diagnosis     ICD-10-CM    1. Malignant neoplasm of overlapping sites of left breast in female, estrogen receptor positive (HCC)  C50.812     Z17.0           Subjective: No new complaints.    Objective: See treatment diary below    Assessment: Initiated Strength After Breast Cancer today with good tolerance. Will return in 2 days for follow up, then implement for HEP.    Plan: Continue per plan of care.      Precautions: h/o L breast CA    POC expires Auth Status? (BOMN, approved, pending) Unit limit (Daily) Auth Start date Expiration date PT/OT + Visit Limit?   25 BOMN         Date of Service  2/3       Visits used 1 2       Visits remaining 99 99       Patient Education         [facility] Education provided        Lymphedema risk education Low risk- discussed                 Manual Ther         Scar mob         MLD                                    Ther Ex         Aerobic exe  NuStep 10 min       Balance exe  March in place x20 ** tandem stance      Strengthening-UE  Bicep curl blue TB x20  Scaption x10  Seated ER at side x20  Row x20  Triceps ext x20         Strengthening- LE  Heel raise x20  Sit to stand x10  Standing hip abd x20       Flexibility- UE   **      Flexibility- LE   **               Ther Activity & Self Care

## 2025-02-05 ENCOUNTER — APPOINTMENT (EMERGENCY)
Dept: RADIOLOGY | Facility: HOSPITAL | Age: 75
DRG: 871 | End: 2025-02-05
Payer: MEDICARE

## 2025-02-05 ENCOUNTER — HOSPITAL ENCOUNTER (INPATIENT)
Facility: HOSPITAL | Age: 75
LOS: 1 days | Discharge: HOME | DRG: 871 | End: 2025-02-06
Attending: EMERGENCY MEDICINE | Admitting: INTERNAL MEDICINE
Payer: MEDICARE

## 2025-02-05 ENCOUNTER — APPOINTMENT (OUTPATIENT)
Dept: PHYSICAL THERAPY | Facility: REHABILITATION | Age: 75
End: 2025-02-05
Payer: MEDICARE

## 2025-02-05 DIAGNOSIS — R50.9 FEVER: ICD-10-CM

## 2025-02-05 DIAGNOSIS — R47.9 DIFFICULTY WITH SPEECH: Primary | ICD-10-CM

## 2025-02-05 DIAGNOSIS — U07.1 COVID-19: ICD-10-CM

## 2025-02-05 DIAGNOSIS — R05.9 COUGH: ICD-10-CM

## 2025-02-05 PROBLEM — G93.40 ACUTE ENCEPHALOPATHY: Status: ACTIVE | Noted: 2025-02-05

## 2025-02-05 LAB
2HR DELTA HS TROPONIN: 2 NG/L
ALBUMIN SERPL BCG-MCNC: 3.5 G/DL (ref 3.5–5)
ALP SERPL-CCNC: 81 U/L (ref 34–104)
ALT SERPL W P-5'-P-CCNC: 13 U/L (ref 7–52)
ANION GAP SERPL CALCULATED.3IONS-SCNC: 8 MMOL/L (ref 4–13)
APTT PPP: 33 SECONDS (ref 23–34)
AST SERPL W P-5'-P-CCNC: 17 U/L (ref 13–39)
BACTERIA UR QL AUTO: ABNORMAL /HPF
BASOPHILS # BLD AUTO: 0.03 THOUSANDS/ΜL (ref 0–0.1)
BASOPHILS NFR BLD AUTO: 0 % (ref 0–1)
BILIRUB SERPL-MCNC: 0.75 MG/DL (ref 0.2–1)
BILIRUB UR QL STRIP: NEGATIVE
BUN SERPL-MCNC: 12 MG/DL (ref 5–25)
CALCIUM SERPL-MCNC: 8.7 MG/DL (ref 8.4–10.2)
CARDIAC TROPONIN I PNL SERPL HS: 11 NG/L (ref ?–50)
CARDIAC TROPONIN I PNL SERPL HS: 9 NG/L (ref ?–50)
CHLORIDE SERPL-SCNC: 104 MMOL/L (ref 96–108)
CLARITY UR: CLEAR
CO2 SERPL-SCNC: 24 MMOL/L (ref 21–32)
COLOR UR: ABNORMAL
CREAT SERPL-MCNC: 0.94 MG/DL (ref 0.6–1.3)
EOSINOPHIL # BLD AUTO: 0.01 THOUSAND/ΜL (ref 0–0.61)
EOSINOPHIL NFR BLD AUTO: 0 % (ref 0–6)
ERYTHROCYTE [DISTWIDTH] IN BLOOD BY AUTOMATED COUNT: 15 % (ref 11.6–15.1)
FLUAV RNA RESP QL NAA+PROBE: NEGATIVE
FLUBV RNA RESP QL NAA+PROBE: NEGATIVE
GFR SERPL CREATININE-BSD FRML MDRD: 60 ML/MIN/1.73SQ M
GLUCOSE SERPL-MCNC: 157 MG/DL (ref 65–140)
GLUCOSE SERPL-MCNC: 167 MG/DL (ref 65–140)
GLUCOSE UR STRIP-MCNC: NEGATIVE MG/DL
HCT VFR BLD AUTO: 34.6 % (ref 34.8–46.1)
HGB BLD-MCNC: 11 G/DL (ref 11.5–15.4)
HGB UR QL STRIP.AUTO: ABNORMAL
IMM GRANULOCYTES # BLD AUTO: 0.03 THOUSAND/UL (ref 0–0.2)
IMM GRANULOCYTES NFR BLD AUTO: 0 % (ref 0–2)
INR PPP: 1.76 (ref 0.85–1.19)
KETONES UR STRIP-MCNC: NEGATIVE MG/DL
LACTATE SERPL-SCNC: 1.6 MMOL/L (ref 0.5–2)
LEUKOCYTE ESTERASE UR QL STRIP: ABNORMAL
LYMPHOCYTES # BLD AUTO: 0.29 THOUSANDS/ΜL (ref 0.6–4.47)
LYMPHOCYTES NFR BLD AUTO: 4 % (ref 14–44)
MCH RBC QN AUTO: 29.5 PG (ref 26.8–34.3)
MCHC RBC AUTO-ENTMCNC: 31.8 G/DL (ref 31.4–37.4)
MCV RBC AUTO: 93 FL (ref 82–98)
MONOCYTES # BLD AUTO: 0.85 THOUSAND/ΜL (ref 0.17–1.22)
MONOCYTES NFR BLD AUTO: 10 % (ref 4–12)
NEUTROPHILS # BLD AUTO: 6.93 THOUSANDS/ΜL (ref 1.85–7.62)
NEUTS SEG NFR BLD AUTO: 86 % (ref 43–75)
NITRITE UR QL STRIP: NEGATIVE
NON-SQ EPI CELLS URNS QL MICRO: ABNORMAL /HPF
NRBC BLD AUTO-RTO: 0 /100 WBCS
PH UR STRIP.AUTO: 6 [PH]
PLATELET # BLD AUTO: 241 THOUSANDS/UL (ref 149–390)
PMV BLD AUTO: 10.3 FL (ref 8.9–12.7)
POTASSIUM SERPL-SCNC: 3.6 MMOL/L (ref 3.5–5.3)
PROCALCITONIN SERPL-MCNC: 0.11 NG/ML
PROT SERPL-MCNC: 6.5 G/DL (ref 6.4–8.4)
PROT UR STRIP-MCNC: ABNORMAL MG/DL
PROTHROMBIN TIME: 20.6 SECONDS (ref 12.3–15)
RBC # BLD AUTO: 3.73 MILLION/UL (ref 3.81–5.12)
RBC #/AREA URNS AUTO: ABNORMAL /HPF
RSV RNA RESP QL NAA+PROBE: NEGATIVE
SARS-COV-2 RNA RESP QL NAA+PROBE: POSITIVE
SODIUM SERPL-SCNC: 136 MMOL/L (ref 135–147)
SP GR UR STRIP.AUTO: >=1.05 (ref 1–1.03)
UROBILINOGEN UR STRIP-ACNC: <2 MG/DL
WBC # BLD AUTO: 8.14 THOUSAND/UL (ref 4.31–10.16)
WBC #/AREA URNS AUTO: ABNORMAL /HPF

## 2025-02-05 PROCEDURE — 85025 COMPLETE CBC W/AUTO DIFF WBC: CPT

## 2025-02-05 PROCEDURE — 36415 COLL VENOUS BLD VENIPUNCTURE: CPT

## 2025-02-05 PROCEDURE — 85730 THROMBOPLASTIN TIME PARTIAL: CPT

## 2025-02-05 PROCEDURE — 71045 X-RAY EXAM CHEST 1 VIEW: CPT

## 2025-02-05 PROCEDURE — 70498 CT ANGIOGRAPHY NECK: CPT

## 2025-02-05 PROCEDURE — 84484 ASSAY OF TROPONIN QUANT: CPT

## 2025-02-05 PROCEDURE — 99285 EMERGENCY DEPT VISIT HI MDM: CPT

## 2025-02-05 PROCEDURE — 70496 CT ANGIOGRAPHY HEAD: CPT

## 2025-02-05 PROCEDURE — 93005 ELECTROCARDIOGRAM TRACING: CPT

## 2025-02-05 PROCEDURE — 99223 1ST HOSP IP/OBS HIGH 75: CPT | Performed by: INTERNAL MEDICINE

## 2025-02-05 PROCEDURE — 80053 COMPREHEN METABOLIC PANEL: CPT

## 2025-02-05 PROCEDURE — 82948 REAGENT STRIP/BLOOD GLUCOSE: CPT

## 2025-02-05 PROCEDURE — 81001 URINALYSIS AUTO W/SCOPE: CPT

## 2025-02-05 PROCEDURE — 0241U HB NFCT DS VIR RESP RNA 4 TRGT: CPT

## 2025-02-05 PROCEDURE — 85610 PROTHROMBIN TIME: CPT

## 2025-02-05 PROCEDURE — 84145 PROCALCITONIN (PCT): CPT

## 2025-02-05 PROCEDURE — 99285 EMERGENCY DEPT VISIT HI MDM: CPT | Performed by: EMERGENCY MEDICINE

## 2025-02-05 PROCEDURE — 83605 ASSAY OF LACTIC ACID: CPT

## 2025-02-05 PROCEDURE — 96374 THER/PROPH/DIAG INJ IV PUSH: CPT

## 2025-02-05 PROCEDURE — 87040 BLOOD CULTURE FOR BACTERIA: CPT

## 2025-02-05 RX ORDER — ACETAMINOPHEN 325 MG/1
975 TABLET ORAL ONCE
Status: COMPLETED | OUTPATIENT
Start: 2025-02-05 | End: 2025-02-05

## 2025-02-05 RX ORDER — SODIUM CHLORIDE, SODIUM GLUCONATE, SODIUM ACETATE, POTASSIUM CHLORIDE, MAGNESIUM CHLORIDE, SODIUM PHOSPHATE, DIBASIC, AND POTASSIUM PHOSPHATE .53; .5; .37; .037; .03; .012; .00082 G/100ML; G/100ML; G/100ML; G/100ML; G/100ML; G/100ML; G/100ML
1000 INJECTION, SOLUTION INTRAVENOUS ONCE
Status: COMPLETED | OUTPATIENT
Start: 2025-02-05 | End: 2025-02-06

## 2025-02-05 RX ADMIN — IOHEXOL 75 ML: 350 INJECTION, SOLUTION INTRAVENOUS at 21:04

## 2025-02-05 RX ADMIN — ACETAMINOPHEN 975 MG: 325 TABLET, FILM COATED ORAL at 20:06

## 2025-02-05 RX ADMIN — SODIUM CHLORIDE, SODIUM GLUCONATE, SODIUM ACETATE, POTASSIUM CHLORIDE, MAGNESIUM CHLORIDE, SODIUM PHOSPHATE, DIBASIC, AND POTASSIUM PHOSPHATE 1000 ML: .53; .5; .37; .037; .03; .012; .00082 INJECTION, SOLUTION INTRAVENOUS at 21:54

## 2025-02-06 VITALS
SYSTOLIC BLOOD PRESSURE: 129 MMHG | DIASTOLIC BLOOD PRESSURE: 54 MMHG | RESPIRATION RATE: 20 BRPM | TEMPERATURE: 99.1 F | HEART RATE: 70 BPM | WEIGHT: 293 LBS | BODY MASS INDEX: 45.99 KG/M2 | HEIGHT: 67 IN | OXYGEN SATURATION: 97 %

## 2025-02-06 PROBLEM — E66.01 MORBID OBESITY (HCC): Status: ACTIVE | Noted: 2025-02-06

## 2025-02-06 PROBLEM — A41.9 SEPSIS (HCC): Status: RESOLVED | Noted: 2025-02-06 | Resolved: 2025-02-06

## 2025-02-06 PROBLEM — G93.41 ACUTE METABOLIC ENCEPHALOPATHY: Status: ACTIVE | Noted: 2025-02-05

## 2025-02-06 PROBLEM — A41.9 SEPSIS (HCC): Status: ACTIVE | Noted: 2025-02-06

## 2025-02-06 LAB
ANION GAP SERPL CALCULATED.3IONS-SCNC: 8 MMOL/L (ref 4–13)
ATRIAL RATE: 98 BPM
BUN SERPL-MCNC: 12 MG/DL (ref 5–25)
CALCIUM SERPL-MCNC: 8.4 MG/DL (ref 8.4–10.2)
CHLORIDE SERPL-SCNC: 105 MMOL/L (ref 96–108)
CO2 SERPL-SCNC: 25 MMOL/L (ref 21–32)
CREAT SERPL-MCNC: 0.95 MG/DL (ref 0.6–1.3)
ERYTHROCYTE [DISTWIDTH] IN BLOOD BY AUTOMATED COUNT: 15.1 % (ref 11.6–15.1)
GFR SERPL CREATININE-BSD FRML MDRD: 59 ML/MIN/1.73SQ M
GLUCOSE SERPL-MCNC: 135 MG/DL (ref 65–140)
GLUCOSE SERPL-MCNC: 165 MG/DL (ref 65–140)
GLUCOSE SERPL-MCNC: 176 MG/DL (ref 65–140)
HCT VFR BLD AUTO: 32.3 % (ref 34.8–46.1)
HGB BLD-MCNC: 10.6 G/DL (ref 11.5–15.4)
MCH RBC QN AUTO: 30.5 PG (ref 26.8–34.3)
MCHC RBC AUTO-ENTMCNC: 32.8 G/DL (ref 31.4–37.4)
MCV RBC AUTO: 93 FL (ref 82–98)
P AXIS: 62 DEGREES
PLATELET # BLD AUTO: 209 THOUSANDS/UL (ref 149–390)
PMV BLD AUTO: 10.3 FL (ref 8.9–12.7)
POTASSIUM SERPL-SCNC: 3.6 MMOL/L (ref 3.5–5.3)
PR INTERVAL: 200 MS
QRS AXIS: 17 DEGREES
QRSD INTERVAL: 78 MS
QT INTERVAL: 330 MS
QTC INTERVAL: 421 MS
RBC # BLD AUTO: 3.47 MILLION/UL (ref 3.81–5.12)
SODIUM SERPL-SCNC: 138 MMOL/L (ref 135–147)
T WAVE AXIS: 8 DEGREES
VENTRICULAR RATE: 98 BPM
WBC # BLD AUTO: 4.31 THOUSAND/UL (ref 4.31–10.16)

## 2025-02-06 PROCEDURE — 99239 HOSP IP/OBS DSCHRG MGMT >30: CPT | Performed by: FAMILY MEDICINE

## 2025-02-06 PROCEDURE — 93010 ELECTROCARDIOGRAM REPORT: CPT | Performed by: INTERNAL MEDICINE

## 2025-02-06 PROCEDURE — 85027 COMPLETE CBC AUTOMATED: CPT | Performed by: INTERNAL MEDICINE

## 2025-02-06 PROCEDURE — 80048 BASIC METABOLIC PNL TOTAL CA: CPT | Performed by: INTERNAL MEDICINE

## 2025-02-06 PROCEDURE — 82948 REAGENT STRIP/BLOOD GLUCOSE: CPT

## 2025-02-06 RX ORDER — INSULIN LISPRO 100 [IU]/ML
2-12 INJECTION, SOLUTION INTRAVENOUS; SUBCUTANEOUS
Status: DISCONTINUED | OUTPATIENT
Start: 2025-02-06 | End: 2025-02-06 | Stop reason: HOSPADM

## 2025-02-06 RX ORDER — ECHINACEA PURPUREA EXTRACT 125 MG
1 TABLET ORAL
Status: DISCONTINUED | OUTPATIENT
Start: 2025-02-06 | End: 2025-02-06 | Stop reason: HOSPADM

## 2025-02-06 RX ORDER — SODIUM CHLORIDE, SODIUM GLUCONATE, SODIUM ACETATE, POTASSIUM CHLORIDE, MAGNESIUM CHLORIDE, SODIUM PHOSPHATE, DIBASIC, AND POTASSIUM PHOSPHATE .53; .5; .37; .037; .03; .012; .00082 G/100ML; G/100ML; G/100ML; G/100ML; G/100ML; G/100ML; G/100ML
100 INJECTION, SOLUTION INTRAVENOUS CONTINUOUS
Status: DISCONTINUED | OUTPATIENT
Start: 2025-02-06 | End: 2025-02-06 | Stop reason: HOSPADM

## 2025-02-06 RX ORDER — ONDANSETRON 2 MG/ML
4 INJECTION INTRAMUSCULAR; INTRAVENOUS EVERY 6 HOURS PRN
Status: DISCONTINUED | OUTPATIENT
Start: 2025-02-06 | End: 2025-02-06 | Stop reason: HOSPADM

## 2025-02-06 RX ORDER — FOLIC ACID 1 MG/1
1000 TABLET ORAL DAILY
Status: DISCONTINUED | OUTPATIENT
Start: 2025-02-06 | End: 2025-02-06 | Stop reason: HOSPADM

## 2025-02-06 RX ORDER — PRAVASTATIN SODIUM 40 MG
40 TABLET ORAL
Status: DISCONTINUED | OUTPATIENT
Start: 2025-02-06 | End: 2025-02-06 | Stop reason: HOSPADM

## 2025-02-06 RX ORDER — BENZONATATE 100 MG/1
100 CAPSULE ORAL 3 TIMES DAILY PRN
Status: DISCONTINUED | OUTPATIENT
Start: 2025-02-06 | End: 2025-02-06 | Stop reason: HOSPADM

## 2025-02-06 RX ORDER — ANASTROZOLE 1 MG/1
1 TABLET ORAL DAILY
Status: DISCONTINUED | OUTPATIENT
Start: 2025-02-06 | End: 2025-02-06 | Stop reason: HOSPADM

## 2025-02-06 RX ORDER — INSULIN LISPRO 100 [IU]/ML
1-6 INJECTION, SOLUTION INTRAVENOUS; SUBCUTANEOUS
Status: DISCONTINUED | OUTPATIENT
Start: 2025-02-06 | End: 2025-02-06 | Stop reason: HOSPADM

## 2025-02-06 RX ORDER — ACETAMINOPHEN 325 MG/1
650 TABLET ORAL EVERY 6 HOURS PRN
Status: DISCONTINUED | OUTPATIENT
Start: 2025-02-06 | End: 2025-02-06 | Stop reason: HOSPADM

## 2025-02-06 RX ORDER — LEVOTHYROXINE SODIUM 88 UG/1
88 TABLET ORAL
Status: DISCONTINUED | OUTPATIENT
Start: 2025-02-06 | End: 2025-02-06 | Stop reason: HOSPADM

## 2025-02-06 RX ADMIN — SODIUM CHLORIDE, SODIUM GLUCONATE, SODIUM ACETATE, POTASSIUM CHLORIDE, MAGNESIUM CHLORIDE, SODIUM PHOSPHATE, DIBASIC, AND POTASSIUM PHOSPHATE 100 ML/HR: .53; .5; .37; .037; .03; .012; .00082 INJECTION, SOLUTION INTRAVENOUS at 03:32

## 2025-02-06 RX ADMIN — FOLIC ACID 1000 MCG: 1 TABLET ORAL at 07:59

## 2025-02-06 RX ADMIN — LEVOTHYROXINE SODIUM 88 MCG: 88 TABLET ORAL at 05:56

## 2025-02-06 RX ADMIN — INSULIN LISPRO 2 UNITS: 100 INJECTION, SOLUTION INTRAVENOUS; SUBCUTANEOUS at 11:59

## 2025-02-06 RX ADMIN — Medication 2000 UNITS: at 07:59

## 2025-02-06 RX ADMIN — ANASTROZOLE 1 MG: 1 TABLET, COATED ORAL at 09:08

## 2025-02-06 RX ADMIN — RIVAROXABAN 20 MG: 20 TABLET, FILM COATED ORAL at 07:59

## 2025-02-06 NOTE — ASSESSMENT & PLAN NOTE
Morbid obesity, in the setting of DM type II, as evidenced by BMI 46.83, treated with consistent carb diet level II and counseling for diet and lifestyle modifications

## 2025-02-06 NOTE — ASSESSMENT & PLAN NOTE
Patient with several day history of URI type symptoms including nasal congestion, sore throat, cough.  COVID-19/influenza/RSV PCR noted positive for COVID-19 infection  Patient currently maintaining appropriate oxygenation on room air  Supportive management at this juncture, if patient should develop oxygen requirement will initiate on treatment protocol as appropriate

## 2025-02-06 NOTE — ASSESSMENT & PLAN NOTE
S/p left lumpectomy and radiation and subsequent completion mastectomy  Recently initiated on anastrozole, continue.  Continue outpatient follow-up with medical/surgical oncology as appropriate

## 2025-02-06 NOTE — ASSESSMENT & PLAN NOTE
Lab Results   Component Value Date    HGBA1C 7.4 (H) 12/12/2024       Recent Labs     02/05/25  1857 02/06/25  0620 02/06/25  1049   POCGLU 157* 135 176*       Blood Sugar Average: Last 72 hrs:  (P) 156  Reasonably controlled as outpatient  Hold metformin while admitted, start correctional insulin coverage with Accu-Cheks  Carb controlled diet  Initiate hypoglycemia protocol

## 2025-02-06 NOTE — ASSESSMENT & PLAN NOTE
Lab Results   Component Value Date    EGFR 60 02/05/2025    EGFR 64 12/12/2024    EGFR 67 10/03/2024    CREATININE 0.94 02/05/2025    CREATININE 0.89 12/12/2024    CREATININE 0.86 10/03/2024   Renal function at baseline, monitor with BMP.  Avoid/limit nephrotoxins and hypotension

## 2025-02-06 NOTE — PLAN OF CARE
Problem: PAIN - ADULT  Goal: Verbalizes/displays adequate comfort level or baseline comfort level  Description: Interventions:  - Encourage patient to monitor pain and request assistance  - Assess pain using appropriate pain scale  - Administer analgesics based on type and severity of pain and evaluate response  - Implement non-pharmacological measures as appropriate and evaluate response  - Consider cultural and social influences on pain and pain management  - Notify physician/advanced practitioner if interventions unsuccessful or patient reports new pain  Outcome: Progressing     Problem: INFECTION - ADULT  Goal: Absence or prevention of progression during hospitalization  Description: INTERVENTIONS:  - Assess and monitor for signs and symptoms of infection  - Monitor lab/diagnostic results  - Monitor all insertion sites, i.e. indwelling lines, tubes, and drains  - Monitor endotracheal if appropriate and nasal secretions for changes in amount and color  - [place] appropriate cooling/warming therapies per order  - Administer medications as ordered  - Instruct and encourage patient and family to use good hand hygiene technique  - Identify and instruct in appropriate isolation precautions for identified infection/condition  Outcome: Progressing  Goal: Absence of fever/infection during neutropenic period  Description: INTERVENTIONS:  - Monitor WBC    Outcome: Progressing     Problem: SAFETY ADULT  Goal: Patient will remain free of falls  Description: INTERVENTIONS:  - Educate patient/family on patient safety including physical limitations  - Instruct patient to call for assistance with activity   - Consult OT/PT to assist with strengthening/mobility   - Keep Call bell within reach  - Keep bed low and locked with side rails adjusted as appropriate  - Keep care items and personal belongings within reach  - Initiate and maintain comfort rounds  - Make Fall Risk Sign visible to staff  - Offer Toileting every 2 Hours,  in advance of need  - Initiate/Maintain bed and chair alarm  - Obtain necessary fall risk management equipment: non skid socks   - Apply yellow socks and bracelet for high fall risk patients  - Consider moving patient to room near nurses station  Outcome: Progressing  Goal: Maintain or return to baseline ADL function  Description: INTERVENTIONS:  -  Assess patient's ability to carry out ADLs; assess patient's baseline for ADL function and identify physical deficits which impact ability to perform ADLs (bathing, care of mouth/teeth, toileting, grooming, dressing, etc.)  - Assess/evaluate cause of self-care deficits   - Assess range of motion  - Assess patient's mobility; develop plan if impaired  - Assess patient's need for assistive devices and provide as appropriate  - Encourage maximum independence but intervene and supervise when necessary  - Involve family in performance of ADLs  - Assess for home care needs following discharge   - Consider OT consult to assist with ADL evaluation and planning for discharge  - Provide patient education as appropriate  Outcome: Progressing  Goal: Maintains/Returns to pre admission functional level  Description: INTERVENTIONS:  - Perform AM-PAC 6 Click Basic Mobility/ Daily Activity assessment daily.  - Set and communicate daily mobility goal to care team and patient/family/caregiver.   - Collaborate with rehabilitation services on mobility goals if consulted  - Perform Range of Motion 3 times a day.  - Reposition patient every 2 hours.  - Dangle patient 3 times a day  - Stand patient 3 times a day  - Ambulate patient 3 times a day  - Out of bed to chair 3 times a day   - Out of bed for meals 3 times a day  - Out of bed for toileting  - Record patient progress and toleration of activity level   Outcome: Progressing     Problem: DISCHARGE PLANNING  Goal: Discharge to home or other facility with appropriate resources  Description: INTERVENTIONS:  - Identify barriers to discharge  w/patient and caregiver  - Arrange for needed discharge resources and transportation as appropriate  - Identify discharge learning needs (meds, wound care, etc.)  - Arrange for interpretive services to assist at discharge as needed  - Refer to Case Management Department for coordinating discharge planning if the patient needs post-hospital services based on physician/advanced practitioner order or complex needs related to functional status, cognitive ability, or social support system  Outcome: Progressing     Problem: Knowledge Deficit  Goal: Patient/family/caregiver demonstrates understanding of disease process, treatment plan, medications, and discharge instructions  Description: Complete learning assessment and assess knowledge base.  Interventions:  - Provide teaching at level of understanding  - Provide teaching via preferred learning methods  Outcome: Progressing

## 2025-02-06 NOTE — PLAN OF CARE
Problem: PAIN - ADULT  Goal: Verbalizes/displays adequate comfort level or baseline comfort level  Description: Interventions:  - Encourage patient to monitor pain and request assistance  - Assess pain using appropriate pain scale  - Administer analgesics based on type and severity of pain and evaluate response  - Implement non-pharmacological measures as appropriate and evaluate response  - Consider cultural and social influences on pain and pain management  - Notify physician/advanced practitioner if interventions unsuccessful or patient reports new pain  Outcome: Progressing     Problem: INFECTION - ADULT  Goal: Absence or prevention of progression during hospitalization  Description: INTERVENTIONS:  - Assess and monitor for signs and symptoms of infection  - Monitor lab/diagnostic results  - Monitor all insertion sites, i.e. indwelling lines, tubes, and drains  - Monitor endotracheal if appropriate and nasal secretions for changes in amount and color  - [place] appropriate cooling/warming therapies per order  - Administer medications as ordered  - Instruct and encourage patient and family to use good hand hygiene technique  - Identify and instruct in appropriate isolation precautions for identified infection/condition  Outcome: Progressing  Goal: Absence of fever/infection during neutropenic period  Description: INTERVENTIONS:  - Monitor WBC    Outcome: Progressing     Problem: SAFETY ADULT  Goal: Patient will remain free of falls  Description: INTERVENTIONS:  - Educate patient/family on patient safety including physical limitations  - Instruct patient to call for assistance with activity   - Consult OT/PT to assist with strengthening/mobility   - Keep Call bell within reach  - Keep bed low and locked with side rails adjusted as appropriate  - Keep care items and personal belongings within reach  - Initiate and maintain comfort rounds  - Make Fall Risk Sign visible to staff  - Offer Toileting every 2 Hours,  in advance of need  - Initiate/Maintain bed and chair alarm  - Obtain necessary fall risk management equipment: non skid socks   - Apply yellow socks and bracelet for high fall risk patients  - Consider moving patient to room near nurses station  Outcome: Progressing  Goal: Maintain or return to baseline ADL function  Description: INTERVENTIONS:  -  Assess patient's ability to carry out ADLs; assess patient's baseline for ADL function and identify physical deficits which impact ability to perform ADLs (bathing, care of mouth/teeth, toileting, grooming, dressing, etc.)  - Assess/evaluate cause of self-care deficits   - Assess range of motion  - Assess patient's mobility; develop plan if impaired  - Assess patient's need for assistive devices and provide as appropriate  - Encourage maximum independence but intervene and supervise when necessary  - Involve family in performance of ADLs  - Assess for home care needs following discharge   - Consider OT consult to assist with ADL evaluation and planning for discharge  - Provide patient education as appropriate  Outcome: Progressing  Goal: Maintains/Returns to pre admission functional level  Description: INTERVENTIONS:  - Perform AM-PAC 6 Click Basic Mobility/ Daily Activity assessment daily.  - Set and communicate daily mobility goal to care team and patient/family/caregiver.   - Collaborate with rehabilitation services on mobility goals if consulted  - Perform Range of Motion 3 times a day.  - Reposition patient every 2 hours.  - Dangle patient 3 times a day  - Stand patient 3 times a day  - Ambulate patient 3 times a day  - Out of bed to chair 3 times a day   - Out of bed for meals 3 times a day  - Out of bed for toileting  - Record patient progress and toleration of activity level   Outcome: Progressing     Problem: DISCHARGE PLANNING  Goal: Discharge to home or other facility with appropriate resources  Description: INTERVENTIONS:  - Identify barriers to discharge  w/patient and caregiver  - Arrange for needed discharge resources and transportation as appropriate  - Identify discharge learning needs (meds, wound care, etc.)  - Arrange for interpretive services to assist at discharge as needed  - Refer to Case Management Department for coordinating discharge planning if the patient needs post-hospital services based on physician/advanced practitioner order or complex needs related to functional status, cognitive ability, or social support system  Outcome: Progressing     Problem: Knowledge Deficit  Goal: Patient/family/caregiver demonstrates understanding of disease process, treatment plan, medications, and discharge instructions  Description: Complete learning assessment and assess knowledge base.  Interventions:  - Provide teaching at level of understanding  - Provide teaching via preferred learning methods  Outcome: Progressing

## 2025-02-06 NOTE — ASSESSMENT & PLAN NOTE
Presented from home with reported fever/chills and URI type symptoms, also noted with apparent slurred speech/confusion.  No focal deficits noted by patient/family, and patient with quick return to mental baseline after arrival here  Workup revealing COVID-19 infection with management as below.  CTA head/neck negative for acute intracranial pathology or LVO/aneurysm/dissection/significant stenosis  Will monitor mental status overnight, reassess during daytime hours 2/6/2025  Given reported marked weakness complete ambulatory trial in a.m., if unable to ambulate consult PT/OT

## 2025-02-06 NOTE — ASSESSMENT & PLAN NOTE
Lab Results   Component Value Date    EGFR 59 02/06/2025    EGFR 60 02/05/2025    EGFR 64 12/12/2024    CREATININE 0.95 02/06/2025    CREATININE 0.94 02/05/2025    CREATININE 0.89 12/12/2024   Renal function at baseline, monitor with BMP.  Avoid/limit nephrotoxins and hypotension

## 2025-02-06 NOTE — ASSESSMENT & PLAN NOTE
Sepsis, POA, secondary to COVID-19 infection, as evidenced by fever (103.1A°) and tachycardia (101), in the ED, treated with IV fluids, Tylenol, monitoring of COVID symptoms, and supportive care

## 2025-02-06 NOTE — ED ATTENDING ATTESTATION
2/5/2025  I, Amilcar Velasco DO, saw and evaluated the patient. I have discussed the patient with the resident/non-physician practitioner and agree with the resident's/non-physician practitioner's findings, Plan of Care, and MDM as documented in the resident's/non-physician practitioner's note, except where noted. All available labs and Radiology studies were reviewed.  I was present for key portions of any procedure(s) performed by the resident/non-physician practitioner and I was immediately available to provide assistance.       At this point I agree with the current assessment done in the Emergency Department.  I have conducted an independent evaluation of this patient a history and physical is as follows:    Patient is a 74-year-old female history of diabetes, breast cancer status post left mastectomy, hypercoagulable state hyperlipidemia, currently on Xarelto, brought in by EMS and accompanied by her son.    The patient says she went to bed last night feeling okay, woke up this morning and felt like she had a bit of a fever, some cough, noticed that she was having some occasional difficulty speaking, felt more weak and fatigued.  She denies chest pain, denies headache, denies dysuria, denies hematuria, denies fevers, denies chills.  She says that she had a couple episodes of loose stool and diarrhea earlier today.  She says this may be due to a new medication which she started 4 days ago, Arimidex that was reportedly prescribed by her hematologist oncologist.  No travel history, no sick contacts.  No recent hospitalizations.  The son indicates that he was called by his sister who texted the patient regularly, last texted her at 4 PM yesterday and talked with her at 4 PM yesterday and the patient was acting normally at that point.  The son says that at 4 PM today the sister tried to text the patient and call the patient but she initially did not respond but then she would reply to the text with desiree  which did not make sense.  The son went over to the house and found the patient sitting in a chair, patient was weak and unable to get up and he noticed that she was occasionally seeming to having some word finding difficulties.  There was no fall, no trauma.  EMS indicates blood sugar was 200 and the patient remained stable during transport.    General:  Patient is well-appearing  Head:  Atraumatic  Eyes:  Conjunctiva pink, Extraocular muscle intact, PERRL  ENT:  Mucous membranes are moist  Neck:  Supple  Cardiac:  S1-S2, without murmurs  Lungs:  Clear to auscultation bilaterally, left breast is surgically absent, surgical incision scar is well-healing, no purulent drainage or discharge  Abdomen:  Soft, nontender, normal bowel sounds, no CVA tenderness, no tympany, no rigidity, no guarding  Extremities:  Normal range of motion  Neurologic:  Awake, fluent speech but at times she is having some pauses and difficulty getting her words out completely.  There is no word salad or garbled speech., normal comprehension. AAOx3. Cranial nerves 2-12 are intact, strength is 5/5 in the bilateral upper & lower extremities, no slurred speech, no facial droop, no deficit on finger-to-nose testing, no pronator drift.  Sensation to light touch is equal and symmetric throughout the whole body  Skin:  Pink warm and dry, no rash  Psychiatric:  Alert, pleasant, cooperative    ED Course     ECG interpreted by me, sinus rhythm, rate of 98, no acute ischemic or infarctive changes    Labs Reviewed   COVID19, INFLUENZA A/B, RSV PCR, [facility] - Abnormal       Result Value Ref Range Status    SARS-CoV-2 Positive (*) Negative Final    Comment:      INFLUENZA A PCR Negative  Negative Final    Comment:      INFLUENZA B PCR Negative  Negative Final    Comment:      RSV PCR Negative  Negative Final    Comment:      Narrative:     This test has been performed using the CoV-2/Flu/RSV plus assay on the [employer] GeneXpert platform. This test has been  validated by the  and verified by the performing laboratory.     This test is designed to amplify and detect the following: nucleocapsid (N), envelope (E), and RNA-dependent RNA polymerase (RdRP) genes of the SARS-CoV-2 genome; matrix (M), basic polymerase (PB2), and acidic protein (PA) segments of the influenza A genome; matrix (M) and non-structural protein (NS) segments of the influenza B genome, and the nucleocapsid genes of RSV A and RSV B.     Positive results are indicative of the presence of Flu A, Flu B, RSV, and/or SARS-CoV-2 RNA. Positive results for SARS-CoV-2 or suspected novel influenza should be reported to state, local, or federal health departments according to local reporting requirements.      All results should be assessed in conjunction with clinical presentation and other laboratory markers for clinical management.     FOR PEDIATRIC PATIENTS - copy/paste COVID Guidelines URL to browser: [email or web address]      PROTIME-INR - Abnormal    Protime 20.6 (*) 12.3 - 15.0 seconds Final    INR 1.76 (*) 0.85 - 1.19 Final    Narrative:     INR Therapeutic Range    Indication                                             INR Range      Atrial Fibrillation                                               2.0-3.0  Hypercoagulable State                                    2.0.2.3  Left Ventricular Asist Device                            2.0-3.0  Mechanical Heart Valve                                  -    Aortic(with afib, MI, embolism, HF, LA enlargement,    and/or coagulopathy)                                     2.0-3.0 (2.5-3.5)     Mitral                                                             2.5-3.5  Prosthetic/Bioprosthetic Heart Valve               2.0-3.0  Venous thromboembolism (VTE: VT, PE        2.0-3.0   CBC AND DIFFERENTIAL - Abnormal    WBC 8.14  4.31 - 10.16 Thousand/uL Final    RBC 3.73 (*) 3.81 - 5.12 Million/uL Final     Hemoglobin 11.0 (*) 11.5 - 15.4 g/dL Final    Hematocrit 34.6 (*) 34.8 - 46.1 % Final    MCV 93  82 - 98 fL Final    MCH 29.5  26.8 - 34.3 pg Final    MCHC 31.8  31.4 - 37.4 g/dL Final    RDW 15.0  11.6 - 15.1 % Final    MPV 10.3  8.9 - 12.7 fL Final    Platelets 241  149 - 390 Thousands/uL Final    nRBC 0  /100 WBCs Final    Segmented % 86 (*) 43 - 75 % Final    Immature Grans % 0  0 - 2 % Final    Lymphocytes % 4 (*) 14 - 44 % Final    Monocytes % 10  4 - 12 % Final    Eosinophils Relative 0  0 - 6 % Final    Basophils Relative 0  0 - 1 % Final    Absolute Neutrophils 6.93  1.85 - 7.62 Thousands/µL Final    Absolute Immature Grans 0.03  0.00 - 0.20 Thousand/uL Final    Absolute Lymphocytes 0.29 (*) 0.60 - 4.47 Thousands/µL Final    Absolute Monocytes 0.85  0.17 - 1.22 Thousand/µL Final    Eosinophils Absolute 0.01  0.00 - 0.61 Thousand/µL Final    Basophils Absolute 0.03  0.00 - 0.10 Thousands/µL Final   COMPREHENSIVE METABOLIC PANEL - Abnormal    Sodium 136  135 - 147 mmol/L Final    Potassium 3.6  3.5 - 5.3 mmol/L Final    Chloride 104  96 - 108 mmol/L Final    CO2 24  21 - 32 mmol/L Final    ANION GAP 8  4 - 13 mmol/L Final    BUN 12  5 - 25 mg/dL Final    Creatinine 0.94  0.60 - 1.30 mg/dL Final    Comment: Standardized to IDMS reference method    Glucose 167 (*) 65 - 140 mg/dL Final    Comment: If the patient is fasting, the ADA then defines impaired fasting glucose as > 100 mg/dL and diabetes as > or equal to 123 mg/dL.    Calcium 8.7  8.4 - 10.2 mg/dL Final    AST 17  13 - 39 U/L Final    ALT 13  7 - 52 U/L Final    Comment: Specimen collection should occur prior to Sulfasalazine administration due to the potential for falsely depressed results.     Alkaline Phosphatase 81  34 - 104 U/L Final    Total Protein 6.5  6.4 - 8.4 g/dL Final    Albumin 3.5  3.5 - 5.0 g/dL Final    Total Bilirubin 0.75  0.20 - 1.00 mg/dL Final    Comment: Use of this assay is not recommended for patients undergoing treatment with  "eltrombopag due to the potential for falsely elevated results.  N-acetyl-p-benzoquinone imine (metabolite of Acetaminophen) will generate erroneously low results in samples for patients that have taken an overdose of Acetaminophen.    eGFR 60  ml/min/1.73sq m Final    Narrative:     National Kid[employer] Disease Foundation guidelines for Chronic Kidney Disease (CKD):     Stage 1 with normal or high GFR (GFR > 90 mL/min/1.73 square meters)    Stage 2 Mild CKD (GFR = 60-89 mL/min/1.73 square meters)    Stage 3A Moderate CKD (GFR = 45-59 mL/min/1.73 square meters)    Stage 3B Moderate CKD (GFR = 30-44 mL/min/1.73 square meters)    Stage 4 Severe CKD (GFR = 15-29 mL/min/1.73 square meters)    Stage 5 End Stage CKD (GFR <15 mL/min/1.73 square meters)  Note: GFR calculation is accurate only with a steady state creatinine   POCT GLUCOSE - Abnormal    POC Glucose 157 (*) 65 - 140 mg/dl Final   APTT - Normal    PTT 33  23 - 34 seconds Final    Comment: Therapeutic Heparin Range =  60-90 seconds   HS TROPONIN I 0HR - Normal    hs TnI 0hr 9  \"Refer to ACS Flowchart\"- see link ng/L Final    Comment:                                              Initial (time 0) result  If >=50 ng/L, Myocardial injury suggested ;  Type of myocardial injury and treatment strategy  to be determined.  If 5-49 ng/L, a delta result at 2 hours and or 4 hours will be needed to further evaluate.  If <4 ng/L, and chest pain has been >3 hours since onset, patient may qualify for discharge based on the HEART score in the ED.  If <5 ng/L and <3hours since onset of chest pain, a delta result at 2 hours will be needed to further evaluate.    HS Troponin 99th Percentile URL of a Health Population=12 ng/L with a 95% Confidence Interval of 8-18 ng/L.    Second Troponin (time 2 hours)  If calculated delta >= 20 ng/L,  Myocardial injury suggested ; Type of myocardial injury and treatment strategy to be determined.  If 5-49 ng/L and the calculated delta is 5-19 ng/L, " consult medical service for evaluation.  Continue evaluation for ischemia on ecg and other possible etiology and repeat hs troponin at 4 hours.  If delta is <5 ng/L at 2 hours, consider discharge based on risk stratification via the HEART score (if in ED), or SHANAE risk score in IP/Observation.    HS Troponin 99th Percentile URL of a Health Population=12 ng/L with a 95% Confidence Interval of 8-18 ng/L.   LACTIC ACID, PLASMA (W/REFLEX IF RESULT > 2.0) - Normal    LACTIC ACID 1.6  0.5 - 2.0 mmol/L Final    Narrative:     Result may be elevated if tourniquet was used during collection.   PROCALCITONIN TEST - Normal    Procalcitonin 0.11  <=0.25 ng/ml Final    Comment: Suspected Lower Respiratory Tract Infection (LRTI):  - LESS than or EQUAL to 0.25 ng/mL:   low likelihood for bacterial LRTI; antibiotics DISCOURAGED.  - GREATER than 0.25 ng/mL:   increased likelihood for bacterial LRTI; antibiotics ENCOURAGED.    Suspected Sepsis:  - Strongly consider initiating antibiotics in ALL UNSTABLE patients.  - LESS than or EQUAL to 0.5 ng/mL:   low likelihood for bacterial sepsis; antibiotics DISCOURAGED.  - GREATER than 0.5 ng/mL:   increased likelihood for bacterial sepsis; antibiotics ENCOURAGED.  - GREATER than 2 ng/mL:   high risk for severe sepsis / septic shock; antibiotics strongly ENCOURAGED.    Decisions on antibiotic use should not be based solely on Procalcitonin (PCT) levels. If PCT is low but uncertainty exists with stopping antibiotics, repeat PCT in 6-24 hours to confirm the low level. If antibiotics are administered (regardless if initial PCT was high or low), repeat PCT every 1-2 days to consider early antibiotic cessation (when GREATER than 80% decrease from the peak OR when PCT drops below designated cutoffs, whichever comes first), so long as the infection is NOT one that typically requires prolonged treatment durations (e.g., bone/joint infections, endocarditis, Staph. aureus bacteremia).    Situations of  "FALSE-POSITIVE Procalcitonin values:  1) Newborns < 72 hours old  2) Massive stress from severe trauma / burns, major surgery, acute pancreatitis, cardiogenic / hemorrhagic shock, sickle cell crisis, or other organ perfusion abnormalities  3) Malaria and some Candidal infections  4) Treatment with agents that stimulate cytokines (e.g., OKT3, anti-lymphocyte globulins, alemtuzumab, IL-2, granulocyte transfusion [NOT GCSFs])  5) Chronic renal disease causes elevated baseline levels (consider GREATER than 0.75 ng/mL as an abnormal cut-off); initiating HD/CRRT may cause transient decreases  6) Paraneoplastic syndromes from medullary thyroid or SCLC, some forms of vasculitis, and acute oifhq-sd-mcyk disease    Situations of FALSE-NEGATIVE Procalcitonin values:  1) Too early in clinical course for PCT to have reached its peak (may repeat in 6-24 hours to confirm low level)  2) Localized infection WITHOUT systemic (SIRS / sepsis) response (e.g., an abscess, osteomyelitis, cystitis)  3) Mycobacteria (e.g., Tuberculosis, MAC)  4) Cystic fibrosis exacerbations     HS TROPONIN I 2HR - Normal    hs TnI 2hr 11  \"Refer to ACS Flowchart\"- see link ng/L Final    Comment:                                              Initial (time 0) result  If >=50 ng/L, Myocardial injury suggested ;  Type of myocardial injury and treatment strategy  to be determined.  If 5-49 ng/L, a delta result at 2 hours and or 4 hours will be needed to further evaluate.  If <4 ng/L, and chest pain has been >3 hours since onset, patient may qualify for discharge based on the HEART score in the ED.  If <5 ng/L and <3hours since onset of chest pain, a delta result at 2 hours will be needed to further evaluate.    HS Troponin 99th Percentile URL of a Health Population=12 ng/L with a 95% Confidence Interval of 8-18 ng/L.    Second Troponin (time 2 hours)  If calculated delta >= 20 ng/L,  Myocardial injury suggested ; Type of myocardial injury and treatment strategy " to be determined.  If 5-49 ng/L and the calculated delta is 5-19 ng/L, consult medical service for evaluation.  Continue evaluation for ischemia on ecg and other possible etiology and repeat hs troponin at 4 hours.  If delta is <5 ng/L at 2 hours, consider discharge based on risk stratification via the HEART score (if in ED), or SHANAE risk score in IP/Observation.    HS Troponin 99th Percentile URL of a Health Population=12 ng/L with a 95% Confidence Interval of 8-18 ng/L.    Delta 2hr hsTnI 2  <20 ng/L Final   BLOOD CULTURE   BLOOD CULTURE   UA W REFLEX TO MICROSCOPIC WITH REFLEX TO CULTURE   HS TROPONIN I 4HR     CTA head and neck with and without contrast   Final Result         1. No evidence of acute infarct, intracranial hemorrhage or mass.   2. No stenosis, dissection or occlusion of the carotid or vertebral arteries or major vessels of the [place].                  Workstation performed: [id number]         XR chest portable   ED Interpretation   Chest x-ray interpreted me shows no infiltrate, no acute cardiopulmonary disease            Patient presents outside the thrombolytic, and stroke alert window.  She is at risk for acute intracranial hemorrhage, acute stroke, acute pneumonia, acute metabolic abnormality, acute underlying infection versus nonspecific fever and neurologic changes.  No stiff neck, no headache, doubt meningitis.    Patient test positive for COVID, head CT interpreted by me shows no acute large vessel occlusion or acute intracranial hemorrhage.  No evidence of life-threatening metabolic abnormality, no sepsis.  Symptoms may be due to the febrile illness, also possibly may be ischemic stroke.  Case discussed with admitting medicine physician    The patient was evaluated for sepsis in the emergency department. After that assessment, at the time of admission, there was no evidence of severe sepsis or septic shock or indication that the patient should be included in the SEP-1 CMS quality  measure.    DIAGNOSIS:  Acute COVID infection, acute febrile illness, acute transient expressive aphasia,    MEDICAL DECISION MAKING CODING    COLLECTION AND INTERPRETATION OF DATA  I reviewed prior external notes, including January 30, 2025 hematology oncology office visit    I ordered each unique test  Tests reviewed personally by me:  ECG: See my ED course  Labs: See above  Imaging: I independently interpreted the head CT and chest x-ray as noted above.            Critical Care Time  Procedures

## 2025-02-06 NOTE — ASSESSMENT & PLAN NOTE
Lab Results   Component Value Date    HGBA1C 7.4 (H) 12/12/2024       Recent Labs     02/05/25  1857   POCGLU 157*       Blood Sugar Average: Last 72 hrs:  (P) 157  Reasonably controlled as outpatient  Hold metformin while admitted, start correctional insulin coverage with Accu-Cheks  Carb controlled diet  Initiate hypoglycemia protocol

## 2025-02-06 NOTE — H&P
H&P - Hospitalist   Name: Sujey Webber 74 y.o. female I MRN: [id number]  Unit/Bed#: [facility] 720-01 I Date of Admission: 2/5/2025   Date of Service: 2/6/2025 I Hospital Day: 1     Assessment & Plan  Acute encephalopathy  Presented from home with reported fever/chills and URI type symptoms, also noted with apparent slurred speech/confusion.  No focal deficits noted by patient/family, and patient with quick return to mental baseline after arrival here  Workup revealing COVID-19 infection with management as below.  CTA head/neck negative for acute intracranial pathology or LVO/aneurysm/dissection/significant stenosis  Will monitor mental status overnight, reassess during daytime hours 2/6/2025  Given reported marked weakness complete ambulatory trial in a.m., if unable to ambulate consult PT/OT  COVID-19 virus infection  Patient with several day history of URI type symptoms including nasal congestion, sore throat, cough.  COVID-19/influenza/RSV PCR noted positive for COVID-19 infection  Patient currently maintaining appropriate oxygenation on room air  Supportive management at this juncture, if patient should develop oxygen requirement will initiate on treatment protocol as appropriate  Type 2 diabetes mellitus without complication, without long-term current use of insulin ([facility])  Lab Results   Component Value Date    HGBA1C 7.4 (H) 12/12/2024       Recent Labs     02/05/25  1857   POCGLU 157*       Blood Sugar Average: Last 72 hrs:  (P) 157  Reasonably controlled as outpatient  Hold metformin while admitted, start correctional insulin coverage with Accu-Cheks  Carb controlled diet  Initiate hypoglycemia protocol  Factor 5 Leiden mutation, heterozygous (HCC)  With history of DVT currently maintained on Xarelto, continue  Stage 3a chronic kidney disease (HCC)  Lab Results   Component Value Date    EGFR 60 02/05/2025    EGFR 64 12/12/2024    EGFR 67 10/03/2024    CREATININE 0.94 02/05/2025    CREATININE 0.89 12/12/2024  "   CREATININE 0.86 10/03/2024   Renal function at baseline, monitor with BMP.  Avoid/limit nephrotoxins and hypotension  Malignant neoplasm of overlapping sites of left breast in female, estrogen receptor positive (HCC)  S/p left lumpectomy and radiation and subsequent completion mastectomy  Recently initiated on anastrozole, continue.  Continue outpatient follow-up with medical/surgical oncology as appropriate      VTE Prophylaxis: Rivaroxaban (Xarelto)  / sequential compression device   Code Status: Level 1 - Full Code   POLST: POLST form is not discussed and not completed at this time.  Discussion with family: Son updated at bedside    Anticipated Length of Stay:  Patient will be admitted on an Inpatient basis with an anticipated length of stay of greater than 2 midnights.   Justification for Hospital Stay: Please see detailed plans noted above.    Chief Complaint:     Fever/chills, altered mental status  History of Present Illness:  Sujey Webber is a 74 y.o. female who has a past medical history significant for left breast DCIS s/p prior lumpectomy and radiation with subsequent completion mastectomy now maintained on anastrozole, factor V Leiden mutation with DVT anticoagulated on Xarelto, type 2 diabetes on metformin, stage III chronic kidney disease who presented from home with fever/chills and altered mental status.  She states on the day of presentation she woke with reported fever and nonproductive cough also with some watery diarrhea.  Her symptoms progressed throughout the day to include marked generalized weakness and diminished appetite to where she slept through much of the day, and on awakening apparently was contacted by family with patient apparently speaking in \"gibberish.\"  Subsequently son went over to her home and found patient markedly weak and apparently with confusion/questionable word finding difficulties which prompted presentation.  No focal weakness or facial drooping were noted, " "and patient herself denied any headache, chest pain/pressure, shortness of breath, numbness/tingling/paresthesias, or other systemic symptoms.    En route to ED patient had rapidly improving mental status and reportedly was at mental baseline throughout ED evaluation.  Broad workup was initiated including a CTA head/neck which was negative for acute intracranial pathology or LVO/aneurysm/dissection/significant stenosis.  A COVID-19/influenza/RSV PCR was obtained and noted positive for COVID-19 infection.  Given degree of her earlier confusion and with ongoing weakness she is admitted for further workup and management as above.    Review of Systems:    Constitutional: Reported fever, chills, generalized weakness  Eyes:  Denies change in visual acuity   HENT: Reported nasal congestion and sore throat  Respiratory:  Denies shortness of breath but reported cough  Cardiovascular:  Denies chest pain or edema   GI:  Denies abdominal pain, nausea, vomiting, bloody stools or diarrhea   :  Denies dysuria   Musculoskeletal:  Denies back pain or joint pain   Integument:  Denies rash   Neurologic:  Denies headache, focal weakness or sensory changes but speech changes reported  Endocrine:  Denies polyuria or polydipsia   Lymphatic:  Denies swollen glands   Psychiatric:  Denies depression or anxiety but confusion reported    Past Medical and Surgical History:   Past Medical History:   Diagnosis Date    Ankle swelling     \"off and on since teenage years\"    Arthritis 2005    Dental crown present     Diabetes mellitus ([facility]) 2014    Type 2 Estimated. On metformin    Disease of thyroid gland 1995    DVT (deep venous thrombosis) ([facility])     18yrs ago and again 2 yrs ago    Factor 5 Leiden mutation, heterozygous ([facility])     Family history of reaction to anesthesia     \"pt's sister and niece woke up after anesthesia however were sleepy for a few days afterwards\"    GERD (gastroesophageal reflux disease)     occas    Heart murmur 1969    " History of radiation therapy 09/01/2006    Hyperlipidemia     Psoriatic arthritis (HCC)     Wears glasses      Past Surgical History:   Procedure Laterality Date    BIOPSY CORE NEEDLE Right 03/03/2010    benign    BREAST CYST ASPIRATION Right 08/15/2003    BREAST CYST ASPIRATION Right     x3  (Years ago)    BREAST LUMPECTOMY Left 08/22/2006    positive    COLONOSCOPY      HERNIA REPAIR  1951    MAMMO STEREOTACTIC BREAST BIOPSY LEFT (ALL INC) Left 08/02/2006    malignant    MASTECTOMY W/ SENTINEL NODE BIOPSY Left 12/27/2024    Procedure: LEFT BREAST MASTECTOMY, SENTINEL LYMPH NODE BX, LYMPHOSCINTIGRAPHY, LYMPHATIC MAPPING;;  Surgeon: Kristy Antoine MD;  Location: [facility];  Service: Surgical Oncology    TUBAL LIGATION  1983    US GUIDED BREAST BIOPSY LEFT COMPLETE Left 02/06/2009    benign    US GUIDED BREAST BIOPSY LEFT COMPLETE Left 11/13/2024    WISDOM TOOTH EXTRACTION         Meds/Allergies:    Medications Prior to Admission:     anastrozole (ARIMIDEX) 1 mg tablet    Cholecalciferol (VITAMIN D) 2000 units CAPS    folic acid (FOLVITE) 1 mg tablet    levothyroxine 88 mcg tablet    metFORMIN (GLUCOPHAGE-XR) 500 mg 24 hr tablet    methotrexate 2.5 mg tablet    rivaroxaban (Xarelto) 20 mg tablet    simvastatin (ZOCOR) 20 mg tablet    traMADol (Ultram) 50 mg tablet    Allergies: No Known Allergies  History:  Marital Status:      Substance Use History:   Social History     Substance and Sexual Activity   Alcohol Use Not Currently    Comment: Few drinks per year     Social History     Tobacco Use   Smoking Status Never   Smokeless Tobacco Never     Social History     Substance and Sexual Activity   Drug Use Never       Family History:  Family History   Problem Relation Age of Onset    Breast cancer Mother 53        Also mat aunt    Heart disease Father     Ovarian cancer Sister 63    No Known Problems Sister     No Known Problems Sister     Lung cancer Sister 67    Breast cancer Maternal Aunt 43    Stroke  "Maternal Aunt     Stomach cancer Maternal Uncle     Skin cancer Maternal Uncle     Heart disease Maternal Uncle     Cancer Paternal Aunt     Breast cancer Paternal Aunt     Skin cancer Paternal Aunt     No Known Problems Maternal Grandmother     Heart disease Maternal Grandfather     Breast cancer Paternal Grandmother 90    Dementia Paternal Grandmother     Heart disease Paternal Grandfather     No Known Problems Daughter     No Known Problems Son     Breast cancer Cousin 71        maternal    Breast cancer Cousin 73        maternal       Physical Exam:     Vitals:   Blood Pressure: 121/51 (02/06/25 0142)  Pulse: 64 (02/06/25 0142)  Temperature: 98.2 °F (36.8 °C) (02/06/25 0142)  Temp Source: Oral (02/06/25 0142)  Respirations: 20 (02/06/25 0142)  Height: 5' 7\" (170.2 cm) (02/06/25 0138)  Weight - Scale: 136 kg (299 lb) (02/06/25 0138)  SpO2: 98 % (02/06/25 0142)    Constitutional:  Well developed, well nourished, no acute distress, non-toxic appearance   Eyes:  PERRL, conjunctiva normal   HENT:  Atraumatic, external ears normal, nose normal, oropharynx moist, no pharyngeal exudates. Neck- normal range of motion, no tenderness, supple   Respiratory:  No respiratory distress, normal breath sounds, no rales, no wheezing   Cardiovascular:  Normal rate, normal rhythm, no murmurs, no gallops, no rubs   GI:  Soft, nondistended, normal bowel sounds, nontender, no organomegaly, no mass, no rebound, no guarding   :  No costovertebral angle tenderness   Musculoskeletal:  No edema, no tenderness, no deformities. Back- no tenderness  Integument:  Well hydrated, no rash   Lymphatic:  No lymphadenopathy noted   Neurologic:  Alert &awake, communicative, CN 2-12 normal, normal motor function, normal sensory function, no focal deficits noted   Psychiatric:  Speech and behavior appropriate       Lab Results: I have reviewed laboratory reports/results from this admission    Results from last 7 days   Lab Units 02/05/25 1925   WBC " Thousand/uL 8.14   HEMOGLOBIN g/dL 11.0*   HEMATOCRIT % 34.6*   PLATELETS Thousands/uL 241   SEGS PCT % 86*   LYMPHO PCT % 4*   MONO PCT % 10   EOS PCT % 0     Results from last 7 days   Lab Units 02/05/25  1925   SODIUM mmol/L 136   POTASSIUM mmol/L 3.6   CHLORIDE mmol/L 104   CO2 mmol/L 24   BUN mg/dL 12   CREATININE mg/dL 0.94   ANION GAP mmol/L 8   CALCIUM mg/dL 8.7   ALBUMIN g/dL 3.5   TOTAL BILIRUBIN mg/dL 0.75   ALK PHOS U/L 81   ALT U/L 13   AST U/L 17   GLUCOSE RANDOM mg/dL 167*     Results from last 7 days   Lab Units 02/05/25  1925   INR  1.76*     Results from last 7 days   Lab Units 02/05/25  1857   POC GLUCOSE mg/dl 157*         Results from last 7 days   Lab Units 02/05/25 1925   LACTIC ACID mmol/L 1.6   PROCALCITONIN ng/ml 0.11       EKG: Personally reviewed, normal sinus rhythm HR 98    Imaging: Results Review Statement: I reviewed radiology reports from this admission including: CTA head and neck and chest xray.    CTA head and neck with and without contrast  Result Date: 2/5/2025  Narrative: CTA NECK AND BRAIN WITH AND WITHOUT CONTRAST INDICATION: Aphasia.  Breast cancer history COMPARISON:   None. TECHNIQUE:  Routine CT imaging of the Brain without contrast.Post contrast imaging was performed after administration of iodinated contrast through the neck and brain. Post contrast axial 0.625 mm images timed to opacify the arterial system.  3D rendering was performed on an independent workstation.   MIP reconstructions performed. Coronal and sagittal reconstructions were performed of the non contrast portion of the brain. Radiation dose length product (DLP) for this visit:  1474 mGy-cm .  This examination, like all CT scans performed in the [facility], was performed utilizing techniques to minimize radiation dose exposure, including the use of iterative reconstruction and automated exposure control. IV Contrast:  75 mL of iohexol IMAGE QUALITY:   Diagnostic FINDINGS: NONCONTRAST  BRAIN PARENCHYMA: No acute intracranial hemorrhage or mass effect. VENTRICLES AND EXTRA-AXIAL SPACES: No hydrocephalus or extra-axial collection. VISUALIZED ORBITS: Intact globes. No retro-orbital inflammation. PARANASAL SINUSES: Clear. CTA NECK ARCH AND GREAT VESSELS: Three-vessel configuration of the aortic arch. No stenosis in the subclavian arteries. VERTEBRAL ARTERIES: Patent extracranial segments. RIGHT CAROTID: No stenosis.    No dissection. LEFT CAROTID: No stenosis.    No dissection. NASCET criteria was used to determine the degree of internal carotid artery diameter stenosis. CTA BRAIN: INTERNAL CAROTID ARTERIES: No stenosis or occlusion. ANTERIOR CEREBRAL ARTERY CIRCULATION:  No stenosis or occlusion. MIDDLE CEREBRAL ARTERY CIRCULATION:  No stenosis or occlusion. DISTAL VERTEBRAL ARTERIES:  No stenosis or occlusion. BASILAR ARTERY: Ectatic basilar artery, physiologic. POSTERIOR CEREBRAL ARTERIES: Fetal-type bilateral posterior cerebral arteries without stenosis. VENOUS STRUCTURES: Patent dural venous sinuses. NON VASCULAR ANATOMY BONY STRUCTURES:  No lytic or blastic lesion. SOFT TISSUES OF THE NECK: Right thyroid 1 cm cystic nodule. THORACIC INLET: Clear lung apices.     Impression: 1. No evidence of acute infarct, intracranial hemorrhage or mass. 2. No stenosis, dissection or occlusion of the carotid or vertebral arteries or major vessels of the [place]. Workstation performed: [id number]         ** Please Note: Dragon 360 Dictation voice to text software was used in the creation of this document. **      ** Please Note: This note has been constructed using a voice recognition system. **

## 2025-02-06 NOTE — ED PROVIDER NOTES
"Time reflects when diagnosis was documented in both MDM as applicable and the Disposition within this note       Time User Action Codes Description Comment    2/5/2025  9:46 PM Trinidad, Thu T Add [R47.9] Difficulty with speech     2/5/2025  9:46 PM Trinidad, Thu T Add [R50.9] Fever     2/5/2025  9:46 PM Trinidad, Thu T Add [R05.9] Cough     2/5/2025  9:47 PM Trinidad, Thu T Add [U07.1] COVID-19           ED Disposition       ED Disposition   Admit    Condition   Stable    Date/Time   Wed Feb 5, 2025  9:46 PM    Comment                  Assessment & Plan       Medical Decision Making  Amount and/or Complexity of Data Reviewed  Labs: ordered. Decision-making details documented in ED Course.  Radiology: ordered and independent interpretation performed. Decision-making details documented in ED Course.    Risk  OTC drugs.  Prescription drug management.  Decision regarding hospitalization.    Patient is a 74 y.o. female with PMH of diabetes, breast cancer s/p left mastectomy, HLD, DVT on Xarelto who presents to the ED with fever, cough, speech difficulty. Last known normal was 4pm yesterday.     Vital signs febrile, mild tachycardia. On exam expressive aphasia, no focal deficits.    History and physical exam most consistent with infection, stroke. However, differential diagnosis included but not limited to hypo or hyperglycemia, electrolyte abnormality, UTI, pneumonia.     Plan: sepsis workup, CTA H/N.     View ED course for further discussion on patient workup.     All labs reviewed and utilized in the medical decision making process  All radiology studies independently viewed by me and interpreted by the radiologist.  I reviewed all testing with the patient.     Upon re-evaluation patient reports feeling better and speech appears improved.    Disposition: admitted to medicine for further evaluation.     Portions of the record may have been created with voice recognition software. Occasional wrong word or \"sound a like\" " "substitutions may have occurred due to the inherent limitations of voice recognition software. Read the chart carefully and recognize, using context, where substitutions have occurred.      ED Course as of 02/05/25 2348   Wed Feb 05, 2025 1911 POC Glucose(!): 157   1953 WBC: 8.14   2004 LACTIC ACID: 1.6  wnl   2004 hs TnI 0hr: 9  Delta due at 21:25   2004 Creatinine: 0.94  No ANNEL    2130 SARS-COV-2(!): Positive   2133 CTA head and neck with and without contrast  1. No evidence of acute infarct, intracranial hemorrhage or mass.  2. No stenosis, dissection or occlusion of the carotid or vertebral arteries or major vessels of the [place].   2146 SC message sent to [facility]    2207 Admitted to [facility] for stroke pathway, COVID19       Medications   acetaminophen (TYLENOL) tablet 975 mg (975 mg Oral Given 2/5/25 2006)   iohexol (OMNIPAQUE) 350 MG/ML injection (SINGLE-DOSE) 75 mL (75 mL Intravenous Given 2/5/25 2104)   multi-electrolyte (ISOLYTE-S PH 7.4) bolus 1,000 mL (1,000 mL Intravenous New Bag 2/5/25 2154)       ED Risk Strat Scores            CRAFFT      Flowsheet Row Most Recent Value   CRAFFT Initial Screen: During the past 12 months, did you:    1. Drink any alcohol (more than a few sips)?  No Filed at: 02/05/2025 1954   2. Smoke any marijuana or hashish No Filed at: 02/05/2025 1954   3. Use anything else to get high? (\"anything else\" includes illegal drugs, over the counter and prescription drugs, and things that you sniff or 'butler')? No Filed at: 02/05/2025 1954                      SBIRT 20yo+      Flowsheet Row Most Recent Value   Initial Alcohol Screen: US AUDIT-C     1. How often do you have a drink containing alcohol? 0 Filed at: 02/05/2025 1954   2. How many drinks containing alcohol do you have on a typical day you are drinking?  0 Filed at: 02/05/2025 1954   3a. Male UNDER 65: How often do you have five or more drinks on one occasion? 0 Filed at: 02/05/2025 1954   3b. FEMALE Any Age, or MALE 65+: " "How often do you have 4 or more drinks on one occassion? 0 Filed at: 02/05/2025 1954   Audit-C Score 0 Filed at: 02/05/2025 1954   CAMELIA: How many times in the past year have you...    Used an illegal drug or used a prescription medication for non-medical reasons? Never Filed at: 02/05/2025 1954                            History of Present Illness       Chief Complaint   Patient presents with    Fever     Pt brought in via EMS, fever of 101.6 took tylenol around noon. Per family pt has slurred speech and trouble finding words.        Past Medical History:   Diagnosis Date    Ankle swelling     \"off and on since teenage years\"    Arthritis 2005    Dental crown present     Diabetes mellitus ([facility]) 2014    Type 2 Estimated. On metformin    Disease of thyroid gland 1995    DVT (deep venous thrombosis) ([facility])     18yrs ago and again 2 yrs ago    Factor 5 Leiden mutation, heterozygous ([facility])     Family history of reaction to anesthesia     \"pt's sister and niece woke up after anesthesia however were sleepy for a few days afterwards\"    GERD (gastroesophageal reflux disease)     occas    Heart murmur 1969    History of radiation therapy 09/01/2006    Hyperlipidemia     Psoriatic arthritis ([facility])     Wears glasses       Past Surgical History:   Procedure Laterality Date    BIOPSY CORE NEEDLE Right 03/03/2010    benign    BREAST CYST ASPIRATION Right 08/15/2003    BREAST CYST ASPIRATION Right     x3  (Years ago)    BREAST LUMPECTOMY Left 08/22/2006    positive    COLONOSCOPY      HERNIA REPAIR  1951    MAMMO STEREOTACTIC BREAST BIOPSY LEFT (ALL INC) Left 08/02/2006    malignant    MASTECTOMY W/ SENTINEL NODE BIOPSY Left 12/27/2024    Procedure: LEFT BREAST MASTECTOMY, SENTINEL LYMPH NODE BX, LYMPHOSCINTIGRAPHY, LYMPHATIC MAPPING;;  Surgeon: Kristy Antoine MD;  Location: [facility];  Service: Surgical Oncology    TUBAL LIGATION  1983    US GUIDED BREAST BIOPSY LEFT COMPLETE Left 02/06/2009    benign    US GUIDED BREAST BIOPSY LEFT " COMPLETE Left 11/13/2024    WISDOM TOOTH EXTRACTION        Family History   Problem Relation Age of Onset    Breast cancer Mother 53        Also mat aunt    Heart disease Father     Ovarian cancer Sister 63    No Known Problems Sister     No Known Problems Sister     Lung cancer Sister 67    Breast cancer Maternal Aunt 43    Stroke Maternal Aunt     Stomach cancer Maternal Uncle     Skin cancer Maternal Uncle     Heart disease Maternal Uncle     Cancer Paternal Aunt     Breast cancer Paternal Aunt     Skin cancer Paternal Aunt     No Known Problems Maternal Grandmother     Heart disease Maternal Grandfather     Breast cancer Paternal Grandmother 90    Dementia Paternal Grandmother     Heart disease Paternal Grandfather     No Known Problems Daughter     No Known Problems Son     Breast cancer Cousin 71        maternal    Breast cancer Cousin 73        maternal      Social History     Tobacco Use    Smoking status: Never    Smokeless tobacco: Never   Vaping Use    Vaping status: Never Used   Substance Use Topics    Alcohol use: Not Currently     Comment: Few drinks per year    Drug use: Never      E-Cigarette/Vaping    E-Cigarette Use Never User       E-Cigarette/Vaping Substances    Nicotine No     THC No     CBD No     Flavoring No     Other No     Unknown No       I have reviewed and agree with the history as documented.     HPI  Patient is a 74 y.o. female with PMHx diabetes, breast cancer status post left-sided mastectomy, hyperlipidemia, DVT on Xarelto who presents to the ED for evaluation of fever, cough, difficulty speaking.  Patient reports developing a cough yesterday and a fever today.  She denies any chest pain, shortness of breath, abdominal pain, nausea, vomiting, dysuria, hematuria, headache, dizziness.  Patient endorses some loose stools today which she attributes to the new medication Arimidex prescribed by her oncologist.  Patient denies any recent sick contacts or travel.  Patient is accompanied by  son who assisted in the history.  The son states he was called by his sister who communicates with the patient regularly, last  texted and talked with her at 4 AM yesterday and patient was acting normally at that point.  The son states that at 4 PM today the sister tried to text the patient and called the patient but she initially did not respond but that she would reply to the text with desiree.  The son went over to the house and found the patient sitting in a chair, patient was weak and seemed to have some word finding difficulties. Denies any fall or head trauma.     Review of Systems   Constitutional:  Positive for fever.   Respiratory:  Positive for cough.    Neurological:  Positive for speech difficulty.           Objective       ED Triage Vitals [02/05/25 1858]   Temperature Pulse Blood Pressure Respirations SpO2 Patient Position - Orthostatic VS   (!) 103.1 °F (39.5 °C) 101 155/70 18 96 % Lying      Temp Source Heart Rate Source BP Location FiO2 (%) Pain Score    Oral Monitor Left arm -- --      Vitals      Date and Time Temp Pulse SpO2 Resp BP Pain Score FACES Pain Rating User   02/05/25 2300 -- 68 93 % 18 119/56 -- -- RADHA   02/05/25 2200 99 °F (37.2 °C) 74 92 % 20 121/60 -- -- SOLANGE   02/05/25 2000 101.7 °F (38.7 °C) 94 92 % 18 131/61 -- -- RADHA   02/05/25 1858 103.1 °F (39.5 °C) 101 96 % 18 155/70 -- -- RADHA            Physical Exam  Vitals and nursing note reviewed.   Constitutional:       General: She is not in acute distress.     Appearance: Normal appearance. She is well-developed. She is obese. She is not ill-appearing, toxic-appearing or diaphoretic.   HENT:      Head: Normocephalic and atraumatic.   Eyes:      General:         Right eye: No discharge.         Left eye: No discharge.      Extraocular Movements: Extraocular movements intact.      Conjunctiva/sclera: Conjunctivae normal.      Pupils: Pupils are equal, round, and reactive to light.   Cardiovascular:      Rate and Rhythm: Normal rate and  regular rhythm.      Heart sounds: No murmur heard.  Pulmonary:      Effort: Pulmonary effort is normal. No respiratory distress.      Breath sounds: Normal breath sounds. No stridor. No wheezing, rhonchi or rales.   Chest:      Chest wall: No tenderness.   Abdominal:      Palpations: Abdomen is soft.      Tenderness: There is no abdominal tenderness.   Musculoskeletal:         General: No swelling. Normal range of motion.      Cervical back: Normal range of motion and neck supple. No rigidity.      Right lower leg: No edema.      Left lower leg: No edema.   Skin:     General: Skin is warm and dry.      Capillary Refill: Capillary refill takes less than 2 seconds.      Comments: S/p left sided mastectomy   Neurological:      General: No focal deficit present.      Mental Status: She is alert and oriented to person, place, and time.      Cranial Nerves: No cranial nerve deficit.      Sensory: No sensory deficit.      Motor: No weakness.      Coordination: Coordination normal.      Comments: Mild expressive aphasia  Fluent speech at times  No garbled speech  No slurred speech      Psychiatric:         Mood and Affect: Mood normal.         Results Reviewed       Procedure Component Value Units Date/Time    Urine Microscopic [[id number]]  (Abnormal) Collected: 02/05/25 2243    Lab Status: Final result Specimen: Urine, Other Updated: 02/05/25 2324     RBC, UA 4-10 /hpf      WBC, UA 1-2 /hpf      Epithelial Cells Occasional /hpf      Bacteria, UA None Seen /hpf     UA w Reflex to Microscopic w Reflex to Culture [[id number]]  (Abnormal) Collected: 02/05/25 2243    Lab Status: Final result Specimen: Urine, Other Updated: 02/05/25 2322     Color, UA Light Yellow     Clarity, UA Clear     Specific Gravity, UA >=1.050     pH, UA 6.0     Leukocytes, UA Trace     Nitrite, UA Negative     Protein, UA Trace mg/dl      Glucose, UA Negative mg/dl      Ketones, UA Negative mg/dl      Urobilinogen, UA <2.0 mg/dl      Bilirubin, UA  Negative     Occult Blood, UA Small    Blood culture #2 [[id number]] Collected: 02/05/25 1953    Lab Status: Preliminary result Specimen: Blood from Arm, Left Updated: 02/05/25 2301     Blood Culture Received in Microbiology Lab. Culture in Progress.    HS Troponin I 2hr [[id number]]  (Normal) Collected: 02/05/25 2116    Lab Status: Final result Specimen: Blood from Arm, Right Updated: 02/05/25 2216     hs TnI 2hr 11 ng/L      Delta 2hr hsTnI 2 ng/L     FLU/RSV/COVID - if FLU/RSV clinically relevant (2hr TAT) [[id number]]  (Abnormal) Resulted: 02/05/25 2124    Lab Status: Final result Specimen: Nares from Nose Updated: 02/05/25 2124     SARS-CoV-2 Positive     INFLUENZA A PCR Negative     INFLUENZA B PCR Negative     RSV PCR Negative    Narrative:      This test has been performed using the CoV-2/Flu/RSV plus assay on the [employer] platform. This test has been validated by the  and verified by the performing laboratory.     This test is designed to amplify and detect the following: nucleocapsid (N), envelope (E), and RNA-dependent RNA polymerase (RdRP) genes of the SARS-CoV-2 genome; matrix (M), basic polymerase (PB2), and acidic protein (PA) segments of the influenza A genome; matrix (M) and non-structural protein (NS) segments of the influenza B genome, and the nucleocapsid genes of RSV A and RSV B.     Positive results are indicative of the presence of Flu A, Flu B, RSV, and/or SARS-CoV-2 RNA. Positive results for SARS-CoV-2 or suspected novel influenza should be reported to state, local, or federal health departments according to local reporting requirements.      All results should be assessed in conjunction with clinical presentation and other laboratory markers for clinical management.     FOR PEDIATRIC PATIENTS - copy/paste COVID Guidelines URL to browser: [email or web address]       Blood culture #1 [[id number]] Collected: 02/05/25 2039     Lab Status: In process Specimen: Blood from Arm, Right Updated: 02/05/25 2043    Protime-INR [[id number]]  (Abnormal) Collected: 02/05/25 1925    Lab Status: Final result Specimen: Blood from Arm, Right Updated: 02/05/25 2009     Protime 20.6 seconds      INR 1.76    Narrative:      INR Therapeutic Range    Indication                                             INR Range      Atrial Fibrillation                                               2.0-3.0  Hypercoagulable State                                    2.0.2.3  Left Ventricular Asist Device                            2.0-3.0  Mechanical Heart Valve                                  -    Aortic(with afib, MI, embolism, HF, LA enlargement,    and/or coagulopathy)                                     2.0-3.0 (2.5-3.5)     Mitral                                                             2.5-3.5  Prosthetic/Bioprosthetic Heart Valve               2.0-3.0  Venous thromboembolism (VTE: VT, PE        2.0-3.0    APTT [[id number]]  (Normal) Collected: 02/05/25 1925    Lab Status: Final result Specimen: Blood from Arm, Right Updated: 02/05/25 2009     PTT 33 seconds     Procalcitonin [[id number]]  (Normal) Collected: 02/05/25 1925    Lab Status: Final result Specimen: Blood from Arm, Right Updated: 02/05/25 2008     Procalcitonin 0.11 ng/ml     HS Troponin I 4hr [[id number]]     Lab Status: No result Specimen: Blood     HS Troponin 0hr (reflex protocol) [[id number]]  (Normal) Collected: 02/05/25 1925    Lab Status: Final result Specimen: Blood from Arm, Right Updated: 02/05/25 2002     hs TnI 0hr 9 ng/L     Comprehensive metabolic panel [[id number]]  (Abnormal) Collected: 02/05/25 1925    Lab Status: Final result Specimen: Blood from Arm, Right Updated: 02/05/25 2000     Sodium 136 mmol/L      Potassium 3.6 mmol/L      Chloride 104 mmol/L      CO2 24 mmol/L      ANION GAP 8 mmol/L      BUN 12 mg/dL      Creatinine 0.94 mg/dL      Glucose 167 mg/dL      Calcium 8.7 mg/dL      AST  17 U/L      ALT 13 U/L      Alkaline Phosphatase 81 U/L      Total Protein 6.5 g/dL      Albumin 3.5 g/dL      Total Bilirubin 0.75 mg/dL      eGFR 60 ml/min/1.73sq m     Narrative:      National Kidney Disease Foundation guidelines for Chronic Kidney Disease (CKD):     Stage 1 with normal or high GFR (GFR > 90 mL/min/1.73 square meters)    Stage 2 Mild CKD (GFR = 60-89 mL/min/1.73 square meters)    Stage 3A Moderate CKD (GFR = 45-59 mL/min/1.73 square meters)    Stage 3B Moderate CKD (GFR = 30-44 mL/min/1.73 square meters)    Stage 4 Severe CKD (GFR = 15-29 mL/min/1.73 square meters)    Stage 5 End Stage CKD (GFR <15 mL/min/1.73 square meters)  Note: GFR calculation is accurate only with a steady state creatinine    Lactic acid [[id number]]  (Normal) Collected: 02/05/25 1925    Lab Status: Final result Specimen: Blood from Arm, Right Updated: 02/05/25 1959     LACTIC ACID 1.6 mmol/L     Narrative:      Result may be elevated if tourniquet was used during collection.    CBC and differential [[id number]]  (Abnormal) Collected: 02/05/25 1925    Lab Status: Final result Specimen: Blood from Arm, Right Updated: 02/05/25 1946     WBC 8.14 Thousand/uL      RBC 3.73 Million/uL      Hemoglobin 11.0 g/dL      Hematocrit 34.6 %      MCV 93 fL      MCH 29.5 pg      MCHC 31.8 g/dL      RDW 15.0 %      MPV 10.3 fL      Platelets 241 Thousands/uL      nRBC 0 /100 WBCs      Segmented % 86 %      Immature Grans % 0 %      Lymphocytes % 4 %      Monocytes % 10 %      Eosinophils Relative 0 %      Basophils Relative 0 %      Absolute Neutrophils 6.93 Thousands/µL      Absolute Immature Grans 0.03 Thousand/uL      Absolute Lymphocytes 0.29 Thousands/µL      Absolute Monocytes 0.85 Thousand/µL      Eosinophils Absolute 0.01 Thousand/µL      Basophils Absolute 0.03 Thousands/µL     Fingerstick Glucose (POCT) [[id number]]  (Abnormal) Collected: 02/05/25 1857    Lab Status: Final result Specimen: Blood Updated: 02/05/25 1858     POC Glucose  157 mg/dl             CTA head and neck with and without contrast   Final Interpretation by Rene López MD (02/05 2130)         1. No evidence of acute infarct, intracranial hemorrhage or mass.   2. No stenosis, dissection or occlusion of the carotid or vertebral arteries or major vessels of the [place].                  Workstation performed: [id number]         XR chest portable   ED Interpretation by Amilcar Velasco DO (02/05 2131)   Chest x-ray interpreted me shows no infiltrate, no acute cardiopulmonary disease          Complex Venous Access Line    Date/Time: 2/5/2025 8:01 PM    Performed by: Lolly Trinidad MD  Authorized by: Lolly Trinidad MD    Patient location:  ED  Other Assisting Provider: No    Consent:     Consent obtained:  Verbal    Consent given by:  Patient    Risks discussed:  Arterial puncture, bleeding, incorrect placement, infection and nerve damage    Alternatives discussed:  No treatment  Universal protocol:     Procedure explained and questions answered to patient or proxy's satisfaction: yes    Pre-procedure details:     Hand hygiene: Hand hygiene performed prior to insertion      Sterile barrier technique: All elements of maximal sterile technique followed      Skin preparation:  Alcohol    Skin preparation agent: Skin preparation agent completely dried prior to procedure    Procedure details:     Complex Venous Access Line Type: US Guided Peripheral IV      Peripheral IV Indications comment:  Difficult access    Orientation:  Left    Location:  Antecubital    Catheter size:  18 gauge    Approach: percutaneous technique used      Patient position:  Flat    Ultrasound image availability:  Not saved    Sterile ultrasound techniques: Sterile gel and sterile probe covers were used      Number of attempts:  1    Successful placement: yes      Landmarks identified: yes    Anesthesia (see MAR for exact dosages):     Anesthesia method:  None  Post-procedure details:     Post-procedure:   Dressing applied    Assessment:  Blood return through all ports    Post-procedure complications: none      Patient tolerance of procedure:  Tolerated well, no immediate complications  Complex Venous Access Line    Date/Time: 2/5/2025 8:39 PM    Performed by: Lolly Trinidad MD  Authorized by: Lolly Trinidad MD    Patient location:  ED  Other Assisting Provider: No    Consent:     Consent obtained:  Verbal    Consent given by:  Patient    Risks discussed:  Arterial puncture, bleeding, infection, nerve damage and incorrect placement    Alternatives discussed:  No treatment  Universal protocol:     Procedure explained and questions answered to patient or proxy's satisfaction: yes    Pre-procedure details:     Hand hygiene: Hand hygiene performed prior to insertion      Sterile barrier technique: All elements of maximal sterile technique followed      Skin preparation:  Alcohol    Skin preparation agent: Skin preparation agent completely dried prior to procedure    Procedure details:     Complex Venous Access Line Type: US Guided Peripheral IV      Peripheral IV Indications: other specified disorders of the vein      Peripheral IV Indications comment:  Difficult access    Orientation:  Right    Location:  Antecubital    Catheter size:  18 gauge    Approach: percutaneous technique used      Patient position:  Flat    Ultrasound image availability:  Not saved    Sterile ultrasound techniques: Sterile gel and sterile probe covers were used      Number of attempts:  1    Successful placement: yes      Landmarks identified: yes    Anesthesia (see MAR for exact dosages):     Anesthesia method:  None  Post-procedure details:     Post-procedure:  Dressing applied    Assessment:  Blood return through all ports    Post-procedure complications: none      Patient tolerance of procedure:  Tolerated well, no immediate complications      ED Medication and Procedure Management   Prior to Admission Medications   Prescriptions Last Dose  Informant Patient Reported? Taking?   Cholecalciferol (VITAMIN D) 2000 units CAPS  Self Yes No   Sig: Take 1 capsule by mouth daily   anastrozole (ARIMIDEX) 1 mg tablet   No No   Sig: Take 1 tablet (1 mg total) by mouth daily   folic acid (FOLVITE) 1 mg tablet  Self Yes No   Sig: Take 1 tablet by mouth daily   levothyroxine 88 mcg tablet  Self No No   Sig: Take 1 tablet (88 mcg total) by mouth daily   metFORMIN (GLUCOPHAGE-XR) 500 mg 24 hr tablet  Self No No   Sig: TAKE 3 TABLETS (1,500 MG TOTAL) BY MOUTH DAILY FOR DIABETES   methotrexate 2.5 mg tablet  Self No No   Si tabs once a week   Patient taking differently: Take by mouth once a week 8 tabs once a week   rivaroxaban (Xarelto) 20 mg tablet  Self No No   Sig: Take 1 tablet (20 mg total) by mouth daily with breakfast   simvastatin (ZOCOR) 20 mg tablet  Self No No   Sig: Take 1 tablet (20 mg total) by mouth daily   traMADol (Ultram) 50 mg tablet  Self No No   Sig: Take 1 tablet (50 mg total) by mouth every 8 (eight) hours as needed for severe pain      Facility-Administered Medications: None     Patient's Medications   Discharge Prescriptions    No medications on file     No discharge procedures on file.  ED SEPSIS DOCUMENTATION   Time reflects when diagnosis was documented in both MDM as applicable and the Disposition within this note       Time User Action Codes Description Comment    2025  9:46 PM Lolly Trinidad T Add [R47.9] Difficulty with speech     2025  9:46 PM Tamra Trinidadu T Add [R50.9] Fever     2025  9:46 PM Tamra Trinidadu T Add [R05.9] Cough     2025  9:47 PM Tamra Trinidadu T Add [U07.1] COVID-19                  Lolly Trinidad MD  [phone]

## 2025-02-06 NOTE — DISCHARGE SUMMARY
Discharge Summary - Hospitalist   Name: Sujey Webber 74 y.o. female I MRN: [id number]  Unit/Bed#: [facility] 720-01 I Date of Admission: 2/5/2025   Date of Service: 2/6/2025 I Hospital Day: 1     Assessment & Plan  Acute metabolic encephalopathy  Presented from home with reported fever/chills and URI type symptoms, also noted with apparent slurred speech/confusion.  No focal deficits noted by patient/family, and patient with quick return to mental baseline after arrival here  Workup revealing COVID-19 infection with management as below.  CTA head/neck negative for acute intracranial pathology or LVO/aneurysm/dissection/significant stenosis  Metabolic encephalopathy, likely due to sepsis/COVID 19 infection, as evidenced by confusion, with return to baseline in ED, treated with monitoring of mental status and supportive care.   Currently ambulating to the bathroom without issues, in room air, back to baseline mentation  COVID-19 virus infection  Patient with several day history of URI type symptoms including nasal congestion, sore throat, cough.  COVID-19/influenza/RSV PCR noted positive for COVID-19 infection  Patient currently maintaining appropriate oxygenation on room air  Supportive management at this juncture, if patient should develop oxygen requirement will initiate on treatment protocol as appropriate  Type 2 diabetes mellitus without complication, without long-term current use of insulin (HCC)  Lab Results   Component Value Date    HGBA1C 7.4 (H) 12/12/2024       Recent Labs     02/05/25  1857 02/06/25  0620 02/06/25  1049   POCGLU 157* 135 176*       Blood Sugar Average: Last 72 hrs:  (P) 156  Reasonably controlled as outpatient  Hold metformin while admitted, start correctional insulin coverage with Accu-Cheks  Carb controlled diet  Initiate hypoglycemia protocol  Factor 5 Leiden mutation, heterozygous (HCC)  With history of DVT currently maintained on Xarelto, continue  Stage 3a chronic kidney disease  (HCC)  Lab Results   Component Value Date    EGFR 59 02/06/2025    EGFR 60 02/05/2025    EGFR 64 12/12/2024    CREATININE 0.95 02/06/2025    CREATININE 0.94 02/05/2025    CREATININE 0.89 12/12/2024   Renal function at baseline, monitor with BMP.  Avoid/limit nephrotoxins and hypotension  Malignant neoplasm of overlapping sites of left breast in female, estrogen receptor positive (HCC)  S/p left lumpectomy and radiation and subsequent completion mastectomy  Recently initiated on anastrozole, continue.  Continue outpatient follow-up with medical/surgical oncology as appropriate  Sepsis (HCC) (Resolved: 2/6/2025)  Sepsis, POA, secondary to COVID-19 infection, as evidenced by fever (103.1A°) and tachycardia (101), in the ED, treated with IV fluids, Tylenol, monitoring of COVID symptoms, and supportive care   Morbid obesity (HCC)  Morbid obesity, in the setting of DM type II, as evidenced by BMI 46.83, treated with consistent carb diet level II and counseling for diet and lifestyle modifications     Discharging Physician / Practitioner: Anand Moraes MD  PCP: Demetrius Garibay DO  Admission Date:   Admission Orders (From admission, onward)       Ordered        02/05/25 2207  INPATIENT ADMISSION  Once                          Discharge Date: 02/06/25    Medical Problems       Resolved Problems  Date Reviewed: 1/29/2025          Resolved    Sepsis (HCC) 2/6/2025     Resolved by  Anand Moraes MD          Consultations During Hospital Stay:  None none    Procedures Performed:   None    Significant Findings / Test Results:   COVID-19 positive        Reason for Admission: Confusion    Hospital Course:     Sujey Webber is a 74 y.o. female patient who originally presented to the hospital on 2/5/2025 with PMH of left breast cancer s/p lumpectomy and radiation currently on Arimidex, factor V Leiden mutation on Xarelto, type 2 diabetes, CKD 3, presented to ED with complaint of fever, chills and confusion.  In ED patient  "returned back to baseline mentation, alert and oriented x 4.  Labs reveals positive for COVID-19 infection.  However fortunately patient did not require any supplemental oxygen.  No leukocytosis.  Normal lactic acid, normal troponin.  CTA head and neck was unremarkable for acute stroke.  Patient was admitted for observation.  Patient rapidly had improvement in mental status, ambulating in the room.  Patient was treated with IV fluid hydration.  Patient is now ready for discharge home.    The patient, initially admitted to the hospital as inpatient, was discharged earlier than expected given the following: complete resolution of her symptoms of encephalopathy, weakness. .    Please see above list of diagnoses and related plan for additional information.     Condition at Discharge: good     Discharge Day Visit / Exam:     Subjective:    Patient is sitting up on the bed, eating lunch without any difficulty.  Denies any shortness of breath, chest pain or any other complaints.  Patient is alert and oriented x 4 on my exam.  She is also ambulating to the bathroom without any issues or any oxygen requirement.    Vitals: Blood Pressure: 129/54 (02/06/25 0701)  Pulse: 70 (02/06/25 0701)  Temperature: 99.1 °F (37.3 °C) (02/06/25 0701)  Temp Source: Oral (02/06/25 0142)  Respirations: 20 (02/06/25 0142)  Height: 5' 7\" (170.2 cm) (02/06/25 0138)  Weight - Scale: 136 kg (299 lb) (02/06/25 0138)  SpO2: 97 % (02/06/25 0701)  Exam:   Physical Exam  Constitutional:       Appearance: She is well-developed.   HENT:      Head: Normocephalic and atraumatic.   Pulmonary:      Effort: Pulmonary effort is normal. No respiratory distress.      Breath sounds: Normal breath sounds.   Musculoskeletal:      Cervical back: Normal range of motion.   Skin:     General: Skin is warm and dry.            Discharge instructions/Information to patient and family:   See after visit summary for information provided to patient and family.      Provisions " for Follow-Up Care:  See after visit summary for information related to follow-up care and any pertinent home health orders.      Disposition:     Home       Planned Readmission: no     Discharge Statement:  I spent 45 minutes discharging the patient. This time was spent on the day of discharge. I had direct contact with the patient on the day of discharge. Greater than 50% of the total time was spent examining patient, answering all patient questions, arranging and discussing plan of care with patient as well as directly providing post-discharge instructions.  Additional time then spent on discharge activities.    Discharge Medications:  See after visit summary for reconciled discharge medications provided to patient and family.      ** Please Note: This note has been constructed using a voice recognition system **

## 2025-02-06 NOTE — ASSESSMENT & PLAN NOTE
Presented from home with reported fever/chills and URI type symptoms, also noted with apparent slurred speech/confusion.  No focal deficits noted by patient/family, and patient with quick return to mental baseline after arrival here  Workup revealing COVID-19 infection with management as below.  CTA head/neck negative for acute intracranial pathology or LVO/aneurysm/dissection/significant stenosis  Metabolic encephalopathy, likely due to sepsis/COVID 19 infection, as evidenced by confusion, with return to baseline in ED, treated with monitoring of mental status and supportive care.   Currently ambulating to the bathroom without issues, in room air, back to baseline mentation

## 2025-02-07 ENCOUNTER — TRANSITIONAL CARE MANAGEMENT (OUTPATIENT)
Dept: INTERNAL MEDICINE CLINIC | Facility: CLINIC | Age: 75
End: 2025-02-07

## 2025-02-09 LAB
BACTERIA BLD CULT: NORMAL
BACTERIA BLD CULT: NORMAL

## 2025-02-10 LAB
BACTERIA BLD CULT: NORMAL
BACTERIA BLD CULT: NORMAL

## 2025-02-11 ENCOUNTER — TELEPHONE (OUTPATIENT)
Dept: OBGYN CLINIC | Facility: OTHER | Age: 75
End: 2025-02-11

## 2025-02-13 ENCOUNTER — APPOINTMENT (OUTPATIENT)
Dept: PHYSICAL THERAPY | Facility: REHABILITATION | Age: 75
End: 2025-02-13
Payer: MEDICARE

## 2025-02-17 ENCOUNTER — OFFICE VISIT (OUTPATIENT)
Dept: PHYSICAL THERAPY | Facility: REHABILITATION | Age: 75
End: 2025-02-17
Payer: MEDICARE

## 2025-02-17 DIAGNOSIS — C50.812 MALIGNANT NEOPLASM OF OVERLAPPING SITES OF LEFT BREAST IN FEMALE, ESTROGEN RECEPTOR POSITIVE (HCC): Primary | ICD-10-CM

## 2025-02-17 DIAGNOSIS — Z17.0 MALIGNANT NEOPLASM OF OVERLAPPING SITES OF LEFT BREAST IN FEMALE, ESTROGEN RECEPTOR POSITIVE (HCC): Primary | ICD-10-CM

## 2025-02-17 PROCEDURE — 97110 THERAPEUTIC EXERCISES: CPT

## 2025-02-17 NOTE — PROGRESS NOTES
Daily Note     Today's date: 2025  Patient name: Sujey Webber  : 1950  MRN: [id number]  Referring provider: Kristy Antoine MD  Dx:   Encounter Diagnosis     ICD-10-CM    1. Malignant neoplasm of overlapping sites of left breast in female, estrogen receptor positive (HCC)  C50.812     Z17.0             Subjective: Reports limited in HPE tolerance 22 COVID over past 2 weeks. Feels she is still recovering. Reports ~32 oz of flat water prior to session today.     Objective: See treatment diary below      Vitals: 25 min into session: O2 - 97, , RPE 4-6 throughout       Assessment: Continued with current POC. Pt required frequent rest breaks 2/2 deconditioning and likely continues to be recovering from recent illness. Vitals and RPE monitored throughout.     Plan: Continue per plan of care.      Precautions: h/o L breast CA    POC expires Auth Status? (BOMN, approved, pending) Unit limit (Daily) Auth Start date Expiration date PT/OT + Visit Limit?   25 BOMN         Date of Service 1/29 2/3 2/17      Visits used 1 2 3      Visits remaining 99 99 99      Patient Education         [facility] Education provided        Lymphedema risk education Low risk- discussed                 Manual Ther         Scar mob         MLD                                    Ther Ex         Aerobic exe  NuStep 10 min UE bike   3' forward   3' back      Balance exe  March in place x20 March in place x20      tandem stance  2x 30 sec       Strengthening-UE  Bicep curl blue TB x20  Scaption x10  Seated ER at side x20  Row x20  Triceps ext x20    Bicep curl G band x20     Scaption x10    Seated ER at side x20  G band     Row GTB x20    Triceps ext x20 GTB      Strengthening- LE  Heel raise x20  Sit to stand x10  Standing hip abd x20 Heel raise x20  Sit to stand x10  Standing hip abd x20        Flexibility- UE    **     Flexibility- LE    **     Therapeutic Rest Breaks   Required seated breaks throughout      Ther Activity &  Self Care

## 2025-02-18 ENCOUNTER — OFFICE VISIT (OUTPATIENT)
Dept: INTERNAL MEDICINE CLINIC | Facility: CLINIC | Age: 75
End: 2025-02-18
Payer: MEDICARE

## 2025-02-18 ENCOUNTER — TELEPHONE (OUTPATIENT)
Dept: ADMINISTRATIVE | Facility: OTHER | Age: 75
End: 2025-02-18

## 2025-02-18 VITALS
SYSTOLIC BLOOD PRESSURE: 118 MMHG | HEART RATE: 84 BPM | BODY MASS INDEX: 41.66 KG/M2 | WEIGHT: 266 LBS | DIASTOLIC BLOOD PRESSURE: 70 MMHG | OXYGEN SATURATION: 97 %

## 2025-02-18 DIAGNOSIS — U07.1 COVID-19: ICD-10-CM

## 2025-02-18 DIAGNOSIS — E04.1 THYROID NODULE: Primary | ICD-10-CM

## 2025-02-18 DIAGNOSIS — E11.21 DIABETIC NEPHROPATHY ASSOCIATED WITH TYPE 2 DIABETES MELLITUS (HCC): ICD-10-CM

## 2025-02-18 DIAGNOSIS — L40.50 PSORIATIC ARTHRITIS (HCC): ICD-10-CM

## 2025-02-18 PROCEDURE — 99495 TRANSJ CARE MGMT MOD F2F 14D: CPT | Performed by: INTERNAL MEDICINE

## 2025-02-18 NOTE — ASSESSMENT & PLAN NOTE
Transition care management visit regarding recent hospitalization with COVID-19 dehydration and mental status change patient's mental status change did improve with IV hydration likely related to dehydration.  She was unable to receive paxlovid secondary to Xarelto and interaction with Paxlovid unable to stop Xarelto because of the DVT and also Leiden factor V currently the patient is improving clinically drinking adequate amounts of fluid eating well able to do her ADLs     Return to office  3 months  call if any problems

## 2025-02-18 NOTE — TELEPHONE ENCOUNTER
----- Message from Shravan CORTÉS sent at 2/18/2025  9:47 AM EST -----  02/18/25 9:47 AM    Hello, our patient Sujey Webber  has had Diabetic Eye Exam completed/performed. Please assist in updating the patient chart by making an External outreach to  [facility] in [place] located in [place] .  The date of service is Spring or Summer of 2024.    Thank you,  Shravan Disla MA  [facility]

## 2025-02-18 NOTE — ASSESSMENT & PLAN NOTE
Clinically stable and doing well continue the current medical regiment will continue monitor.  Continue routine follow-up with rheumatology

## 2025-02-18 NOTE — ASSESSMENT & PLAN NOTE
Elevated blood sugars during the hospitalization with COVID will obtain diabetic labs in 1 month  Lab Results   Component Value Date    HGBA1C 7.4 (H) 12/12/2024     Orders:  •  Comprehensive metabolic panel; Future  •  Lipid Panel with Direct LDL reflex; Future  •  Hemoglobin A1c (w/out EAG) ([facility] ONLY); Future

## 2025-02-18 NOTE — PROGRESS NOTES
Transition of Care Visit  Name: Sujey Webber      : 1950      MRN: [id number]  Encounter Provider: Demetrius Garibay DO  Encounter Date: 2025   Encounter department: [facility]    Assessment & Plan  Psoriatic arthritis (HCC)  Clinically stable and doing well continue the current medical regiment will continue monitor.  Continue routine follow-up with rheumatology       Diabetic nephropathy associated with type 2 diabetes mellitus (HCC)  Elevated blood sugars during the hospitalization with COVID will obtain diabetic labs in 1 month  Lab Results   Component Value Date    HGBA1C 7.4 (H) 2024     Orders:  •  Comprehensive metabolic panel; Future  •  Lipid Panel with Direct LDL reflex; Future  •  Hemoglobin A1c (w/out EAG) ([facility] ONLY); Future    Thyroid nodule  Known thyroid nodule please CAT scan in the hospital did show a thyroid nodule will check a surveillance ultrasound  Orders:  •  US thyroid; Future    COVID-19  Transition care management visit regarding recent hospitalization with COVID-19 dehydration and mental status change patient's mental status change did improve with IV hydration likely related to dehydration.  She was unable to receive paxlovid secondary to Xarelto and interaction with Paxlovid unable to stop Xarelto because of the DVT and also Leiden factor V currently the patient is improving clinically drinking adequate amounts of fluid eating well able to do her ADLs     Return to office  3 months  call if any problems       History of Present Illness     Transitional Care Management Review:   Sujey Webber is a 74 y.o. female here for TCM follow up. Wed body aches , diarrrhea , slept all day daughter called 3:30pm daughter called sounded linda, son cane to check with the pt , not making sense , went to the hospital eating drinking no anosmnia 1st time cov, found to have COVID-19 dehydration mental status change.  Mental status and change  improved with IV fluids not able to receive paxlovid secondary to drug to drug interaction.  Patient reports me overall she is feeling much better she is eating and drinking well no confusion able to do the ADLs at home she does feel fatigued, cough improving no more diarrhea diarrhea only for 1 day    During the TCM phone call patient stated:  TCM Call     Date and time call was made  2/7/2025  8:21 AM    Hospital care reviewed  Records not available    Patient was hospitial[facility] at  [facility]    Date of Admission  02/05/25    Date of discharge  02/06/25    Diagnosis  Acute metabolic encephalopathy    Disposition  Home    Were the patients medications reviewed and updated  No    Current Symptoms  None      TCM Call     Post hospital issues  None    Should patient be enrolled in anticoag monitoring?  No    Scheduled for follow up?  Yes    Did you obtain your prescribed medications  No    Why were you unable to obtain your medications  pts meds wwere held till PCP appt    Do you need help managing your prescriptions or medications  No    Is transportation to your appointment needed  No    I have advised the patient to call PCP with any new or worsening symptoms  Symone MORALEZ    Living Arrangements  Alone    Are you recieving any outpatient services  No    Are you recieving home care services  No    Are you using any community resources  No    Current waiver services  No    Have you fallen in the last 12 months  No    Interperter language line needed  No        HPI  Review of Systems   Constitutional:  Positive for fatigue. Negative for activity change, appetite change and unexpected weight change.   HENT:  Negative for congestion and postnasal drip.    Eyes:  Negative for visual disturbance.   Respiratory:  Positive for cough. Negative for shortness of breath.    Cardiovascular:  Negative for chest pain.   Gastrointestinal:  Negative for abdominal pain, diarrhea, nausea and vomiting.   Neurological:   Negative for dizziness, light-headedness and headaches.   Hematological:  Negative for adenopathy.     Objective   /70   Pulse 84   Wt 121 kg (266 lb)   SpO2 97%   BMI 41.66 kg/m²     Physical Exam  Constitutional:       Appearance: She is well-developed.   HENT:      Head: Normocephalic and atraumatic.      Right Ear: External ear normal.      Left Ear: External ear normal.      Nose: Nose normal.   Eyes:      Pupils: Pupils are equal, round, and reactive to light.   Cardiovascular:      Rate and Rhythm: Normal rate and regular rhythm.      Heart sounds: Normal heart sounds. No murmur heard.  Pulmonary:      Effort: Pulmonary effort is normal.      Breath sounds: Normal breath sounds.   Abdominal:      General: There is no distension.      Palpations: Abdomen is soft.      Tenderness: There is no abdominal tenderness. There is no guarding.       Medications have been reviewed by provider in current encounter

## 2025-02-19 NOTE — TELEPHONE ENCOUNTER
Upon review of the In Basket request we  found April 2024 in      Any additional questions or concerns should be emailed to the Practice Liaisons via the appropriate education email address, please do not reply via In Basket.    Thank you  Shravan Almanza MA   [facility]

## 2025-02-26 ENCOUNTER — TELEPHONE (OUTPATIENT)
Facility: HOSPITAL | Age: 75
End: 2025-02-26

## 2025-02-26 ENCOUNTER — OFFICE VISIT (OUTPATIENT)
Dept: PHYSICAL THERAPY | Facility: REHABILITATION | Age: 75
End: 2025-02-26
Payer: MEDICARE

## 2025-02-26 DIAGNOSIS — Z17.0 MALIGNANT NEOPLASM OF OVERLAPPING SITES OF LEFT BREAST IN FEMALE, ESTROGEN RECEPTOR POSITIVE (HCC): Primary | ICD-10-CM

## 2025-02-26 DIAGNOSIS — C50.812 MALIGNANT NEOPLASM OF OVERLAPPING SITES OF LEFT BREAST IN FEMALE, ESTROGEN RECEPTOR POSITIVE (HCC): Primary | ICD-10-CM

## 2025-02-26 PROCEDURE — 97110 THERAPEUTIC EXERCISES: CPT | Performed by: PHYSICAL THERAPIST

## 2025-02-26 NOTE — PROGRESS NOTES
Daily Note     Today's date: 2025  Patient name: Sujey Webber  : 1950  MRN: [id number]  Referring provider: Kristy Antoine MD  Dx:   Encounter Diagnosis     ICD-10-CM    1. Malignant neoplasm of overlapping sites of left breast in female, estrogen receptor positive (HCC)  C50.812     Z17.0         Subjective: Doing more in her day as she recovers from COVID. L chest aching pain the other day, thinks its from sleeping in L sidelying the night before.    Objective: See treatment diary below    Vitals: 25 min into session: O2 - 98, HR 78 sitting, RPE 5-6 throughout   Post  bpm, 98% SpO2    Assessment: Continued with current POC. Pt required frequent rest breaks 2/2 deconditioning and likely continues to be recovering from recent illness. Vitals and RPE monitored throughout.     Plan: Continue per plan of care.      Precautions: h/o L breast CA    POC expires Auth Status? (BOMN, approved, pending) Unit limit (Daily) Auth Start date Expiration date PT/OT + Visit Limit?   25 BOMN         Date of Service 1/29 2/3 2/17 2/26     Visits used 1 2 3 4     Visits remaining 99 99 99 99     Patient Education         [facility] Education provided        Lymphedema risk education Low risk- discussed                 Manual Ther         Scar mob         MLD                                    Ther Ex         Aerobic exe  NuStep 10 min UE bike   3' forward   3' back UBE 2'/4' standing L1     Balance exe  March in place x20 March in place x20      tandem stance  2x 30 sec  March in place x20     Tandem stance  2x 30 sec      Strengthening-UE  Bicep curl blue TB x20  Scaption x10  Seated ER at side x20  Row x20  Triceps ext x20   Bicep curl G band x20   Scaption x10  Seated ER at side x20  G band   Row GTB x20  Triceps ext x20 GTB Bicep curl seated 5#DB x10  OH press seated 3# DB x10  Seated lateral raise 3#DB x10     Strengthening- LE  Heel raise x20  Sit to stand x10  Standing hip abd x20 Heel raise x20  Sit  to stand x10  Standing hip abd x20   Heel raise x20  Sit to stand x10  Standing hip abd x20     Flexibility- UE     **     Flexibility- LE    **     Therapeutic Rest Breaks   Required seated breaks throughout      Ther Activity & Self Care

## 2025-02-26 NOTE — TELEPHONE ENCOUNTER
SHAWN for appointment reminder and insurance benefits for mastectomy [facility]. Call back number is [phone]

## 2025-02-27 ENCOUNTER — CLINICAL SUPPORT (OUTPATIENT)
Dept: OBGYN CLINIC | Facility: OTHER | Age: 75
End: 2025-02-27

## 2025-02-27 DIAGNOSIS — Z17.0 MALIGNANT NEOPLASM OF OVERLAPPING SITES OF LEFT BREAST IN FEMALE, ESTROGEN RECEPTOR POSITIVE (HCC): Primary | ICD-10-CM

## 2025-02-27 DIAGNOSIS — C50.812 MALIGNANT NEOPLASM OF OVERLAPPING SITES OF LEFT BREAST IN FEMALE, ESTROGEN RECEPTOR POSITIVE (HCC): Primary | ICD-10-CM

## 2025-02-27 NOTE — PROGRESS NOTES
Mastectomy Bra Fitting Order Details    Sujey Webber  1950  [id number]    Reason For Visit  Mastectomy bra and prosthesis     Precautions   History of breast cancer     Subjective  Sujey underwent a mastectomy on left side without reconstruction. She did not require radiation. She is healing well and is not TTP  along incision.     Surgery Type: Mastectomy    Lymph node removal yes    Date of surgery 12/27/2024    Surgical side left    Objective  She is wearing her post op garment and is here to be fit for a prosthesis. She has a well healed chest wall .     Assessment  Band - 21.5 x 2 - 43   Cup - 22 x 2 = 44       Plan  Ship to home. Reviewed wear and care of her products. Will follow up in 1 month to check comfort and then again in 3 months if she would like to order remaining bras.     Order Details   Marlen 46C x 1 off white   Jasmin 48/50 nude x 1   Natura light style 400 size 12 x 1

## 2025-03-05 ENCOUNTER — OFFICE VISIT (OUTPATIENT)
Dept: PHYSICAL THERAPY | Facility: REHABILITATION | Age: 75
End: 2025-03-05
Payer: MEDICARE

## 2025-03-05 DIAGNOSIS — Z17.0 MALIGNANT NEOPLASM OF OVERLAPPING SITES OF LEFT BREAST IN FEMALE, ESTROGEN RECEPTOR POSITIVE (HCC): Primary | ICD-10-CM

## 2025-03-05 DIAGNOSIS — C50.812 MALIGNANT NEOPLASM OF OVERLAPPING SITES OF LEFT BREAST IN FEMALE, ESTROGEN RECEPTOR POSITIVE (HCC): Primary | ICD-10-CM

## 2025-03-05 PROCEDURE — 97110 THERAPEUTIC EXERCISES: CPT

## 2025-03-10 ENCOUNTER — OFFICE VISIT (OUTPATIENT)
Dept: PHYSICAL THERAPY | Facility: REHABILITATION | Age: 75
End: 2025-03-10
Payer: MEDICARE

## 2025-03-10 DIAGNOSIS — Z17.0 MALIGNANT NEOPLASM OF OVERLAPPING SITES OF LEFT BREAST IN FEMALE, ESTROGEN RECEPTOR POSITIVE (HCC): Primary | ICD-10-CM

## 2025-03-10 DIAGNOSIS — C50.812 MALIGNANT NEOPLASM OF OVERLAPPING SITES OF LEFT BREAST IN FEMALE, ESTROGEN RECEPTOR POSITIVE (HCC): Primary | ICD-10-CM

## 2025-03-10 PROCEDURE — 97110 THERAPEUTIC EXERCISES: CPT

## 2025-03-10 NOTE — PROGRESS NOTES
"Daily Note     Today's date: 3/10/2025  Patient name: Sujey Webber  : 1950  MRN: [id number]  Referring provider: Kristy Antoine MD  Dx:   Encounter Diagnosis     ICD-10-CM    1. Malignant neoplasm of overlapping sites of left breast in female, estrogen receptor positive (HCC)  C50.812     Z17.0             Subjective: Feels she has been making progress w/ endurance.     Objective: See treatment diary below      HR 91 bpm t/o session    Assessment: Small breaks given t/o session but overall did very well.  Progressed DB weight w/ all seated exercises w/ good tolerance. Continued w pec stretch. Pt presents w/ increased activity tolerance since previous session. Pt would benefit from continued PT to address goals.     Plan: Continue per plan of care.      Precautions: h/o L breast CA    POC expires Auth Status? (BOMN, approved, pending) Unit limit (Daily) Auth Start date Expiration date PT/OT + Visit Limit?   25 BOMN         Date of Service 1/29 2/3 2/17 2/26 3/5 3/10   Visits used 1 2 3 4 5 6   Visits remaining 99 99 99 99 99 99   Patient Education         [facility] Education provided        Lymphedema risk education Low risk- discussed                 Manual Ther         Scar mob         MLD                                    Ther Ex         Aerobic exe  NuStep 10 min UE bike   3' forward   3' back UBE 2'/4' standing L1 UBE 2'/4' standing L1    UBE 4' 3'    Balance exe  March in place x20 March in place x20      tandem stance  2x 30 sec  March in place x20     Tandem stance  2x 30 sec  March in place 1# 20x  Walking marches 4x w/ 1#  Tandem stance 2x30\" March in place 1# 2x10  Walking marches 4x w/ 1#  Tandem stance 2x30\"   Strengthening-UE  Bicep curl blue TB x20  Scaption x10  Seated ER at side x20  Row x20  Triceps ext x20   Bicep curl G band x20   Scaption x10  Seated ER at side x20  G band   Row GTB x20  Triceps ext x20 GTB Bicep curl seated 5#DB x10  OH press seated 3# DB x10  Seated lateral " "raise 3#DB x10 Bicep curl seated 5#DB 3x8  OH press seated 3# DB 2x10  Seated lateral raise 3#DB 2x10 Bicep curl seated 2x10 #8, #5 x1     OH press seated 5# DB 2x10    Seated lateral raise 4#DB 2x10   Strengthening- LE  Heel raise x20  Sit to stand x10  Standing hip abd x20 Heel raise x20  Sit to stand x10  Standing hip abd x20   Heel raise x20  Sit to stand x10  Standing hip abd x20 Heel raise x20  Sit to stand x10  Standing hip abd 2x10 w/ 1# Heel raise x20  Sit to stand x10 #4 DB each hand   Standing hip abd 2x10 w/ 1#   Flexibility- UE     ** Pec stretch 3x15\" Pen stretch 3/10''   Flexibility- LE    **     Therapeutic Rest Breaks   Required seated breaks throughout      Ther Activity & Self Care                                                                    "

## 2025-03-17 ENCOUNTER — OFFICE VISIT (OUTPATIENT)
Dept: PHYSICAL THERAPY | Facility: REHABILITATION | Age: 75
End: 2025-03-17
Payer: MEDICARE

## 2025-03-17 DIAGNOSIS — C50.812 MALIGNANT NEOPLASM OF OVERLAPPING SITES OF LEFT BREAST IN FEMALE, ESTROGEN RECEPTOR POSITIVE (HCC): Primary | ICD-10-CM

## 2025-03-17 DIAGNOSIS — Z17.0 MALIGNANT NEOPLASM OF OVERLAPPING SITES OF LEFT BREAST IN FEMALE, ESTROGEN RECEPTOR POSITIVE (HCC): Primary | ICD-10-CM

## 2025-03-17 PROCEDURE — 97110 THERAPEUTIC EXERCISES: CPT

## 2025-03-17 NOTE — PROGRESS NOTES
"Daily Note     Today's date: 3/17/2025  Patient name: Sujey Webber  : 1950  MRN: [id number]  Referring provider: Kristy Antoine MD  Dx:   Encounter Diagnosis     ICD-10-CM    1. Malignant neoplasm of overlapping sites of left breast in female, estrogen receptor positive (HCC)  C50.812     Z17.0               Subjective: Feels good. Continued adherence to HEP.     Objective: See treatment diary below    Vitals:    Warm up:  HR, and 97% O2 10'   After Interval 1: 107 HR and 97%  Rest 1: 102 HR   After Interval 2: 106 HR and 96% O2  Rest 2: 103 HR  After Interval 3: 113 HR and 97 O2 (RPE - 6-7 )   Rest 3:       Assessment: Small breaks given t/o session but overall did very well. Initiated interval training on Nustep. Monitored RPE and HR throughout. Pt would benefit from continued PT to address goals. Continued w/ general strengthening w/ fatigue noted in target muscle groups.     Plan: Continue per plan of care.      Precautions: h/o L breast CA    POC expires Auth Status? (BOMN, approved, pending) Unit limit (Daily) Auth Start date Expiration date PT/OT + Visit Limit?   25 BOMN         Date of Service 1/29 2/3 2/17 2/26 3/5 3/10 3/17   Visits used 1 2 3 4 5 6 7   Visits remaining 99 99 99 99 99 99 99   Patient Education          [facility] Education provided         Lymphedema risk education Low risk- discussed                   Manual Ther          Scar mob          MLD                                        Ther Ex          Aerobic exe  NuStep 10 min UE bike   3' forward   3' back UBE 2'/4' standing L1 UBE 2'/4' standing L1    UBE 4' 3'  Warm up 10'       Interval training on Nustep 3' on 2' rest (L5-7 and L1)   20'      Balance exe  March in place x20 March in place x20      tandem stance  2x 30 sec  March in place x20     Tandem stance  2x 30 sec  March in place 1# 20x  Walking marches 4x w/ 1#  Tandem stance 2x30\" March in place 1# 2x10  Walking marches 4x w/ 1#  Tandem stance 2x30\"  " "  Strengthening-UE  Bicep curl blue TB x20  Scaption x10  Seated ER at side x20  Row x20  Triceps ext x20   Bicep curl G band x20   Scaption x10  Seated ER at side x20  G band   Row GTB x20  Triceps ext x20 GTB Bicep curl seated 5#DB x10  OH press seated 3# DB x10  Seated lateral raise 3#DB x10 Bicep curl seated 5#DB 3x8  OH press seated 3# DB 2x10  Seated lateral raise 3#DB 2x10 Bicep curl seated 2x10 #8, #5 x1     OH press seated 5# DB 2x10    Seated lateral raise 4#DB 2x10 Bicep curl #8 2x10     OH press #5 2x10  (increase NV)     Lateral raise seated #5 2x10      Strengthening- LE  Heel raise x20  Sit to stand x10  Standing hip abd x20 Heel raise x20  Sit to stand x10  Standing hip abd x20   Heel raise x20  Sit to stand x10  Standing hip abd x20 Heel raise x20  Sit to stand x10  Standing hip abd 2x10 w/ 1# Heel raise x20  Sit to stand x10 #4 DB each hand   Standing hip abd 2x10 w/ 1# STS #5 2x10     Standing hip ex 2x10        Flexibility- UE     ** Pec stretch 3x15\" Pen stretch 3/10''    Flexibility- LE    **      Therapeutic Rest Breaks   Required seated breaks throughout       Ther Activity & Self Care                                                                           "

## 2025-03-24 ENCOUNTER — APPOINTMENT (OUTPATIENT)
Dept: PHYSICAL THERAPY | Facility: REHABILITATION | Age: 75
End: 2025-03-24
Payer: MEDICARE

## 2025-03-24 ENCOUNTER — OFFICE VISIT (OUTPATIENT)
Dept: PHYSICAL THERAPY | Facility: REHABILITATION | Age: 75
End: 2025-03-24
Payer: MEDICARE

## 2025-03-24 DIAGNOSIS — C50.812 MALIGNANT NEOPLASM OF OVERLAPPING SITES OF LEFT BREAST IN FEMALE, ESTROGEN RECEPTOR POSITIVE (HCC): Primary | ICD-10-CM

## 2025-03-24 DIAGNOSIS — Z17.0 MALIGNANT NEOPLASM OF OVERLAPPING SITES OF LEFT BREAST IN FEMALE, ESTROGEN RECEPTOR POSITIVE (HCC): Primary | ICD-10-CM

## 2025-03-24 PROCEDURE — 97140 MANUAL THERAPY 1/> REGIONS: CPT | Performed by: PHYSICAL THERAPIST

## 2025-03-24 PROCEDURE — 97110 THERAPEUTIC EXERCISES: CPT | Performed by: PHYSICAL THERAPIST

## 2025-03-24 NOTE — PROGRESS NOTES
"Daily Note     Today's date: 3/24/2025  Patient name: Sujey Webber  : 1950  MRN: [id number]  Referring provider: Kristy Antoine MD  Dx:   Encounter Diagnosis     ICD-10-CM    1. Malignant neoplasm of overlapping sites of left breast in female, estrogen receptor positive (HCC)  C50.812     Z17.0         Subjective: Feels ready for discharge. Has 5# dumbbells at home to continue with resistance training.    Objective: See treatment diary below    Assessment: Reviewed 2 minute intervals for resistance training, to incorporate into commercial breaks for long-term exercise. Also provided [employer] flyer. Reviewed self scar mobilization for chest wall, and patient demonstrates safety and understanding. She is appropriate for discharge today.    Plan: Discharge     Precautions: h/o L breast CA    POC expires Auth Status? (BOMN, approved, pending) Unit limit (Daily) Auth Start date Expiration date PT/OT + Visit Limit?   25 BOMN         Date of Service 3/24     3/10 3/17   Visits used 8     6 7   Visits remaining 99     99 99   Patient Education          [facility]          Lymphedema risk education                    Manual Ther          Scar mob Chest wall 10'         MLD                                        Ther Ex          Aerobic exe UBE 4'/4'    NuStep L4 5'     UBE 4' 3'  Warm up 10'       Interval training on Nustep 3' on 2' rest (L5-7 and L1)   20'      Balance exe      March in place 1# 2x10  Walking marches 4x w/ 1#  Tandem stance 2x30\"    Strengthening-UE Bicep curl seated 5# x2 min    OH press 5# 2 min w breaks    Lateral raise 5# 2min w breaks     Bicep curl seated 2x10 #8, #5 x1     OH press seated 5# DB 2x10    Seated lateral raise 4#DB 2x10 Bicep curl #8 2x10     OH press #5 2x10  (increase NV)     Lateral raise seated #5 2x10      Strengthening- LE Standing heel raise 2'    STS 2 min       Heel raise x20  Sit to stand x10 #4 DB each hand   Standing hip abd 2x10 w/ 1# STS #5 2x10 "     Standing hip ex 2x10        Flexibility- UE      Pen stretch 3/10''    Flexibility- LE          Therapeutic Rest Breaks          Ther Activity & Self Care

## 2025-04-01 ENCOUNTER — DOCUMENTATION (OUTPATIENT)
Dept: SURGICAL ONCOLOGY | Facility: CLINIC | Age: 75
End: 2025-04-01

## 2025-04-02 LAB
LEFT EYE DIABETIC RETINOPATHY: NORMAL
RIGHT EYE DIABETIC RETINOPATHY: NORMAL

## 2025-04-07 ENCOUNTER — RESULTS FOLLOW-UP (OUTPATIENT)
Dept: INTERNAL MEDICINE CLINIC | Facility: CLINIC | Age: 75
End: 2025-04-07

## 2025-04-25 ENCOUNTER — HOSPITAL ENCOUNTER (OUTPATIENT)
Dept: RADIOLOGY | Age: 75
Discharge: HOME | End: 2025-04-25
Payer: MEDICARE

## 2025-04-25 VITALS — HEIGHT: 65 IN | BODY MASS INDEX: 45.15 KG/M2 | WEIGHT: 271 LBS

## 2025-04-25 DIAGNOSIS — M81.0 AGE-RELATED OSTEOPOROSIS WITHOUT CURRENT PATHOLOGICAL FRACTURE: ICD-10-CM

## 2025-04-25 PROCEDURE — 77080 DXA BONE DENSITY AXIAL: CPT

## 2025-04-29 ENCOUNTER — APPOINTMENT (OUTPATIENT)
Dept: LAB | Facility: CLINIC | Age: 75
End: 2025-04-29
Payer: MEDICARE

## 2025-04-29 DIAGNOSIS — L40.59 POLYARTICULAR PSORIATIC ARTHRITIS (HCC): ICD-10-CM

## 2025-04-29 DIAGNOSIS — Z17.0 MALIGNANT NEOPLASM OF OVERLAPPING SITES OF LEFT BREAST IN FEMALE, ESTROGEN RECEPTOR POSITIVE (HCC): ICD-10-CM

## 2025-04-29 DIAGNOSIS — C50.812 MALIGNANT NEOPLASM OF OVERLAPPING SITES OF LEFT BREAST IN FEMALE, ESTROGEN RECEPTOR POSITIVE (HCC): ICD-10-CM

## 2025-04-29 LAB
ALBUMIN SERPL BCG-MCNC: 4 G/DL (ref 3.5–5)
ALP SERPL-CCNC: 94 U/L (ref 34–104)
ALT SERPL W P-5'-P-CCNC: 14 U/L (ref 7–52)
ANION GAP SERPL CALCULATED.3IONS-SCNC: 11 MMOL/L (ref 4–13)
AST SERPL W P-5'-P-CCNC: 12 U/L (ref 13–39)
BASOPHILS # BLD AUTO: 0.04 THOUSANDS/ÂΜL (ref 0–0.1)
BASOPHILS NFR BLD AUTO: 1 % (ref 0–1)
BILIRUB DIRECT SERPL-MCNC: 0.16 MG/DL (ref 0–0.2)
BILIRUB SERPL-MCNC: 1.07 MG/DL (ref 0.2–1)
BUN SERPL-MCNC: 11 MG/DL (ref 5–25)
CALCIUM SERPL-MCNC: 9.7 MG/DL (ref 8.4–10.2)
CHLORIDE SERPL-SCNC: 104 MMOL/L (ref 96–108)
CO2 SERPL-SCNC: 29 MMOL/L (ref 21–32)
CREAT SERPL-MCNC: 0.83 MG/DL (ref 0.6–1.3)
CRP SERPL QL: 7.6 MG/L
EOSINOPHIL # BLD AUTO: 0.11 THOUSAND/ÂΜL (ref 0–0.61)
EOSINOPHIL NFR BLD AUTO: 2 % (ref 0–6)
ERYTHROCYTE [DISTWIDTH] IN BLOOD BY AUTOMATED COUNT: 16.3 % (ref 11.6–15.1)
ERYTHROCYTE [SEDIMENTATION RATE] IN BLOOD: 34 MM/HOUR (ref 0–29)
GFR SERPL CREATININE-BSD FRML MDRD: 70 ML/MIN/1.73SQ M
GLUCOSE P FAST SERPL-MCNC: 168 MG/DL (ref 65–99)
HCT VFR BLD AUTO: 39.1 % (ref 34.8–46.1)
HGB BLD-MCNC: 12.4 G/DL (ref 11.5–15.4)
IMM GRANULOCYTES # BLD AUTO: 0.02 THOUSAND/UL (ref 0–0.2)
IMM GRANULOCYTES NFR BLD AUTO: 0 % (ref 0–2)
LYMPHOCYTES # BLD AUTO: 0.87 THOUSANDS/ÂΜL (ref 0.6–4.47)
LYMPHOCYTES NFR BLD AUTO: 15 % (ref 14–44)
MCH RBC QN AUTO: 29.9 PG (ref 26.8–34.3)
MCHC RBC AUTO-ENTMCNC: 31.7 G/DL (ref 31.4–37.4)
MCV RBC AUTO: 94 FL (ref 82–98)
MONOCYTES # BLD AUTO: 0.5 THOUSAND/ÂΜL (ref 0.17–1.22)
MONOCYTES NFR BLD AUTO: 9 % (ref 4–12)
NEUTROPHILS # BLD AUTO: 4.29 THOUSANDS/ÂΜL (ref 1.85–7.62)
NEUTS SEG NFR BLD AUTO: 73 % (ref 43–75)
NRBC BLD AUTO-RTO: 0 /100 WBCS
PLATELET # BLD AUTO: 296 THOUSANDS/UL (ref 149–390)
PMV BLD AUTO: 10.6 FL (ref 8.9–12.7)
POTASSIUM SERPL-SCNC: 4.5 MMOL/L (ref 3.5–5.3)
PROT SERPL-MCNC: 7 G/DL (ref 6.4–8.4)
RBC # BLD AUTO: 4.15 MILLION/UL (ref 3.81–5.12)
SODIUM SERPL-SCNC: 144 MMOL/L (ref 135–147)
WBC # BLD AUTO: 5.83 THOUSAND/UL (ref 4.31–10.16)

## 2025-04-29 PROCEDURE — 80053 COMPREHEN METABOLIC PANEL: CPT

## 2025-04-29 PROCEDURE — 85652 RBC SED RATE AUTOMATED: CPT

## 2025-04-29 PROCEDURE — 85025 COMPLETE CBC W/AUTO DIFF WBC: CPT

## 2025-04-29 PROCEDURE — 36415 COLL VENOUS BLD VENIPUNCTURE: CPT

## 2025-04-29 PROCEDURE — 82248 BILIRUBIN DIRECT: CPT

## 2025-04-29 PROCEDURE — 86140 C-REACTIVE PROTEIN: CPT

## 2025-05-02 ENCOUNTER — OFFICE VISIT (OUTPATIENT)
Dept: HEMATOLOGY ONCOLOGY | Facility: CLINIC | Age: 75
End: 2025-05-02
Payer: MEDICARE

## 2025-05-02 VITALS
HEART RATE: 103 BPM | SYSTOLIC BLOOD PRESSURE: 118 MMHG | DIASTOLIC BLOOD PRESSURE: 64 MMHG | HEIGHT: 64 IN | OXYGEN SATURATION: 98 % | BODY MASS INDEX: 46.1 KG/M2 | RESPIRATION RATE: 18 BRPM | WEIGHT: 270 LBS | TEMPERATURE: 97.3 F

## 2025-05-02 DIAGNOSIS — Z86.2 HISTORY OF FACTOR V LEIDEN MUTATION: ICD-10-CM

## 2025-05-02 DIAGNOSIS — Z17.0 MALIGNANT NEOPLASM OF OVERLAPPING SITES OF LEFT BREAST IN FEMALE, ESTROGEN RECEPTOR POSITIVE (HCC): Primary | ICD-10-CM

## 2025-05-02 DIAGNOSIS — Z86.39 HISTORY OF DIABETES MELLITUS: ICD-10-CM

## 2025-05-02 DIAGNOSIS — Z86.79 HISTORY OF SUPERFICIAL PHLEBITIS: ICD-10-CM

## 2025-05-02 DIAGNOSIS — E03.9 ACQUIRED HYPOTHYROIDISM: ICD-10-CM

## 2025-05-02 DIAGNOSIS — Z86.718 HISTORY OF DVT OF LOWER EXTREMITY: ICD-10-CM

## 2025-05-02 DIAGNOSIS — C50.812 MALIGNANT NEOPLASM OF OVERLAPPING SITES OF LEFT BREAST IN FEMALE, ESTROGEN RECEPTOR POSITIVE (HCC): Primary | ICD-10-CM

## 2025-05-02 DIAGNOSIS — E78.2 MIXED HYPERLIPIDEMIA: ICD-10-CM

## 2025-05-02 PROCEDURE — 99214 OFFICE O/P EST MOD 30 MIN: CPT | Performed by: INTERNAL MEDICINE

## 2025-05-02 PROCEDURE — G2211 COMPLEX E/M VISIT ADD ON: HCPCS | Performed by: INTERNAL MEDICINE

## 2025-05-02 NOTE — ASSESSMENT & PLAN NOTE
Patient had left breast mastectomy and sentinel lymph node sampling on 12/27/2024 for hormone receptor positive and HER2 negative by FISH stage I, T1cN0 M0 left breast cancer.  Patient did not want chemotherapy and for that reason we did not do Oncotype.  Patient has been receiving adjuvant Arimidex.    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  Patient had a short course of anticoagulation and was then switched over to 1 aspirin daily but in 2020 she had DVT in the left tibial to femoral vein and she has been on Xarelto ever since.  Orders:    CBC and differential; Future    Comprehensive metabolic panel; Future

## 2025-05-02 NOTE — PROGRESS NOTES
Name: Sujey Webber      : 1950      MRN: [id number]  Encounter Provider: Byron Garsia MD  Encounter Date: 2025   Encounter department: [facility] [place]  :  Assessment & Plan  Malignant neoplasm of overlapping sites of left breast in female, estrogen receptor positive (HCC)  Patient had left breast mastectomy and sentinel lymph node sampling on 2024 for hormone receptor positive and HER2 negative by FISH stage I, T1cN0 M0 left breast cancer.  Patient did not want chemotherapy and for that reason we did not do Oncotype.  Patient has been receiving adjuvant Arimidex.    Patient had DCIS in the same left breast in  and she had lumpectomy and radiation and a short course of tamoxifen but that was stopped because she developed superficial phlebitis and workup showed 1 copy of factor V Leiden mutation.  Patient had a short course of anticoagulation and was then switched over to 1 aspirin daily but in  she had DVT in the left tibial to femoral vein and she has been on Xarelto ever since.  Orders:    CBC and differential; Future    Comprehensive metabolic panel; Future    History of factor V Leiden mutation  Patient has been on Xarelto.  See above.       History of DVT of lower extremity  See above.       History of superficial phlebitis  See above.       History of diabetes mellitus  Managed by PCP       Acquired hypothyroidism  Managed by PCP       Mixed hyperlipidemia  Managed by PCP           Blood work prior to next visit in 3 months.  No change in Arimidex that was started in 2025.  See diagnoses, orders and instructions above.  Breast cancer remains in remission.  Goal is cure from breast cancer.  Patient is capable of self-care.  Patient to stay on Xarelto while on Arimidex.  Near normal DEXA scan  She will continue taking vitamin D3 and multivitamin with calcium and will stay active.  All discussed in detail.  Questions answered  I  suggested self breast examination, eating healthy foods, staying active but to avoid falls and trauma.  Suggested health screening tests. Patient to continue to follow-up with primary physician and other consultants.  Provided counseling and support.  I used a dictation device to dictate this note and there could be mistakes in my note and for that patient may contact my office.    History of Present Illness   Chief Complaint   Patient presents with    Follow-up   Patient had left breast mastectomy and sentinel lymph node sampling on 12/27/2024 for hormone receptor positive and HER2 negative by FISH stage I, T1cN0 M0 left breast cancer.  Patient did not want chemotherapy and for that reason we did not do Oncotype.  Patient has been receiving adjuvant Arimidex.    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  Patient had a short course of anticoagulation and was then switched over to 1 aspirin daily but in 2020 she had DVT in the left tibial to femoral vein and she has been on Xarelto ever since.  Has some tiredness and arthritic symptoms  Oncology History   Cancer Staging   Malignant neoplasm of overlapping sites of left breast in female, estrogen receptor positive (HCC)  Staging form: Breast, AJCC 8th Edition  - Clinical stage from 11/13/2024: Stage 0 (cTis (DCIS), cN0, cM0, ER+, AL+, HER2: Not Assessed) - Signed by Kristy Antoine MD on 11/27/2024  Stage prefix: Initial diagnosis  Method of lymph node assessment: Clinical  Nuclear grade: G2  - Pathologic stage from 12/27/2024: Stage IA (pT1c, pN0(sn), cM0, G2, ER+, AL+, HER2: Equivocal) - Signed by Kristy Antoine MD on 1/13/2025  Stage prefix: Initial diagnosis  Method of lymph node assessment: [place] lymph node biopsy  Histologic grading system: 3 grade system  Oncology  History   Malignant neoplasm of overlapping sites of left breast in female, estrogen receptor positive (HCC)   8/25/2006 Initial Diagnosis    Ductal Carcinoma In Situ Left breast     8/25/2006 Biopsy    Left breast wire lumpectomy  DCIS  Multifocal, 0.5 cm  Grade 2-3  All margins: no evidence of malignancy  No invasive carcinoma identified  Fibrocystic disease with microcalcifications, intraductal papillomas, micropapillary and florid intraductal hyperplasia, sclerosing adenosis, apocrine metaplasia and radial scars    ER 98%  MO 98%     2006 Genetic Testing    Pt states that her genetic testing was NEGATIVE in 2006 11/13/2024 Initial Diagnosis    Ductal carcinoma in situ (DCIS) of left breast     11/13/2024 Biopsy    LEFT US guided breast BX  Ductal carcinoma in situ, fragmented, intermediate nuclear grade  Grade 2  2 oclock, 3 cmfn    ER 99%  MO 60%  HER2 PENDING     11/13/2024 -  Cancer Staged    Staging form: Breast, AJCC 8th Edition  - Clinical stage from 11/13/2024: Stage 0 (cTis (DCIS), cN0, cM0, ER+, MO+, HER2: Not Assessed) - Signed by Kristy Antoine MD on 11/27/2024  Stage prefix: Initial diagnosis  Method of lymph node assessment: Clinical  Nuclear grade: G2       12/12/2024 Genetic Testing    Patient has genetic testing done for breast cancer  [employer]- final NEGATIVE     12/27/2024 Surgery    LEFT BREAST MASTECTOMY W/ SLNB  Ductal NST/IDC, Grade 2, 12 mm  DCIS present  Positive for EIC  All margins negative for invasive carcinoma  Previous bx site changes  0/5 sentinel nodes.  Path Stage: pT1c, pN0     12/27/2024 -  Cancer Staged    Staging form: Breast, AJCC 8th Edition  - Pathologic stage from 12/27/2024: Stage IA (pT1c, pN0(sn), cM0, G2, ER+, MO+, HER2: Equivocal) - Signed by Kristy Antoine MD on 1/13/2025  Stage prefix: Initial diagnosis  Method of lymph node assessment: [place] lymph node biopsy  Histologic grading system: 3 grade system          Pertinent Medical History   See details in  "HPI  05/02/25:      Review of Systems  Reviewed 12 systems.  Symptoms are as in HPI.  No fevers, chills, bleeding, bone pains, skin rash, weight loss, night sweats and no swelling of the ankles and no swollen glands.  No other neurological, cardiac, pulmonary, GI and  symptoms other than listed in HPI.      Objective   /64 (BP Location: Left arm, Patient Position: Sitting, Cuff Size: Adult)   Pulse 103   Temp (!) 97.3 °F (36.3 °C) (Temporal)   Resp 18   Ht 5' 4\" (1.626 m)   Wt 122 kg (270 lb)   SpO2 98%   BMI 46.35 kg/m²     Pain Screening:  Pain Score: 0-No pain  ECOG   0  Physical Exam  Vitals reviewed.   Constitutional:       General: She is not in acute distress.     Appearance: Normal appearance. She is obese. She is not ill-appearing.   HENT:      Head: Normocephalic and atraumatic.      Mouth/Throat:      Comments: No thrush.  Eyes:      General: No scleral icterus.     Conjunctiva/sclera: Conjunctivae normal.   Cardiovascular:      Rate and Rhythm: Normal rate and regular rhythm.      Heart sounds: Normal heart sounds. No murmur heard.  Pulmonary:      Effort: Pulmonary effort is normal. No respiratory distress.      Breath sounds: Normal breath sounds. No rhonchi or rales.   Abdominal:      General: Abdomen is flat. There is no distension.      Palpations: Abdomen is soft. There is no mass.      Tenderness: There is no abdominal tenderness.      Comments: No ascites.    Musculoskeletal:         General: No swelling or tenderness. Normal range of motion.      Cervical back: Normal range of motion. No rigidity or tenderness.      Right lower leg: Edema (Nonpitting edema) present.      Left lower leg: Edema (Nonpitting edema) present.      Comments: No calf tenderness.   Lymphadenopathy:      Cervical: No cervical adenopathy.      Upper Body:      Right upper body: No supraclavicular or axillary adenopathy.      Left upper body: No supraclavicular or axillary adenopathy.   Skin:     General: " Skin is warm.      Coloration: Skin is not jaundiced or pale.      Findings: No bruising or rash.      Nails: There is no clubbing.   Neurological:      General: No focal deficit present.      Mental Status: She is alert and oriented to person, place, and time.      Motor: No weakness.      Coordination: Coordination normal.      Gait: Gait normal.   Psychiatric:         Mood and Affect: Mood normal.         Behavior: Behavior normal.         Thought Content: Thought content normal.     No lymphedema    Labs: I have reviewed the following labs:  Lab Results   Component Value Date/Time    WBC 5.83 04/29/2025 09:03 AM    RBC 4.15 04/29/2025 09:03 AM    Hemoglobin 12.4 04/29/2025 09:03 AM    Hematocrit 39.1 04/29/2025 09:03 AM    MCV 94 04/29/2025 09:03 AM    MCH 29.9 04/29/2025 09:03 AM    RDW 16.3 (H) 04/29/2025 09:03 AM    Platelets 296 04/29/2025 09:03 AM    Segmented % 73 04/29/2025 09:03 AM    Lymphocytes % 15 04/29/2025 09:03 AM    Monocytes % 9 04/29/2025 09:03 AM    Eosinophils Relative 2 04/29/2025 09:03 AM    Basophils Relative 1 04/29/2025 09:03 AM    Immature Grans % 0 04/29/2025 09:03 AM    Absolute Neutrophils 4.29 04/29/2025 09:03 AM     Lab Results   Component Value Date/Time    Potassium 4.5 04/29/2025 09:03 AM    Chloride 104 04/29/2025 09:03 AM    CO2 29 04/29/2025 09:03 AM    BUN 11 04/29/2025 09:03 AM    Creatinine 0.83 04/29/2025 09:03 AM    Glucose, Fasting 168 (H) 04/29/2025 09:03 AM    Calcium 9.7 04/29/2025 09:03 AM    AST 12 (L) 04/29/2025 09:03 AM    ALT 14 04/29/2025 09:03 AM    Alkaline Phosphatase 94 04/29/2025 09:03 AM    Total Protein 7.0 04/29/2025 09:03 AM    Albumin 4.0 04/29/2025 09:03 AM    Total Bilirubin 1.07 (H) 04/29/2025 09:03 AM    eGFR 70 04/29/2025 09:03 AM     Component  Ref Range & Units (hover) 4/29/25  9:03 AM 12/12/24  8:46 AM 8/26/24  8:07 AM 10/31/23  9:16 AM 6/30/23  8:27 AM 11/30/22  8:16 AM 8/1/22  8:33 AM   Bilirubin, Direct 0.16 0.19 0.09 0.23 High  0.23 High   0.30 High  0.21 High            DXA bone density spine hip and pelvis  Status: Final result     PACS Images - GE     Show images for DXA bone density spine hip and pelvis  PACS Images - Sectra     Show images for DXA bone density spine hip and pelvis  Study Result    Narrative & Impression   DXA SCAN     CLINICAL HISTORY: 74-year-old postmenopausal female.  OTHER RISK FACTORS: Anastrozole.     PHARMACOLOGIC THERAPY FOR OSTEOPOROSIS:  None.     TECHNIQUE: Bone densitometry was performed using a Hologic Horizon A bone densitometer.  Regions of interest appear properly placed.        COMPARISON: 1/3/2020.     RESULTS:     LUMBAR SPINE: Not assessed because generalized spondylosis results in fewer than two evaluable vertebrae..     LEFT  TOTAL HIP:  BMD: 1.069 gm/cm2  T-score: 1.0     LEFT  FEMORAL NECK:  BMD: 0.817 gm/cm2  T score: -0.3     LEFT  FOREARM:  33% RADIUS BMD: 0.742 gm/cm2  T-score: 1.0        IMPRESSION:     1. Normal bone density.

## 2025-05-15 ENCOUNTER — CLINICAL SUPPORT (OUTPATIENT)
Dept: OBGYN CLINIC | Facility: OTHER | Age: 75
End: 2025-05-15

## 2025-05-15 DIAGNOSIS — Z17.0 MALIGNANT NEOPLASM OF OVERLAPPING SITES OF LEFT BREAST IN FEMALE, ESTROGEN RECEPTOR POSITIVE (HCC): Primary | ICD-10-CM

## 2025-05-15 DIAGNOSIS — C50.812 MALIGNANT NEOPLASM OF OVERLAPPING SITES OF LEFT BREAST IN FEMALE, ESTROGEN RECEPTOR POSITIVE (HCC): Primary | ICD-10-CM

## 2025-05-15 NOTE — PROGRESS NOTES
Mastectomy Bra Fitting Order Details    Sujey Webber  1950  [id number]    Reason For Visit  Mastectomy bra and prosthesis     Precautions   History of breast cancer     Subjective  Sujey is here today to return Marlen and trial different bras. Marlen bra does not fit prosthesis on L side (to small of cup) but is to large on R side (natural side)      Surgery Type: Mastectomy    Lymph node removal yes    Date of surgery 12/27/2024    Surgical side left    Objective  Trial of Suzanne, Rachel or Jasmin      Assessment  44C    Plan  Ship to home.     Order Details   L mastectomy 12/2024  RETURN Marlen 46C off white x 1  Jasmin 48/50 white  x 1   Suzanne 44D white x 1   Suzanne 44D FRONT CLOSURE blush x 1   Ship to home

## 2025-05-19 ENCOUNTER — RESULTS FOLLOW-UP (OUTPATIENT)
Dept: INTERNAL MEDICINE CLINIC | Facility: CLINIC | Age: 75
End: 2025-05-19

## 2025-05-19 ENCOUNTER — APPOINTMENT (OUTPATIENT)
Dept: LAB | Facility: CLINIC | Age: 75
End: 2025-05-19
Attending: INTERNAL MEDICINE
Payer: MEDICARE

## 2025-05-19 DIAGNOSIS — E11.21 DIABETIC NEPHROPATHY ASSOCIATED WITH TYPE 2 DIABETES MELLITUS (HCC): ICD-10-CM

## 2025-05-19 DIAGNOSIS — C50.812 MALIGNANT NEOPLASM OF OVERLAPPING SITES OF LEFT BREAST IN FEMALE, ESTROGEN RECEPTOR POSITIVE (HCC): ICD-10-CM

## 2025-05-19 DIAGNOSIS — Z17.0 MALIGNANT NEOPLASM OF OVERLAPPING SITES OF LEFT BREAST IN FEMALE, ESTROGEN RECEPTOR POSITIVE (HCC): ICD-10-CM

## 2025-05-19 LAB
ALBUMIN SERPL BCG-MCNC: 3.9 G/DL (ref 3.5–5)
ALP SERPL-CCNC: 90 U/L (ref 34–104)
ALT SERPL W P-5'-P-CCNC: 12 U/L (ref 7–52)
ANION GAP SERPL CALCULATED.3IONS-SCNC: 9 MMOL/L (ref 4–13)
AST SERPL W P-5'-P-CCNC: 12 U/L (ref 13–39)
BILIRUB SERPL-MCNC: 0.9 MG/DL (ref 0.2–1)
BUN SERPL-MCNC: 12 MG/DL (ref 5–25)
CALCIUM SERPL-MCNC: 9.4 MG/DL (ref 8.4–10.2)
CHLORIDE SERPL-SCNC: 104 MMOL/L (ref 96–108)
CHOLEST SERPL-MCNC: 149 MG/DL (ref ?–200)
CO2 SERPL-SCNC: 25 MMOL/L (ref 21–32)
CREAT SERPL-MCNC: 0.86 MG/DL (ref 0.6–1.3)
EST. AVERAGE GLUCOSE BLD GHB EST-MCNC: 180 MG/DL
GFR SERPL CREATININE-BSD FRML MDRD: 67 ML/MIN/1.73SQ M
GLUCOSE P FAST SERPL-MCNC: 178 MG/DL (ref 65–99)
HBA1C MFR BLD: 7.9 %
HDLC SERPL-MCNC: 58 MG/DL
LDLC SERPL CALC-MCNC: 74 MG/DL (ref 0–100)
POTASSIUM SERPL-SCNC: 4.2 MMOL/L (ref 3.5–5.3)
PROT SERPL-MCNC: 6.9 G/DL (ref 6.4–8.4)
SODIUM SERPL-SCNC: 138 MMOL/L (ref 135–147)
TRIGL SERPL-MCNC: 85 MG/DL (ref ?–150)

## 2025-05-19 PROCEDURE — 36415 COLL VENOUS BLD VENIPUNCTURE: CPT

## 2025-05-19 PROCEDURE — 80053 COMPREHEN METABOLIC PANEL: CPT

## 2025-05-19 PROCEDURE — 80061 LIPID PANEL: CPT

## 2025-05-19 PROCEDURE — 83036 HEMOGLOBIN GLYCOSYLATED A1C: CPT

## 2025-05-22 ENCOUNTER — OFFICE VISIT (OUTPATIENT)
Dept: INTERNAL MEDICINE CLINIC | Facility: CLINIC | Age: 75
End: 2025-05-22

## 2025-05-22 VITALS
SYSTOLIC BLOOD PRESSURE: 112 MMHG | HEART RATE: 65 BPM | BODY MASS INDEX: 45.82 KG/M2 | DIASTOLIC BLOOD PRESSURE: 70 MMHG | HEIGHT: 64 IN | OXYGEN SATURATION: 100 % | WEIGHT: 268.4 LBS

## 2025-05-22 DIAGNOSIS — E11.9 TYPE 2 DIABETES MELLITUS WITHOUT COMPLICATION, WITHOUT LONG-TERM CURRENT USE OF INSULIN (HCC): ICD-10-CM

## 2025-05-22 DIAGNOSIS — Z00.00 MEDICARE ANNUAL WELLNESS VISIT, SUBSEQUENT: Primary | ICD-10-CM

## 2025-05-22 NOTE — PROGRESS NOTES
Name: Sujey Webber      : 1950      MRN: [id number]  Encounter Provider: Charito German MD  Encounter Date: 2025   Encounter department: [facility]  :  Assessment & Plan  Medicare annual wellness visit, subsequent  1. Annual wellness examination overall the patient is clinically stable and doing well, we encouraged the patient to follow a healthy and balanced diet.  2. We recommend that the patient exercise routinely approximately 30 minutes 5 times per week .   3. We have reviewed the patient's vaccines and have made recommendations for updates.   4. We will be ordering screening laboratories which are age appropriate.  5. Return to the office in 6 month for re-eval or call if any problems.   Last colonoscopy , Cologuard done in  negative-repeat in 3 years  Orders:    CBC and differential; Future    Comprehensive metabolic panel; Future    Lipid Panel with Direct LDL reflex; Future    Hemoglobin A1C; Future    Type 2 diabetes mellitus without complication, without long-term current use of insulin (HCC)    Lab Results   Component Value Date    HGBA1C 7.9 (H) 2025     Currently on metformin 500 mg 3 times daily  Patient reports having poor diet, minimal physical activity  Increase metformin to 1000 mg twice daily  Recommended following a low-carb diet  Follow-up with a repeat A1c in 6 months  Orders:    CBC and differential; Future    Comprehensive metabolic panel; Future    Lipid Panel with Direct LDL reflex; Future    Hemoglobin A1C; Future    metFORMIN (GLUCOPHAGE) 1000 MG tablet; Take 1 tablet (1,000 mg total) by mouth 2 (two) times a day with meals       Preventive health issues were discussed with patient, and age appropriate screening tests were ordered as noted in patient's After Visit Summary. Personalized health advice and appropriate referrals for health education or preventive services given if needed, as noted in patient's After Visit  Summary.    History of Present Illness     HPI   Presents to the clinic for her biannual wellness visit    Patient Care Team:  Demetrius Garibay DO as PCP - General (Internal Medicine)  DO Kristy Pardo MD (General Surgery)  Fernanda Bauer MA as Care Coordinator (Oncology)  Byron Garsia MD as Medical Oncologist (Hematology and Oncology)    Review of Systems  Medical History Reviewed by provider this encounter:       Annual Wellness Visit Questionnaire   Sujey is here for her Subsequent Wellness visit.     Health Risk Assessment:   Patient rates overall health as good. Patient feels that their physical health rating is same. Patient is satisfied with their life. Eyesight was rated as same. Hearing was rated as same. Patient feels that their emotional and mental health rating is same. Patients states they are never, rarely angry. Patient states they are sometimes unusually tired/fatigued. Pain experienced in the last 7 days has been none. Patient states that she has experienced no weight loss or gain in last 6 months.     Depression Screening:   PHQ-2 Score: 0      Fall Risk Screening:   In the past year, patient has experienced: no history of falling in past year      Urinary Incontinence Screening:   Patient has leaked urine accidently in the last six months.     Home Safety:  Patient does not have trouble with stairs inside or outside of their home. Patient has working smoke alarms and has no working carbon monoxide detector. Home safety hazards include: none.     Nutrition:   Current diet is Regular.     Medications:   Patient is currently taking over-the-counter supplements. OTC medications include: see medication list. Patient is able to manage medications.     Activities of Daily Living (ADLs)/Instrumental Activities of Daily Living (IADLs):   Walk and transfer into and out of bed and chair?: Yes  Dress and groom yourself?: Yes    Bathe or shower yourself?: Yes    Feed yourself?  Yes  Do your laundry/housekeeping?: Yes  Manage your money, pay your bills and track your expenses?: Yes  Make your own meals?: Yes    Do your own shopping?: Yes    Previous Hospitalizations:   Any hospitalizations or ED visits within the last 12 months?: Yes    How many hospitalizations have you had in the last year?: 1-2    Advance Care Planning:   Living will: Yes    Durable POA for healthcare: Yes    Advanced directive: Yes      Preventive Screenings      Cardiovascular Screening:    General: Screening Not Indicated and History Lipid Disorder      Diabetes Screening:     General: Screening Not Indicated and History Diabetes      Colorectal Cancer Screening:     General: Screening Current      Breast Cancer Screening:     General: History Breast Cancer      Cervical Cancer Screening:    General: Screening Not Indicated      Osteoporosis Screening:    General: Screening Not Indicated and History Osteoporosis      Lung Cancer Screening:     General: Screening Not Indicated      Hepatitis C Screening:    General: Screening Current    Screening, Brief Intervention, and Referral to Treatment (SBIRT)     Screening  Typical number of drinks in a day: 0  Typical number of drinks in a week: 0  Interpretation: Low risk drinking behavior.    AUDIT-C Screenin) How often did you have a drink containing alcohol in the past year? never  2) How many drinks did you have on a typical day when you were drinking in the past year? 0  3) How often did you have 6 or more drinks on one occasion in the past year? never    AUDIT-C Score: 0  Interpretation: Score 0-2 (female): Negative screen for alcohol misuse    Single Item Drug Screening:  How often have you used an illegal drug (including marijuana) or a prescription medication for non-medical reasons in the past year? never    Single Item Drug Screen Score: 0  Interpretation: Negative screen for possible drug use disorder    Social Drivers of Health     Financial Resource Strain:  "Low Risk  (2/28/2024)    Overall Financial Resource Strain (CARDIA)     Difficulty of Paying Living Expenses: Not hard at all   Food Insecurity: No Food Insecurity (5/19/2025)    Nursing - Inadequate Food Risk Classification     Worried About Running Out of Food in the Last Year: Never true     Ran Out of Food in the Last Year: Never true     Ran Out of Food in the Last Year: Never true   Transportation Needs: No Transportation Needs (5/19/2025)    PRAPARE - Transportation     Lack of Transportation (Medical): No     Lack of Transportation (Non-Medical): No   Housing Stability: Low Risk  (5/19/2025)    Housing Stability Vital Sign     Unable to Pay for Housing in the Last Year: No     Number of Times Moved in the Last Year: 0     Homeless in the Last Year: No   Utilities: Not At Risk (5/19/2025)    [facility] Utilities     Threatened with loss of utilities: No     No results found.    Objective   /70 (BP Location: Left arm, Patient Position: Sitting, Cuff Size: Large)   Pulse 65   Ht 5' 4\" (1.626 m)   Wt 122 kg (268 lb 6.4 oz)   SpO2 100%   BMI 46.07 kg/m²     Physical Exam  Constitutional:       Appearance: She is well-developed.   HENT:      Head: Normocephalic and atraumatic.      Right Ear: External ear normal.      Left Ear: External ear normal.      Nose: Nose normal.     Eyes:      Pupils: Pupils are equal, round, and reactive to light.       Cardiovascular:      Rate and Rhythm: Normal rate and regular rhythm.      Heart sounds: Murmur heard.   Pulmonary:      Effort: Pulmonary effort is normal.      Breath sounds: Normal breath sounds.   Abdominal:      General: There is no distension.      Palpations: Abdomen is soft.      Tenderness: There is no abdominal tenderness. There is no guarding.         "

## 2025-05-22 NOTE — PATIENT INSTRUCTIONS
Medicare Preventive Visit Patient Instructions  Thank you for completing your Welcome to Medicare Visit or Medicare Annual Wellness Visit today. Your next wellness visit will be due in one year (5/23/2026).  The screening/preventive services that you may require over the next 5-10 years are detailed below. Some tests may not apply to you based off risk factors and/or age. Screening tests ordered at today's visit but not completed yet may show as past due. Also, please note that scanned in results may not display below.  Preventive Screenings:  Service Recommendations Previous Testing/Comments   Colorectal Cancer Screening  * Colonoscopy    * Fecal Occult Blood Test (FOBT)/Fecal Immunochemical Test (FIT)  * Fecal DNA/Cologuard Test  * Flexible Sigmoidoscopy Age: 45-75 years old   Colonoscopy: every 10 years (may be performed more frequently if at higher risk)  OR  FOBT/FIT: every 1 year  OR  Cologuard: every 3 years  OR  Sigmoidoscopy: every 5 years  Screening may be recommended earlier than age 45 if at higher risk for colorectal cancer. Also, an individualized decision between you and your healthcare provider will decide whether screening between the ages of 76-85 would be appropriate. Colonoscopy: 10/06/2014  FOBT/FIT: Not on file  Cologuard: 03/18/2024  Sigmoidoscopy: Not on file          Breast Cancer Screening Age: 40+ years old  Frequency: every 1-2 years  Not required if history of left and right mastectomy Mammogram: 11/12/2024        Cervical Cancer Screening Between the ages of 21-29, pap smear recommended once every 3 years.   Between the ages of 30-65, can perform pap smear with HPV co-testing every 5 years.   Recommendations may differ for women with a history of total hysterectomy, cervical cancer, or abnormal pap smears in past. Pap Smear: 11/21/2023        Hepatitis C Screening Once for adults born between 1945 and 1965  More frequently in patients at high risk for Hepatitis C Hep C Antibody: Not on  file        Diabetes Screening 1-2 times per year if you're at risk for diabetes or have pre-diabetes Fasting glucose: 178 mg/dL (5/19/2025)  A1C: 7.9 % (5/19/2025)      Cholesterol Screening Once every 5 years if you don't have a lipid disorder. May order more often based on risk factors. Lipid panel: 05/19/2025          Other Preventive Screenings Covered by Medicare:  Abdominal Aortic Aneurysm (AAA) Screening: covered once if your at risk. You're considered to be at risk if you have a family history of AAA.  Lung Cancer Screening: covers low dose CT scan once per year if you meet all of the following conditions: (1) Age 55-77; (2) No signs or symptoms of lung cancer; (3) Current smoker or have quit smoking within the last 15 years; (4) You have a tobacco smoking history of at least 20 pack years (packs per day multiplied by number of years you smoked); (5) You get a written order from a healthcare provider.  Glaucoma Screening: covered annually if you're considered high risk: (1) You have diabetes OR (2) Family history of glaucoma OR (3)  aged 50 and older OR (4)  American aged 65 and older  Osteoporosis Screening: covered every 2 years if you meet one of the following conditions: (1) You're estrogen deficient and at risk for osteoporosis based off medical history and other findings; (2) Have a vertebral abnormality; (3) On glucocorticoid therapy for more than 3 months; (4) Have primary hyperparathyroidism; (5) On osteoporosis medications and need to assess response to drug therapy.   Last bone density test (DXA Scan): 04/25/2025.  HIV Screening: covered annually if you're between the age of 15-65. Also covered annually if you are younger than 15 and older than 65 with risk factors for HIV infection. For pregnant patients, it is covered up to 3 times per pregnancy.    Immunizations:  Immunization Recommendations   Influenza Vaccine Annual influenza vaccination during flu season is  recommended for all persons aged >= 6 months who do not have contraindications   Pneumococcal Vaccine   * Pneumococcal conjugate vaccine = PCV13 (Prevnar 13), PCV15 (Vaxneuvance), PCV20 (Prevnar 20)  * Pneumococcal polysaccharide vaccine = PPSV23 (Pneumovax) Adults 19-63 yo with certain risk factors or if 65+ yo  If never received any pneumonia vaccine: recommend Prevnar 20 (PCV20)  Give PCV20 if previously received 1 dose of PCV13 or PPSV23   Hepatitis B Vaccine 3 dose series if at intermediate or high risk (ex: diabetes, end stage renal disease, liver disease)   Respiratory syncytial virus (RSV) Vaccine - COVERED BY MEDICARE PART D  * RSVPreF3 (Arexvy) CDC recommends that adults 60 years of age and older may receive a single dose of RSV vaccine using shared clinical decision-making (SCDM)   Tetanus (Td) Vaccine - COST NOT COVERED BY MEDICARE PART B Following completion of primary series, a booster dose should be given every 10 years to maintain immunity against tetanus. Td may also be given as tetanus wound prophylaxis.   Tdap Vaccine - COST NOT COVERED BY MEDICARE PART B Recommended at least once for all adults. For pregnant patients, recommended with each pregnancy.   Shingles Vaccine (Shingrix) - COST NOT COVERED BY MEDICARE PART B  2 shot series recommended in those 19 years and older who have or will have weakened immune systems or those 50 years and older     Health Maintenance Due:      Topic Date Due   • Breast Cancer Screening: Mammogram  11/12/2026   • Colorectal Cancer Screening  03/18/2027   • Hepatitis C Screening  Completed     Immunizations Due:      Topic Date Due   • COVID-19 Vaccine (8 - Pfizer risk 2024-25 season) 03/25/2025     Advance Directives   What are advance directives?  Advance directives are legal documents that state your wishes and plans for medical care. These plans are made ahead of time in case you lose your ability to make decisions for yourself. Advance directives can apply to  any medical decision, such as the treatments you want, and if you want to donate organs.   What are the types of advance directives?  There are many types of advance directives, and each state has rules about how to use them. You may choose a combination of any of the following:  Living will:  This is a written record of the treatment you want. You can also choose which treatments you do not want, which to limit, and which to stop at a certain time. This includes surgery, medicine, IV fluid, and tube feedings.   Durable power of  for healthcare (DPAHC):  This is a written record that states who you want to make healthcare choices for you when you are unable to make them for yourself. This person, called a proxy, is usually a family member or a friend. You may choose more than 1 proxy.  Do not resuscitate (DNR) order:  A DNR order is used in case your heart stops beating or you stop breathing. It is a request not to have certain forms of treatment, such as CPR. A DNR order may be included in other types of advance directives.  Medical directive:  This covers the care that you want if you are in a coma, near death, or unable to make decisions for yourself. You can list the treatments you want for each condition. Treatment may include pain medicine, surgery, blood transfusions, dialysis, IV or tube feedings, and a ventilator (breathing machine).  Values history:  This document has questions about your views, beliefs, and how you feel and think about life. This information can help others choose the care that you would choose.  Why are advance directives important?  An advance directive helps you control your care. Although spoken wishes may be used, it is better to have your wishes written down. Spoken wishes can be misunderstood, or not followed. Treatments may be given even if you do not want them. An advance directive may make it easier for your family to make difficult choices about your care.   Weight  Management   Why it is important to manage your weight:  Being overweight increases your risk of health conditions such as heart disease, high blood pressure, type 2 diabetes, and certain types of cancer. It can also increase your risk for osteoarthritis, sleep apnea, and other respiratory problems. Aim for a slow, steady weight loss. Even a small amount of weight loss can lower your risk of health problems.  How to lose weight safely:  A safe and healthy way to lose weight is to eat fewer calories and get regular exercise. You can lose up about 1 pound a week by decreasing the number of calories you eat by 500 calories each day.   Healthy meal plan for weight management:  A healthy meal plan includes a variety of foods, contains fewer calories, and helps you stay healthy. A healthy meal plan includes the following:  Eat whole-grain foods more often.  A healthy meal plan should contain fiber. Fiber is the part of grains, fruits, and vegetables that is not broken down by your body. Whole-grain foods are healthy and provide extra fiber in your diet. Some examples of whole-grain foods are whole-wheat breads and pastas, oatmeal, brown rice, and bulgur.  Eat a variety of vegetables every day.  Include dark, leafy greens such as spinach, kale, joshua greens, and mustard greens. Eat yellow and orange vegetables such as carrots, sweet potatoes, and winter squash.   Eat a variety of fruits every day.  Choose fresh or canned fruit (canned in its own juice or light syrup) instead of juice. Fruit juice has very little or no fiber.  Eat low-fat dairy foods.  Drink fat-free (skim) milk or 1% milk. Eat fat-free yogurt and low-fat cottage cheese. Try low-fat cheeses such as mozzarella and other reduced-fat cheeses.  Choose meat and other protein foods that are low in fat.  Choose beans or other legumes such as split peas or lentils. Choose fish, skinless poultry (chicken or turkey), or lean cuts of red meat (beef or pork). Before  you cook meat or poultry, cut off any visible fat.   Use less fat and oil.  Try baking foods instead of frying them. Add less fat, such as margarine, sour cream, regular salad dressing and mayonnaise to foods. Eat fewer high-fat foods. Some examples of high-fat foods include french fries, doughnuts, ice cream, and cakes.  Eat fewer sweets.  Limit foods and drinks that are high in sugar. This includes candy, cookies, regular soda, and sweetened drinks.  Exercise:  Exercise at least 30 minutes per day on most days of the week. Some examples of exercise include walking, biking, dancing, and swimming. You can also fit in more physical activity by taking the stairs instead of the elevator or parking farther away from stores. Ask your healthcare provider about the best exercise plan for you.    © Copyright [employer] 2018 Information is for End User's use only and may not be sold, redistributed or otherwise used for commercial purposes. All illustrations and images included in CareNotes® are the copyrighted property of [employer] or [employer]

## 2025-05-22 NOTE — ASSESSMENT & PLAN NOTE
1. Annual wellness examination overall the patient is clinically stable and doing well, we encouraged the patient to follow a healthy and balanced diet.  2. We recommend that the patient exercise routinely approximately 30 minutes 5 times per week .   3. We have reviewed the patient's vaccines and have made recommendations for updates.   4. We will be ordering screening laboratories which are age appropriate.  5. Return to the office in 6 month for re-eval or call if any problems.   Last colonoscopy 2014, Cologuard done in 2024 negative-repeat in 3 years  Orders:    CBC and differential; Future    Comprehensive metabolic panel; Future    Lipid Panel with Direct LDL reflex; Future    Hemoglobin A1C; Future

## 2025-05-22 NOTE — ASSESSMENT & PLAN NOTE
Lab Results   Component Value Date    HGBA1C 7.9 (H) 05/19/2025     Currently on metformin 500 mg 3 times daily  Patient reports having poor diet, minimal physical activity  Increase metformin to 1000 mg twice daily  Recommended following a low-carb diet  Follow-up with a repeat A1c in 6 months  Orders:    CBC and differential; Future    Comprehensive metabolic panel; Future    Lipid Panel with Direct LDL reflex; Future    Hemoglobin A1C; Future    metFORMIN (GLUCOPHAGE) 1000 MG tablet; Take 1 tablet (1,000 mg total) by mouth 2 (two) times a day with meals

## 2025-06-21 PROBLEM — Z00.00 MEDICARE ANNUAL WELLNESS VISIT, SUBSEQUENT: Status: RESOLVED | Noted: 2025-05-22 | Resolved: 2025-06-21

## 2025-06-24 DIAGNOSIS — I82.409 DVT (DEEP VENOUS THROMBOSIS) (HCC): ICD-10-CM

## 2025-06-25 RX ORDER — RIVAROXABAN 20 MG/1
20 TABLET, FILM COATED ORAL
Qty: 90 TABLET | Refills: 1 | Status: SHIPPED | OUTPATIENT
Start: 2025-06-25

## 2025-06-27 ENCOUNTER — TELEPHONE (OUTPATIENT)
Dept: HEMATOLOGY ONCOLOGY | Facility: CLINIC | Age: 75
End: 2025-06-27

## 2025-06-27 NOTE — TELEPHONE ENCOUNTER
Spoke with patient regarding need to reschedule upcoming appointment with Dr. Garsia from 8/4 to 8/22 at 10:20am due to provider availability.  Patient verbalized understanding.

## 2025-07-23 DIAGNOSIS — Z17.0 MALIGNANT NEOPLASM OF OVERLAPPING SITES OF LEFT BREAST IN FEMALE, ESTROGEN RECEPTOR POSITIVE (HCC): ICD-10-CM

## 2025-07-23 DIAGNOSIS — C50.812 MALIGNANT NEOPLASM OF OVERLAPPING SITES OF LEFT BREAST IN FEMALE, ESTROGEN RECEPTOR POSITIVE (HCC): ICD-10-CM

## 2025-07-23 RX ORDER — ANASTROZOLE 1 MG/1
1 TABLET ORAL DAILY
Qty: 90 TABLET | Refills: 1 | Status: SHIPPED | OUTPATIENT
Start: 2025-07-23

## 2025-07-24 DIAGNOSIS — E03.9 HYPOTHYROIDISM, UNSPECIFIED TYPE: ICD-10-CM

## 2025-07-27 RX ORDER — LEVOTHYROXINE SODIUM 88 UG/1
88 TABLET ORAL DAILY
Qty: 30 TABLET | Refills: 5 | Status: SHIPPED | OUTPATIENT
Start: 2025-07-27

## 2025-08-18 ENCOUNTER — TELEPHONE (OUTPATIENT)
Dept: HEMATOLOGY ONCOLOGY | Facility: CLINIC | Age: 75
End: 2025-08-18

## 2025-08-20 DIAGNOSIS — E11.9 TYPE 2 DIABETES MELLITUS WITHOUT COMPLICATION, WITHOUT LONG-TERM CURRENT USE OF INSULIN (HCC): ICD-10-CM

## 2025-08-20 LAB
ALBUMIN SERPL BCG-MCNC: 4 G/DL (ref 3.5–5)
ALP SERPL-CCNC: 80 U/L (ref 34–104)
ALT SERPL W P-5'-P-CCNC: 13 U/L (ref 7–52)
ANION GAP SERPL CALCULATED.3IONS-SCNC: 7 MMOL/L (ref 4–13)
AST SERPL W P-5'-P-CCNC: 12 U/L (ref 13–39)
BASOPHILS # BLD AUTO: 0.06 THOUSANDS/ÂΜL (ref 0–0.1)
BASOPHILS NFR BLD AUTO: 1 % (ref 0–1)
BILIRUB SERPL-MCNC: 0.98 MG/DL (ref 0.2–1)
BUN SERPL-MCNC: 11 MG/DL (ref 5–25)
CALCIUM SERPL-MCNC: 9.3 MG/DL (ref 8.4–10.2)
CHLORIDE SERPL-SCNC: 107 MMOL/L (ref 96–108)
CO2 SERPL-SCNC: 28 MMOL/L (ref 21–32)
CREAT SERPL-MCNC: 0.79 MG/DL (ref 0.6–1.3)
EOSINOPHIL # BLD AUTO: 0.12 THOUSAND/ÂΜL (ref 0–0.61)
EOSINOPHIL NFR BLD AUTO: 2 % (ref 0–6)
ERYTHROCYTE [DISTWIDTH] IN BLOOD BY AUTOMATED COUNT: 15.4 % (ref 11.6–15.1)
GFR SERPL CREATININE-BSD FRML MDRD: 74 ML/MIN/1.73SQ M
GLUCOSE P FAST SERPL-MCNC: 148 MG/DL (ref 65–99)
HCT VFR BLD AUTO: 37.8 % (ref 34.8–46.1)
HGB BLD-MCNC: 11.9 G/DL (ref 11.5–15.4)
IMM GRANULOCYTES # BLD AUTO: 0.03 THOUSAND/UL (ref 0–0.2)
IMM GRANULOCYTES NFR BLD AUTO: 0 % (ref 0–2)
LYMPHOCYTES # BLD AUTO: 0.97 THOUSANDS/ÂΜL (ref 0.6–4.47)
LYMPHOCYTES NFR BLD AUTO: 13 % (ref 14–44)
MCH RBC QN AUTO: 30.2 PG (ref 26.8–34.3)
MCHC RBC AUTO-ENTMCNC: 31.5 G/DL (ref 31.4–37.4)
MCV RBC AUTO: 96 FL (ref 82–98)
MONOCYTES # BLD AUTO: 0.62 THOUSAND/ÂΜL (ref 0.17–1.22)
MONOCYTES NFR BLD AUTO: 8 % (ref 4–12)
NEUTROPHILS # BLD AUTO: 5.66 THOUSANDS/ÂΜL (ref 1.85–7.62)
NEUTS SEG NFR BLD AUTO: 76 % (ref 43–75)
NRBC BLD AUTO-RTO: 0 /100 WBCS
PLATELET # BLD AUTO: 297 THOUSANDS/UL (ref 149–390)
PMV BLD AUTO: 10.4 FL (ref 8.9–12.7)
POTASSIUM SERPL-SCNC: 4.8 MMOL/L (ref 3.5–5.3)
PROT SERPL-MCNC: 7 G/DL (ref 6.4–8.4)
RBC # BLD AUTO: 3.94 MILLION/UL (ref 3.81–5.12)
SODIUM SERPL-SCNC: 142 MMOL/L (ref 135–147)
WBC # BLD AUTO: 7.46 THOUSAND/UL (ref 4.31–10.16)

## 2025-08-21 ENCOUNTER — OFFICE VISIT (OUTPATIENT)
Dept: SURGICAL ONCOLOGY | Facility: CLINIC | Age: 75
End: 2025-08-21
Payer: MEDICARE

## 2025-08-21 VITALS
TEMPERATURE: 97.3 F | SYSTOLIC BLOOD PRESSURE: 120 MMHG | HEART RATE: 81 BPM | DIASTOLIC BLOOD PRESSURE: 68 MMHG | HEIGHT: 64 IN | OXYGEN SATURATION: 98 % | WEIGHT: 273 LBS | BODY MASS INDEX: 46.61 KG/M2

## 2025-08-21 DIAGNOSIS — Z79.811 USE OF ANASTROZOLE: ICD-10-CM

## 2025-08-21 DIAGNOSIS — C50.812 MALIGNANT NEOPLASM OF OVERLAPPING SITES OF LEFT BREAST IN FEMALE, ESTROGEN RECEPTOR POSITIVE (HCC): Primary | ICD-10-CM

## 2025-08-21 DIAGNOSIS — Z17.0 MALIGNANT NEOPLASM OF OVERLAPPING SITES OF LEFT BREAST IN FEMALE, ESTROGEN RECEPTOR POSITIVE (HCC): Primary | ICD-10-CM

## 2025-08-21 PROCEDURE — 99215 OFFICE O/P EST HI 40 MIN: CPT | Performed by: NURSE PRACTITIONER

## 2025-08-22 ENCOUNTER — OFFICE VISIT (OUTPATIENT)
Dept: HEMATOLOGY ONCOLOGY | Facility: CLINIC | Age: 75
End: 2025-08-22
Payer: MEDICARE

## 2025-08-22 VITALS
HEIGHT: 64 IN | TEMPERATURE: 97.3 F | OXYGEN SATURATION: 100 % | BODY MASS INDEX: 46.26 KG/M2 | RESPIRATION RATE: 18 BRPM | HEART RATE: 95 BPM | WEIGHT: 271 LBS | SYSTOLIC BLOOD PRESSURE: 128 MMHG | DIASTOLIC BLOOD PRESSURE: 68 MMHG

## 2025-08-22 DIAGNOSIS — Z86.2 HISTORY OF FACTOR V LEIDEN MUTATION: ICD-10-CM

## 2025-08-22 DIAGNOSIS — E78.2 MIXED HYPERLIPIDEMIA: ICD-10-CM

## 2025-08-22 DIAGNOSIS — Z86.79 HISTORY OF SUPERFICIAL PHLEBITIS: ICD-10-CM

## 2025-08-22 DIAGNOSIS — Z17.0 MALIGNANT NEOPLASM OF OVERLAPPING SITES OF LEFT BREAST IN FEMALE, ESTROGEN RECEPTOR POSITIVE (HCC): Primary | ICD-10-CM

## 2025-08-22 DIAGNOSIS — E03.9 ACQUIRED HYPOTHYROIDISM: ICD-10-CM

## 2025-08-22 DIAGNOSIS — Z86.718 HISTORY OF DVT OF LOWER EXTREMITY: ICD-10-CM

## 2025-08-22 DIAGNOSIS — C50.812 MALIGNANT NEOPLASM OF OVERLAPPING SITES OF LEFT BREAST IN FEMALE, ESTROGEN RECEPTOR POSITIVE (HCC): Primary | ICD-10-CM

## 2025-08-22 DIAGNOSIS — Z86.39 HISTORY OF DIABETES MELLITUS: ICD-10-CM

## 2025-08-22 DIAGNOSIS — E66.813 CLASS 3 SEVERE OBESITY DUE TO EXCESS CALORIES WITH SERIOUS COMORBIDITY AND BODY MASS INDEX (BMI) OF 40.0 TO 44.9 IN ADULT: ICD-10-CM

## 2025-08-22 PROCEDURE — 99214 OFFICE O/P EST MOD 30 MIN: CPT | Performed by: INTERNAL MEDICINE

## 2025-08-22 PROCEDURE — G2211 COMPLEX E/M VISIT ADD ON: HCPCS | Performed by: INTERNAL MEDICINE

## (undated) DEVICE — SURGICEL 2 X 14

## (undated) DEVICE — SUT MONOCRYL 4-0 PS-2 27 IN Y426H

## (undated) DEVICE — MEDI-VAC YANKAUER SUCTION HANDLE W/BULBOUS AND CONTROL VENT: Brand: CARDINAL HEALTH

## (undated) DEVICE — TUBING SUCTION 5MM X 12 FT

## (undated) DEVICE — SCD SEQUENTIAL COMPRESSION COMFORT SLEEVE MEDIUM KNEE LENGTH: Brand: KENDALL SCD

## (undated) DEVICE — LIGACLIP MCA MULTIPLE CLIP APPLIERS, 20 MEDIUM CLIPS: Brand: LIGACLIP

## (undated) DEVICE — PAD GROUNDING DUAL ADULT

## (undated) DEVICE — DRAIN SPONGES,6 PLY: Brand: EXCILON

## (undated) DEVICE — SUT MONOCRYL 3-0 SH 27 IN Y416H

## (undated) DEVICE — STAPLER INSORB SUBCUTICULAR 30 SINGLE USE

## (undated) DEVICE — JP PERF DRN SIL FLT 10MM FULL: Brand: CARDINAL HEALTH

## (undated) DEVICE — NEEDLE 25G X 1 1/2

## (undated) DEVICE — CHEST/BREAST DRAPE: Brand: CONVERTORS

## (undated) DEVICE — GLOVE SRG BIOGEL 6

## (undated) DEVICE — CHLORAPREP HI-LITE 26ML ORANGE

## (undated) DEVICE — INTENDED FOR TISSUE SEPARATION, AND OTHER PROCEDURES THAT REQUIRE A SHARP SURGICAL BLADE TO PUNCTURE OR CUT.: Brand: BARD-PARKER SAFETY BLADES SIZE 10, STERILE

## (undated) DEVICE — SUPER SPONGES,MEDIUM: Brand: KERLIX

## (undated) DEVICE — DRAPE EQUIPMENT RF WAND

## (undated) DEVICE — PLUMEPEN PRO 10FT

## (undated) DEVICE — DRAPE SHEET THREE QUARTER

## (undated) DEVICE — BETHLEHEM UNIVERSAL MINOR GEN: Brand: CARDINAL HEALTH

## (undated) DEVICE — EXOFIN PRECISION PEN HIGH VISCOSITY TOPICAL SKIN ADHESIVE: Brand: EXOFIN PRECISION PEN, 1G

## (undated) DEVICE — SUT SILK 2-0 SH 30 IN K833H

## (undated) DEVICE — JACKSON-PRATT 100CC BULB RESERVOIR: Brand: CARDINAL HEALTH

## (undated) DEVICE — SPONGE LAP 18 X 18 IN STRL RFD